# Patient Record
Sex: FEMALE | Race: WHITE | NOT HISPANIC OR LATINO | Employment: OTHER | URBAN - METROPOLITAN AREA
[De-identification: names, ages, dates, MRNs, and addresses within clinical notes are randomized per-mention and may not be internally consistent; named-entity substitution may affect disease eponyms.]

---

## 2017-06-26 ENCOUNTER — ALLSCRIPTS OFFICE VISIT (OUTPATIENT)
Dept: OTHER | Facility: OTHER | Age: 57
End: 2017-06-26

## 2017-06-26 DIAGNOSIS — E78.5 HYPERLIPIDEMIA: ICD-10-CM

## 2017-06-26 DIAGNOSIS — R79.89 OTHER SPECIFIED ABNORMAL FINDINGS OF BLOOD CHEMISTRY: ICD-10-CM

## 2017-06-26 DIAGNOSIS — E03.9 HYPOTHYROIDISM: ICD-10-CM

## 2017-06-26 DIAGNOSIS — E55.9 VITAMIN D DEFICIENCY: ICD-10-CM

## 2017-06-26 DIAGNOSIS — F39 MOOD DISORDER (HCC): ICD-10-CM

## 2017-06-28 ENCOUNTER — ALLSCRIPTS OFFICE VISIT (OUTPATIENT)
Dept: OTHER | Facility: OTHER | Age: 57
End: 2017-06-28

## 2017-08-25 ENCOUNTER — GENERIC CONVERSION - ENCOUNTER (OUTPATIENT)
Dept: OTHER | Facility: OTHER | Age: 57
End: 2017-08-25

## 2017-11-07 ENCOUNTER — GENERIC CONVERSION - ENCOUNTER (OUTPATIENT)
Dept: FAMILY MEDICINE CLINIC | Facility: CLINIC | Age: 57
End: 2017-11-07

## 2017-11-08 ENCOUNTER — ALLSCRIPTS OFFICE VISIT (OUTPATIENT)
Dept: OTHER | Facility: OTHER | Age: 57
End: 2017-11-08

## 2017-11-10 NOTE — PROGRESS NOTES
Assessment    1  Visit for suture removal (V58 32) (Z48 02)    Plan  Visit for suture removal    · Follow-up PRN Evaluation and Treatment  Follow-up  Status: Complete  Done:08Nov2017    Discussion/Summary    Continue to keep area clean and dry  Can use moisturizer to help minimize scarring in that area  Follow-up as needed  Possible side effects of new medications were reviewed with the patient/guardian today  The treatment plan was reviewed with the patient/guardian  The patient/guardian understands and agrees with the treatment plan     Self Referrals: No      Chief Complaint  Patient presents today for suture removal, 6 sutures on chin  History of Present Illness  HPI: 51-year-old female with intellectual disability presenting with caretaker for removal of sutures  Patient fell approximately 1 week ago going upstairs and lacerated her chin  Was seen [facility] was given 6 sutures in her chin  Mild tenderness around the area today, but no drainage  Review of Systems   Constitutional: No fever, no chills, feels well, no tiredness, no recent weight gain or loss  Integumentary: as noted in HPI  Active Problems    1  Balance problem (781 99) (R26 89)   2  Chronic constipation (564 00) (K59 09)   3  Colonoscopy (Fiberoptic) Screening   4  Confusion and disorientation (300 9) (F99)   5  Contusion of right lower leg (924 10) (S80 11XA)   6  Contusion of right wrist (923 21) (S60 211A)   7  Depression (311) (F32 9)   8  Encounter for PPD test (V74 1) (Z11 1)   9  Encounter for screening colonoscopy (V76 51) (Z12 11)   10  Encounter for screening mammogram for breast cancer (V76 12) (Z12 31)   11  Encounter for screening mammogram for malignant neoplasm of breast (V76 12)  (Z12 31)   12  Examination (V72 9) (Z00 00)   13  Hyperlipidemia (272 4) (E78 5)   14  Hypothyroidism (244 9) (E03 9)   15  Low serum vitamin D (790 6) (R79 89)   16   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,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)   17  Mental disability (319) (F79)   18  Mood disorder (296 90) (F39)   19  Need for prophylactic vaccination and inoculation against influenza (V04 81) (Z23)   20  Need for vaccination for DTP (V06 1) (Z23)   21  Obese (278 00) (E66 9)   22  Open Wound Of The Finger (883 0)   23  PPD screening test (V74 1) (Z11 1)   24  Screening for depression (V79 0) (Z13 89)   25  Urinary incontinence (788 30) (R32)   26  Vitamin D deficiency (268 9) (E55 9)    Past Medical History  1  History of Anxiety disorder (300 00) (F41 9)   2  History of Cellulitis of leg (682 6) (L03 119)   3  History of Cellulitis Of The Ankle (682 6)   4  History of Dyslipidemia (272 4) (E78 5)   5  History of mental retardation (V11 8) (Z86 59)   6  History of varicella (V12 09) (Z86 19)   7  History of Impacted cerumen, unspecified laterality (380 4) (H61 20)   8  Need for vaccination for DTP (V06 1) (Z23)    Family History  Mother    1  Family history of Neglect or abandonment  Father    2  Family history of Neglect or abandonment    Social History   · Disabled   · Never a smoker   · Never A Smoker   · Never Drank Alcohol   · No alcohol use   · No drug use   · Social history reviewed, unchanged    Surgical History    1  History of Hysterectomy    Current Meds   1  Calcium Carbonate-Vitamin D 600-400 MG-UNIT Oral Tablet; TAKE 1 TABLET TWICE DAILY AT 8AM & 5PM *DR MERRITT (CALCIUM SUPPLEMENT); Therapy: 13MZU6915 to (Evaluate:22Jan2018)  Requested for: 26Jun2017; Last Rx:26Jun2017 Ordered   2  Disposable Brief Large Miscellaneous; Use 3 pullups a day; Therapy: 51PLA4721 to (Evaluate:25Glh3621); Last Rx:73Spf1487 Ordered   3  Disposable Brief X-Large Miscellaneous; use 2 a day; Therapy: 62QKM2205 to (Evaluate:16Dtb9296)  Requested for: 46KUO9623; Last Rx:12Iyo7381 Ordered   4  FLUoxetine HCl - 40 MG Oral Capsule; take one capsule a day; Therapy: 99HOE4259 to (Evaluate:64Vdt1356)  Requested for: 04MFG4261; Last Rx:15Mar2016 Ordered   5  LamoTRIgine 100 MG Oral Tablet; TAKE 1 TABLET DAILY; Therapy: 67WLN0949 to (Evaluate:98Lbd2977)  Requested for: 92SNH8959; Last Rx:2016 Ordered   6  Levothyroxine Sodium 100 MCG Oral Tablet; TAKE 1 TABLET BY MOUTH ONCE DAILY AT 6AM (HYPOTHYROIDISM); Therapy: 10YLS1266 to (Evaluate:98Rez3006)  Requested for: 2017; Last Rx:2017 Ordered   7  Omega 3 1000 MG Oral Capsule; take 3 tabs daily;; Therapy: 2013 to (Evaluate:29Zlt6169)  Requested for: 54KRJ4660; Last Rx:2017 Ordered   8  Pravastatin Sodium 40 MG Oral Tablet; TAKE 1 TABLET BY MOUTH ONCE DAILY AT 5PM (CHOLESTEROL) *PICA; Therapy: 30GLA3125 to (Evaluate:40Rza5585)  Requested for: 50PYZ6919; Last DP:22EGM9957 Ordered   9  SF 5000 Plus 1 1 % Dental Cream; Therapy: (Recorded:24Lty1479) to Recorded   10  Tylenol 325 MG Oral Tablet; TAKE 1 OR 2 TABLETS EVERY 4 TO 6 HOURS AS  NEEDED; Therapy: 29VHX5942 to (Prosper Blacksmith)  Requested for: 2013; Last  Rx:2013 Ordered   11  Vitamin D2 400 UNIT Oral Tablet; Take 1 pill a day; Therapy: 48ZGQ1195 to (Evaluate:08Ael1840)  Requested for: 29IZU8020; Last  88YAH4659 Ordered   12  Vitamin D3 400 UNIT Oral Tablet; TAKE 1 TABLET BY MOUTH ONCE DAILY @ 8AM  (SUPPLEMENT); Therapy: 59UCU8074 to (Evaluate:81Dbf1786)  Requested for: 95QYI5415; Last  Rx:2017 Ordered    The medication list was reviewed and updated today  Allergies  1  No Known Drug Allergies    Vitals   Recorded: 60RMI4210 02:07PM   Temperature 96 8 F   Heart Rate 76   Respiration 20   Systolic 801   Diastolic 76   Height 5 ft 1 in   Weight 196 lb 2 oz   BMI Calculated 37 06   BSA Calculated 1 87   O2 Saturation 94       Physical Exam   Constitutional  General appearance: No acute distress, well appearing and well nourished  Skin  Examination of the skin for lesions: Abnormal  -- Approximately 3 cm laceration on chin with 6 sutures  Procedure   six sutures removed chin  Mild erythema noted    Laceration appears well healing  No complaints the patient                      Signatures   Electronically signed by : CARLA Baxter; Nov 8 2017  2:38PM EST                       (Author)    Electronically signed by : NIGHAT Murillo ; Nov 8 2017  3:36PM EST

## 2017-12-07 ENCOUNTER — ALLSCRIPTS OFFICE VISIT (OUTPATIENT)
Dept: OTHER | Facility: OTHER | Age: 57
End: 2017-12-07

## 2017-12-07 DIAGNOSIS — Z12.31 ENCOUNTER FOR SCREENING MAMMOGRAM FOR MALIGNANT NEOPLASM OF BREAST: ICD-10-CM

## 2017-12-07 DIAGNOSIS — R94.31 ABNORMAL ELECTROCARDIOGRAM: ICD-10-CM

## 2017-12-07 DIAGNOSIS — R42 DIZZINESS AND GIDDINESS: ICD-10-CM

## 2017-12-07 DIAGNOSIS — R29.6 REPEATED FALLS: ICD-10-CM

## 2017-12-20 ENCOUNTER — GENERIC CONVERSION - ENCOUNTER (OUTPATIENT)
Dept: OTHER | Facility: OTHER | Age: 57
End: 2017-12-20

## 2017-12-26 ENCOUNTER — GENERIC CONVERSION - ENCOUNTER (OUTPATIENT)
Dept: FAMILY MEDICINE CLINIC | Facility: CLINIC | Age: 57
End: 2017-12-26

## 2017-12-26 ENCOUNTER — HOSPITAL ENCOUNTER (OUTPATIENT)
Dept: NON INVASIVE DIAGNOSTICS | Facility: HOSPITAL | Age: 57
Discharge: HOME | End: 2017-12-26
Payer: COMMERCIAL

## 2017-12-26 DIAGNOSIS — R42 DIZZINESS AND GIDDINESS: ICD-10-CM

## 2017-12-26 DIAGNOSIS — R29.6 REPEATED FALLS: ICD-10-CM

## 2017-12-26 DIAGNOSIS — R94.31 ABNORMAL ELECTROCARDIOGRAM: ICD-10-CM

## 2017-12-26 PROCEDURE — 93017 CV STRESS TEST TRACING ONLY: CPT

## 2017-12-29 ENCOUNTER — TRANSCRIBE ORDERS (OUTPATIENT)
Dept: ADMINISTRATIVE | Facility: HOSPITAL | Age: 57
End: 2017-12-29

## 2017-12-29 DIAGNOSIS — R29.6 FALLS FREQUENTLY: ICD-10-CM

## 2017-12-29 DIAGNOSIS — R94.31 NONSPECIFIC ABNORMAL ELECTROCARDIOGRAM (ECG) (EKG): Primary | ICD-10-CM

## 2017-12-29 DIAGNOSIS — R94.39 ABNORMAL BALLISTOCARDIOGRAM: ICD-10-CM

## 2017-12-29 DIAGNOSIS — R42 DIZZINESS AND GIDDINESS: ICD-10-CM

## 2018-01-10 NOTE — PROGRESS NOTES
Assessment    1  Encounter for preventive health examination (V70 0) (Z00 00)    Plan  Encounter for PPD test    · PPD  Encounter for screening mammogram for breast cancer    · * MAMMO SCREENING BILATERAL W CAD; Status:Active - Retrospective By Protocol  Authorization; Requested OPE:[id number]; Health Maintenance    · Calcium Carbonate-Vitamin D 600-400 MG-UNIT Oral Tablet; TAKE 1 TABLET  TWICE DAILY AT 8AM & 5PM *DR MERRITT (CALCIUM SUPPLEMENT)   · Begin a limited exercise program ; Status:Complete;   Done: 22WNE6496   · Eat a low fat and low cholesterol diet ; Status:Complete;   Done: 44YAO5079   · Some eating tips that can help you lose weight ; Status:Complete;   Done: 78DZG9472   · We recommend that you follow these steps to lower your risk of osteoporosis  ;  Status:Complete;   Done: 58ZIW4214  Hyperlipidemia    · Pravastatin Sodium 40 MG Oral Tablet; TAKE 1 TABLET BY MOUTH ONCE  DAILY AT 5PM (CHOLESTEROL) *PICA   · (1) COMPREHENSIVE METABOLIC PANEL; Status:Active; Requested CIJ:49ZEQ5792;    · (1) LIPID PANEL, FASTING; Status:Active; Requested XXQ:85GTV0921;   Hypothyroidism    · Levothyroxine Sodium 100 MCG Oral Tablet; TAKE 1 TABLET BY MOUTH ONCE  DAILY AT 6AM (HYPOTHYROIDISM)   · (1) TSH; Status:Active; Requested PWH:27UDU6763;   Low serum vitamin D    · Vitamin D2 400 UNIT Oral Tablet; Take 1 pill a day  Low serum vitamin D, Vitamin D deficiency    · (1) VITAMIN D 25-HYDROXY; Status:Active; Requested BOB:[id number];   Mood disorder    · (1) CBC/PLT/DIFF; Status:Active; Requested TPL:79NRF8655;     Discussion/Summary  Impression: Subsequent Annual Wellness Visit, with preventive exam as well as age and risk appropriate counseling completed  Cardiovascular screening and counseling: screening is current, counseling was given on maintaining a healthy diet, counseling was given on maintaining a healthy weight and counseling was given on ways to improve cholesterol     Diabetes screening and counseling: screening is current, counseling was given on maintaining a healthy diet, counseling was given on maintaining a healthy weight and counseling was given on ways to improve physical activity  Colorectal cancer screening and counseling: screening is current  Breast cancer screening and counseling: screening is current  Osteoporosis screening and counseling: screening not indicated  Abdominal aortic aneurysm screening and counseling: screening not indicated  Glaucoma screening and counseling: screening not indicated  HIV screening and counseling: screening not indicated  Immunizations: influenza vaccination is recommended annually, pneumococcal vaccination status is unknown, hepatitis B vaccination series is not indicated at this time due to the patient's low risk of abdoul the disease, Zostavax vaccination status is unknown and Tdap vaccination up to date  Advance Directive Planning: complete and up to date  Advice and education were given regarding increasing physical activity, nutrition (non-diabetic) and sunscreen use  She was referred to continue regular f/u  Patient Discussion: follow-up visit needed in one year  Educational resources provided:   Possible side effects of new medications were reviewed with the patient/guardian today  The treatment plan was reviewed with the patient/guardian  The patient/guardian understands and agrees with the treatment plan     Self Referrals: No      Chief Complaint  ANNUAL WELLNESS VISIT      History of Present Illness  HPI: 63 y/o F here for AWV  No complaints  She has changed her diet and is more physically active  She pierce have optometry and just had lenses changed  Welcome to [employer] and Wellness Visits: The patient is being seen for the subsequent annual wellness visit  Medicare Screening and Risk Factors   Hospitalizations: she has been previously hospitalizied     Once per lifetime medicare screening tests: ECG has not been done and AAA screening US has not yet been done  Medicare Screening Tests Risk Questions   Abdominal aortic aneurysm risk assessment: none indicated  Osteoporosis risk assessment: none indicated  HIV risk assessment: none indicated  Drug and Alcohol Use: The patient has never smoked cigarettes  The patient reports never drinking alcohol  Alcohol concern:   The patient has no concerns about alcohol abuse  She has never used illicit drugs  Diet and Physical Activity: Current diet includes well balanced meals, limited junk food, 2 servings of fruit per day, 1 servings of vegetables per day, 1-2 servings of meat per day, 1 servings of whole grains per day, 1 servings of simple carbohydrates per day, 1-2 servings of dairy products per day, 2 cups of coffee per day, 1 cups of tea per day, 0 cans of regular soda per day and 1 cans of diet soda per day  She exercises daily  Exercise: walking 30-60 minutes per day  Mood Disorder and Cognitive Impairment Screening: PHQ-9 Depression Scale   Over the past 2 weeks, how often have you been bothered by the following problems? 1 ) Little interest or pleasure in doing things? Not at all    2 ) Feeling down, depressed or hopeless? Not at all    3 ) Trouble falling asleep or sleeping too much? Not at all    4 ) Feeling tired or having little energy? Not at all    5 ) Poor appetite or overeating? Not at all    6 ) Feeling bad about yourself, or that you are a failure, or have let yourself or your family down? Not at all    7 ) Trouble concentrating on things, such as reading a newspaper or watching television? Not at all    8 ) Moving or speaking so slowly that other people could have noticed, or the opposite, moving or speaking faster than usual? Not at all  TOTAL SCORE: 0    How difficult have these problems made it for you to do your work, take care of things at home, or get along with people? Not at all  Depression screening score was 0     negative for symptoms   She denies feeling down, depressed, or hopeless over the past two weeks  She denies feeling little interest or pleasure in doing things over the past two weeks  Cognitive impairment screening: denies difficulty learning/retaining new information, difficulty handling complex tasks, denies difficulty with reasoning, denies difficulty with spatial ability and orientation, denies difficulty with language and difficulty with behavior  Functional Ability/Level of Safety: Hearing is normal in the right ear, normal in the left ear and a hearing aid is not used  She denies hearing difficulties  The patient is currently able to do activities of daily living with limitations, able to do instrumental activities of daily living with limitations, able to participate in social activities with limitations and unable to drive  Activities of daily living details: needs help using the phone, transportation help needed, needs help shopping, meal preparation help needed, needs help doing housework, needs help doing laundry, needs help managing medications and needs help managing money  Fall risk factors:  urinary incontinence, but The patient fell 0 times in the past 12 months , no polypharmacy, no alcohol use, no mobility impairment, no antidepressant use, no deconditioning, no postural hypotension, no sedative use, no visual impairment, no antihypertensive use, no cognitive impairment and no previous fall  Home safety risk factors:  no unfamiliar surroundings, no loose rugs, no poor household lighting, no uneven floors, no household clutter, grab bars in the bathroom and handrails on the stairs  Advance Directives: Advance directives: durable power of  for health care directives, but no living will and no advance directives     Co-Managers and Medical Equipment/Suppliers: See Patient Care Team      Patient Care Team    Care Team Member Role Specialty Office Number   [facility]  Family Medicine [phone]   Danish Barba, [address] [facility] Medicine [phone]     Active Problems    1  Balance problem (781 99) (R26 89)   2  Chronic constipation (564 00) (K59 09)   3  Colonoscopy (Fiberoptic) Screening   4  Confusion and disorientation (300 9) (F99)   5  Contusion of right lower leg (924 10) (S80 11XA)   6  Contusion of right wrist (923 21) (S60 211A)   7  Depression (311) (F32 9)   8  Encounter for screening colonoscopy (V76 51) (Z12 11)   9  Encounter for screening mammogram for breast cancer (V76 12) (Z12 31)   10  Encounter for screening mammogram for malignant neoplasm of breast (V76 12)    (Z12 31)   11  Examination (V72 9) (Z00 00)   12  Hyperlipidemia (272 4) (E78 5)   13  Hypothyroidism (244 9) (E03 9)   14  Low serum vitamin D (790 6) (R79 89)   15  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,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)   16  Mental disability (319) (F79)   17  Mood disorder (296 90) (F39)   18  Need for prophylactic vaccination and inoculation against influenza (V04 81) (Z23)   19  Need for vaccination for DTP (V06 1) (Z23)   20  Obese (278 00) (E66 9)   21  Open Wound Of The Finger (883 0)   22  PPD screening test (V74 1) (Z11 1)   23  Screening for depression (V79 0) (Z13 89)   24  Urinary incontinence (788 30) (R32)   25  Vitamin D deficiency (268 9) (E55 9)    Past Medical History    · History of Anxiety disorder (300 00) (F41 9)   · History of Cellulitis of leg (682 6) (L03 119)   · History of Cellulitis Of The Ankle (682 6)   · History of Dyslipidemia (272 4) (E78 5)   · History of mental retardation (V11 8) (Z86 59)   · History of varicella (V12 09) (Z86 19)   · History of Impacted cerumen, unspecified laterality (380 4) (H61 20)   · Need for vaccination for DTP (V06 1) (Z23)    Surgical History    · History of Hysterectomy    The surgical history was reviewed and updated today         Family History  Mother    · Family history of Neglect or abandonment  Father    · Family history of Neglect or abandonment    The family history was reviewed and updated today  Social History    · Disabled   · Never a smoker   · Never A Smoker   · Never Drank Alcohol   · No alcohol use   · No drug use   · Social history reviewed, unchanged  The social history was reviewed and updated today  Current Meds   1  Calcium Carbonate-Vitamin D 600-400 MG-UNIT Oral Tablet; TAKE 1 TABLET TWICE   DAILY AT 8AM & 5PM *DR MERRITT (CALCIUM SUPPLEMENT); Therapy: 50UQM0217 to (Evaluate:36Yjc4278)  Requested for: 28Apr2017; Last   Rx:28Apr2017 Ordered   2  Disposable Brief Large Miscellaneous; Use 3 pullups a day; Therapy: 83EFQ8823 to (Evaluate:47Gkk0725); Last Rx:85Cxi7400 Ordered   3  Disposable Brief X-Large Miscellaneous; use 2 a day; Therapy: 01ZLJ4428 to (Evaluate:00Ehq8859)  Requested for: 66KER2318; Last   Rx:07Nsc1540 Ordered   4  FLUoxetine HCl - 40 MG Oral Capsule; take one capsule a day; Therapy: 70GSK5689 to (Evaluate:82Hhl4531)  Requested for: 08SVZ2268; Last   Rx:15Mar2016 Ordered   5  LamoTRIgine 100 MG Oral Tablet; TAKE 1 TABLET DAILY; Therapy: 92AUK7260 to (Evaluate:60Lmk4007)  Requested for: 41TFC4485; Last   Rx:15Mar2016 Ordered   6  Levothyroxine Sodium 100 MCG Oral Tablet; TAKE 1 TABLET BY MOUTH ONCE DAILY   AT 6AM (HYPOTHYROIDISM); Therapy: 46THB1394 to (Evaluate:37Xdc0100)  Requested for: 02BZG1281; Last   Rx:30Jan2017 Ordered   7  Omega 3 1000 MG Oral Capsule; take 3 tabs daily;;   Therapy: 12Jun2013 to (Evaluate:95Xzv5230)  Requested for: 22RND4987; Last   Rx:31Mar2017 Ordered   8  Pravastatin Sodium 40 MG Oral Tablet; TAKE 1 TABLET BY MOUTH ONCE DAILY AT   5PM (CHOLESTEROL) *PICA; Therapy: 55TPH1537 to (Evaluate:54Boa1267)  Requested for: 28Apr2017; Last   Rx:28Apr2017 Ordered   9  SF 5000 Plus 1 1 % Dental Cream;   Therapy: (Recorded:26Piq3554) to Recorded   10  Tylenol 325 MG Oral Tablet; TAKE 1 OR 2 TABLETS EVERY 4 TO 6 HOURS AS    NEEDED;     Therapy: 16SBW1844 to (Fermin Virgen)  Requested for: 86KFE9378; Last    Rx:11Jun2013 Ordered   11  Vitamin D2 400 UNIT Oral Tablet; Take 1 pill a day; Therapy: 74DOU5702 to (Evaluate:03Jun2017)  Requested for: 00OWC3022; Last    Rx:02Fmd5893 Ordered   12  Vitamin D3 400 UNIT Oral Tablet; TAKE 1 TABLET BY MOUTH ONCE DAILY @ 8AM    (SUPPLEMENT); Therapy: 21DKY8905 to (Evaluate:03Sep2017)  Requested for: 57VSR3128; Last    Rx:93Qxo4791 Ordered    The medication list was reviewed and updated today  Allergies    1  No Known Drug Allergies    Immunizations   1 2    Influenza  Temporarily Deferred: Out of Supplies 14-Nov-2014    PPD  11-Jun-2013 12-Jun-2015    Tdap  11-Jun-2013      Vitals  Signs    Temperature: 97 4 F, Tympanic  Heart Rate: 79  Respiration: 22  Systolic: 688  Diastolic: 78  Height: 5 ft 1 in  Weight: 199 lb 3 oz  BMI Calculated: 37 64  BSA Calculated: 1 89  O2 Saturation: 96    Physical Exam    Constitutional   General appearance: No acute distress, well appearing and well nourished  Eyes   Conjunctiva and lids: No swelling, erythema or discharge  Pupils and irises: Equal, round and reactive to light  Ears, Nose, Mouth, and Throat   External inspection of ears and nose: Normal     Otoscopic examination: Tympanic membranes translucent with normal light reflex  Canals patent without erythema  Oropharynx: Normal with no erythema, edema, exudate or lesions  Pulmonary   Respiratory effort: No increased work of breathing or signs of respiratory distress  Auscultation of lungs: Clear to auscultation  Cardiovascular   Palpation of heart: Normal PMI, no thrills  Auscultation of heart: Normal rate and rhythm, normal S1 and S2, without murmurs  Examination of extremities for edema and/or varicosities: Normal     Abdomen   Abdomen: Non-tender, no masses  Liver and spleen: No hepatomegaly or splenomegaly  Lymphatic   Palpation of lymph nodes in neck: No lymphadenopathy      Musculoskeletal   Gait and station: Normal     Digits and nails: Normal without clubbing or cyanosis  Inspection/palpation of joints, bones, and muscles: Normal     Skin   Skin and subcutaneous tissue: Normal without rashes or lesions  Neurologic   Cranial nerves: Cranial nerves 2-12 intact  Reflexes: 2+ and symmetric  Sensation: No sensory loss      Psychiatric   Orientation to person, place, and time: Normal     Mood and affect: Normal        Future Appointments    Date/Time Provider Specialty Site   06/28/2017 03:00 PM Silvestre PAEZ, Nurse Schedule  [place] [id number]     Signatures   Electronically signed by : ALBARO Qiu; Jun 26 2017  3:05PM EST                       (Author)    Electronically signed by : NIGHAT Mcgee ; Jul 5 2017  2:20PM EST

## 2018-01-11 NOTE — MISCELLANEOUS
Message  We have attempted to contact the patient/patients care taker but have been unsuccessful  I sent the patient a letter to contact our office at her earliest convenience  [id number]      Active Problems    1  Balance problem (781 99) (R26 89)   2  Chronic constipation (564 00) (K59 09)   3  Colonoscopy (Fiberoptic) Screening   4  Confusion and disorientation (300 9) (F99)   5  Contusion of right lower leg (924 10) (S80 11XA)   6  Contusion of right wrist (923 21) (S60 211A)   7  Depression (311) (F32 9)   8  Encounter for PPD test (V74 1) (Z11 1)   9  Encounter for screening colonoscopy (V76 51) (Z12 11)   10  Encounter for screening mammogram for breast cancer (V76 12) (Z12 31)   11  Encounter for screening mammogram for malignant neoplasm of breast (V76 12)    (Z12 31)   12  Examination (V72 9) (Z00 00)   13  Hyperlipidemia (272 4) (E78 5)   14  Hypothyroidism (244 9) (E03 9)   15  Low serum vitamin D (790 6) (R79 89)   16  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,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)   17  Mental disability (319) (F79)   18  Mood disorder (296 90) (F39)   19  Need for prophylactic vaccination and inoculation against influenza (V04 81) (Z23)   20  Need for vaccination for DTP (V06 1) (Z23)   21  Obese (278 00) (E66 9)   22  Open Wound Of The Finger (883 0)   23  PPD screening test (V74 1) (Z11 1)   24  Screening for depression (V79 0) (Z13 89)   25  Urinary incontinence (788 30) (R32)   26  Vitamin D deficiency (268 9) (E55 9)    Current Meds   1  Calcium Carbonate-Vitamin D 600-400 MG-UNIT Oral Tablet; TAKE 1 TABLET TWICE   DAILY AT 8AM & 5PM *DR MERRITT (CALCIUM SUPPLEMENT); Therapy: 14UDV4132 to (Evaluate:22Jan2018)  Requested for: 26Jun2017; Last   Rx:26Jun2017 Ordered   2  Disposable Brief Large Miscellaneous; Use 3 pullups a day; Therapy: 12QCQ3014 to (Evaluate:12Sta8359); Last Rx:86Wwa9050 Ordered   3  Disposable Brief X-Large Miscellaneous; use 2 a day;    Therapy: 01WON5657 to (Evaluate:24Cjq0512) Requested for: 08KRB4166; Last   Rx:89Ker4130 Ordered   4  FLUoxetine HCl - 40 MG Oral Capsule; take one capsule a day; Therapy: 14NDQ9620 to (Evaluate:23Tze4831)  Requested for: 13QIK9928; Last   Rx:2016 Ordered   5  LamoTRIgine 100 MG Oral Tablet; TAKE 1 TABLET DAILY; Therapy: 83AJA0924 to (Evaluate:68Eqg0835)  Requested for: 19PAA6583; Last   Rx:2016 Ordered   6  Levothyroxine Sodium 100 MCG Oral Tablet; TAKE 1 TABLET BY MOUTH ONCE DAILY   AT 6AM (HYPOTHYROIDISM); Therapy: 02GMU0420 to (Evaluate:81Zcw8754)  Requested for: 2017; Last   Rx:2017 Ordered   7  Omega 3 1000 MG Oral Capsule; take 3 tabs daily;;   Therapy: 2013 to (Evaluate:50Btc1806)  Requested for: 53IAC9213; Last   Rx:2017 Ordered   8  Pravastatin Sodium 40 MG Oral Tablet; TAKE 1 TABLET BY MOUTH ONCE DAILY AT   5PM (CHOLESTEROL) *PICA; Therapy: 58SLW2637 to (Evaluate:50Hpg5465)  Requested for: 23RHP4705; Last   ZX:73MFU9674 Ordered   9  SF 5000 Plus 1 1 % Dental Cream;   Therapy: (Recorded:39Lwz1477) to Recorded   10  Tylenol 325 MG Oral Tablet; TAKE 1 OR 2 TABLETS EVERY 4 TO 6 HOURS AS    NEEDED; Therapy: 26JPP6903 to (Yoana Walsh)  Requested for: 2013; Last    Rx:2013 Ordered   11  Vitamin D2 400 UNIT Oral Tablet; Take 1 pill a day; Therapy: 22WVG2687 to (Evaluate:98Mqd9122)  Requested for: 69FQS8150; Last    26QJR3271 Ordered   12  Vitamin D3 400 UNIT Oral Tablet; TAKE 1 TABLET BY MOUTH ONCE DAILY @ 8AM    (SUPPLEMENT); Therapy: 97GCZ0923 to (Evaluate:36Moj9010)  Requested for: 62LJZ3548; Last    Rx:85Zyw0515 Ordered    Allergies    1  No Known Drug Allergies    Signatures   Electronically signed by : ALBARO Hassan;  Aug 28 2017 11:14AM EST

## 2018-01-11 NOTE — PROGRESS NOTES
Assessment    1  Hyperlipidemia (272 4) (E78 5)   2  Hypothyroidism (244 9) (E03 9)   3  Encounter for preventive health examination (V70 0) (Z00 00)   4  Mental disability (319) (F79)    Plan  Encounter for screening mammogram for breast cancer    · * MAMMO SCREENING BILATERAL W CAD; Status:Hold For - Scheduling; Requested  for:22Jun2016;   Hyperlipidemia    · (1) COMPREHENSIVE METABOLIC PANEL; Status:Active; Requested for:22Jun2016;    · (1) COMPREHENSIVE METABOLIC PANEL; Status:Canceled;    · (1) LIPID PANEL, FASTING; Status:Active; Requested for:22Jun2016;    · (1) LIPID PANEL, FASTING; Status:Canceled;    · (1) LIPID PANEL, FASTING; Status:Canceled; Hypothyroidism    · (1) TSH; Status:Active; Requested for:22Jun2016;    · (1) TSH; Status:Canceled;    · (1) TSH; Status:Canceled;   Vitamin D deficiency    · (1) VITAMIN D 25-HYDROXY; Status:Canceled;    · (1) VITAMIN D 25-HYDROXY; Status:Canceled; Discussion/Summary  health maintenance visit Currently, she eats a healthy diet  cervical cancer screening is current Breast cancer screening: mammogram is current  Colorectal cancer screening: colorectal cancer screening is current  Screening lab work includes glucose, lipid profile and thyroid function testing  The immunizations are up to date  She was advised to be evaluated by UTD  Advice and education were given regarding nutrition and aerobic exercise  Continue with healthy diet and exercise  Despite no weight loss she is walking regularly and doing aerobics classes  She is UTD with vaccines and zostavax was in 2014  Possible side effects of new medications were reviewed with the patient/guardian today  The patient was counseled regarding instructions for management, impressions  Self Referrals: No      Chief Complaint  53 y/o well visit  Tosha Gann states that psych DC'd abilify 2mg and put pt on abilify 1mg      History of Present Illness  HM, Adult Female:  The patient is being seen for a health maintenance evaluation  The last health maintenance visit was 1 year(s) ago  General Health: The patient's health since the last visit is described as good  She has regular dental visits  She complains of vision problems (just got new glasses)  Vision care includes wearing glasses  She denies hearing loss  Immunizations status: up to date  Lifestyle:  She does not have a healthy diet  She has weight concerns  She exercises regularly  She does not use tobacco  She denies alcohol use  She denies drug use  Reproductive health:  she reports normal menses  she is not sexually active  Screening: cancer screening reviewed and current  metabolic screening reviewed and current  risk screening reviewed and current  HPI: 53 y/o F here for yearly PE  She saw psychiatry 1 year ago and they put her on abilify  She has had shaking and UI  He decreased the dose  Review of Systems    Constitutional: No fever, no chills, feels well, no tiredness, no recent weight gain or weight loss  Eyes: No complaints of eye pain, no red eyes, no eyesight problems, no discharge, no dry eyes, no itching of eyes  ENT: no complaints of earache, no loss of hearing, no nose bleeds, no nasal discharge, no sore throat, no hoarseness  Cardiovascular: No complaints of slow heart rate, no fast heart rate, no chest pain, no palpitations, no leg claudication, no lower extremity edema  Respiratory: No complaints of shortness of breath, no wheezing, no cough, no SOB on exertion, no orthopnea, no PND  Gastrointestinal: No complaints of abdominal pain, no constipation, no nausea or vomiting, no diarrhea, no bloody stools  Genitourinary: as noted in HPI  Musculoskeletal: No complaints of arthralgias, no myalgias, no joint swelling or stiffness, no limb pain or swelling  Integumentary: No complaints of skin rash or lesions, no itching, no skin wounds, no breast pain or lump     Neurological: tremor, but No complaints of headache, no confusion, no convulsions, no numbness, no dizziness or fainting, no tingling, no limb weakness, no difficulty walking  Psychiatric: Not suicidal, no sleep disturbance, no anxiety or depression, no change in personality, no emotional problems  Endocrine: No complaints of proptosis, no hot flashes, no muscle weakness, no deepening of the voice, no feelings of weakness  Hematologic/Lymphatic: No complaints of swollen glands, no swollen glands in the neck, does not bleed easily, does not bruise easily  Active Problems    1  Chronic constipation (564 00) (K59 09)   2  Colonoscopy (Fiberoptic) Screening   3  Contusion of right lower leg (924 10) (S80 11XA)   4  Contusion of right wrist (923 21) (S60 211A)   5  Depression (311) (F32 9)   6  Encounter for screening colonoscopy (V76 51) (Z12 11)   7  Encounter for screening mammogram for breast cancer (V76 12) (Z12 31)   8  Encounter for screening mammogram for malignant neoplasm of breast (V76 12)   (Z12 31)   9  Examination (V72 9) (Z00 00)   10  Hyperlipidemia (272 4) (E78 5)   11  Hypothyroidism (244 9) (E03 9)   12  Low serum vitamin D (790 6) (R79 89)   13  Mood disorder (296 90) (F39)   14  Need for prophylactic vaccination and inoculation against influenza (V04 81) (Z23)   15  Need for vaccination for DTP (V06 1) (Z23)   16  Obese (278 00) (E66 9)   17  Open Wound Of The Finger (883 0)   18  PPD screening test (V74 1) (Z11 1)   19  Screening for depression (V79 0) (Z13 89)   20  Urinary incontinence (788 30) (R32)   21   Vitamin D deficiency (268 9) (E55 9)    Past Medical History    · History of Anxiety disorder (300 00) (F41 9)   · History of Cellulitis of leg (682 6) (L03 119)   · History of Cellulitis Of The Ankle (682 6)   · History of Dyslipidemia (272 4) (E78 5)   · History of mental retardation (V11 8) (Z86 59)   · History of varicella (V12 09) (Z86 19)   · History of Impacted cerumen, unspecified laterality (380 4) (H61 20)   · Need for vaccination for DTP (V06 1) (Z23)    Surgical History    · History of Hysterectomy    Family History  Mother    · Family history of Neglect or abandonment  Father    · Family history of Neglect or abandonment    Social History    · Disabled   · Never a smoker   · Never A Smoker   · Never Drank Alcohol   · No alcohol use   · No drug use   · Social history reviewed, unchanged    Current Meds   1  ARIPiprazole 2 MG Oral Tablet; TAKE 1 TABLET DAILY AS DIRECTED AT 6AM;   Therapy: 13PKB2961 to (Evaluate:07Imh7812)  Requested for: 30URQ1765; Last   Rx:15Mar2016 Ordered   2  Calcium Carbonate-Vitamin D 600-400 MG-UNIT Oral Tablet; TAKE 1 TABLET TWICE   DAILY AT 8AM & 5PM *DR MERRITT (CALCIUM SUPPLEMENT); Therapy: 23BSO4768 to (Last Rx:28Apr2016)  Requested for: 28Apr2016 Ordered   3  FLUoxetine HCl - 40 MG Oral Capsule; take one capsule a day; Therapy: 54EOC8433 to (Evaluate:87Yob4301)  Requested for: 77DOC8467; Last   Rx:15Mar2016 Ordered   4  LamoTRIgine 100 MG Oral Tablet; TAKE 1 TABLET DAILY; Therapy: 65LHR4176 to (Evaluate:10Ppj5105)  Requested for: 86BGP8919; Last   Rx:15Mar2016 Ordered   5  Levothyroxine Sodium 100 MCG Oral Tablet; TAKE ONE (1) TABLET daily; Therapy: 59NLA8752 to (Evaluate:92Ibj7740)  Requested for: 72NOJ7898; Last   Rx:15Mar2016 Ordered   6  Omega 3 1000 MG Oral Capsule; take 3 tabs daily;;   Therapy: 69Wiu2151 to (Evaluate:27Xho3791)  Requested for: 36XFT9602; Last   Rx:15Mar2016 Ordered   7  Pravastatin Sodium 40 MG Oral Tablet; TAKE 1 TABLET BY MOUTH ONCE DAILY AT   5PM; Patient to contact PCP for Appointment; Therapy: 40GGW9635 to (Evaluate:86Uos8257)  Requested for: 78EAQ0790; Last   Rx:15Mar2016 Ordered   8  SF 5000 Plus 1 1 % Dental Cream;   Therapy: (Recorded:90Qjv8781) to Recorded   9  Tylenol 325 MG Oral Tablet; TAKE 1 OR 2 TABLETS EVERY 4 TO 6 HOURS AS   NEEDED; Therapy: 29XKZ0851 to (Chadd Das)  Requested for: 92Lic3866; Last   Rx:11Jun2013 Ordered   10   Vitamin D2 400 UNIT Oral Tablet; Take 1 pill a day; Therapy: 42QXQ0732 to (Evaluate:39Zok6022)  Requested for: 38WMB1260; Last    Rx:70Jhe7224 Ordered    Allergies    1  No Known Drug Allergies    Vitals   Recorded: 24QPT0903 12:54PM   Temperature 98 3 F   Heart Rate 72   Respiration 20   Systolic 193   Diastolic 68   Height 5 ft 1 in   Weight 206 lb    BMI Calculated 38 92   BSA Calculated 1 91     Physical Exam    Constitutional   General appearance: No acute distress, well appearing and well nourished  Eyes   Conjunctiva and lids: No swelling, erythema or discharge  Pupils and irises: Equal, round and reactive to light  Ears, Nose, Mouth, and Throat   External inspection of ears and nose: Normal     Otoscopic examination: Tympanic membranes translucent with normal light reflex  Canals patent without erythema  Oropharynx: Normal with no erythema, edema, exudate or lesions  Pulmonary   Respiratory effort: No increased work of breathing or signs of respiratory distress  Auscultation of lungs: Clear to auscultation  Cardiovascular   Palpation of heart: Normal PMI, no thrills  Auscultation of heart: Normal rate and rhythm, normal S1 and S2, without murmurs  Examination of extremities for edema and/or varicosities: Normal     Abdomen   Abdomen: Non-tender, no masses  Liver and spleen: No hepatomegaly or splenomegaly  Lymphatic   Palpation of lymph nodes in neck: No lymphadenopathy  Musculoskeletal   Gait and station: Normal     Digits and nails: Normal without clubbing or cyanosis  Inspection/palpation of joints, bones, and muscles: Normal     Skin   Skin and subcutaneous tissue: Normal without rashes or lesions  Neurologic   Cranial nerves: Cranial nerves 2-12 intact  Reflexes: 2+ and symmetric  Sensation: No sensory loss      Psychiatric   Orientation to person, place, and time: Normal     Mood and affect: Normal        Procedure    Procedure: Hearing Acuity Test  Indication: Routine screeing  Audiometry:   Hearing in the right ear: 30 decibals at 500 hertz, 30 decibals at 1000 hertz and 30 decibals at 2000 hertz  Hearing in the left ear: 30 decibals at 500 hertz, 30 decibals at 1000 hertz and 30 decibals at 2000 hertz         Signatures   Electronically signed by : ALBARO Flores; Jun 22 2016  1:27PM EST                       (Author)    Electronically signed by : NIGHAT Qureshi ; Jun 22 2016  2:17PM EST

## 2018-01-12 VITALS
RESPIRATION RATE: 22 BRPM | SYSTOLIC BLOOD PRESSURE: 126 MMHG | HEIGHT: 61 IN | OXYGEN SATURATION: 96 % | WEIGHT: 199.19 LBS | TEMPERATURE: 97.4 F | BODY MASS INDEX: 37.61 KG/M2 | DIASTOLIC BLOOD PRESSURE: 78 MMHG | HEART RATE: 79 BPM

## 2018-01-14 VITALS
BODY MASS INDEX: 37.03 KG/M2 | OXYGEN SATURATION: 94 % | TEMPERATURE: 96.8 F | WEIGHT: 196.13 LBS | SYSTOLIC BLOOD PRESSURE: 124 MMHG | RESPIRATION RATE: 20 BRPM | HEART RATE: 76 BPM | HEIGHT: 61 IN | DIASTOLIC BLOOD PRESSURE: 76 MMHG

## 2018-01-15 NOTE — MISCELLANEOUS
Please contact our office at your convenience  It is important that we speak to you  REGARDING:          Scheduling an Appointment     Rescheduling an Appointment      Cancelling an Appointment     XXXXX Your Lab/ X-ray Results    XXXXX Updating your Phone number or Address      Other:                                                                                                                   Narvis Rubinstein

## 2018-01-18 NOTE — PROGRESS NOTES
Chief Complaint  Pt here today with care taker for PPD reading  PPD is Negative @0MM SR16      Active Problems    1  Balance problem (781 99) (R26 89)   2  Chronic constipation (564 00) (K59 09)   3  Colonoscopy (Fiberoptic) Screening   4  Confusion and disorientation (300 9) (F99)   5  Contusion of right lower leg (924 10) (S80 11XA)   6  Contusion of right wrist (923 21) (S60 211A)   7  Depression (311) (F32 9)   8  Encounter for PPD test (V74 1) (Z11 1)   9  Encounter for screening colonoscopy (V76 51) (Z12 11)   10  Encounter for screening mammogram for breast cancer (V76 12) (Z12 31)   11  Encounter for screening mammogram for malignant neoplasm of breast (V76 12)    (Z12 31)   12  Examination (V72 9) (Z00 00)   13  Hyperlipidemia (272 4) (E78 5)   14  Hypothyroidism (244 9) (E03 9)   15  Low serum vitamin D (790 6) (R79 89)   16  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,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)   17  Mental disability (319) (F79)   18  Mood disorder (296 90) (F39)   19  Need for prophylactic vaccination and inoculation against influenza (V04 81) (Z23)   20  Need for vaccination for DTP (V06 1) (Z23)   21  Obese (278 00) (E66 9)   22  Open Wound Of The Finger (883 0)   23  PPD screening test (V74 1) (Z11 1)   24  Screening for depression (V79 0) (Z13 89)   25  Urinary incontinence (788 30) (R32)   26  Vitamin D deficiency (268 9) (E55 9)    Current Meds   1  Calcium Carbonate-Vitamin D 600-400 MG-UNIT Oral Tablet; TAKE 1 TABLET TWICE   DAILY AT 8AM & 5PM *DR MERRITT (CALCIUM SUPPLEMENT); Therapy: 18DOI5628 to (Evaluate:22Jan2018)  Requested for: 26Jun2017; Last   Rx:26Jun2017 Ordered   2  Disposable Brief Large Miscellaneous; Use 3 pullups a day; Therapy: 81WQG3481 to (Evaluate:07Sog6186); Last Rx:92Qmr2464 Ordered   3  Disposable Brief X-Large Miscellaneous; use 2 a day; Therapy: 38XXH7569 to (Evaluate:10Dhg8760)  Requested for: 70HAZ8576; Last   Rx:30Sep2016 Ordered   4   FLUoxetine HCl - 40 MG Oral Capsule; take one capsule a day; Therapy: 66LNO1455 to (Evaluate:08Pae8001)  Requested for: 76YVC7717; Last   Rx:15Mar2016 Ordered   5  LamoTRIgine 100 MG Oral Tablet; TAKE 1 TABLET DAILY; Therapy: 58UMD2499 to (Evaluate:30Shk7021)  Requested for: 00HGJ4148; Last   Rx:15Mar2016 Ordered   6  Levothyroxine Sodium 100 MCG Oral Tablet; TAKE 1 TABLET BY MOUTH ONCE DAILY   AT 6AM (HYPOTHYROIDISM); Therapy: 08ABA8626 to (Evaluate:44Sxy2587)  Requested for: 26Jun2017; Last   Rx:26Jun2017 Ordered   7  Omega 3 1000 MG Oral Capsule; take 3 tabs daily;;   Therapy: 12Jun2013 to (Evaluate:34Dqa9522)  Requested for: 48KLI1090; Last   Rx:31Mar2017 Ordered   8  Pravastatin Sodium 40 MG Oral Tablet; TAKE 1 TABLET BY MOUTH ONCE DAILY AT   5PM (CHOLESTEROL) *PICA; Therapy: 70VEZ2095 to (Evaluate:44Tyo0076)  Requested for: 08LDP9180; Last   YK:40QXS5883 Ordered   9  SF 5000 Plus 1 1 % Dental Cream;   Therapy: (Recorded:01Ekh3937) to Recorded   10  Tylenol 325 MG Oral Tablet; TAKE 1 OR 2 TABLETS EVERY 4 TO 6 HOURS AS    NEEDED; Therapy: 89LBN5141 to (Hernan Mitchell)  Requested for: 11Jun2013; Last    Rx:11Jun2013 Ordered   11  Vitamin D2 400 UNIT Oral Tablet; Take 1 pill a day; Therapy: 16HLZ0459 to (Evaluate:73Yhs1498)  Requested for: 12PSH7038; Last    EX:01HEP3328 Ordered   12  Vitamin D3 400 UNIT Oral Tablet; TAKE 1 TABLET BY MOUTH ONCE DAILY @ 8AM    (SUPPLEMENT); Therapy: 06SGN5844 to (Evaluate:88Cnq9014)  Requested for: 78GHB1334; Last    Rx:05Jun2017 Ordered    Allergies    1   No Known Drug Allergies    Plan  Encounter for PPD test    · PPD    Signatures   Electronically signed by : ALBARO Sanchez; Jun 29 2017  8:40AM EST                          Electronically signed by : NIGHAT Moran ; Jul 5 2017  2:20PM EST

## 2018-01-19 ENCOUNTER — GENERIC CONVERSION - ENCOUNTER (OUTPATIENT)
Dept: OTHER | Facility: OTHER | Age: 58
End: 2018-01-19

## 2018-01-19 ENCOUNTER — TRANSCRIBE ORDERS (OUTPATIENT)
Dept: ADMINISTRATIVE | Facility: HOSPITAL | Age: 58
End: 2018-01-19

## 2018-01-19 ENCOUNTER — ALLSCRIPTS OFFICE VISIT (OUTPATIENT)
Dept: OTHER | Facility: OTHER | Age: 58
End: 2018-01-19

## 2018-01-19 DIAGNOSIS — R94.31 ABNORMAL ELECTROCARDIOGRAM: Primary | ICD-10-CM

## 2018-01-19 DIAGNOSIS — R42 DIZZINESS AND GIDDINESS: ICD-10-CM

## 2018-01-22 VITALS
SYSTOLIC BLOOD PRESSURE: 126 MMHG | HEIGHT: 61 IN | TEMPERATURE: 98 F | RESPIRATION RATE: 20 BRPM | HEART RATE: 76 BPM | DIASTOLIC BLOOD PRESSURE: 72 MMHG | OXYGEN SATURATION: 96 % | BODY MASS INDEX: 36.46 KG/M2 | WEIGHT: 193.13 LBS

## 2018-01-23 NOTE — MISCELLANEOUS
Please contact our office at your convenience  It is important that we speak to you  REGARDING:   Porfavor contacte a nuestra oficina  Es muy  importante que hablemos con usted   SOBRE:          Scheduling an Appointment/ Programar grace Lisa          Rescheduling an Appointment/ Re-programar  grace Lisa     Cancelling an Appointment/Cancelar crow Lisa     Your Lab/ X-ray Results/Resultados         Updating your Phone number or Address/ Actualizar crow [employer]            Verify your Primary Providor/ Verificar crow Proveedor primario          X Other/Otro: Call office  [phone] about test    Please Contact Our Office to Schedule Your Appointment  It is Very Important That You See Your Provider for your  Routine  Medical Care  If you are seeing a New Providor,  Please Contact our Office so we can update our Records  Thank You  Comuníquese con nuestra oficina para programar crow lisa  Es Muy Importante que usted magui crow proveedor para crow   [address] [facility] Floor de [place]  Si usted esta viendo un Proveedor   [place], [place] con Meneses Yannick oficina para actualizar   nuestro registros  Abran

## 2018-01-24 VITALS
HEART RATE: 60 BPM | WEIGHT: 194 LBS | HEIGHT: 61 IN | SYSTOLIC BLOOD PRESSURE: 102 MMHG | BODY MASS INDEX: 36.63 KG/M2 | DIASTOLIC BLOOD PRESSURE: 70 MMHG | RESPIRATION RATE: 16 BRPM

## 2018-02-08 RX ORDER — LAMOTRIGINE 100 MG/1
1 TABLET ORAL DAILY
COMMUNITY
Start: 2015-12-10 | End: 2019-06-24 | Stop reason: SDUPTHER

## 2018-02-08 RX ORDER — SODIUM FLUORIDE 5 MG/ML
PASTE, DENTIFRICE DENTAL
COMMUNITY
End: 2021-12-13 | Stop reason: ALTCHOICE

## 2018-02-08 RX ORDER — PRAVASTATIN SODIUM 40 MG
TABLET ORAL
COMMUNITY
Start: 2012-01-17 | End: 2018-07-02 | Stop reason: SDUPTHER

## 2018-02-08 RX ORDER — LEVOTHYROXINE SODIUM 0.1 MG/1
TABLET ORAL
COMMUNITY
Start: 2015-07-14 | End: 2018-07-02 | Stop reason: SDUPTHER

## 2018-02-08 RX ORDER — FLUOXETINE HYDROCHLORIDE 40 MG/1
CAPSULE ORAL
COMMUNITY
Start: 2015-12-10 | End: 2018-07-02 | Stop reason: SINTOL

## 2018-02-08 RX ORDER — QUETIAPINE FUMARATE 50 MG/1
1 TABLET, FILM COATED ORAL
COMMUNITY
Start: 2018-01-19 | End: 2019-06-24 | Stop reason: SDUPTHER

## 2018-02-08 RX ORDER — CHLORAL HYDRATE 500 MG
CAPSULE ORAL
COMMUNITY
Start: 2013-06-12 | End: 2018-07-02 | Stop reason: SDUPTHER

## 2018-02-08 RX ORDER — DIAPER,BRIEF,ADULT, DISPOSABLE
EACH MISCELLANEOUS
COMMUNITY
Start: 2016-09-30 | End: 2018-02-26 | Stop reason: SDUPTHER

## 2018-02-08 RX ORDER — OMEGA-3S/DHA/EPA/FISH OIL/D3 300MG-1000
CAPSULE ORAL
COMMUNITY
Start: 2017-06-05 | End: 2018-07-02 | Stop reason: SDUPTHER

## 2018-02-08 RX ORDER — BUSPIRONE HYDROCHLORIDE 10 MG/1
0.5 TABLET ORAL 2 TIMES DAILY
COMMUNITY
Start: 2018-01-19 | End: 2019-06-24 | Stop reason: SDUPTHER

## 2018-02-13 ENCOUNTER — OFFICE VISIT (OUTPATIENT)
Dept: CARDIOLOGY CLINIC | Facility: CLINIC | Age: 58
End: 2018-02-13
Payer: COMMERCIAL

## 2018-02-13 ENCOUNTER — HOSPITAL ENCOUNTER (OUTPATIENT)
Dept: NON INVASIVE DIAGNOSTICS | Facility: CLINIC | Age: 58
Discharge: HOME | End: 2018-02-13
Payer: COMMERCIAL

## 2018-02-13 VITALS
SYSTOLIC BLOOD PRESSURE: 100 MMHG | BODY MASS INDEX: 35.27 KG/M2 | RESPIRATION RATE: 20 BRPM | HEIGHT: 61 IN | HEART RATE: 76 BPM | DIASTOLIC BLOOD PRESSURE: 68 MMHG | WEIGHT: 186.8 LBS

## 2018-02-13 DIAGNOSIS — R42 DIZZINESS AND GIDDINESS: ICD-10-CM

## 2018-02-13 DIAGNOSIS — I51.7 ENLARGED LA (LEFT ATRIUM): Primary | ICD-10-CM

## 2018-02-13 DIAGNOSIS — R94.31 ABNORMAL ELECTROCARDIOGRAM: ICD-10-CM

## 2018-02-13 DIAGNOSIS — Z87.898 H/O PROLONGED Q-T INTERVAL ON ECG: ICD-10-CM

## 2018-02-13 PROCEDURE — 93306 TTE W/DOPPLER COMPLETE: CPT | Performed by: INTERNAL MEDICINE

## 2018-02-13 PROCEDURE — 99214 OFFICE O/P EST MOD 30 MIN: CPT | Performed by: INTERNAL MEDICINE

## 2018-02-13 PROCEDURE — 93306 TTE W/DOPPLER COMPLETE: CPT

## 2018-02-13 RX ORDER — DOXYCYCLINE HYCLATE 100 MG/1
CAPSULE ORAL
COMMUNITY
Start: 2018-02-09 | End: 2018-07-02 | Stop reason: ALTCHOICE

## 2018-02-13 RX ORDER — VALACYCLOVIR HYDROCHLORIDE 1 G/1
TABLET, FILM COATED ORAL
COMMUNITY
Start: 2018-02-12 | End: 2018-07-02 | Stop reason: ALTCHOICE

## 2018-02-13 RX ORDER — BUSPIRONE HYDROCHLORIDE 5 MG/1
TABLET ORAL
COMMUNITY
Start: 2018-02-10 | End: 2018-02-13 | Stop reason: SDUPTHER

## 2018-02-13 RX ORDER — LAMOTRIGINE 25 MG/1
TABLET ORAL
COMMUNITY
Start: 2018-01-25 | End: 2019-06-24 | Stop reason: SDUPTHER

## 2018-02-13 RX ORDER — ARIPIPRAZOLE 2 MG/1
2 TABLET ORAL
COMMUNITY
End: 2018-07-02 | Stop reason: SINTOL

## 2018-02-13 NOTE — LETTER
February 13, 2018     Aditya Mccray, [address]  [address] [place] [address]    Patient: Donta Ramos   YOB: 1960   Date of Visit: 2/13/2018       Dear Dr Lizabeth Whalen: Thank you for referring Carlos Vangchristinehitesh to me for evaluation  Below are my notes for this consultation  If you have questions, please do not hesitate to call me  I look forward to following your patient along with you           Sincerely,        Redgie Riedel, MD        CC: No Recipients

## 2018-02-13 NOTE — PROGRESS NOTES
[address] [facility] [place]  [address]  [facility] [address] [facility]  [phone]    Cardiology Follow up    Patient:  Claudio Dennis  :  1960  MRN:  [id number]    History of Present Illness:      44-year-old female with past medical history of dyslipidemia, thyroid disease, mood disorder and mild IDD/ MR presents for follow-up  She has unfortunately been diagnosed with pneumonia recently and has had some shortness of breath  She continues to have some instability when she walks around but no falls  She denies chest pain, palpitations, or syncope  Patient Active Problem List   Diagnosis    Enlarged LA (left atrium)       Past Surgical History  No past surgical history on file  Social History   Social History     Social History    Marital status: Single     Spouse name: N/A    Number of children: N/A    Years of education: N/A     Occupational History    Not on file  Social History Main Topics    Smoking status: Never Smoker    Smokeless tobacco: Never Used    Alcohol use Not on file    Drug use: Unknown    Sexual activity: Not on file     Other Topics Concern    Not on file     Social History Narrative    No narrative on file        No Known Allergies    Family History   No family history on file  Review of Systems:  Review of Systems   Constitutional: Negative for chills, fatigue and fever  HENT: Negative for hearing loss, nosebleeds and tinnitus  Eyes: Negative for pain  Respiratory: Positive for shortness of breath  Negative for cough and chest tightness  Cardiovascular: Negative for chest pain, palpitations and leg swelling  Gastrointestinal: Negative for abdominal pain, nausea and vomiting  Endocrine: Negative for cold intolerance and heat intolerance  Genitourinary: Negative for difficulty urinating, hematuria and vaginal bleeding  Musculoskeletal: Negative for arthralgias  Skin: Negative for rash     Allergic/Immunologic: Negative for environmental allergies  Neurological: Negative for dizziness, syncope, weakness and light-headedness  Psychiatric/Behavioral: Negative for decreased concentration and sleep disturbance  The patient is not nervous/anxious  Current Outpatient Prescriptions:     busPIRone (BUSPAR) 10 mg tablet, Take 0 5 tablets by mouth 2 (two) times a day, Disp: , Rfl:     Calcium Carbonate-Vitamin D3 600-400 MG-UNIT TABS, , Disp: , Rfl:     cholecalciferol (VITAMIN D3) 400 units tablet, Take by mouth, Disp: , Rfl:     doxycycline hyclate (VIBRAMYCIN) 100 mg capsule, , Disp: , Rfl:     FLUoxetine (PROzac) 40 MG capsule, Take by mouth, Disp: , Rfl:     Incontinence Supply Disposable (DISPOSABLE BRIEF X-LARGE) MISC, by Does not apply route, Disp: , Rfl:     lamoTRIgine (LaMICtal) 100 mg tablet, Take 1 tablet by mouth daily, Disp: , Rfl:     lamoTRIgine (LaMICtal) 25 mg tablet, , Disp: , Rfl:     levothyroxine 100 mcg tablet, Take by mouth, Disp: , Rfl:     Omega-3 1000 MG CAPS, Take by mouth, Disp: , Rfl:     pravastatin (PRAVACHOL) 40 mg tablet, Take by mouth, Disp: , Rfl:     QUEtiapine (SEROquel) 50 mg tablet, Take 1 tablet by mouth, Disp: , Rfl:     SODIUM FLUORIDE, DENTAL GEL, (SF 5000 PLUS) 1 1 % CREA, SF 5000 Plus 1 1 % Dental Cream; 0; 22-Aug-2014; Active, Disp: , Rfl:     valACYclovir (VALTREX) 1,000 mg tablet, , Disp: , Rfl:     VENTOLIN  (90 Base) MCG/ACT inhaler, , Disp: , Rfl:     ARIPiprazole (ABILIFY) 2 mg tablet, Take 2 mg by mouth, Disp: , Rfl:      Physical Exam:    Vitals:    02/13/18 0840   BP: 100/68   BP Location: Left arm   Patient Position: Sitting   Cuff Size: Standard   Pulse: 76   Resp: 20   Weight: 84 7 kg (186 lb 12 8 oz)   Height: 5' 1" (1 549 m)       Physical Exam   Constitutional: She is oriented to person, place, and time  She appears well-developed and well-nourished  HENT:   Head: Normocephalic     Right Ear: External ear normal    Left Ear: External ear normal  Mouth/Throat: Oropharynx is clear and moist    Eyes: Pupils are equal, round, and reactive to light  Neck: No JVD present  Carotid bruit is not present  Cardiovascular: Normal rate, regular rhythm and intact distal pulses  Exam reveals no gallop and no friction rub  No murmur heard  Pulmonary/Chest: Effort normal and breath sounds normal  No tachypnea  No respiratory distress  She has no wheezes  She has no rales  She exhibits no tenderness  Abdominal: Soft  She exhibits no distension  There is no tenderness  There is no rebound and no guarding  Musculoskeletal: She exhibits no edema  Neurological: She is alert and oriented to person, place, and time  Skin: Skin is warm and dry  Psychiatric: She has a normal mood and affect  Her behavior is normal  Judgment and thought content normal    Nursing note and vitals reviewed  Labs:not applicable    Assessment/Plan:     1  Gait instability/ history of falls- I do not see a clear cardiovascular etiology for this  I do think it would be a good idea for follow-up with Neurology  From a cardiovascular standpoint,her stress test was without evidence of ischemia but she did not reach target heart rate  Her echocardiogram has a moderately dilated left atrium and the left ventricle is top-normal in size  She does have mild mitral regurgitation  I would like her to follow up next year with an echocardiogram       2  Mildly prolonged QTC - I would recommend checking EKG if there are new medicines that are added that can prolong QTC  Thank you so much, please not hesitate to contact me if there are questions or concerns              Josep Medina MD  2/13/2018  9:16 AM

## 2018-02-26 ENCOUNTER — TELEPHONE (OUTPATIENT)
Dept: FAMILY MEDICINE CLINIC | Facility: CLINIC | Age: 58
End: 2018-02-26

## 2018-02-26 DIAGNOSIS — R32 URINARY INCONTINENCE, UNSPECIFIED TYPE: Primary | ICD-10-CM

## 2018-02-28 RX ORDER — DIAPER,BRIEF,ADULT, DISPOSABLE
EACH MISCELLANEOUS
Qty: 64 EACH | Refills: 5 | Status: SHIPPED | OUTPATIENT
Start: 2018-02-28 | End: 2018-07-02 | Stop reason: SDUPTHER

## 2018-03-01 ENCOUNTER — TELEPHONE (OUTPATIENT)
Dept: FAMILY MEDICINE CLINIC | Facility: CLINIC | Age: 58
End: 2018-03-01

## 2018-03-08 ENCOUNTER — TELEPHONE (OUTPATIENT)
Dept: NEUROLOGY | Facility: CLINIC | Age: 58
End: 2018-03-08

## 2018-03-08 ENCOUNTER — TELEPHONE (OUTPATIENT)
Dept: FAMILY MEDICINE CLINIC | Facility: CLINIC | Age: 58
End: 2018-03-08

## 2018-03-08 DIAGNOSIS — R25.1 TREMOR: Primary | ICD-10-CM

## 2018-04-16 LAB
ARRHY DURING EX: NORMAL
CHEST PAIN STATEMENT: NORMAL
MAX DIASTOLIC BP: 76 MMHG
MAX HEART RATE: 118 BPM
MAX PREDICTED HEART RATE: 163 BPM
MAX. SYSTOLIC BP: 192 MMHG
PROTOCOL NAME: NORMAL
TARGET HR FORMULA: NORMAL
TEST INDICATION: NORMAL
TIME IN EXERCISE PHASE: NORMAL

## 2018-06-24 RX ORDER — LEVOTHYROXINE SODIUM 0.1 MG/1
TABLET ORAL
Qty: 31 TABLET | Refills: 0 | Status: CANCELLED | OUTPATIENT
Start: 2018-06-24

## 2018-07-02 ENCOUNTER — OFFICE VISIT (OUTPATIENT)
Dept: FAMILY MEDICINE CLINIC | Facility: CLINIC | Age: 58
End: 2018-07-02
Payer: COMMERCIAL

## 2018-07-02 VITALS
TEMPERATURE: 98.2 F | BODY MASS INDEX: 35.95 KG/M2 | HEIGHT: 61 IN | WEIGHT: 190.4 LBS | SYSTOLIC BLOOD PRESSURE: 118 MMHG | OXYGEN SATURATION: 95 % | RESPIRATION RATE: 20 BRPM | DIASTOLIC BLOOD PRESSURE: 62 MMHG | HEART RATE: 88 BPM

## 2018-07-02 DIAGNOSIS — Z13.1 SCREENING FOR DIABETES MELLITUS: ICD-10-CM

## 2018-07-02 DIAGNOSIS — R32 URINARY INCONTINENCE, UNSPECIFIED TYPE: ICD-10-CM

## 2018-07-02 DIAGNOSIS — Z13.820 SCREENING FOR OSTEOPOROSIS: ICD-10-CM

## 2018-07-02 DIAGNOSIS — Z00.00 MEDICARE ANNUAL WELLNESS VISIT, SUBSEQUENT: Primary | ICD-10-CM

## 2018-07-02 DIAGNOSIS — E03.9 HYPOTHYROIDISM, UNSPECIFIED TYPE: ICD-10-CM

## 2018-07-02 DIAGNOSIS — N39.490 OVERFLOW INCONTINENCE OF URINE: ICD-10-CM

## 2018-07-02 DIAGNOSIS — E55.9 VITAMIN D DEFICIENCY: ICD-10-CM

## 2018-07-02 DIAGNOSIS — E78.5 HYPERLIPIDEMIA, UNSPECIFIED HYPERLIPIDEMIA TYPE: ICD-10-CM

## 2018-07-02 DIAGNOSIS — Z13.0 SCREENING FOR DEFICIENCY ANEMIA: ICD-10-CM

## 2018-07-02 PROBLEM — F41.9 SEVERE ANXIETY: Status: ACTIVE | Noted: 2018-05-15

## 2018-07-02 PROCEDURE — G0439 PPPS, SUBSEQ VISIT: HCPCS | Performed by: FAMILY MEDICINE

## 2018-07-02 PROCEDURE — 3008F BODY MASS INDEX DOCD: CPT | Performed by: FAMILY MEDICINE

## 2018-07-02 PROCEDURE — 3725F SCREEN DEPRESSION PERFORMED: CPT | Performed by: FAMILY MEDICINE

## 2018-07-02 RX ORDER — PRAVASTATIN SODIUM 40 MG
40 TABLET ORAL DAILY
Qty: 30 TABLET | Refills: 5 | Status: SHIPPED | OUTPATIENT
Start: 2018-07-02 | End: 2018-11-05 | Stop reason: SDUPTHER

## 2018-07-02 RX ORDER — CHLORAL HYDRATE 500 MG
3 CAPSULE ORAL DAILY
Qty: 90 CAPSULE | Refills: 5 | Status: SHIPPED | OUTPATIENT
Start: 2018-07-02 | End: 2018-11-05 | Stop reason: SDUPTHER

## 2018-07-02 RX ORDER — DIAPER,BRIEF,ADULT, DISPOSABLE
EACH MISCELLANEOUS
Qty: 64 EACH | Refills: 5 | Status: SHIPPED | OUTPATIENT
Start: 2018-07-02 | End: 2019-02-28 | Stop reason: SDUPTHER

## 2018-07-02 RX ORDER — OMEGA-3S/DHA/EPA/FISH OIL/D3 300MG-1000
400 CAPSULE ORAL DAILY
Qty: 30 TABLET | Refills: 5 | Status: SHIPPED | OUTPATIENT
Start: 2018-07-02 | End: 2018-08-20 | Stop reason: DRUGHIGH

## 2018-07-02 RX ORDER — LEVOTHYROXINE SODIUM 0.1 MG/1
100 TABLET ORAL DAILY
Qty: 30 TABLET | Refills: 5 | Status: SHIPPED | OUTPATIENT
Start: 2018-07-02 | End: 2018-11-05 | Stop reason: SDUPTHER

## 2018-07-02 NOTE — PROGRESS NOTES
Assessment and Plan:  Problem List Items Addressed This Visit     Hypothyroidism    Relevant Medications    levothyroxine 100 mcg tablet    Other Relevant Orders    TSH, 3rd generation with T4 reflex    Hyperlipidemia    Relevant Medications    Omega-3 1000 MG CAPS    pravastatin (PRAVACHOL) 40 mg tablet    Vitamin D deficiency    Relevant Medications    cholecalciferol (VITAMIN D3) 400 units tablet    Calcium Carbonate-Vitamin D3 600-400 MG-UNIT TABS    Other Relevant Orders    Vitamin D 25 hydroxy    Overflow incontinence of urine      Other Visit Diagnoses     Medicare annual wellness visit, subsequent    -  Primary    Relevant Orders    Lipid panel    POCT HIV-1 and HIV-2 antibodies    Hepatitis C antibody    Mammo screening bilateral w cad    Urinary incontinence, unspecified type        Relevant Medications    Incontinence Supply Disposable (WINGS CHOICE PLUS ADULT BRIEFS) MISC    Screening for diabetes mellitus        Relevant Orders    Comprehensive metabolic panel    Screening for deficiency anemia        Relevant Orders    CBC and differential    Screening for osteoporosis        Relevant Orders    DXA bone density spine hip and pelvis        Health Maintenance Due   Topic Date Due    HIV SCREENING  1960    Hepatitis C Screening  1960    Depression Screening PHQ-9  1960    PAP SMEAR  1960    CRC Screening: Colonoscopy  1960    MAMMOGRAM  05/16/2016    INFLUENZA VACCINE  06/01/2018         HPI:  Damien Jaquez is a 62 y o  female here for her Subsequent Wellness Visit  No recent issues with balance  She had been on fluoxetine previously but this was discontinued by psych 6/15/18 and since that was discontinued, she hasn't had any dizzy/balance issues  No other concerns today other than having lots of forms to complete for her housing and AR[facility]      Patient Active Problem List   Diagnosis    Enlarged LA (left atrium)    H/O prolonged Q-T interval on ECG    Hypothyroidism    Hyperlipidemia    Vitamin D deficiency    Medication-induced movement disorder    Mental disability    Mood disorder (HCC)    Obese    Overflow incontinence of urine    Severe anxiety     Past Medical History:   Diagnosis Date    Anxiety disorder     Dyslipidemia     Mental retardation      Past Surgical History:   Procedure Laterality Date    HYSTERECTOMY       Family History   Problem Relation Age of Onset    Other Mother         neglect or abandonment     Other Father         neglect or abandonment      History   Smoking Status    Never Smoker   Smokeless Tobacco    Never Used     History   Alcohol Use No      History   Drug Use No         Current Outpatient Prescriptions   Medication Sig Dispense Refill    busPIRone (BUSPAR) 10 mg tablet Take 0 5 tablets by mouth 2 (two) times a day      Calcium Carbonate-Vitamin D3 600-400 MG-UNIT TABS Take 1 tablet by mouth 2 (two) times a day 60 tablet 5    cholecalciferol (VITAMIN D3) 400 units tablet Take 1 tablet (400 Units total) by mouth daily 30 tablet 5    lamoTRIgine (LaMICtal) 100 mg tablet Take 1 tablet by mouth daily      lamoTRIgine (LaMICtal) 25 mg tablet       levothyroxine 100 mcg tablet Take 1 tablet (100 mcg total) by mouth daily 30 tablet 5    Omega-3 1000 MG CAPS Take 3 capsules (3,000 mg total) by mouth daily 90 capsule 5    pravastatin (PRAVACHOL) 40 mg tablet Take 1 tablet (40 mg total) by mouth daily 30 tablet 5    QUEtiapine (SEROquel) 50 mg tablet Take 1 tablet by mouth      SODIUM FLUORIDE, DENTAL GEL, (SF 5000 PLUS) 1 1 % CREA SF 5000 Plus 1 1 % Dental Cream; 0; 22-Aug-2014; Active      Incontinence Supply Disposable (WINGS CHOICE PLUS ADULT BRIEFS) MISC Use one twice daily 64 each 5     No current facility-administered medications for this visit        No Known Allergies  Immunization History   Administered Date(s) Administered    Influenza 10/31/2016, 10/13/2017, 10/25/2017    Influenza TIV (IM) 11/14/2014, 10/25/2017    Tdap 06/11/2013, 11/01/2017    Tuberculin Skin Test-PPD Intradermal 06/11/2013, 06/12/2015, 06/26/2017       Patient Care Team:  Ana Fenton MD as PCP - General (Family Medicine)  MD Alivia Lara PA-C Richele Pica, LALA Grimes    Medicare Screening Tests and Risk Assessments:  AWV Clinical     ISAR:   Previous hospitalizations?:  No       Once in a Lifetime Medicare Screening:   EKG performed?:  Yes Results:  Murmur    AAA screening performed? (if performed, please add date to Health Maintenance):  No       Medicare Screening Tests and Risk Assessment:   AAA Risk Assessment    None Indicated:  Yes    Osteoporosis Risk Assessment     Female:  Yes   :  Yes :  No   Age over 48:  Yes Low body weight (<127lbs):  No   Tobacco use:  No Alcohol use:  No   Low calcium diet:  No PMHX of fractures:  No   FHX of fractures:  No    HIV Risk Assessment    None indicated:  Yes        Drug and Alcohol Use:   Tobacco use    Cigarettes:  never smoker    Tobacco use duration    Tobacco Cessation Readiness    Alcohol use    Alcohol use:  never    Alcohol Treatment Readiness   Illicit Drug Use    Drug use:  never        Diet & Exercise:   Diet   What is your diet?:  Regular, Limited junk food   How many servings a day of the following:   Fruits and Vegetables:  3-4 Meat:  1-2   Whole Grains:  3 Simple Carbs:  2   Dairy:  1 Soda:  1   Coffee:  2 Tea:  1   Exercise    Do you currently exercise?:  yes    Frequency:  daily    Type of exercise:  cardio, walking       Cognitive Impairment Screening:   Depression screening preformed:  Yes Depression screen score:  0    PHQ-9 Depression scale score:  0   Depression screening results:  negative for symptoms   Cognitive Impairment Screening    Do you have difficulty learning or retaining new information?:  No Do you have difficulty handling new tasks?:  No   Do you have difficulty with reasoning?:  No Do you have difficulty with spatial ability and orientation?:  No   Do you have difficulty with language?:  No Do you have difficulty with behavior?:  No       Functional Ability/Level of Safety:   Hearing    Hearing Impairment Assessment    Current Activities    Help needed with the folllowing:    ADL    Fall Risk   Injury History       Home Safety:   Are there hazards in your environment?:  No   If you fell, would you need help to get back up from the ground?:  No Do you have problems or concerns getting in/out of a bed, chair, tub, or toilet?:  No   Do you feel unsteady when walking?:  No Is your activity limited by pain?:  No   Do you have handrails and grab-bars in the home?:  Yes Are emergency numbers kept by the phone and regularly updated?:  Yes   Are you and/or family members aware of the dangers of smoking in bed?:  Yes    Do you have working smoke alarms and fire extinguisher?:  Yes Do all household members know how to use them?:  Yes   Have you left the stove on unsupervised?:  No    Home Safety Risk Factors   Unfamilar with surroundings:  No Uneven floors:  No   Stairs or handrail saftey risk:  No Loose rugs:  No   Household clutter:  No Poor household lighting:  No   No grab bars in bathroom:  No Further evaluation needed:  No       Advanced Directives:   Advanced Directives    Living Will:  No Durable POA for healthcare:  No   Advanced directive:  No    Patient's End of Life Decisions        Urinary Incontinence:   Do you have urinary incontinence?:  Yes Do you have incomplete emptying?:  No   Do you urinate frequently?:  No Do you have urinary urgency?:  No   Do you have urinary hesitancy?:  No Do you have dysuria (painful and/or difficult urination)?:  No   Do you have nocturia (waking up to urinate)?:  No Do you strain when urinating (have to push to urinate)?:  No   Do you have a weak stream when urinating?:  No Do you have intermittent streaming when urinating?:  No   Do you dribble urine after finishing?: Yes     Do you have vaginal dryness?:  No       Glaucoma:            Provider Screening     Preventative Screening/Counseling:   Cardiovascular Screening/Counseling:   (Labs Q5 years, EKG optional one-time)   General:  Screening Current Counseling:  Healthy Diet, Healthy Weight Due for: Lab Panel/Analytes:  Lipid Panel         Diabetes Screening/Counseling:   (2 tests/year if Pre-Diabetes or 1 test/year if no Diabetes)   General:  Risks and Benefits Discussed Counseling:  Healthy Diet, Healthy Weight, Improve Physical Activity Due for: Labs:  Blood Glucose         Colorectal Cancer Screening/Counseling:   (FOBT Q1 yr; Flex Sig Q4 yrs or Q10 yrs after Screening Colonoscopy; Screening Colonoscpy Q2 yrs High Risk or Q10 yrs Low Risk; Barium Enema Q2 yrs High Risk or Q4 yrs Low Risk)   General:  Risks and Benefits Discussed, Screening Current Counseling:  high fiber diet          Prostate Cancer Screening/Counseling:   (Annual)          Breast Cancer Screening/Counseling:   (Baseline Age 28 - 43; Annual Age 36+)   General:  Risks and Benefits Discussed Due for: Studies:  mammogram         Cervical Cancer Screening/Counseling:   (Annual for High Risk or Childbearing Age with Abnormal Pap in Last 3 yrs; Every 2 all others)   General:  Risks and Benefits Discussed, Screening Current           Osteoporosis Screening/Counseling:   (Every 2 Yrs if at risk or more if medically necessary)   General:  Risks and Benefits Discussed, Screening Current Counseling:  Calcium and Vitamin D Intake Due for: Studies:  Bone Density Ultrasound         AAA Screening/Counseling:   (Once per Lifetime with risk factors)    General:  Risks and Benefits Discussed, Screening Not Indicated           Glaucoma Screening/Counseling:   (Annual)   General:  Risks and Benefits Discussed, Screening Current          HIV Screening/Counseling:   (Voluntary;  Once annually for high risk OR 3 times for Pregnancy at diagnosis of IUP; 3rd trimester; and at Labor General:  Risks and Benefits Discussed  Due for: Labs:  SHE         Hepatitis C Screening:   Hepatitis C Counseling Provided:  Yes Hepatitis C Screening Accepted: Yes              Immunizations:   Influenza (annual):  Risks & Benefits Discussed, Influenza Recommended Annually   Pneumococcal (Once in a Lifetime):  Risks & Benefits Discussed   Hepatitis B Series (medium to high risk patients scheduled series):  Risks & Benefits Discussedj   Zostavax (Medicare D Coverage, Pt >70 yo):  Risks & Benefits Discussed, Zostavax Vaccine UTD   Tdap (Non-Medicare Wellness Visit required):  Risks & Benefits Discussed, Tdap Vaccine UTD       Other Preventative Couseling (Non-Medicare Wellness Visit Required):   nutrition counseling performed, fall prevention education provided, car/seat belt/driving safety reviewed, alcohol use counseling provided, dietary education for weight gain, sunscreen education provided, weight reduction was discussed, Increased physical activity counseling given       Referrals (Non-Medicare Wellness Visit Required):       Medical Equipment/Suppliers:           No exam data present    Physical Exam :  History obtained from patient and caregiver from [facility]  Physical Exam   Constitutional: She is oriented to person, place, and time  She appears well-developed and well-nourished  No distress  HENT:   Head: Normocephalic and atraumatic  Right Ear: External ear normal    Left Ear: External ear normal    Nose: Nose normal    Mouth/Throat: Oropharynx is clear and moist  No oropharyngeal exudate  Eyes: Conjunctivae and EOM are normal  Pupils are equal, round, and reactive to light  Right eye exhibits no discharge  Left eye exhibits no discharge  No scleral icterus  Neck: Normal range of motion  Neck supple  No JVD present  No tracheal deviation present  No thyromegaly present  Cardiovascular: Normal rate, regular rhythm, normal heart sounds and intact distal pulses    Exam reveals no gallop and no friction rub  No murmur heard  Pulmonary/Chest: Effort normal and breath sounds normal  No stridor  No respiratory distress  She has no wheezes  She has no rales  She exhibits no tenderness  Abdominal: Soft  Bowel sounds are normal  She exhibits no distension and no mass  There is no tenderness  There is no rebound and no guarding  No hernia  Musculoskeletal: Normal range of motion  She exhibits no edema, tenderness or deformity  Lymphadenopathy:     She has no cervical adenopathy  Neurological: She is alert and oriented to person, place, and time  She displays normal reflexes  No cranial nerve deficit or sensory deficit  She exhibits normal muscle tone  Coordination normal    Skin: Skin is warm and dry  No rash noted  She is not diaphoretic  No erythema  No pallor  Psychiatric: She has a normal mood and affect   Her behavior is normal  Judgment and thought content normal

## 2018-07-02 NOTE — PATIENT INSTRUCTIONS

## 2018-07-11 ENCOUNTER — TELEPHONE (OUTPATIENT)
Dept: NEUROLOGY | Facility: CLINIC | Age: 58
End: 2018-07-11

## 2018-07-17 ENCOUNTER — TELEPHONE (OUTPATIENT)
Dept: FAMILY MEDICINE CLINIC | Facility: CLINIC | Age: 58
End: 2018-07-17

## 2018-07-25 ENCOUNTER — HOSPITAL ENCOUNTER (OUTPATIENT)
Dept: BONE DENSITY | Facility: MEDICAL CENTER | Age: 58
Discharge: HOME | End: 2018-07-25
Payer: COMMERCIAL

## 2018-07-25 DIAGNOSIS — Z13.820 SCREENING FOR OSTEOPOROSIS: ICD-10-CM

## 2018-07-25 PROCEDURE — 77080 DXA BONE DENSITY AXIAL: CPT

## 2018-08-20 ENCOUNTER — TELEPHONE (OUTPATIENT)
Dept: FAMILY MEDICINE CLINIC | Facility: CLINIC | Age: 58
End: 2018-08-20

## 2018-08-20 DIAGNOSIS — E55.9 VITAMIN D DEFICIENCY: Primary | ICD-10-CM

## 2018-08-20 RX ORDER — OMEGA-3S/DHA/EPA/FISH OIL/D3 300MG-1000
400 CAPSULE ORAL 3 TIMES DAILY
Qty: 270 TABLET | Refills: 2 | Status: SHIPPED | OUTPATIENT
Start: 2018-08-20 | End: 2018-11-05 | Stop reason: SDUPTHER

## 2018-08-24 ENCOUNTER — TELEPHONE (OUTPATIENT)
Dept: FAMILY MEDICINE CLINIC | Facility: CLINIC | Age: 58
End: 2018-08-24

## 2018-08-24 RX ORDER — OMEGA-3S/DHA/EPA/FISH OIL/D3 300MG-1000
CAPSULE ORAL
Qty: 31 TABLET | Refills: 0 | OUTPATIENT
Start: 2018-08-24

## 2018-08-24 RX ORDER — PRAVASTATIN SODIUM 40 MG
TABLET ORAL
Qty: 31 TABLET | Refills: 0 | OUTPATIENT
Start: 2018-08-24

## 2018-08-24 NOTE — TELEPHONE ENCOUNTER
Pt's caretaker is questioning the Vit D 400 units three times a day  Pt already taking calcium plus Vit D twice a day and she wants to make sure she should be taking such a high dose

## 2018-08-24 NOTE — TELEPHONE ENCOUNTER
It looks like pradip had added this because it was still low with the vitamin d she is taking    Some people are on high doses like 5000 a day so this is acceptable

## 2018-09-25 ENCOUNTER — TELEPHONE (OUTPATIENT)
Dept: NEUROLOGY | Facility: CLINIC | Age: 58
End: 2018-09-25

## 2018-11-05 ENCOUNTER — OFFICE VISIT (OUTPATIENT)
Dept: FAMILY MEDICINE CLINIC | Facility: CLINIC | Age: 58
End: 2018-11-05
Payer: COMMERCIAL

## 2018-11-05 VITALS
HEIGHT: 61 IN | TEMPERATURE: 97 F | WEIGHT: 195 LBS | BODY MASS INDEX: 36.82 KG/M2 | SYSTOLIC BLOOD PRESSURE: 112 MMHG | OXYGEN SATURATION: 95 % | HEART RATE: 97 BPM | DIASTOLIC BLOOD PRESSURE: 62 MMHG | RESPIRATION RATE: 20 BRPM

## 2018-11-05 DIAGNOSIS — E78.5 HYPERLIPIDEMIA, UNSPECIFIED HYPERLIPIDEMIA TYPE: ICD-10-CM

## 2018-11-05 DIAGNOSIS — E03.9 HYPOTHYROIDISM, UNSPECIFIED TYPE: ICD-10-CM

## 2018-11-05 DIAGNOSIS — E55.9 VITAMIN D DEFICIENCY: Primary | ICD-10-CM

## 2018-11-05 PROCEDURE — 99213 OFFICE O/P EST LOW 20 MIN: CPT | Performed by: PHYSICIAN ASSISTANT

## 2018-11-05 PROCEDURE — 3008F BODY MASS INDEX DOCD: CPT | Performed by: PHYSICIAN ASSISTANT

## 2018-11-05 RX ORDER — OMEGA-3S/DHA/EPA/FISH OIL/D3 300MG-1000
400 CAPSULE ORAL 3 TIMES DAILY
Qty: 270 TABLET | Refills: 1 | Status: SHIPPED | OUTPATIENT
Start: 2018-11-05 | End: 2019-04-04 | Stop reason: SDUPTHER

## 2018-11-05 RX ORDER — PRAVASTATIN SODIUM 40 MG
40 TABLET ORAL DAILY
Qty: 30 TABLET | Refills: 5 | Status: SHIPPED | OUTPATIENT
Start: 2018-11-05 | End: 2019-01-28 | Stop reason: SDUPTHER

## 2018-11-05 RX ORDER — LEVOTHYROXINE SODIUM 0.1 MG/1
100 TABLET ORAL DAILY
Qty: 30 TABLET | Refills: 5 | Status: SHIPPED | OUTPATIENT
Start: 2018-11-05 | End: 2019-01-28 | Stop reason: SDUPTHER

## 2018-11-05 RX ORDER — CHLORAL HYDRATE 500 MG
3 CAPSULE ORAL DAILY
Qty: 90 CAPSULE | Refills: 5 | Status: SHIPPED | OUTPATIENT
Start: 2018-11-05 | End: 2019-01-28 | Stop reason: SDUPTHER

## 2018-11-05 NOTE — PROGRESS NOTES
Assessment/Plan:    Hypothyroidism  Labs to be done in Feb       Hyperlipidemia  Continue on pravastatin  Lipids stable           Problem List Items Addressed This Visit        Endocrine    Hypothyroidism     Labs to be done in Feb            Relevant Medications    levothyroxine 100 mcg tablet    Other Relevant Orders    CBC and differential    Comprehensive metabolic panel    TSH, 3rd generation       Other    Hyperlipidemia     Continue on pravastatin  Lipids stable           Relevant Medications    Omega-3 1000 MG CAPS    pravastatin (PRAVACHOL) 40 mg tablet    Other Relevant Orders    Comprehensive metabolic panel    Lipid panel    Vitamin D deficiency - Primary    Relevant Medications    Calcium Carbonate-Vitamin D3 600-400 MG-UNIT TABS    cholecalciferol (VITAMIN D3) 400 units tablet    Other Relevant Orders    Vitamin D 25 hydroxy            Subjective:      Patient ID: Lisa Munoz is a 62 y o  female  HPI 63 y/o F here for follow up of anxiety  She is doing better with behaviors now  One med was stopped and she was placed on buspar and her lamictal was increased  She has not had self harm in the last 1 month  She is UTD with mammo, pap, dentist and eye exam      The following portions of the patient's history were reviewed and updated as appropriate:   She  has a past medical history of Anxiety disorder and Mental retardation  She   Patient Active Problem List    Diagnosis Date Noted    Overflow incontinence of urine 05/15/2018    Severe anxiety 05/15/2018    Enlarged LA (left atrium) 02/13/2018    H/O prolonged Q-T interval on ECG 02/13/2018    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    Mental disability 06/22/2016    Hypothyroidism 07/14/2015    Vitamin D deficiency 07/14/2015    Obese 01/27/2015    Mood disorder ([address] ) 08/22/2014    Hyperlipidemia 06/27/2012     She  has a past surgical history that includes Hysterectomy    Her family history includes Other in her father and mother  She  reports that she has never smoked  She has never used smokeless tobacco  She reports that she does not drink alcohol or use drugs  Current Outpatient Prescriptions   Medication Sig Dispense Refill    busPIRone (BUSPAR) 10 mg tablet Take 0 5 tablets by mouth 2 (two) times a day      Calcium Carbonate-Vitamin D3 600-400 MG-UNIT TABS Take 1 tablet by mouth 2 (two) times a day 60 tablet 5    cholecalciferol (VITAMIN D3) 400 units tablet Take 1 tablet (400 Units total) by mouth 3 (three) times a day 270 tablet 1    Incontinence Supply Disposable (WINGS CHOICE PLUS ADULT BRIEFS) MISC Use one twice daily 64 each 5    lamoTRIgine (LaMICtal) 100 mg tablet Take 1 tablet by mouth daily      lamoTRIgine (LaMICtal) 25 mg tablet       levothyroxine 100 mcg tablet Take 1 tablet (100 mcg total) by mouth daily 30 tablet 5    Omega-3 1000 MG CAPS Take 3 capsules (3,000 mg total) by mouth daily 90 capsule 5    pravastatin (PRAVACHOL) 40 mg tablet Take 1 tablet (40 mg total) by mouth daily 30 tablet 5    QUEtiapine (SEROquel) 50 mg tablet Take 1 tablet by mouth      SODIUM FLUORIDE, DENTAL GEL, (SF 5000 PLUS) 1 1 % CREA SF 5000 Plus 1 1 % Dental Cream; 0; 22-Aug-2014; Active       No current facility-administered medications for this visit  Current Outpatient Prescriptions on File Prior to Visit   Medication Sig    busPIRone (BUSPAR) 10 mg tablet Take 0 5 tablets by mouth 2 (two) times a day    Incontinence Supply Disposable (WINGS CHOICE PLUS ADULT BRIEFS) MISC Use one twice daily    lamoTRIgine (LaMICtal) 100 mg tablet Take 1 tablet by mouth daily    lamoTRIgine (LaMICtal) 25 mg tablet     QUEtiapine (SEROquel) 50 mg tablet Take 1 tablet by mouth    SODIUM FLUORIDE, DENTAL GEL, (SF 5000 PLUS) 1 1 % CREA SF 5000 Plus 1 1 % Dental Cream; 0; 22-Aug-2014;  Active    [DISCONTINUED] Calcium Carbonate-Vitamin D3 600-400 MG-UNIT TABS Take 1 tablet by mouth 2 (two) times a day    [DISCONTINUED] cholecalciferol (VITAMIN D3) 400 units tablet Take 1 tablet (400 Units total) by mouth 3 (three) times a day    [DISCONTINUED] levothyroxine 100 mcg tablet Take 1 tablet (100 mcg total) by mouth daily    [DISCONTINUED] Omega-3 1000 MG CAPS Take 3 capsules (3,000 mg total) by mouth daily    [DISCONTINUED] pravastatin (PRAVACHOL) 40 mg tablet Take 1 tablet (40 mg total) by mouth daily     No current facility-administered medications on file prior to visit  She has No Known Allergies       Review of Systems   Constitutional: Negative for activity change, appetite change, chills, fatigue and unexpected weight change  HENT: Negative for dental problem, ear pain, hearing loss and sore throat  Eyes: Negative for visual disturbance  Respiratory: Negative for cough and wheezing  Cardiovascular: Negative for chest pain  Gastrointestinal: Negative for abdominal pain, constipation, diarrhea and vomiting  Genitourinary: Negative for difficulty urinating and dysuria  Musculoskeletal: Negative for arthralgias, back pain and myalgias  Skin: Negative for rash  Neurological: Negative for dizziness and headaches  Psychiatric/Behavioral: Negative for behavioral problems  Objective:      /62 (BP Location: Right arm, Patient Position: Sitting, Cuff Size: Large)   Pulse 97   Temp (!) 97 °F (36 1 °C) (Tympanic)   Resp 20   Ht 5' 1" (1 549 m)   Wt 88 5 kg (195 lb)   SpO2 95%   Breastfeeding? No   BMI 36 84 kg/m²          Physical Exam   Constitutional: She is oriented to person, place, and time  She appears well-developed and well-nourished  HENT:   Head: Normocephalic and atraumatic  Right Ear: External ear normal    Left Ear: External ear normal    Nose: Nose normal    Mouth/Throat: Oropharynx is clear and moist    Eyes: Conjunctivae are normal    Neck: Normal range of motion  Neck supple  No thyromegaly present  Cardiovascular: Normal rate, regular rhythm and normal heart sounds  No murmur heard  Pulmonary/Chest: Effort normal and breath sounds normal  No respiratory distress  Abdominal: Soft  Bowel sounds are normal  She exhibits no mass  There is no tenderness  There is no guarding  Musculoskeletal: Normal range of motion  Lymphadenopathy:     She has no cervical adenopathy  Neurological: She is alert and oriented to person, place, and time  Skin: Skin is warm  Psychiatric: She has a normal mood and affect  Her behavior is normal    Nursing note and vitals reviewed

## 2018-11-29 ENCOUNTER — OFFICE VISIT (OUTPATIENT)
Dept: NEUROLOGY | Facility: CLINIC | Age: 58
End: 2018-11-29
Payer: COMMERCIAL

## 2018-11-29 VITALS
HEART RATE: 80 BPM | SYSTOLIC BLOOD PRESSURE: 120 MMHG | RESPIRATION RATE: 14 BRPM | DIASTOLIC BLOOD PRESSURE: 80 MMHG | BODY MASS INDEX: 36.82 KG/M2 | WEIGHT: 195 LBS | HEIGHT: 61 IN

## 2018-11-29 DIAGNOSIS — F39 MOOD DISORDER (HCC): ICD-10-CM

## 2018-11-29 DIAGNOSIS — G25.1 DRUG-INDUCED TREMOR: Primary | ICD-10-CM

## 2018-11-29 DIAGNOSIS — F79 MENTAL DISABILITY: ICD-10-CM

## 2018-11-29 PROCEDURE — 99204 OFFICE O/P NEW MOD 45 MIN: CPT | Performed by: PSYCHIATRY & NEUROLOGY

## 2018-11-29 NOTE — PROGRESS NOTES
Patient ID: Macario Rodriguez is a 62 y o  female  Assessment/Plan:    No problem-specific Assessment & Plan notes found for this encounter  Diagnoses and all orders for this visit:    Drug-induced tremor  - per hx when on wellbutrin certainly not noted on exam today  - No PD symptoms  - Hx of imbalance and confusion when on wellbutrin but no longer and she is at baseline today and since wellbutrin cessation per group home personnel present today    Neurologically stable, will review blood work ordered by PCP when done including TSH  Follow up with us on an as needed basis  Subjective:    HPI      Ms  Kelly Cast is a pleasant 63 yo female with history IDD, limited cognitive baseline see in consultation for tremors and imbalance and confusion per chart review  On exam today group home personnel states this was present months ago when patient was on Wellbutrin, since then this was discontinued with essential resolution of tremor  Mild imbalance from time to time now per group home personnel but no falls and certainly not worse than prior  Per report tremor was resting, right worse than left with no weakness or sensory loss reported  No confusion or staring off episodes or seizure like events reported  Patient states she is feeling great and has no complaints    The following portions of the patient's history were reviewed and updated as appropriate: allergies, current medications, past family history, past medical history, past social history, past surgical history and problem list          Objective:    Blood pressure 120/80, pulse 80, resp  rate 14, height 5' 1" (1 549 m), weight 88 5 kg (195 lb), not currently breastfeeding  Physical Exam   Constitutional: She is oriented to person, place, and time  She appears well-developed and well-nourished  HENT:   Head: Normocephalic and atraumatic  Eyes: Pupils are equal, round, and reactive to light  Neck: Normal range of motion  Cardiovascular: Normal rate and regular rhythm  Pulmonary/Chest: Effort normal    Abdominal: Soft  Musculoskeletal: She exhibits no tenderness  Neurological: She is alert and oriented to person, place, and time  She has normal strength and normal reflexes  Coordination normal    Psychiatric:   Hx IDD- limited cognitive baseline   Nursing note and vitals reviewed  Neurological Exam  Mental Status  Alert  Oriented only to person and place  Language is fluent with no aphasia  Oriented to person place but not time or situation, limited cognitive baseline (Chronic)  Can repeat and name, has a baseline chronic dysarthria       Cranial Nerves  CN II: Visual fields full to confrontation  CN III, IV, VI: Extraocular movements intact bilaterally  Pupils equal round and reactive to light bilaterally  CN V: Facial sensation is normal   CN VII: Full and symmetric facial movement  CN VIII: Hearing is normal   CN IX, X: Palate elevates symmetrically  Normal gag reflex  CN XI: Shoulder shrug strength is normal   CN XII: Tongue midline without atrophy or fasciculations  Motor   Normal muscle tone  Strength is 5/5 throughout all four extremities  Sensory  Light touch is normal in upper and lower extremities  Temperature is normal in upper and lower extremities  No right-sided hemispatial neglect  No left-sided hemispatial neglect  Reflexes  Deep tendon reflexes are 2+ and symmetric in all four extremities with downgoing toes bilaterally  Coordination  Finger-to-nose, rapid alternating movements and heel-to-shin normal bilaterally without dysmetria  Gait  Normal range gait with no ataxia noted  Does not follow with Rombergs eyes closed testing  ROS:    Review of Systems   Constitutional: Negative  Negative for appetite change and fever  HENT: Negative  Negative for hearing loss, tinnitus, trouble swallowing and voice change  Eyes: Negative  Negative for photophobia and pain  Respiratory: Negative  Negative for shortness of breath  Cardiovascular: Negative  Negative for palpitations  Gastrointestinal: Negative  Negative for nausea and vomiting  Endocrine: Negative  Negative for cold intolerance and heat intolerance  Genitourinary: Negative  Negative for dysuria, frequency and urgency  Musculoskeletal: Negative  Negative for myalgias and neck pain  Skin: Negative  Negative for rash  Neurological: Positive for tremors  Negative for dizziness, seizures, syncope, facial asymmetry, speech difficulty, weakness, light-headedness, numbness and headaches  Hematological: Negative  Does not bruise/bleed easily  Psychiatric/Behavioral: Negative  Negative for confusion, hallucinations and sleep disturbance

## 2019-01-02 DIAGNOSIS — E78.5 HYPERLIPIDEMIA, UNSPECIFIED HYPERLIPIDEMIA TYPE: ICD-10-CM

## 2019-01-02 DIAGNOSIS — E03.9 HYPOTHYROIDISM, UNSPECIFIED TYPE: ICD-10-CM

## 2019-01-03 RX ORDER — LEVOTHYROXINE SODIUM 0.1 MG/1
TABLET ORAL
Qty: 31 TABLET | Refills: 0 | OUTPATIENT
Start: 2019-01-03

## 2019-01-03 RX ORDER — PRAVASTATIN SODIUM 40 MG
TABLET ORAL
Qty: 31 TABLET | Refills: 0 | OUTPATIENT
Start: 2019-01-03

## 2019-01-24 DIAGNOSIS — E78.5 HYPERLIPIDEMIA, UNSPECIFIED HYPERLIPIDEMIA TYPE: ICD-10-CM

## 2019-01-24 DIAGNOSIS — E03.9 HYPOTHYROIDISM, UNSPECIFIED TYPE: ICD-10-CM

## 2019-01-24 RX ORDER — LEVOTHYROXINE SODIUM 0.1 MG/1
TABLET ORAL
Qty: 31 TABLET | Refills: 4 | Status: CANCELLED | OUTPATIENT
Start: 2019-01-24

## 2019-01-24 RX ORDER — PRAVASTATIN SODIUM 40 MG
TABLET ORAL
Qty: 31 TABLET | Refills: 4 | Status: CANCELLED | OUTPATIENT
Start: 2019-01-24

## 2019-01-28 DIAGNOSIS — E03.9 HYPOTHYROIDISM, UNSPECIFIED TYPE: ICD-10-CM

## 2019-01-28 DIAGNOSIS — E55.9 VITAMIN D DEFICIENCY: ICD-10-CM

## 2019-01-28 DIAGNOSIS — E78.5 HYPERLIPIDEMIA, UNSPECIFIED HYPERLIPIDEMIA TYPE: ICD-10-CM

## 2019-01-29 RX ORDER — CHLORAL HYDRATE 500 MG
3 CAPSULE ORAL DAILY
Qty: 90 CAPSULE | Refills: 5 | Status: SHIPPED | OUTPATIENT
Start: 2019-01-29 | End: 2019-06-24 | Stop reason: SDUPTHER

## 2019-01-29 RX ORDER — LEVOTHYROXINE SODIUM 0.1 MG/1
100 TABLET ORAL DAILY
Qty: 30 TABLET | Refills: 5 | Status: SHIPPED | OUTPATIENT
Start: 2019-01-29 | End: 2019-06-24 | Stop reason: SDUPTHER

## 2019-01-29 RX ORDER — PRAVASTATIN SODIUM 40 MG
40 TABLET ORAL DAILY
Qty: 30 TABLET | Refills: 5 | Status: SHIPPED | OUTPATIENT
Start: 2019-01-29 | End: 2019-06-24 | Stop reason: SDUPTHER

## 2019-02-06 DIAGNOSIS — E55.9 VITAMIN D DEFICIENCY: ICD-10-CM

## 2019-02-28 DIAGNOSIS — R32 URINARY INCONTINENCE, UNSPECIFIED TYPE: ICD-10-CM

## 2019-03-01 RX ORDER — DIAPER,BRIEF,ADULT, DISPOSABLE
EACH MISCELLANEOUS
Qty: 90 EACH | Refills: 4 | Status: SHIPPED | OUTPATIENT
Start: 2019-03-01 | End: 2022-07-05

## 2019-04-02 DIAGNOSIS — E55.9 VITAMIN D DEFICIENCY: ICD-10-CM

## 2019-04-03 RX ORDER — OMEGA-3S/DHA/EPA/FISH OIL/D3 300MG-1000
CAPSULE ORAL
Qty: 90 TABLET | Refills: 0 | OUTPATIENT
Start: 2019-04-03

## 2019-04-04 DIAGNOSIS — E55.9 VITAMIN D DEFICIENCY: ICD-10-CM

## 2019-04-04 RX ORDER — OMEGA-3S/DHA/EPA/FISH OIL/D3 300MG-1000
400 CAPSULE ORAL 3 TIMES DAILY
Qty: 270 TABLET | Refills: 1 | Status: SHIPPED | OUTPATIENT
Start: 2019-04-04 | End: 2019-06-24 | Stop reason: SDUPTHER

## 2019-04-05 ENCOUNTER — TELEPHONE (OUTPATIENT)
Dept: FAMILY MEDICINE CLINIC | Facility: CLINIC | Age: 59
End: 2019-04-05

## 2019-06-24 ENCOUNTER — OFFICE VISIT (OUTPATIENT)
Dept: FAMILY MEDICINE CLINIC | Facility: CLINIC | Age: 59
End: 2019-06-24

## 2019-06-24 VITALS
WEIGHT: 198 LBS | TEMPERATURE: 97.4 F | SYSTOLIC BLOOD PRESSURE: 128 MMHG | BODY MASS INDEX: 37.41 KG/M2 | HEART RATE: 75 BPM | DIASTOLIC BLOOD PRESSURE: 84 MMHG | OXYGEN SATURATION: 97 %

## 2019-06-24 DIAGNOSIS — E78.5 HYPERLIPIDEMIA, UNSPECIFIED HYPERLIPIDEMIA TYPE: ICD-10-CM

## 2019-06-24 DIAGNOSIS — E55.9 VITAMIN D DEFICIENCY: ICD-10-CM

## 2019-06-24 DIAGNOSIS — Z00.00 MEDICARE ANNUAL WELLNESS VISIT, SUBSEQUENT: Primary | ICD-10-CM

## 2019-06-24 DIAGNOSIS — E03.9 HYPOTHYROIDISM, UNSPECIFIED TYPE: ICD-10-CM

## 2019-06-24 DIAGNOSIS — F71 MODERATE INTELLECTUAL DISABILITIES: ICD-10-CM

## 2019-06-24 DIAGNOSIS — F39 MOOD DISORDER (HCC): ICD-10-CM

## 2019-06-24 PROCEDURE — G0439 PPPS, SUBSEQ VISIT: HCPCS | Performed by: PHYSICIAN ASSISTANT

## 2019-06-24 PROCEDURE — 86580 TB INTRADERMAL TEST: CPT

## 2019-06-24 RX ORDER — OMEGA-3S/DHA/EPA/FISH OIL/D3 300MG-1000
400 CAPSULE ORAL 3 TIMES DAILY
Qty: 270 TABLET | Refills: 1 | Status: SHIPPED | OUTPATIENT
Start: 2019-06-24 | End: 2020-02-11 | Stop reason: SDUPTHER

## 2019-06-24 RX ORDER — LEVOTHYROXINE SODIUM 0.1 MG/1
100 TABLET ORAL DAILY
Qty: 30 TABLET | Refills: 5 | Status: SHIPPED | OUTPATIENT
Start: 2019-06-24 | End: 2019-12-04 | Stop reason: SDUPTHER

## 2019-06-24 RX ORDER — CHLORAL HYDRATE 500 MG
3 CAPSULE ORAL DAILY
Qty: 90 CAPSULE | Refills: 5 | Status: SHIPPED | OUTPATIENT
Start: 2019-06-24 | End: 2019-12-04 | Stop reason: SDUPTHER

## 2019-06-24 RX ORDER — BUSPIRONE HYDROCHLORIDE 10 MG/1
10 TABLET ORAL 2 TIMES DAILY
Qty: 60 TABLET | Refills: 1 | Status: SHIPPED | OUTPATIENT
Start: 2019-06-24 | End: 2020-09-15 | Stop reason: SDUPTHER

## 2019-06-24 RX ORDER — DIAPER,BRIEF,INFANT-TODD,DISP
EACH MISCELLANEOUS 2 TIMES DAILY
Qty: 30 G | Refills: 0 | Status: SHIPPED | OUTPATIENT
Start: 2019-06-24 | End: 2021-09-20 | Stop reason: ALTCHOICE

## 2019-06-24 RX ORDER — LAMOTRIGINE 25 MG/1
50 TABLET ORAL
Qty: 60 TABLET | Refills: 1 | Status: SHIPPED | OUTPATIENT
Start: 2019-06-24 | End: 2021-03-01

## 2019-06-24 RX ORDER — SENNOSIDES 8.6 MG
650 CAPSULE ORAL EVERY 8 HOURS PRN
Qty: 30 TABLET | Refills: 1 | Status: SHIPPED | OUTPATIENT
Start: 2019-06-24 | End: 2021-11-30

## 2019-06-24 RX ORDER — DOCUSATE SODIUM 100 MG/1
100 CAPSULE, LIQUID FILLED ORAL 2 TIMES DAILY
Qty: 30 CAPSULE | Refills: 0 | Status: SHIPPED | OUTPATIENT
Start: 2019-06-24

## 2019-06-24 RX ORDER — LOPERAMIDE HYDROCHLORIDE 2 MG/1
2 CAPSULE ORAL 4 TIMES DAILY PRN
Qty: 30 CAPSULE | Refills: 0 | Status: SHIPPED | OUTPATIENT
Start: 2019-06-24

## 2019-06-24 RX ORDER — PRAVASTATIN SODIUM 40 MG
40 TABLET ORAL DAILY
Qty: 30 TABLET | Refills: 5 | Status: SHIPPED | OUTPATIENT
Start: 2019-06-24 | End: 2020-02-05

## 2019-06-24 RX ORDER — IBUPROFEN 200 MG
TABLET ORAL 4 TIMES DAILY
Qty: 28.3 G | Refills: 0 | Status: SHIPPED | OUTPATIENT
Start: 2019-06-24 | End: 2021-01-19

## 2019-06-24 RX ORDER — QUETIAPINE FUMARATE 50 MG/1
TABLET, FILM COATED ORAL
Qty: 90 TABLET | Refills: 1 | Status: SHIPPED | OUTPATIENT
Start: 2019-06-24 | End: 2019-12-23

## 2019-06-24 RX ORDER — OMEGA-3 FATTY ACIDS/FISH OIL 300-1000MG
CAPSULE ORAL
Qty: 120 EACH | Refills: 0 | Status: SHIPPED | OUTPATIENT
Start: 2019-06-24

## 2019-06-24 RX ORDER — LAMOTRIGINE 100 MG/1
100 TABLET ORAL DAILY
Qty: 30 TABLET | Refills: 1 | Status: SHIPPED | OUTPATIENT
Start: 2019-06-24 | End: 2021-03-01

## 2019-06-26 ENCOUNTER — CLINICAL SUPPORT (OUTPATIENT)
Dept: FAMILY MEDICINE CLINIC | Facility: CLINIC | Age: 59
End: 2019-06-26

## 2019-06-26 DIAGNOSIS — Z11.1 ENCOUNTER FOR PPD SKIN TEST READING: Primary | ICD-10-CM

## 2019-06-26 LAB
INDURATION: 0 MM
TB SKIN TEST: NEGATIVE

## 2019-07-01 DIAGNOSIS — E55.9 VITAMIN D DEFICIENCY: ICD-10-CM

## 2019-07-01 RX ORDER — CALCIUM CARBONATE/VITAMIN D3 600 MG-10
TABLET ORAL
Qty: 62 TABLET | Refills: 6 | Status: SHIPPED | OUTPATIENT
Start: 2019-07-01 | End: 2020-01-03

## 2019-07-08 ENCOUNTER — LAB (OUTPATIENT)
Dept: LAB | Age: 59
End: 2019-07-08
Payer: COMMERCIAL

## 2019-07-08 ENCOUNTER — HOSPITAL ENCOUNTER (OUTPATIENT)
Dept: RADIOLOGY | Age: 59
Discharge: HOME | End: 2019-07-08
Payer: COMMERCIAL

## 2019-07-08 VITALS — BODY MASS INDEX: 37.38 KG/M2 | WEIGHT: 198 LBS | HEIGHT: 61 IN

## 2019-07-08 DIAGNOSIS — Z00.00 MEDICARE ANNUAL WELLNESS VISIT, SUBSEQUENT: ICD-10-CM

## 2019-07-08 DIAGNOSIS — E78.5 HYPERLIPIDEMIA, UNSPECIFIED HYPERLIPIDEMIA TYPE: ICD-10-CM

## 2019-07-08 DIAGNOSIS — E55.9 VITAMIN D DEFICIENCY: ICD-10-CM

## 2019-07-08 DIAGNOSIS — E03.9 HYPOTHYROIDISM, UNSPECIFIED TYPE: ICD-10-CM

## 2019-07-08 LAB
25(OH)D3 SERPL-MCNC: 32.7 NG/ML (ref 30–100)
BASOPHILS # BLD AUTO: 0.05 THOUSANDS/ΜL (ref 0–0.1)
BASOPHILS NFR BLD AUTO: 1 % (ref 0–1)
EOSINOPHIL # BLD AUTO: 0.26 THOUSAND/ΜL (ref 0–0.61)
EOSINOPHIL NFR BLD AUTO: 4 % (ref 0–6)
ERYTHROCYTE [DISTWIDTH] IN BLOOD BY AUTOMATED COUNT: 13.8 % (ref 11.6–15.1)
HCT VFR BLD AUTO: 42 % (ref 34.8–46.1)
HGB BLD-MCNC: 13.2 G/DL (ref 11.5–15.4)
IMM GRANULOCYTES # BLD AUTO: 0.01 THOUSAND/UL (ref 0–0.2)
IMM GRANULOCYTES NFR BLD AUTO: 0 % (ref 0–2)
LYMPHOCYTES # BLD AUTO: 1.71 THOUSANDS/ΜL (ref 0.6–4.47)
LYMPHOCYTES NFR BLD AUTO: 25 % (ref 14–44)
MCH RBC QN AUTO: 28.1 PG (ref 26.8–34.3)
MCHC RBC AUTO-ENTMCNC: 31.4 G/DL (ref 31.4–37.4)
MCV RBC AUTO: 90 FL (ref 82–98)
MONOCYTES # BLD AUTO: 0.39 THOUSAND/ΜL (ref 0.17–1.22)
MONOCYTES NFR BLD AUTO: 6 % (ref 4–12)
NEUTROPHILS # BLD AUTO: 4.57 THOUSANDS/ΜL (ref 1.85–7.62)
NEUTS SEG NFR BLD AUTO: 64 % (ref 43–75)
NRBC BLD AUTO-RTO: 0 /100 WBCS
PLATELET # BLD AUTO: 134 THOUSANDS/UL (ref 149–390)
PMV BLD AUTO: 12.2 FL (ref 8.9–12.7)
RBC # BLD AUTO: 4.69 MILLION/UL (ref 3.81–5.12)
TSH SERPL DL<=0.05 MIU/L-ACNC: 1.99 UIU/ML (ref 0.36–3.74)
WBC # BLD AUTO: 6.99 THOUSAND/UL (ref 4.31–10.16)

## 2019-07-08 PROCEDURE — 87389 HIV-1 AG W/HIV-1&-2 AB AG IA: CPT

## 2019-07-08 PROCEDURE — 84443 ASSAY THYROID STIM HORMONE: CPT

## 2019-07-08 PROCEDURE — 85025 COMPLETE CBC W/AUTO DIFF WBC: CPT

## 2019-07-08 PROCEDURE — 36415 COLL VENOUS BLD VENIPUNCTURE: CPT

## 2019-07-08 PROCEDURE — 82306 VITAMIN D 25 HYDROXY: CPT

## 2019-07-08 PROCEDURE — 77067 SCR MAMMO BI INCL CAD: CPT

## 2019-07-08 PROCEDURE — 86706 HEP B SURFACE ANTIBODY: CPT

## 2019-07-08 PROCEDURE — 86803 HEPATITIS C AB TEST: CPT | Performed by: FAMILY MEDICINE

## 2019-07-09 LAB
HBV SURFACE AB SER-ACNC: 22.86 MIU/ML
HCV AB SER QL: NORMAL

## 2019-07-10 ENCOUNTER — TRANSCRIBE ORDERS (OUTPATIENT)
Dept: ADMINISTRATIVE | Age: 59
End: 2019-07-10

## 2019-07-10 ENCOUNTER — APPOINTMENT (OUTPATIENT)
Dept: LAB | Age: 59
End: 2019-07-10
Payer: COMMERCIAL

## 2019-07-10 DIAGNOSIS — E78.5 HYPERLIPIDEMIA, UNSPECIFIED HYPERLIPIDEMIA TYPE: Primary | ICD-10-CM

## 2019-07-10 DIAGNOSIS — E78.5 HYPERLIPIDEMIA, UNSPECIFIED HYPERLIPIDEMIA TYPE: ICD-10-CM

## 2019-07-10 LAB
ALBUMIN SERPL BCP-MCNC: 3.8 G/DL (ref 3.5–5)
ALP SERPL-CCNC: 110 U/L (ref 46–116)
ALT SERPL W P-5'-P-CCNC: 18 U/L (ref 12–78)
ANION GAP SERPL CALCULATED.3IONS-SCNC: 3 MMOL/L (ref 4–13)
AST SERPL W P-5'-P-CCNC: 10 U/L (ref 5–45)
BILIRUB SERPL-MCNC: 1.2 MG/DL (ref 0.2–1)
BUN SERPL-MCNC: 19 MG/DL (ref 5–25)
CALCIUM SERPL-MCNC: 9.3 MG/DL (ref 8.3–10.1)
CHLORIDE SERPL-SCNC: 105 MMOL/L (ref 100–108)
CHOLEST SERPL-MCNC: 127 MG/DL (ref 50–200)
CO2 SERPL-SCNC: 31 MMOL/L (ref 21–32)
CREAT SERPL-MCNC: 1.05 MG/DL (ref 0.6–1.3)
GFR SERPL CREATININE-BSD FRML MDRD: 59 ML/MIN/1.73SQ M
GLUCOSE P FAST SERPL-MCNC: 87 MG/DL (ref 65–99)
HDLC SERPL-MCNC: 53 MG/DL (ref 40–60)
HIV 1+2 AB+HIV1 P24 AG SERPL QL IA: NORMAL
LDLC SERPL CALC-MCNC: 56 MG/DL (ref 0–100)
NONHDLC SERPL-MCNC: 74 MG/DL
POTASSIUM SERPL-SCNC: 3.9 MMOL/L (ref 3.5–5.3)
PROT SERPL-MCNC: 7.5 G/DL (ref 6.4–8.2)
SODIUM SERPL-SCNC: 139 MMOL/L (ref 136–145)
TRIGL SERPL-MCNC: 89 MG/DL

## 2019-07-10 PROCEDURE — 80053 COMPREHEN METABOLIC PANEL: CPT

## 2019-07-10 PROCEDURE — 80061 LIPID PANEL: CPT

## 2019-07-10 PROCEDURE — 36415 COLL VENOUS BLD VENIPUNCTURE: CPT

## 2019-07-11 DIAGNOSIS — R79.89 PLATELET COUNT LESS 140,000 PER CUBIC MILLIMETER: Primary | ICD-10-CM

## 2019-07-11 NOTE — RESULT ENCOUNTER NOTE
Her blood work looked good  She was immune to hepatitis-B  She does not have hepatitis-C  Her thyroid level was good    Her platelet count was a little bit low so I would recommend rechecking in 3 months but has been good in the past

## 2019-08-06 ENCOUNTER — OFFICE VISIT (OUTPATIENT)
Dept: URGENT CARE | Age: 59
End: 2019-08-06
Payer: COMMERCIAL

## 2019-08-06 VITALS
TEMPERATURE: 96.9 F | DIASTOLIC BLOOD PRESSURE: 72 MMHG | HEIGHT: 61 IN | SYSTOLIC BLOOD PRESSURE: 138 MMHG | WEIGHT: 196 LBS | RESPIRATION RATE: 16 BRPM | OXYGEN SATURATION: 95 % | BODY MASS INDEX: 37 KG/M2 | HEART RATE: 103 BPM

## 2019-08-06 DIAGNOSIS — J06.9 ACUTE UPPER RESPIRATORY INFECTION: Primary | ICD-10-CM

## 2019-08-06 PROCEDURE — 99213 OFFICE O/P EST LOW 20 MIN: CPT | Performed by: FAMILY MEDICINE

## 2019-08-06 RX ORDER — BENZONATATE 200 MG/1
200 CAPSULE ORAL 3 TIMES DAILY PRN
Qty: 20 CAPSULE | Refills: 0 | Status: SHIPPED | OUTPATIENT
Start: 2019-08-06

## 2019-08-06 NOTE — LETTER
August 6, 2019     Patient: Helen Verduzco   YOB: 1960   Date of Visit: 8/6/2019       To Whom it May Concern:    Jimena Myers was seen in my clinic on 8/6/2019  She may return to school on 08/08/2019  If you have any questions or concerns, please don't hesitate to call           Sincerely,          Shawn Zuniga DO        CC: No Recipients

## 2019-08-06 NOTE — PROGRESS NOTES
[facility]        NAME: Dianna Greer is a 62 y o  female  : 1960    MRN: [id number]  DATE: 2019  TIME: 3:05 PM    Assessment and Plan   Acute upper respiratory infection [J06 9]  1  Acute upper respiratory infection  benzonatate (TESSALON) 200 MG capsule         Patient Instructions     Patient Instructions   Tessalon Perles 2 to 3 times a day (every 8-12 hours) as needed for cough  Ibuprofen as needed  Recheck/follow-up with family physician as needed  Please go to the hospital emergency department if needed  Follow up with PCP in 3-5 days  Proceed to  ER if symptoms worsen  Chief Complaint     Chief Complaint   Patient presents with    Cough     Pt having a cough starting yesterday and lost her voice today at her day program  Denies fevers  History of Present Illness       Patient here with caretaker; cough and hoarseness since yesterday; no fever      Review of Systems   Review of Systems   Constitutional: Negative for fever  HENT: Positive for voice change  Respiratory: Positive for cough  Cardiovascular: Negative  Musculoskeletal: Negative  Skin: Negative  Neurological: Negative            Current Medications       Current Outpatient Medications:     busPIRone (BUSPAR) 10 mg tablet, Take 1 tablet (10 mg total) by mouth 2 (two) times a day, Disp: 60 tablet, Rfl: 1    CALCIUM 600-D 600-400 MG-UNIT TABS, TAKE (1) TABLET BY MOUTH TWICE DAILY AT 8AM AND 5PM , Disp: 62 tablet, Rfl: 6    cholecalciferol (VITAMIN D3) 400 units tablet, Take 1 tablet (400 Units total) by mouth 3 (three) times a day, Disp: 270 tablet, Rfl: 1    docusate sodium (COLACE) 100 mg capsule, Take 1 capsule (100 mg total) by mouth 2 (two) times a day If needed constipation, Disp: 30 capsule, Rfl: 0    hydrocortisone 1 % cream, Apply topically 2 (two) times a day If needed for itchy rash, Disp: 30 g, Rfl: 0    Ibuprofen 200 MG CAPS, Take 1-2 tablets if needed for pain or fever every 6-8 hours, Disp: 120 each, Rfl: 0    Incontinence Supply Disposable (WINGS CHOICE PLUS ADULT BRIEFS) MISC, USE AS DIRECTED FOR INCONTINENCE, Disp: 90 each, Rfl: 4    lamoTRIgine (LaMICtal) 100 mg tablet, Take 1 tablet (100 mg total) by mouth daily In the morning, Disp: 30 tablet, Rfl: 1    lamoTRIgine (LaMICtal) 25 mg tablet, Take 2 tablets (50 mg total) by mouth daily at bedtime, Disp: 60 tablet, Rfl: 1    levothyroxine 100 mcg tablet, Take 1 tablet (100 mcg total) by mouth daily, Disp: 30 tablet, Rfl: 5    loperamide (IMODIUM A-D) 2 mg capsule, Take 1 capsule (2 mg total) by mouth 4 (four) times a day as needed for diarrhea, Disp: 30 capsule, Rfl: 0    neomycin-bacitracin-polymyxin (NEOSPORIN) 5-400-5,000 ointment, Apply topically 4 (four) times a day If needed for wound, Disp: 28 3 g, Rfl: 0    Omega-3 1000 MG CAPS, Take 3 capsules (3,000 mg total) by mouth daily, Disp: 90 capsule, Rfl: 5    pravastatin (PRAVACHOL) 40 mg tablet, Take 1 tablet (40 mg total) by mouth daily, Disp: 30 tablet, Rfl: 5    QUEtiapine (SEROquel) 50 mg tablet, Take 1 tablet in the morning and 2 tablets orally at night, Disp: 90 tablet, Rfl: 1    SODIUM FLUORIDE, DENTAL GEL, (SF 5000 PLUS) 1 1 % CREA, SF 5000 Plus 1 1 % Dental Cream; 0; 22-Aug-2014;  Active, Disp: , Rfl:     acetaminophen (TYLENOL) 650 mg CR tablet, Take 1 tablet (650 mg total) by mouth every 8 (eight) hours as needed for mild pain (or fever) (Patient not taking: Reported on 8/6/2019), Disp: 30 tablet, Rfl: 1    benzonatate (TESSALON) 200 MG capsule, Take 1 capsule (200 mg total) by mouth 3 (three) times a day as needed for cough for up to 20 doses, Disp: 20 capsule, Rfl: 0    Current Allergies     Allergies as of 08/06/2019    (No Known Allergies)            The following portions of the patient's history were reviewed and updated as appropriate: allergies, current medications, past family history, past medical history, past social history, past surgical history and problem list      Past Medical History:   Diagnosis Date    Anxiety disorder     Mental retardation        Past Surgical History:   Procedure Laterality Date    HYSTERECTOMY      age 18    OOPHORECTOMY         Family History   Problem Relation Age of Onset    Other Mother         neglect or abandonment     Other Father         neglect or abandonment     No Known Problems Sister          Medications have been verified  Objective   /72 (BP Location: Right arm, Patient Position: Sitting)   Pulse 103   Temp (!) 96 9 °F (36 1 °C) (Temporal)   Resp 16   Ht 5' 1" (1 549 m)   Wt 88 9 kg (196 lb)   SpO2 95%   BMI 37 03 kg/m²        Physical Exam     Physical Exam   Constitutional: She appears well-developed and well-nourished  No distress  HENT:   Right Ear: External ear normal    Left Ear: External ear normal    Slight injection of the oropharynx   Neck: Normal range of motion  Neck supple  Cardiovascular: Normal rate, regular rhythm and normal heart sounds  Pulmonary/Chest: Effort normal and breath sounds normal    Neurological: She is alert  Skin:   Good color and turgor   Nursing note and vitals reviewed

## 2019-08-06 NOTE — PATIENT INSTRUCTIONS
Tessalon Perles 2 to 3 times a day (every 8-12 hours) as needed for cough  Ibuprofen as needed  Recheck/follow-up with family physician as needed  Please go to the hospital emergency department if needed

## 2019-08-22 ENCOUNTER — HOSPITAL ENCOUNTER (INPATIENT)
Facility: HOSPITAL | Age: 59
LOS: 2 days | Discharge: HOME | DRG: 310 | End: 2019-08-24
Attending: INTERNAL MEDICINE | Admitting: INTERNAL MEDICINE
Payer: COMMERCIAL

## 2019-08-22 ENCOUNTER — OFFICE VISIT (OUTPATIENT)
Dept: CARDIOLOGY CLINIC | Facility: CLINIC | Age: 59
End: 2019-08-22
Payer: COMMERCIAL

## 2019-08-22 VITALS
DIASTOLIC BLOOD PRESSURE: 80 MMHG | SYSTOLIC BLOOD PRESSURE: 120 MMHG | HEIGHT: 61 IN | HEART RATE: 125 BPM | BODY MASS INDEX: 37.19 KG/M2 | WEIGHT: 197 LBS

## 2019-08-22 DIAGNOSIS — I48.19 PERSISTENT ATRIAL FIBRILLATION (HCC): ICD-10-CM

## 2019-08-22 DIAGNOSIS — Z87.898 H/O PROLONGED Q-T INTERVAL ON ECG: Primary | ICD-10-CM

## 2019-08-22 DIAGNOSIS — I48.91 NEW ONSET ATRIAL FIBRILLATION (HCC): ICD-10-CM

## 2019-08-22 DIAGNOSIS — I51.7 ENLARGED LA (LEFT ATRIUM): Primary | ICD-10-CM

## 2019-08-22 DIAGNOSIS — I48.91 RAPID ATRIAL FIBRILLATION (HCC): ICD-10-CM

## 2019-08-22 LAB
ANION GAP SERPL CALCULATED.3IONS-SCNC: 9 MMOL/L (ref 4–13)
BASOPHILS # BLD AUTO: 0.02 THOUSANDS/ΜL (ref 0–0.1)
BASOPHILS NFR BLD AUTO: 0 % (ref 0–1)
BUN SERPL-MCNC: 17 MG/DL (ref 5–25)
CALCIUM SERPL-MCNC: 9.1 MG/DL (ref 8.3–10.1)
CHLORIDE SERPL-SCNC: 106 MMOL/L (ref 100–108)
CO2 SERPL-SCNC: 28 MMOL/L (ref 21–32)
CREAT SERPL-MCNC: 1.16 MG/DL (ref 0.6–1.3)
EOSINOPHIL # BLD AUTO: 0.31 THOUSAND/ΜL (ref 0–0.61)
EOSINOPHIL NFR BLD AUTO: 4 % (ref 0–6)
ERYTHROCYTE [DISTWIDTH] IN BLOOD BY AUTOMATED COUNT: 13.9 % (ref 11.6–15.1)
GFR SERPL CREATININE-BSD FRML MDRD: 52 ML/MIN/1.73SQ M
GLUCOSE SERPL-MCNC: 153 MG/DL (ref 65–140)
HCT VFR BLD AUTO: 40 % (ref 34.8–46.1)
HGB BLD-MCNC: 12.5 G/DL (ref 11.5–15.4)
IMM GRANULOCYTES # BLD AUTO: 0.01 THOUSAND/UL (ref 0–0.2)
IMM GRANULOCYTES NFR BLD AUTO: 0 % (ref 0–2)
LYMPHOCYTES # BLD AUTO: 2.2 THOUSANDS/ΜL (ref 0.6–4.47)
LYMPHOCYTES NFR BLD AUTO: 30 % (ref 14–44)
MCH RBC QN AUTO: 28.5 PG (ref 26.8–34.3)
MCHC RBC AUTO-ENTMCNC: 31.3 G/DL (ref 31.4–37.4)
MCV RBC AUTO: 91 FL (ref 82–98)
MONOCYTES # BLD AUTO: 0.31 THOUSAND/ΜL (ref 0.17–1.22)
MONOCYTES NFR BLD AUTO: 4 % (ref 4–12)
NEUTROPHILS # BLD AUTO: 4.43 THOUSANDS/ΜL (ref 1.85–7.62)
NEUTS SEG NFR BLD AUTO: 62 % (ref 43–75)
NRBC BLD AUTO-RTO: 0 /100 WBCS
PLATELET # BLD AUTO: 141 THOUSANDS/UL (ref 149–390)
PMV BLD AUTO: 11.5 FL (ref 8.9–12.7)
POTASSIUM SERPL-SCNC: 3.4 MMOL/L (ref 3.5–5.3)
RBC # BLD AUTO: 4.39 MILLION/UL (ref 3.81–5.12)
SODIUM SERPL-SCNC: 143 MMOL/L (ref 136–145)
T4 FREE SERPL-MCNC: 0.9 NG/DL (ref 0.76–1.46)
TSH SERPL DL<=0.05 MIU/L-ACNC: 3 UIU/ML (ref 0.36–3.74)
WBC # BLD AUTO: 7.28 THOUSAND/UL (ref 4.31–10.16)

## 2019-08-22 PROCEDURE — 80048 BASIC METABOLIC PNL TOTAL CA: CPT | Performed by: INTERNAL MEDICINE

## 2019-08-22 PROCEDURE — 93000 ELECTROCARDIOGRAM COMPLETE: CPT | Performed by: INTERNAL MEDICINE

## 2019-08-22 PROCEDURE — 99222 1ST HOSP IP/OBS MODERATE 55: CPT | Performed by: INTERNAL MEDICINE

## 2019-08-22 PROCEDURE — 85025 COMPLETE CBC W/AUTO DIFF WBC: CPT | Performed by: INTERNAL MEDICINE

## 2019-08-22 PROCEDURE — 1111F DSCHRG MED/CURRENT MED MERGE: CPT | Performed by: INTERNAL MEDICINE

## 2019-08-22 PROCEDURE — 99214 OFFICE O/P EST MOD 30 MIN: CPT | Performed by: INTERNAL MEDICINE

## 2019-08-22 PROCEDURE — 84443 ASSAY THYROID STIM HORMONE: CPT | Performed by: INTERNAL MEDICINE

## 2019-08-22 PROCEDURE — 84439 ASSAY OF FREE THYROXINE: CPT | Performed by: INTERNAL MEDICINE

## 2019-08-22 PROCEDURE — 99223 1ST HOSP IP/OBS HIGH 75: CPT | Performed by: INTERNAL MEDICINE

## 2019-08-22 RX ORDER — QUETIAPINE FUMARATE 25 MG/1
50 TABLET, FILM COATED ORAL 2 TIMES DAILY
Status: DISCONTINUED | OUTPATIENT
Start: 2019-08-22 | End: 2019-08-24 | Stop reason: HOSPADM

## 2019-08-22 RX ORDER — LEVOTHYROXINE SODIUM 0.1 MG/1
100 TABLET ORAL
Status: DISCONTINUED | OUTPATIENT
Start: 2019-08-23 | End: 2019-08-24 | Stop reason: HOSPADM

## 2019-08-22 RX ORDER — SODIUM CHLORIDE 9 MG/ML
100 INJECTION, SOLUTION INTRAVENOUS CONTINUOUS
Status: DISCONTINUED | OUTPATIENT
Start: 2019-08-23 | End: 2019-08-24 | Stop reason: HOSPADM

## 2019-08-22 RX ORDER — BUSPIRONE HYDROCHLORIDE 10 MG/1
10 TABLET ORAL 2 TIMES DAILY
Status: DISCONTINUED | OUTPATIENT
Start: 2019-08-22 | End: 2019-08-24 | Stop reason: HOSPADM

## 2019-08-22 RX ORDER — LAMOTRIGINE 25 MG/1
50 TABLET ORAL
Status: DISCONTINUED | OUTPATIENT
Start: 2019-08-22 | End: 2019-08-24 | Stop reason: HOSPADM

## 2019-08-22 RX ORDER — LAMOTRIGINE 100 MG/1
100 TABLET ORAL DAILY
Status: DISCONTINUED | OUTPATIENT
Start: 2019-08-23 | End: 2019-08-24 | Stop reason: HOSPADM

## 2019-08-22 RX ORDER — PRAVASTATIN SODIUM 40 MG
40 TABLET ORAL
Status: DISCONTINUED | OUTPATIENT
Start: 2019-08-22 | End: 2019-08-24 | Stop reason: HOSPADM

## 2019-08-22 RX ADMIN — QUETIAPINE FUMARATE 50 MG: 25 TABLET ORAL at 18:20

## 2019-08-22 RX ADMIN — PRAVASTATIN SODIUM 40 MG: 40 TABLET ORAL at 18:20

## 2019-08-22 RX ADMIN — BUSPIRONE HYDROCHLORIDE 10 MG: 10 TABLET ORAL at 18:20

## 2019-08-22 RX ADMIN — LAMOTRIGINE 50 MG: 25 TABLET ORAL at 21:34

## 2019-08-22 RX ADMIN — APIXABAN 5 MG: 5 TABLET, FILM COATED ORAL at 17:40

## 2019-08-22 NOTE — ASSESSMENT & PLAN NOTE
· Patient resides in a group home  · Watch for delirium/agitation  · If necessary, will give Ativan p r n  Her sister is agreeable

## 2019-08-22 NOTE — ASSESSMENT & PLAN NOTE
· Direct admission from Cardiology office due to atrial fibrillation with rapid ventricular response  · Discussed plan with Cardiology  · NPO at midnight for planned MARCIE with cardioversion tomorrow  · Started on Eliquis 5 mg b i d   · Postprocedure, she will be observed another night and then plan for discharge on Saturday 08/24/2019 if stable  · She will need 1 month of anticoagulation after discharge  · Follow-up with Cardiology post discharge    · Plan of care was discussed with sister and supervised at the [facility]

## 2019-08-22 NOTE — ASSESSMENT & PLAN NOTE
· Check TSH and free T4 given new onset atrial fibrillation  · Continue levothyroxine 100 mcg daily for now

## 2019-08-22 NOTE — H&P
H&P- Jeff Estrada 1960, 62 y o  female MRN: [id number]    Unit/Bed#: E4 -01 Encounter: [id number]    Primary Care Provider: Ho Valles PA-C   Date and time admitted to hospital: 8/22/2019  2:41 PM        * New onset atrial fibrillation [facility])  Assessment & Plan  · Direct admission from Cardiology office due to atrial fibrillation with rapid ventricular response  · Discussed plan with Cardiology  · NPO at midnight for planned MARCIE with cardioversion tomorrow  · Started on Eliquis 5 mg b i d   · Postprocedure, she will be observed another night and then plan for discharge on Saturday 08/24/2019 if stable  · She will need 1 month of anticoagulation after discharge  · Follow-up with Cardiology post discharge  · Plan of care was discussed with sister and supervised at the group home    H/O prolonged Q-T interval on ECG  Assessment & Plan  · Possibly related to psychiatric medications such as Seroquel and Lamictal  · Maintain on telemetry    Mood disorder [facility])  Assessment & Plan  · Mood is currently stable  · Continue home regimen:  Lamictal 100 mg in the morning and 50 mg at bedtime, Seroquel 50 mg in the morning and 100 mg at night, BuSpar 10 mg b i d  Mental disability  Assessment & Plan  · Patient resides in a group home  · Watch for delirium/agitation  · If necessary, will give Ativan p r n  Her sister is agreeable  Hyperlipidemia  Assessment & Plan  · Continue pravastatin 40 mg daily    Hypothyroidism  Assessment & Plan  · Check TSH and free T4 given new onset atrial fibrillation  · Continue levothyroxine 100 mcg daily for now        VTE Prophylaxis: Apixaban (Eliquis)     Code Status: full code  POLST: There is no POLST form on file for this patient (pre-hospital)  Discussion with family: yes    Anticipated Length of Stay:  Patient will be admitted on an Inpatient basis with an anticipated length of stay of  2 midnights     Justification for Hospital Stay: new afib     Total Time for Visit, including Counseling / Coordination of Care: 45 minutes  Greater than 50% of this total time spent on direct patient counseling and coordination of care  Chief Complaint:   Abnormal heart rate    History of Present Illness:    Jr Joiner is a 62 y o  female who was sent to the hospital from the Cardiology office due to newly diagnosed atrial fibrillation  She has known history of intellectual disability and mood disorder  She is residing in a group home  At baseline she is able to perform activities of daily living but does require help with bathing  She was at the Cardiology office today for a routine visit regarding previously diagnosed prolonged QT  During the visit she was found to have a fast and irregular heartbeat  An EKG was performed which confirmed new atrial fibrillation with RVR  The patient herself had no symptoms of dizziness, lightheadedness, chest pain or shortness of breath  Most of the history was obtained from her sister and the supervisor at the group home  At the time of my examination she was laying on the bed without any complaints  Review of Systems:    Review of Systems   Unable to perform ROS: Psychiatric disorder       Past Medical and Surgical History:     Past Medical History:   Diagnosis Date    Anxiety disorder     Mental retardation        Past Surgical History:   Procedure Laterality Date    HYSTERECTOMY      age 18    OOPHORECTOMY         Meds/Allergies:    Prior to Admission medications    Medication Sig Start Date End Date Taking?  Authorizing Provider   busPIRone (BUSPAR) 10 mg tablet Take 1 tablet (10 mg total) by mouth 2 (two) times a day 6/24/19  Yes Ho Valles PA-C   CALCIUM 600-D 600-400 MG-UNIT TABS TAKE (1) TABLET BY MOUTH TWICE DAILY AT 8AM AND 5PM  7/1/19  Yes Ho Valles PA-C   cholecalciferol (VITAMIN D3) 400 units tablet Take 1 tablet (400 Units total) by mouth 3 (three) times a day 6/24/19  Yes Ho Valles PA-C   Incontinence Supply Disposable (WINGS CHOICE PLUS ADULT BRIEFS) MISC USE AS DIRECTED FOR INCONTINENCE 3/1/19  Yes Ho Valles, PA-C   lamoTRIgine (LaMICtal) 100 mg tablet Take 1 tablet (100 mg total) by mouth daily In the morning 6/24/19  Yes Ho Pica, PA-C   lamoTRIgine (LaMICtal) 25 mg tablet Take 2 tablets (50 mg total) by mouth daily at bedtime 6/24/19  Yes Ho Fourniera, PA-C   levothyroxine 100 mcg tablet Take 1 tablet (100 mcg total) by mouth daily 6/24/19  Yes Ho Valles, PA-C   Omega-3 1000 MG CAPS Take 3 capsules (3,000 mg total) by mouth daily 6/24/19  Yes Ho Valles, PA-C   pravastatin (PRAVACHOL) 40 mg tablet Take 1 tablet (40 mg total) by mouth daily 6/24/19  Yes Ho Fourniera, PA-C   QUEtiapine (SEROquel) 50 mg tablet Take 1 tablet in the morning and 2 tablets orally at night 6/24/19  Yes Ho Pica, PA-C   SODIUM FLUORIDE, DENTAL GEL, (SF 5000 PLUS) 1 1 % CREA SF 5000 Plus 1 1 % Dental Cream; 0; 22-Aug-2014;  Active   Yes Historical Provider, MD   acetaminophen (TYLENOL) 650 mg CR tablet Take 1 tablet (650 mg total) by mouth every 8 (eight) hours as needed for mild pain (or fever) 6/24/19   Ho Valles, PA-C   benzonatate (TESSALON) 200 MG capsule Take 1 capsule (200 mg total) by mouth 3 (three) times a day as needed for cough for up to 20 doses 8/6/19   Sherwin Curlin, DO   docusate sodium (COLACE) 100 mg capsule Take 1 capsule (100 mg total) by mouth 2 (two) times a day If needed constipation 6/24/19   Ho Pica, PA-C   hydrocortisone 1 % cream Apply topically 2 (two) times a day If needed for itchy rash 6/24/19   oH Pica, PA-C   Ibuprofen 200 MG CAPS Take 1-2 tablets if needed for pain or fever every 6-8 hours 6/24/19   Ho Pica, PA-C   loperamide (IMODIUM A-D) 2 mg capsule Take 1 capsule (2 mg total) by mouth 4 (four) times a day as needed for diarrhea 6/24/19   Ho Valles PA-C   neomycin-bacitracin-polymyxin (NEOSPORIN) 5-400-5,000 ointment Apply topically 4 (four) times a day If needed for wound 6/24/19   Ho Valles PA-C     I have reviewed home medications with a medical source (PCP, Pharmacy, other)  Allergies: No Known Allergies    Social History:     Marital Status: Single   Occupation:  None  Patient Pre-hospital Living Situation:  Lives in a group home  Patient Pre-hospital Level of Mobility:  Independent  Patient Pre-hospital Diet Restrictions:  None  Substance Use History:   Social History     Substance and Sexual Activity   Alcohol Use No     Social History     Tobacco Use   Smoking Status Never Smoker   Smokeless Tobacco Never Used     Social History     Substance and Sexual Activity   Drug Use No       Family History: According to her sister her father had heart disease    Family History   Problem Relation Age of Onset    Other Mother         neglect or abandonment     Other Father         neglect or abandonment     No Known Problems Sister        Physical Exam:     Vitals:   Blood Pressure: 132/80 (08/22/19 1454)  Pulse: (!) 108 (08/22/19 1454)  Temperature: 97 8 °F (36 6 °C) (08/22/19 1454)  Temp Source: Temporal (08/22/19 1454)  Respirations: 18 (08/22/19 1454)  SpO2: 98 % (08/22/19 1454)    Physical Exam   Constitutional: She appears well-developed  No distress  HENT:   Head: Normocephalic and atraumatic  On visual exam, slight microcephaly   Eyes: No scleral icterus  Neck: No JVD present  Cardiovascular:   Irregular irregular   Pulmonary/Chest: Effort normal  No stridor  No respiratory distress  She has no wheezes  Abdominal: Soft  She exhibits no distension  There is no tenderness  Musculoskeletal: She exhibits no edema or deformity  Neurological: She is alert  Skin: Skin is warm and dry  She is not diaphoretic  Psychiatric: She has a normal mood and affect  Vitals reviewed  Additional Data:     Lab Results: I have personally reviewed pertinent reports                                  Imaging:     No orders to display       EKG, Pathology, and Other Studies Reviewed on Admission:   · EKG:  Atrial fibrillation with RVR    Allscripts / Epic Records Reviewed: Yes     ** Please Note: This note has been constructed using a voice recognition system   **

## 2019-08-22 NOTE — PROGRESS NOTES
[address] [facility]  [address]  [facility] [address] [facility]  [phone]    Cardiology Follow up    Patient:  Jr Joiner  :  1960  MRN:  [id number]    History of Present Illness:      79-year-old female with past medical history of dyslipidemia, thyroid disease, mood disorder and mild IDD/ MR, history of prolonged QTC, presents for follow-up  She does note palpitations for few days  He notes no chest pain, shortness of breath, dizziness or syncope      Patient Active Problem List   Diagnosis    Enlarged LA (left atrium)    H/O prolonged Q-T interval on ECG    Hypothyroidism    Hyperlipidemia    Vitamin D deficiency    Medication-induced movement disorder    Mental disability    Mood disorder ([address] )    Obese    Overflow incontinence of urine    Severe anxiety       Past Surgical History  Past Surgical History:   Procedure Laterality Date    HYSTERECTOMY      age 25   Elwyn Serge OOPHORECTOMY         Social History   Social History     Socioeconomic History    Marital status: Single     Spouse name: Not on file    Number of children: Not on file    Years of education: Not on file    Highest education level: Not on file   Occupational History    Not on file   Social Needs    Financial resource strain: Not on file    Food insecurity:     Worry: Not on file     Inability: Not on file    Transportation needs:     Medical: Not on file     Non-medical: Not on file   Tobacco Use    Smoking status: Never Smoker    Smokeless tobacco: Never Used   Substance and Sexual Activity    Alcohol use: No    Drug use: No    Sexual activity: Not on file   Lifestyle    Physical activity:     Days per week: Not on file     Minutes per session: Not on file    Stress: Not on file   Relationships    Social connections:     Talks on phone: Not on file     Gets together: Not on file     Attends [employer] service: Not on file     Active member of club or organization: Not on file     Attends meetings of clubs or organizations: Not on file     Relationship status: Not on file    Intimate partner violence:     Fear of current or ex partner: Not on file     Emotionally abused: Not on file     Physically abused: Not on file     Forced sexual activity: Not on file   Other Topics Concern    Not on file   Social History Narrative    Disabled    Social history reviewed unchanged         No Known Allergies    Family History   Family History   Problem Relation Age of Onset    Other Mother         neglect or abandonment     Other Father         neglect or abandonment     No Known Problems Sister        Review of Systems:  Review of Systems   Constitutional: Negative for chills, fatigue and fever  HENT: Negative for hearing loss, nosebleeds and tinnitus  Eyes: Negative for pain  Respiratory: Negative for cough, chest tightness and shortness of breath  Cardiovascular: Positive for palpitations  Negative for chest pain and leg swelling  Gastrointestinal: Negative for abdominal pain, nausea and vomiting  Endocrine: Negative for cold intolerance and heat intolerance  Genitourinary: Negative for difficulty urinating, hematuria and vaginal bleeding  Musculoskeletal: Negative for arthralgias  Skin: Negative for rash  Allergic/Immunologic: Negative for environmental allergies  Neurological: Negative for dizziness, syncope, weakness and light-headedness  Psychiatric/Behavioral: Negative for decreased concentration and sleep disturbance  The patient is not nervous/anxious            Current Outpatient Medications:     acetaminophen (TYLENOL) 650 mg CR tablet, Take 1 tablet (650 mg total) by mouth every 8 (eight) hours as needed for mild pain (or fever) (Patient not taking: Reported on 8/6/2019), Disp: 30 tablet, Rfl: 1    benzonatate (TESSALON) 200 MG capsule, Take 1 capsule (200 mg total) by mouth 3 (three) times a day as needed for cough for up to 20 doses, Disp: 20 capsule, Rfl: 0   busPIRone (BUSPAR) 10 mg tablet, Take 1 tablet (10 mg total) by mouth 2 (two) times a day, Disp: 60 tablet, Rfl: 1    CALCIUM 600-D 600-400 MG-UNIT TABS, TAKE (1) TABLET BY MOUTH TWICE DAILY AT 8AM AND 5PM , Disp: 62 tablet, Rfl: 6    cholecalciferol (VITAMIN D3) 400 units tablet, Take 1 tablet (400 Units total) by mouth 3 (three) times a day, Disp: 270 tablet, Rfl: 1    docusate sodium (COLACE) 100 mg capsule, Take 1 capsule (100 mg total) by mouth 2 (two) times a day If needed constipation, Disp: 30 capsule, Rfl: 0    hydrocortisone 1 % cream, Apply topically 2 (two) times a day If needed for itchy rash, Disp: 30 g, Rfl: 0    Ibuprofen 200 MG CAPS, Take 1-2 tablets if needed for pain or fever every 6-8 hours, Disp: 120 each, Rfl: 0    Incontinence Supply Disposable (WINGS CHOICE PLUS ADULT BRIEFS) MISC, USE AS DIRECTED FOR INCONTINENCE, Disp: 90 each, Rfl: 4    lamoTRIgine (LaMICtal) 100 mg tablet, Take 1 tablet (100 mg total) by mouth daily In the morning, Disp: 30 tablet, Rfl: 1    lamoTRIgine (LaMICtal) 25 mg tablet, Take 2 tablets (50 mg total) by mouth daily at bedtime, Disp: 60 tablet, Rfl: 1    levothyroxine 100 mcg tablet, Take 1 tablet (100 mcg total) by mouth daily, Disp: 30 tablet, Rfl: 5    loperamide (IMODIUM A-D) 2 mg capsule, Take 1 capsule (2 mg total) by mouth 4 (four) times a day as needed for diarrhea, Disp: 30 capsule, Rfl: 0    neomycin-bacitracin-polymyxin (NEOSPORIN) 5-400-5,000 ointment, Apply topically 4 (four) times a day If needed for wound, Disp: 28 3 g, Rfl: 0    Omega-3 1000 MG CAPS, Take 3 capsules (3,000 mg total) by mouth daily, Disp: 90 capsule, Rfl: 5    pravastatin (PRAVACHOL) 40 mg tablet, Take 1 tablet (40 mg total) by mouth daily, Disp: 30 tablet, Rfl: 5    QUEtiapine (SEROquel) 50 mg tablet, Take 1 tablet in the morning and 2 tablets orally at night, Disp: 90 tablet, Rfl: 1    SODIUM FLUORIDE, DENTAL GEL, (SF 5000 PLUS) 1 1 % CREA, SF 5000 Plus 1 1 % Dental Cream; 0; 22-Aug-2014; Active, Disp: , Rfl:      Physical Exam:    Vitals:    08/22/19 1258   Height: 5' 1" (1 549 m)       Physical Exam   Constitutional: She is oriented to person, place, and time  She appears well-developed and well-nourished  HENT:   Head: Normocephalic  Right Ear: External ear normal    Left Ear: External ear normal    Mouth/Throat: Oropharynx is clear and moist    Eyes: Pupils are equal, round, and reactive to light  Neck: No JVD present  Carotid bruit is not present  Cardiovascular: Normal rate and intact distal pulses  Exam reveals no gallop and no friction rub  No murmur heard  Irregular tachycardia   Pulmonary/Chest: Effort normal and breath sounds normal  No tachypnea  No respiratory distress  She has no wheezes  She has no rales  She exhibits no tenderness  Abdominal: Soft  She exhibits no distension  There is no tenderness  There is no rebound and no guarding  Musculoskeletal: She exhibits no edema  Neurological: She is alert and oriented to person, place, and time  Skin: Skin is warm and dry  Psychiatric: She has a normal mood and affect  Her behavior is normal  Judgment and thought content normal    Nursing note and vitals reviewed  Labs:not applicable    Assessment/Plan:    1  Rapid atrial fibrillation  I would like her admitted for anticoagulation, rate control, and possible cardioversion tomorrow  I noticed her resting heart rate in sinus on no beta-blockers is 56-70 beats per minute  She does have a history of prolonged QTC  Will see her back after the hospital stay  Thank you so much, please not hesitate to contact me with any questions or concerns          Luis Garcia MD  8/22/2019  1:00 PM

## 2019-08-22 NOTE — ASSESSMENT & PLAN NOTE
· Mood is currently stable  · Continue home regimen:  Lamictal 100 mg in the morning and 50 mg at bedtime, Seroquel 50 mg in the morning and 100 mg at night, BuSpar 10 mg b i d

## 2019-08-22 NOTE — CONSULTS
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  5  Hypothyroidism    PLAN:     1  Atrial fibrillation:    -begin Eliquis 5 mg b i d , 1st dose tonight   -rate intrinsically controlled, no need for rate control medications   -will arrange for MARCIE/cardioversion tomorrow   -verbal consent obtained by sister at bedside, POA   -check echocardiogram   -NPO after midnight tonight with gentle IV fluids   -will check telemetry in a m     2  History of prolonged QTC syndrome:   -please placed on telemetry   -avoid QT prolonging medications       History Of Present Illness: This is a very kind 79-year-old female with past medical history including prolonged QT, dyslipidemia, mild MR, and hypothyroidism  She normally follows with Dr Rebecca John of our cardiology group  She presently lives in a group home secondary to her mild cognitive delay  Her sister is her medical power of   She was originally seen in consultation by our group for history of frequent falls and shortness of breath with exertion  She underwent an exercise stress test which was equivocal and had no ECG changes or symptomatic skin me a  She had an echocardiogram performed in February 2018 which was significant for grade 2 diastolic dysfunction  She went to routine follow-up visit today in our Cardiology office at which time she was noted to be in atrial fibrillation with a ventricular rate of 125  She she was reportedly symptomatic with a several day history of palpitations  Her sinus rate was noted to be between 59-70 beats per minute  For these reasons he was advised that she come to the hospital for direct admission noted to undergo anticoagulation and possible MARCIE/cardioversion    As such I am seeing her today in consultation  A caretaker from her group home is present and providing history  The patient had a fairly regular day  She has not been ill lately  She was not complaining of chest pain or shortness of breath at the group home  Presently she is resting comfortably in bed has no physical complaints  Past Medical History:        Past Medical History:   Diagnosis Date    Anxiety disorder     Mental retardation       Past Surgical History:   Procedure Laterality Date    HYSTERECTOMY      age 25   Joce Cast OOPHORECTOMY          Allergy:        No Known Allergies    Medications:       Prior to Admission medications    Medication Sig Start Date End Date Taking?  Authorizing Provider   busPIRone (BUSPAR) 10 mg tablet Take 1 tablet (10 mg total) by mouth 2 (two) times a day 6/24/19  Yes CARLA Kasper-C   CALCIUM 600-D 600-400 MG-UNIT TABS TAKE (1) TABLET BY MOUTH TWICE DAILY AT 8AM AND 5PM  7/1/19  Yes Ho Valles PA-C   cholecalciferol (VITAMIN D3) 400 units tablet Take 1 tablet (400 Units total) by mouth 3 (three) times a day 6/24/19  Yes Ho Valles PA-C   Incontinence Supply Disposable (WINGS CHOICE PLUS ADULT BRIEFS) MISC USE AS DIRECTED FOR INCONTINENCE 3/1/19  Yes Ho Valles PA-C   lamoTRIgine (LaMICtal) 100 mg tablet Take 1 tablet (100 mg total) by mouth daily In the morning 6/24/19  Yes Ho Valles PA-C   lamoTRIgine (LaMICtal) 25 mg tablet Take 2 tablets (50 mg total) by mouth daily at bedtime 6/24/19  Yes Ho Valles PA-C   levothyroxine 100 mcg tablet Take 1 tablet (100 mcg total) by mouth daily 6/24/19  Yes Ho Valles PA-C   Omega-3 1000 MG CAPS Take 3 capsules (3,000 mg total) by mouth daily 6/24/19  Yes Ho Valles PA-C   pravastatin (PRAVACHOL) 40 mg tablet Take 1 tablet (40 mg total) by mouth daily 6/24/19  Yes Ho Valles PA-C   QUEtiapine (SEROquel) 50 mg tablet Take 1 tablet in the morning and 2 tablets orally at night 6/24/19  Yes Ho Valles PA-C   SODIUM FLUORIDE, DENTAL GEL, (SF 5000 PLUS) 1 1 % CREA SF 5000 Plus 1 1 % Dental Cream; 0; 22-Aug-2014;  Active   Yes Historical Provider, MD   acetaminophen (TYLENOL) 650 mg CR tablet Take 1 tablet (650 mg total) by mouth every 8 (eight) hours as needed for mild pain (or fever) 6/24/19   Ho Valles PA-C   benzonatate (TESSALON) 200 MG capsule Take 1 capsule (200 mg total) by mouth 3 (three) times a day as needed for cough for up to 20 doses 8/6/19   Kingsley Lunsford DO   docusate sodium (COLACE) 100 mg capsule Take 1 capsule (100 mg total) by mouth 2 (two) times a day If needed constipation 6/24/19   Ho Valles PA-C   hydrocortisone 1 % cream Apply topically 2 (two) times a day If needed for itchy rash 6/24/19   Ho Valles PA-C   Ibuprofen 200 MG CAPS Take 1-2 tablets if needed for pain or fever every 6-8 hours 6/24/19   Ho Valles, PA-C   loperamide (IMODIUM A-D) 2 mg capsule Take 1 capsule (2 mg total) by mouth 4 (four) times a day as needed for diarrhea 6/24/19   Ho Fourniera, PA-C   neomycin-bacitracin-polymyxin (NEOSPORIN) 5-400-5,000 ointment Apply topically 4 (four) times a day If needed for wound 6/24/19   Ho Valles PA-C       Family History:     Family History   Problem Relation Age of Onset    Other Mother         neglect or abandonment     Other Father         neglect or abandonment     No Known Problems Sister         Social History:       Social History     Socioeconomic History    Marital status: Single     Spouse name: Not on file    Number of children: Not on file    Years of education: Not on file    Highest education level: Not on file   Occupational History    Not on file   Social Needs    Financial resource strain: Not on file    Food insecurity:     Worry: Not on file     Inability: Not on file    Transportation needs:     Medical: Not on file     Non-medical: Not on file   Tobacco Use    Smoking status: Never Smoker    Smokeless tobacco: Never Used   Substance and Sexual Activity    Alcohol use: No    Drug use: No    Sexual activity: Not on file   Lifestyle    Physical activity:     Days per week: Not on file     Minutes per session: Not on file    Stress: Not on file   Relationships    Social connections:     Talks on phone: Not on file     Gets together: Not on file     Attends Denominational service: Not on file     Active member of club or organization: Not on file     Attends meetings of clubs or organizations: Not on file     Relationship status: Not on file    Intimate partner violence:     Fear of current or ex partner: Not on file     Emotionally abused: Not on file     Physically abused: Not on file     Forced sexual activity: Not on file   Other Topics Concern    Not on file   Social History Narrative    Disabled    Social history reviewed unchanged        ROS:  ROS unobtainable secondary to patient mental status  Remainder review of systems is negative    Exam:  General:  Alert awake  Pleasant and cooperative  Limited historian  Head: Normocephalic, atraumatic  Eyes:  EOMI  Pupils - equal, round, reactive to accomodation  No icterus  Normal Conjunctiva  Oropharynx: moist and normal-appearing mucosa  Neck: supple, symmetrical, trachea midline and no JVD  Heart:  Irregular but rate controlled  No murmurs   Respiratory effort / Chest Inspection: unlabored on room air  Lungs:  normal air entry, lungs clear to auscultation and no rales, rhonchi or wheezing   Abdomen: flat, normal findings: bowel sounds normal and soft, non-tender  Lower Limbs:  no pitting edema  Pulses[de-identified]  RLE - DP: present 2+                 LLE - DP: present 2+  Musculoskeletal: ROM grossly normal        DATA:        Telemetry: Atrial fibrillation  HR 80-low 100's          Echocardiogram:      2/2018:  SUMMARY     LEFT VENTRICLE:  Size was at the upper limits of normal   Systolic function was normal  Ejection fraction was estimated to be 55 %  There were no regional wall motion abnormalities    Features were consistent with a pseudonormal left ventricular filling pattern, with concomitant abnormal relaxation and increased filling pressure (grade 2 diastolic dysfunction)      LEFT ATRIUM:  The atrium was moderately dilated      MITRAL VALVE:  There was mild regurgitation      TRICUSPID VALVE:  There was mild regurgitation      HISTORY: PRIOR HISTORY: Hyperlipidemia, Dyspnea on exertion, Mental disability      PROCEDURE: The study was performed in the [address] [place] [address]  This was a routine study  The transthoracic approach was used  The study included complete 2D imaging, M-mode, complete spectral Doppler, and color Doppler  The  heart rate was 63 bpm, at the start of the study  Images were obtained from the parasternal, apical, subcostal, and suprasternal notch acoustic windows  Image quality was adequate      LEFT VENTRICLE: Size was at the upper limits of normal  Systolic function was normal  Ejection fraction was estimated to be 55 %  There were no regional wall motion abnormalities  Wall thickness was normal  No evidence of apical thrombus  DOPPLER: Features were consistent with a pseudonormal left ventricular filling pattern, with concomitant abnormal relaxation and increased filling pressure (grade 2 diastolic dysfunction)      RIGHT VENTRICLE: The size was normal  Systolic function was normal  Wall thickness was normal      LEFT ATRIUM: The atrium was moderately dilated      RIGHT ATRIUM: Size was normal      MITRAL VALVE: Valve structure was normal  There was normal leaflet separation  DOPPLER: The transmitral velocity was within the normal range  There was no evidence for stenosis  There was mild regurgitation      AORTIC VALVE: The valve was trileaflet  Leaflets exhibited normal thickness and normal cuspal separation  DOPPLER: Transaortic velocity was within the normal range  There was no evidence for stenosis   There was no significant  regurgitation      TRICUSPID VALVE: The valve structure was normal  There was normal leaflet separation  DOPPLER: The transtricuspid velocity was within the normal range  There was no evidence for stenosis  There was mild regurgitation  Estimated peak PA  pressure was 30 mmHg      PULMONIC VALVE: Not well visualized  DOPPLER: The transpulmonic velocity was within the normal range  There was no significant regurgitation      PERICARDIUM: There was no pericardial effusion  The pericardium was normal in appearance      AORTA: The root exhibited normal size      SYSTEMIC VEINS: IVC: The inferior vena cava was normal in size  Ischemic Testing:         Weights: Wt Readings from Last 3 Encounters:   08/22/19 89 4 kg (197 lb)   08/06/19 88 9 kg (196 lb)   07/08/19 89 8 kg (198 lb)   , There is no height or weight on file to calculate BMI           Lab Studies:               ,       Invalid input(s): LABALBU

## 2019-08-22 NOTE — PLAN OF CARE
Problem: PAIN - ADULT  Goal: Verbalizes/displays adequate comfort level or baseline comfort level  Description  Interventions:  - Encourage patient to monitor pain and request assistance  - Assess pain using appropriate pain scale  - Administer analgesics based on type and severity of pain and evaluate response  - Implement non-pharmacological measures as appropriate and evaluate response  - Consider cultural and social influences on pain and pain management  - Notify physician/advanced practitioner if interventions unsuccessful or patient reports new pain  Outcome: Progressing     Problem: INFECTION - ADULT  Goal: Absence or prevention of progression during hospitalization  Description  INTERVENTIONS:  - Assess and monitor for signs and symptoms of infection  - Monitor lab/diagnostic results  - Monitor all insertion sites, i e  indwelling lines, tubes, and drains  - Monitor endotracheal if appropriate and nasal secretions for changes in amount and color  - Newton appropriate cooling/warming therapies per order  - Administer medications as ordered  - Instruct and encourage patient and family to use good hand hygiene technique  - Identify and instruct in appropriate isolation precautions for identified infection/condition  Outcome: Progressing     Problem: SAFETY ADULT  Goal: Patient will remain free of falls  Description  INTERVENTIONS:  - Assess patient frequently for physical needs  -  Identify cognitive and physical deficits and behaviors that affect risk of falls    -  Newton fall precautions as indicated by assessment   - Educate patient/family on patient safety including physical limitations  - Instruct patient to call for assistance with activity based on assessment  - Modify environment to reduce risk of injury  - Consider OT/PT consult to assist with strengthening/mobility  Outcome: Progressing  Goal: Maintain or return to baseline ADL function  Description  INTERVENTIONS:  -  Assess patient's ability to carry out ADLs; assess patient's baseline for ADL function and identify physical deficits which impact ability to perform ADLs (bathing, care of mouth/teeth, toileting, grooming, dressing, etc )  - Assess/evaluate cause of self-care deficits   - Assess range of motion  - Assess patient's mobility; develop plan if impaired  - Assess patient's need for assistive devices and provide as appropriate  - Encourage maximum independence but intervene and supervise when necessary  - Involve family in performance of ADLs  - Assess for home care needs following discharge   - Consider OT consult to assist with ADL evaluation and planning for discharge  - Provide patient education as appropriate  Outcome: Progressing  Goal: Maintain or return mobility status to optimal level  Description  INTERVENTIONS:  - Assess patient's baseline mobility status (ambulation, transfers, stairs, etc )    - Identify cognitive and physical deficits and behaviors that affect mobility  - Identify mobility aids required to assist with transfers and/or ambulation (gait belt, sit-to-stand, lift, walker, cane, etc )  - Brooks fall precautions as indicated by assessment  - Record patient progress and toleration of activity level on Mobility SBAR; progress patient to next Phase/Stage  - Instruct patient to call for assistance with activity based on assessment  - Consider rehabilitation consult to assist with strengthening/weightbearing, etc   Outcome: Progressing     Problem: DISCHARGE PLANNING  Goal: Discharge to home or other facility with appropriate resources  Description  INTERVENTIONS:  - Identify barriers to discharge w/patient and caregiver  - Arrange for needed discharge resources and transportation as appropriate  - Identify discharge learning needs (meds, wound care, etc )  - Arrange for interpretive services to assist at discharge as needed  - Refer to Case Management Department for coordinating discharge planning if the patient needs post-hospital services based on physician/advanced practitioner order or complex needs related to functional status, cognitive ability, or social support system  Outcome: Progressing     Problem: Knowledge Deficit  Goal: Patient/family/caregiver demonstrates understanding of disease process, treatment plan, medications, and discharge instructions  Description  Complete learning assessment and assess knowledge base    Interventions:  - Provide teaching at level of understanding  - Provide teaching via preferred learning methods  Outcome: Progressing     Problem: CARDIOVASCULAR - ADULT  Goal: Maintains optimal cardiac output and hemodynamic stability  Description  INTERVENTIONS:  - Monitor I/O, vital signs and rhythm  - Monitor for S/S and trends of decreased cardiac output  - Administer and titrate ordered vasoactive medications to optimize hemodynamic stability  - Assess quality of pulses, skin color and temperature  - Assess for signs of decreased coronary artery perfusion  - Instruct patient to report change in severity of symptoms  Outcome: Progressing  Goal: Absence of cardiac dysrhythmias or at baseline rhythm  Description  INTERVENTIONS:  - Continuous cardiac monitoring, vital signs, obtain 12 lead EKG if ordered  - Administer antiarrhythmic and heart rate control medications as ordered  - Monitor electrolytes and administer replacement therapy as ordered  Outcome: Progressing

## 2019-08-23 ENCOUNTER — ANESTHESIA EVENT (INPATIENT)
Dept: NON INVASIVE DIAGNOSTICS | Facility: HOSPITAL | Age: 59
DRG: 310 | End: 2019-08-23
Payer: COMMERCIAL

## 2019-08-23 ENCOUNTER — APPOINTMENT (INPATIENT)
Dept: NON INVASIVE DIAGNOSTICS | Facility: HOSPITAL | Age: 59
DRG: 310 | End: 2019-08-23
Attending: INTERNAL MEDICINE
Payer: COMMERCIAL

## 2019-08-23 PROCEDURE — 99233 SBSQ HOSP IP/OBS HIGH 50: CPT | Performed by: INTERNAL MEDICINE

## 2019-08-23 PROCEDURE — 92960 CARDIOVERSION ELECTRIC EXT: CPT | Performed by: INTERNAL MEDICINE

## 2019-08-23 PROCEDURE — 93325 DOPPLER ECHO COLOR FLOW MAPG: CPT | Performed by: INTERNAL MEDICINE

## 2019-08-23 PROCEDURE — 93312 ECHO TRANSESOPHAGEAL: CPT | Performed by: INTERNAL MEDICINE

## 2019-08-23 PROCEDURE — B24BZZ4 ULTRASONOGRAPHY OF HEART WITH AORTA, TRANSESOPHAGEAL: ICD-10-PCS | Performed by: INTERNAL MEDICINE

## 2019-08-23 PROCEDURE — 5A2204Z RESTORATION OF CARDIAC RHYTHM, SINGLE: ICD-10-PCS | Performed by: INTERNAL MEDICINE

## 2019-08-23 PROCEDURE — NC001 PR NO CHARGE: Performed by: INTERNAL MEDICINE

## 2019-08-23 PROCEDURE — 99232 SBSQ HOSP IP/OBS MODERATE 35: CPT | Performed by: INTERNAL MEDICINE

## 2019-08-23 PROCEDURE — 92960 CARDIOVERSION ELECTRIC EXT: CPT

## 2019-08-23 PROCEDURE — 93005 ELECTROCARDIOGRAM TRACING: CPT

## 2019-08-23 PROCEDURE — 93312 ECHO TRANSESOPHAGEAL: CPT

## 2019-08-23 RX ORDER — ONDANSETRON 2 MG/ML
4 INJECTION INTRAMUSCULAR; INTRAVENOUS ONCE
Status: COMPLETED | OUTPATIENT
Start: 2019-08-23 | End: 2019-08-23

## 2019-08-23 RX ORDER — LIDOCAINE HYDROCHLORIDE 20 MG/ML
INJECTION, SOLUTION EPIDURAL; INFILTRATION; INTRACAUDAL; PERINEURAL AS NEEDED
Status: DISCONTINUED | OUTPATIENT
Start: 2019-08-23 | End: 2019-08-23 | Stop reason: SURG

## 2019-08-23 RX ORDER — FENTANYL CITRATE/PF 50 MCG/ML
25 SYRINGE (ML) INJECTION
Status: DISCONTINUED | OUTPATIENT
Start: 2019-08-23 | End: 2019-08-24 | Stop reason: HOSPADM

## 2019-08-23 RX ORDER — PROPOFOL 10 MG/ML
INJECTION, EMULSION INTRAVENOUS AS NEEDED
Status: DISCONTINUED | OUTPATIENT
Start: 2019-08-23 | End: 2019-08-23 | Stop reason: SURG

## 2019-08-23 RX ADMIN — APIXABAN 5 MG: 5 TABLET, FILM COATED ORAL at 18:01

## 2019-08-23 RX ADMIN — PROPOFOL 50 MG: 10 INJECTION, EMULSION INTRAVENOUS at 12:15

## 2019-08-23 RX ADMIN — PROPOFOL 100 MG: 10 INJECTION, EMULSION INTRAVENOUS at 12:12

## 2019-08-23 RX ADMIN — ONDANSETRON 4 MG: 2 INJECTION INTRAMUSCULAR; INTRAVENOUS at 14:12

## 2019-08-23 RX ADMIN — LAMOTRIGINE 100 MG: 100 TABLET ORAL at 08:26

## 2019-08-23 RX ADMIN — BUSPIRONE HYDROCHLORIDE 10 MG: 10 TABLET ORAL at 18:01

## 2019-08-23 RX ADMIN — PRAVASTATIN SODIUM 40 MG: 40 TABLET ORAL at 18:01

## 2019-08-23 RX ADMIN — SODIUM CHLORIDE 100 ML/HR: 0.9 INJECTION, SOLUTION INTRAVENOUS at 15:45

## 2019-08-23 RX ADMIN — DILTIAZEM HYDROCHLORIDE 30 MG: 30 TABLET, FILM COATED ORAL at 14:10

## 2019-08-23 RX ADMIN — PROPOFOL 50 MG: 10 INJECTION, EMULSION INTRAVENOUS at 12:19

## 2019-08-23 RX ADMIN — QUETIAPINE FUMARATE 50 MG: 25 TABLET ORAL at 18:01

## 2019-08-23 RX ADMIN — SODIUM CHLORIDE 100 ML/HR: 0.9 INJECTION, SOLUTION INTRAVENOUS at 05:12

## 2019-08-23 RX ADMIN — PROPOFOL 50 MG: 10 INJECTION, EMULSION INTRAVENOUS at 12:27

## 2019-08-23 RX ADMIN — LAMOTRIGINE 50 MG: 25 TABLET ORAL at 21:21

## 2019-08-23 RX ADMIN — QUETIAPINE FUMARATE 50 MG: 25 TABLET ORAL at 08:26

## 2019-08-23 RX ADMIN — PROPOFOL 50 MG: 10 INJECTION, EMULSION INTRAVENOUS at 12:23

## 2019-08-23 RX ADMIN — BUSPIRONE HYDROCHLORIDE 10 MG: 10 TABLET ORAL at 08:26

## 2019-08-23 RX ADMIN — LEVOTHYROXINE SODIUM 100 MCG: 100 TABLET ORAL at 05:12

## 2019-08-23 RX ADMIN — APIXABAN 5 MG: 5 TABLET, FILM COATED ORAL at 08:26

## 2019-08-23 RX ADMIN — LIDOCAINE HYDROCHLORIDE 60 MG: 20 INJECTION, SOLUTION EPIDURAL; INFILTRATION; INTRACAUDAL; PERINEURAL at 12:12

## 2019-08-23 NOTE — ASSESSMENT & PLAN NOTE
· Direct admission from Cardiology office due to atrial fibrillation with rapid ventricular response  · For MARCIE and cardioversion today  · Started on Eliquis 5 mg b i d   · Postprocedure, she will be observed another night and then plan for discharge on Saturday 08/24/2019 if stable  · She will need 1 month of anticoagulation after discharge  · Follow-up with Cardiology post discharge

## 2019-08-23 NOTE — UTILIZATION REVIEW
Initial Clinical Review    DIRECT ADMISSION FROM CARDIOLOGY OFFICE     Admission: Date/Time/Statement: Inpatient Admission Orders (From admission, onward)     Ordered        08/22/19 1749  Inpatient Admission  Once                   Orders Placed This Encounter   Procedures    Inpatient Admission     Standing Status:   Standing     Number of Occurrences:   1     Order Specific Question:   Admitting Physician     Answer:   Marianela Marquez [[id number]]     Order Specific Question:   Level of Care     Answer:   Med Surg [[id number]]     Order Specific Question:   Bed Type     Answer:   Catherine [[id number]]     Order Specific Question:   Estimated length of stay     Answer:   More than 2 Midnights     Order Specific Question:   Certification     Answer:   I certify that inpatient services are medically necessary for this patient for a duration of greater than two midnights  See H&P and MD Progress Notes for additional information about the patient's course of treatment  Assessment/Plan:   MS CORTES IS A 57 YO FEMALE WHO WAS A DIRECT ADMISSION FROM THE CARDIOLOGY OFFICE TO [facility] WITH NEW ONSET OF ATRIAL FIB  SHE WAS THERE FOR PREVIOUSLY DIAGNOSED PROLONGED QR  SHE WAS THEN FOUND TO HAVE A FAST, IRREGULAR HEART BEAT  NO ASSOCIATED SYMPTOMS  PMH:  INTELLECTUAL DISABILITY AND LIVES IN A GROUP HOME  NEEDS ASSISTANCE WITH DRESSING  SHE IS ADMITTED TO INPATIENT STATUS WITH NEW ONSET ATRIAL FIBRILLATION  - WILL HAVE MARCIE WITH CARDIOVERSION 8/23, ELIQUIS WHICH SHE WILL STAY ON FOR 1 MONTH POST-D/C  H/O PROLONGED QT INTERVAL - POSSIBLE R/T TO PSYCH MEDS  PMH:  MOOD DISORDER, MENTAL DISABILITY, HLD, HYPOTHYROIDISM  CARDIOLOGY CONSULT HAS SAME PLAN - TELE       ++++++++++++++++++++++++++++  8/23 MARCIE WITH CARDIOVERSION   Conclusion:   - No evidence of left atrial or left atrial appendage thrombus  - Successful cardioversion of atrial flutter into sinus rhythm    - Other echo findings as noted above      Recommendations:   - Patient will begin therapy with Cardizem 30 mg p o  Q 6 hours    This will need to be converted to long-acting Cardizem at a probably it does tomorrow morning   - Continue anticoagulation with Eliquis 5 mg twice daily   - If no events overnight, may be able to be discharged home tomorrow with arrangements for outpatient follow-up with Cardiology and primary care physician   ++++++++++++++++++++++++++++    Additional Vital Signs:   08/23/19 1500  97 6 °F (36 4 °C)  79  18  150/80  98 %  None (Room air)   08/23/19 1409  97 6 °F (36 4 °C)  78  18  119/68  98 %  None (Room air)   08/23/19 1311  98 °F (36 7 °C)  76  12  125/58  97 %  None (Room air)   08/23/19 1256  --  76  19  118/56  95 %  Nasal cannula   08/23/19 1249  --  --  --  --  100 %  Nasal cannula   08/23/19 1241  98 5 °F (36 9 °C)  63  17  110/55  100 %  Nasal cannula   08/23/19 1100  97 5 °F (36 4 °C)  116Abnormal   18  144/83  97 %  None (Room air)   08/23/19 0830  98 °F (36 7 °C)  113Abnormal   18  125/81  96 %  None (Room air)   08/22/19 2347  97 3 °F (36 3 °C)Abnormal   88  18  112/79  96 %  None (Room air)   08/22/19 1933  97 7 °F (36 5 °C)  102  18  117/65  97 %  None (Room air)   08/22/19 1454  97 8 °F (36 6 °C)  108Abnormal   18  132/80  98 %  None (Room air)     Pertinent Labs/Diagnostic Test Results:     8/22 ECG - Atrial fibrillation at 125 beats per minute nonspecific T changes    8/22 MARCIE AND CARDIOVERSION - SEE ABOVE NOTES    Results from last 7 days   Lab Units 08/22/19  2040   WBC Thousand/uL 7 28   HEMOGLOBIN g/dL 12 5   HEMATOCRIT % 40 0   PLATELETS Thousands/uL 141*   NEUTROS ABS Thousands/µL 4 43     Results from last 7 days   Lab Units 08/22/19 2040   SODIUM mmol/L 143   POTASSIUM mmol/L 3 4*   CHLORIDE mmol/L 106   CO2 mmol/L 28   ANION GAP mmol/L 9   BUN mg/dL 17   CREATININE mg/dL 1 16   EGFR ml/min/1 73sq m 52   CALCIUM mg/dL 9 1     Results from last 7 days   Lab Units 08/22/19  2040   GLUCOSE RANDOM mg/dL 153*     Results from last 7 days   Lab Units 08/22/19 2040   TSH 3RD GENERATON uIU/mL 3 000     Past Medical History:   Diagnosis Date    Anxiety disorder     Mental retardation      Present on Admission:   Mood disorder ([address] )   Mental disability   Hypothyroidism   Hyperlipidemia    Admitting Diagnosis: Enlarged LA (left atrium) [I51 7]     Age/Sex: 62 y o  female     Admission Orders:    Current Facility-Administered Medications:  apixaban 5 mg Oral BID    busPIRone 10 mg Oral BID    diltiazem 30 mg Oral Q6H LATHA    fentaNYL 25 mcg Intravenous Q5 Min PRN    lamoTRIgine 100 mg Oral Daily    lamoTRIgine 50 mg Oral HS    levothyroxine 100 mcg Oral Early Morning    pravastatin 40 mg Oral After Dinner    QUEtiapine 50 mg Oral BID    sodium chloride 100 mL/hr Intravenous Continuous Last Rate: 100 mL/hr (08/23/19 1545)     TELE  SCDs  CONT PULSE OX   ACTIVITY AS ASHWIN   UP W/ ASSIST  CARDIAC, LO CHOLESTEROL DIET   IP CONSULT TO CARDIOLOGY    Network Utilization Review Department  Phone: [phone]; Fax [phone]  [email or web address]  ATTENTION: Please call with any questions or concerns to [phone]  and carefully listen to the prompts so that you are directed to the right person  Send all requests for admission clinical reviews, approved or denied determinations and any other requests to fax [phone]   All voicemails are confidential

## 2019-08-23 NOTE — PROGRESS NOTES
Patient s  Blood pressure is stable  een and examined prior to procedure  Remains in atrial fibrillation rhythm with tendency towards tachycardia  Consents verified  Will proceed with MARCIE and cardioversion

## 2019-08-23 NOTE — ANESTHESIA PREPROCEDURE EVALUATION
Review of Systems/Medical History  Patient summary reviewed  Chart reviewed      Cardiovascular  Exercise tolerance (METS): <4,  Hyperlipidemia,    Pulmonary  Negative pulmonary ROS        GI/Hepatic  Negative GI/hepatic ROS          Negative  ROS        Endo/Other  History of thyroid disease , hypothyroidism,      GYN       Hematology  Negative hematology ROS      Musculoskeletal  Negative musculoskeletal ROS        Neurology  Negative neurology ROS      Psychology   Anxiety,     Comment: Mentally chalenged           Physical Exam    Airway    Mallampati score: II  TM Distance: <3 FB  Neck ROM: full     Dental       Cardiovascular  Rhythm: regular,     Pulmonary  Breath sounds clear to auscultation,     Other Findings        Anesthesia Plan  ASA Score- 3 Emergent    Anesthesia Type- general with ASA Monitors  Additional Monitors:   Airway Plan:         Plan Factors- Patient instructed to abstain from smoking on day of procedure  Patient did not smoke on day of surgery  Induction- intravenous  Postoperative Plan-     Informed Consent- Anesthetic plan and risks discussed with patient

## 2019-08-23 NOTE — PROCEDURES
DC CARDIOVERSION PROCEDURE REPORT     ENCOUNTER DATE: 08/23/19 12:47 PM    PATIENT NAME: Roberta Ho  Age: 62 y o  Sex: female    AUTHOR: Brody Bethea MD  PRIMARYCARE PHYSICIAN: Mana Schneider PA-C  INPATIENT ATTENDING: Yesenia Richards MD     PROCEDURE PERFORMING PHYSICIAN: Cielo Yung MD [facility]     ANESTHESIA: Natacha Shannon CRNA; Loc Whatley MD     INDICATION:  Atrial fibrillation with rapid ventricular response  Medications Administered during Procedure: Propofol IV (total 300 mg), Hurricane spray and Lidocaine gel  Procedures Perfomed:  1  Transesophageal Echocardiogram  2  Synchronized DC cardioversion  Procedures Summary: Patient was received in cath lab and identified  Informed consent, NPO status, allergies, medications and labs were reviewed and verified  Patient reported no loose teeth, reported no difficulty swallowing and had no history of previous radiation exposure  The Zoll pads were placed in Anterior-Posterior approach  Timeout procedure was performed  The patient was placed in left lateral position and back of throat was numbed with Hurricaine  spray  Following sedation by Anesthesia, the MARCIE probe was guided into the esophagous without any difficulty  MARCIE was performed as per standard protocol in multiple planes and images were obtained using 2D, color and Doppler imaging  After imaging was completed, the probe was removed without difficulty  As there was no left atrial or left atrial appendage clots on the MARCIE, we proceeded to perform synchronized cardioversion at 150 J and this was successful in converting the patient into sinus rhythm  Patient was recovered in the PACU and will be transferred to the floor when cleared by Anesthesia  HR, BP, ECG and oxygenation were monitored continuously during the procedure  Heart rate and BP response: Normal blood pressure, heart rate and oxygen and maintained throughout the procedure      Complications: None during or immediately following the procedure  Findings:   - Normal left ventricle cavity size, mild concentric left ventricular hypertrophy, normal left ventricular systolic function  Regional wall motion and ejection fraction could not be reliably assessed due to rapid ventricular heart rates  - Normal right ventricular size and visually normal right ventricular systolic function     - Moderately enlarged left atrium, no spontaneous contrast,mass or thrombus noted in left atrial cavity      - Enlarged single lobe left atrial appendage (ABBIE), with moderately reduced ABBIE velocities  No masses, vegetations or thrombus seen identified in ABBIE  - Mild right atrial enlargement  - Intact interatrial septum  No evidence of interatrial septal aneurysm are shunts by color flow Doppler evaluation     - Mild mitral valve leaflet thickening, adequate leaflet mobility  Mild mitral valve regurgitation noted  - Tricuspid aortic valve with mild sclerosis  No aortic valve stenosis regurgitation     - Normal-appearing tricuspid valve morphology and motion  There was mild tricuspid valve regurgitation     - Normal appearing pulmonic valve with no significant pulmonic valve regurgitation     - Mild atherosclerotic changes noted in the visualized portion of the ascending and descending aorta  No mobile plaques noted  Conclusion:   - No evidence of left atrial or left atrial appendage thrombus  - Successful cardioversion of atrial flutter into sinus rhythm  - Other echo findings as noted above  Recommendations:   - Patient will begin therapy with Cardizem 30 mg p o  Q 6 hours  This will need to be converted to long-acting Cardizem at a probably it does tomorrow morning   - Continue anticoagulation with Eliquis 5 mg twice daily   - If no events overnight, may be able to be discharged home tomorrow with arrangements for outpatient follow-up with Cardiology and primary care physician

## 2019-08-23 NOTE — PLAN OF CARE
Problem: PAIN - ADULT  Goal: Verbalizes/displays adequate comfort level or baseline comfort level  Description  Interventions:  - Encourage patient to monitor pain and request assistance  - Assess pain using appropriate pain scale  - Administer analgesics based on type and severity of pain and evaluate response  - Implement non-pharmacological measures as appropriate and evaluate response  - Consider cultural and social influences on pain and pain management  - Notify physician/advanced practitioner if interventions unsuccessful or patient reports new pain  Outcome: Progressing     Problem: INFECTION - ADULT  Goal: Absence or prevention of progression during hospitalization  Description  INTERVENTIONS:  - Assess and monitor for signs and symptoms of infection  - Monitor lab/diagnostic results  - Monitor all insertion sites, i e  indwelling lines, tubes, and drains  - Monitor endotracheal if appropriate and nasal secretions for changes in amount and color  - [place] appropriate cooling/warming therapies per order  - Administer medications as ordered  - Instruct and encourage patient and family to use good hand hygiene technique  - Identify and instruct in appropriate isolation precautions for identified infection/condition  Outcome: Progressing     Problem: SAFETY ADULT  Goal: Patient will remain free of falls  Description  INTERVENTIONS:  - Assess patient frequently for physical needs  -  Identify cognitive and physical deficits and behaviors that affect risk of falls    -  [place] fall precautions as indicated by assessment   - Educate patient/family on patient safety including physical limitations  - Instruct patient to call for assistance with activity based on assessment  - Modify environment to reduce risk of injury  - Consider OT/PT consult to assist with strengthening/mobility  Outcome: Progressing  Goal: Maintain or return to baseline ADL function  Description  INTERVENTIONS:  -  Assess patient's ability to carry out ADLs; assess patient's baseline for ADL function and identify physical deficits which impact ability to perform ADLs (bathing, care of mouth/teeth, toileting, grooming, dressing, etc )  - Assess/evaluate cause of self-care deficits   - Assess range of motion  - Assess patient's mobility; develop plan if impaired  - Assess patient's need for assistive devices and provide as appropriate  - Encourage maximum independence but intervene and supervise when necessary  - Involve family in performance of ADLs  - Assess for home care needs following discharge   - Consider OT consult to assist with ADL evaluation and planning for discharge  - Provide patient education as appropriate  Outcome: Progressing  Goal: Maintain or return mobility status to optimal level  Description  INTERVENTIONS:  - Assess patient's baseline mobility status (ambulation, transfers, stairs, etc )    - Identify cognitive and physical deficits and behaviors that affect mobility  - Identify mobility aids required to assist with transfers and/or ambulation (gait belt, sit-to-stand, lift, walker, cane, etc )  - [place] fall precautions as indicated by assessment  - Record patient progress and toleration of activity level on Mobility SBAR; progress patient to next Phase/Stage  - Instruct patient to call for assistance with activity based on assessment  - Consider rehabilitation consult to assist with strengthening/weightbearing, etc   Outcome: Progressing     Problem: DISCHARGE PLANNING  Goal: Discharge to home or other facility with appropriate resources  Description  INTERVENTIONS:  - Identify barriers to discharge w/patient and caregiver  - Arrange for needed discharge resources and transportation as appropriate  - Identify discharge learning needs (meds, wound care, etc )  - Arrange for interpretive services to assist at discharge as needed  - Refer to Case Management Department for coordinating discharge planning if the patient needs post-hospital services based on physician/advanced practitioner order or complex needs related to functional status, cognitive ability, or social support system  Outcome: Progressing     Problem: Knowledge Deficit  Goal: Patient/family/caregiver demonstrates understanding of disease process, treatment plan, medications, and discharge instructions  Description  Complete learning assessment and assess knowledge base    Interventions:  - Provide teaching at level of understanding  - Provide teaching via preferred learning methods  Outcome: Progressing     Problem: CARDIOVASCULAR - ADULT  Goal: Maintains optimal cardiac output and hemodynamic stability  Description  INTERVENTIONS:  - Monitor I/O, vital signs and rhythm  - Monitor for S/S and trends of decreased cardiac output  - Administer and titrate ordered vasoactive medications to optimize hemodynamic stability  - Assess quality of pulses, skin color and temperature  - Assess for signs of decreased coronary artery perfusion  - Instruct patient to report change in severity of symptoms  Outcome: Progressing  Goal: Absence of cardiac dysrhythmias or at baseline rhythm  Description  INTERVENTIONS:  - Continuous cardiac monitoring, vital signs, obtain 12 lead EKG if ordered  - Administer antiarrhythmic and heart rate control medications as ordered  - Monitor electrolytes and administer replacement therapy as ordered  Outcome: Progressing     Problem: Prexisting or High Potential for Compromised Skin Integrity  Goal: Skin integrity is maintained or improved  Description  INTERVENTIONS:  - Identify patients at risk for skin breakdown  - Assess and monitor skin integrity  - Assess and monitor nutrition and hydration status  - Monitor labs   - Assess for incontinence   - Turn and reposition patient  - Assist with mobility/ambulation  - Relieve pressure over bony prominences  - Avoid friction and shearing  - Provide appropriate hygiene as needed including keeping skin clean and dry  - Evaluate need for skin moisturizer/barrier cream  - Collaborate with interdisciplinary team   - Patient/family teaching  - Consider wound care consult   Outcome: Progressing     Problem: Potential for Falls  Goal: Patient will remain free of falls  Description  INTERVENTIONS:  - Assess patient frequently for physical needs  -  Identify cognitive and physical deficits and behaviors that affect risk of falls    -  [place] fall precautions as indicated by assessment   - Educate patient/family on patient safety including physical limitations  - Instruct patient to call for assistance with activity based on assessment  - Modify environment to reduce risk of injury  - Consider OT/PT consult to assist with strengthening/mobility  Outcome: Progressing

## 2019-08-23 NOTE — PROGRESS NOTES
Progress Note - Whit Retort 1960, 62 y o  female MRN: [id number]    Unit/Bed#: E4 -01 Encounter: [id number]    Primary Care Provider: Ho Valles PA-C   Date and time admitted to hospital: 8/22/2019  2:41 PM        * New onset atrial fibrillation [facility])  Assessment & Plan  · Direct admission from Cardiology office due to atrial fibrillation with rapid ventricular response  · For MARCIE and cardioversion today  · Started on Eliquis 5 mg b i d   · Postprocedure, she will be observed another night and then plan for discharge on Saturday 08/24/2019 if stable  · She will need 1 month of anticoagulation after discharge  · Follow-up with Cardiology post discharge  H/O prolonged Q-T interval on ECG  Assessment & Plan  · Possibly related to psychiatric medications such as Seroquel and Lamictal  · Maintain on telemetry    Mood disorder [facility])  Assessment & Plan  · Mood is currently stable  · Continue home regimen:  Lamictal 100 mg in the morning and 50 mg at bedtime, Seroquel 50 mg in the morning and 100 mg at night, BuSpar 10 mg b i d  Mental disability  Assessment & Plan  · Patient resides in a group home  · Watch for delirium/agitation  · If necessary, will give Ativan p r n  Her sister is agreeable  Hyperlipidemia  Assessment & Plan  · Continue pravastatin 40 mg daily    Hypothyroidism  Assessment & Plan  · TSH and T4 within acceptable limits  · Continue levothyroxine 100 mcg daily       VTE Pharmacologic Prophylaxis:   Pharmacologic: Apixaban (Eliquis)  Mechanical VTE Prophylaxis in Place: No    Patient Centered Rounds: I have performed bedside rounds with nursing staff today  Time Spent for Care: 20 minutes  More than 50% of total time spent on counseling and coordination of care as described above      Current Length of Stay: 1 day(s)    Current Patient Status: Inpatient   Certification Statement: The patient will continue to require additional inpatient hospital stay due to New atrial fibrillation    Discharge Plan:  In 24 hours if stable    Code Status: Level 1 - Full Code      Subjective:   Patient reports having had a good night sleep  No palpitations or chest pain    Objective:     Vitals:   Temp (24hrs), Av 7 °F (36 5 °C), Min:97 3 °F (36 3 °C), Max:98 °F (36 7 °C)    Temp:  [97 3 °F (36 3 °C)-98 °F (36 7 °C)] 97 5 °F (36 4 °C)  HR:  [] 116  Resp:  [18] 18  BP: (112-144)/(65-83) 144/83  SpO2:  [96 %-98 %] 97 %  There is no height or weight on file to calculate BMI  Input and Output Summary (last 24 hours):     No intake or output data in the 24 hours ending 19 1155    Physical Exam:     Physical Exam   Constitutional: She appears well-developed  No distress  HENT:   Head: Normocephalic and atraumatic  Eyes: No scleral icterus  Neck: No JVD present  Cardiovascular:   Irregular irregular   Pulmonary/Chest: Effort normal  No stridor  No respiratory distress  She has no wheezes  Abdominal: Soft  She exhibits no distension  There is no tenderness  There is no guarding  Musculoskeletal: She exhibits no edema  Neurological: She is alert  Skin: Skin is warm and dry  She is not diaphoretic  Psychiatric: She has a normal mood and affect  Additional Data:     Labs:    Results from last 7 days   Lab Units 19  2040   WBC Thousand/uL 7 28   HEMOGLOBIN g/dL 12 5   HEMATOCRIT % 40 0   PLATELETS Thousands/uL 141*   NEUTROS PCT % 62   LYMPHS PCT % 30   MONOS PCT % 4   EOS PCT % 4     Results from last 7 days   Lab Units 19  2040   SODIUM mmol/L 143   POTASSIUM mmol/L 3 4*   CHLORIDE mmol/L 106   CO2 mmol/L 28   BUN mg/dL 17   CREATININE mg/dL 1 16   ANION GAP mmol/L 9   CALCIUM mg/dL 9 1   GLUCOSE RANDOM mg/dL 153*                           * I Have Reviewed All Lab Data Listed Above  * Additional Pertinent Lab Tests Reviewed:  All Labs Within Last 24 Hours Reviewed    Imaging:    Imaging Reports Reviewed Today Include:  None    Recent Cultures (last 7 days): Last 24 Hours Medication List:     Current Facility-Administered Medications:  apixaban 5 mg Oral BID Michelle Mendes MD    busPIRone 10 mg Oral BID Michelle Mendes MD    lamoTRIgine 100 mg Oral Daily Michelle Mendes MD    lamoTRIgine 50 mg Oral HS Michelle Mendes MD    levothyroxine 100 mcg Oral Early Morning Michelle Mendes MD    pravastatin 40 mg Oral After Ronal Ramos MD    QUEtiapine 50 mg Oral BID Michelle Mendes MD    sodium chloride 100 mL/hr Intravenous Continuous Michelle Mendes MD Last Rate: 100 mL/hr (08/23/19 1465)        Today, Patient Was Seen By: Michelle Mendes MD    ** Please Note: Dictation voice to text software may have been used in the creation of this document   **

## 2019-08-23 NOTE — PROGRESS NOTES
CARDIOLOGY INPATIENT FOLLOW-UP PROGRESS NOTE    ENCOUNTER DATE: 08/23/19 11:40 AM  PATIENT NAME: Blake Otto   1960    [id number]  Age: 62 y o  Sex: female  AUTHOR: Brody Bethea MD  PRIMARYCARE PHYSICIAN: Ho Valles PA-C  PRIMARY INPATIENT PHYSICIAN: Annabelle Carpenter MD  *-*-*-*-*-*-*-*-*-*-*-*-*-*-*-*-*-*-*-*-*-*-*-*-*-*-*-*-*-*-*-*-*-*-*-*-*-*-*-*-*-*-*-*-*-*-*-*-*-*-*-*-*-*-  FOLLOW-UP FOR:  Paroxysmal atrial fibrillation with rapid ventricular response  CARDIOLOGY PLAN:  - MARCIE and Cardioversion today  - Begin Cardizem PO 30mg Q 6 hrs after cath and transition to long acting tomorrow  - Contineu Eliquis  - Needs workup for YVETTE as outpatient  - Follow-up with Dr Rola Tapia upon discharge  *-*-*-*-*-*-*-*-*-*-*-*-*-*-*-*-*-*-*-*-*-*-*-*-*-*-*-*-*-*-*-*-*-*-*-*-*-*-*-*-*-*-*-*-*-*-*-*-*-*-*-*-*-*-  INTERVAL CHANGES / HISTORY OF PRESENT ILLNESS:  No acute events the patient remains in atrial fibrillation with intermittent rapid heart rate needed due to her intellectual impairment she is unable to provide history  There have been no acute events overnight and no significant symptoms were reported  Patient has been NPO  Received Eliquis this AM   Currently not on any Cardizem drip  REVIEW OF SYMPTOMS:    Positive for:  Not obtainable  Negative for: All remaining as reviewed below and in HPI      SYSTEM SYMPTOMS REVIEWED:  General--weight change, fever, night sweats  Respiratoryl-- Wheezing, shortness of breath, cough, URI symptoms, sputum, blood  Cardiovascular--chest pain, syncope, dyspnea on exertion, edema, decline in exercise tolerance, claudication   Gastrointestinal--persistent vomiting, diarrhea, abdominal distention, blood in stool   Muscular or skeletal--joint pain or swelling   Neurologic--headaches, syncope, abnormal movement  Hematologic--history of easy bruising and bleeding   Endocrine--thyroid enlargement, heat or cold intolerance, polyuria   Psychiatric--anxiety, depression *-*-*-*-*-*-*-*-*-*-*-*-*-*-*-*-*-*-*-*-*-*-*-*-*-*-*-*-*-*-*-*-*-*-*-*-*-*-*-*-*-*-*-*-*-*-*-*-*-*-*-*-*-*-    VITAL SIGNS:  Vitals:    19 1933 19 2347 19 0830 19 1100   BP: 117/65 112/79 125/81 144/83   BP Location: Left arm Left arm Left arm Left arm   Pulse: 102 88 (!) 113 (!) 116   Resp: 18 18 18 18   Temp: 97 7 °F (36 5 °C) (!) 97 3 °F (36 3 °C) 98 °F (36 7 °C) 97 5 °F (36 4 °C)   TempSrc: Temporal Tympanic Tympanic Tympanic   SpO2: 97% 96% 96% 97%      Temp (24hrs), Av 7 °F (36 5 °C), Min:97 3 °F (36 3 °C), Max:98 °F (36 7 °C)  Current: Temperature: 97 5 °F (36 4 °C)    No intake/output data recorded  Weight (last 2 days)     None           *-*-*-*-*-*-*-*-*-*-*-*-*-*-*-*-*-*-*-*-*-*-*-*-*-*-*-*-*-*-*-*-*-*-*-*-*-*-*-*-*-*-*-*-*-*-*-*-*-*-*-*-*-*-  PHYSICAL EXAM:  General Appearance:    Awake, oriented to self only  In no respiratory distress  Head, Eyes, ENT:    No obvious abnormality, moist mucous mebranes  Neck:   Supple, no carotid bruit or JVD   Back:     Symmetric, no curvature  Lungs:     Respirations unlabored  Clear to auscultation bilaterally  Chest wall:    No tenderness or deformity   Heart:    irregularly irregular rhythm with tendency towards tachycardia, S1 and S2 normal, no murmur, rub  or gallop  Abdomen:     Soft, non-tender, No obvious masses, or organomegaly   Extremities:   Extremities normal, no cyanosis or edema    Skin:   Skin color, texture, turgor normal, no rashes or lesions   *-*-*-*-*-*-*-*-*-*-*-*-*-*-*-*-*-*-*-*-*-*-*-*-*-*-*-*-*-*-*-*-*-*-*-*-*-*-*-*-*-*-*-*-*-*-*-*-*-*-*-*-*-*-    Telemetry reviewed    Atrial fibrillation with intermittent rapid ventricular response  Last ECG     No results found for this visit on 19  Medications reviewed         Current Facility-Administered Medications:     apixaban (ELIQUIS) tablet 5 mg, 5 mg, Oral, BID, Benigno Oliveros MD, 5 mg at 19 0826    busPIRone (BUSPAR) tablet 10 mg, 10 mg, Oral, BID, Amarilis Sharpe MD, 10 mg at 08/23/19 6616    lamoTRIgine (LaMICtal) tablet 100 mg, 100 mg, Oral, Daily, Amarilis Sharpe MD, 100 mg at 08/23/19 5574    lamoTRIgine (LaMICtal) tablet 50 mg, 50 mg, Oral, HS, Amarilis Sharpe MD, 50 mg at 08/22/19 2134    levothyroxine tablet 100 mcg, 100 mcg, Oral, Early Morning, Amarilis Sharpe MD, 100 mcg at 08/23/19 4823    pravastatin (PRAVACHOL) tablet 40 mg, 40 mg, Oral, After Miguel Campbell MD, 40 mg at 08/22/19 1820    QUEtiapine (SEROquel) tablet 50 mg, 50 mg, Oral, BID, Amarilis Sharpe MD, 50 mg at 08/23/19 0826    sodium chloride 0 9 % infusion, 100 mL/hr, Intravenous, Continuous, Amarilis Sharpe MD, Last Rate: 100 mL/hr at 08/23/19 0512, 100 mL/hr at 08/23/19 7881    Imaging studies results reviewed    No procedure found  *-*-*-*-*-*-*-*-*-*-*-*-*-*-*-*-*-*-*-*-*-*-*-*-*-*-*-*-*-*-*-*-*-*-*-*-*-*-*-*-*-*-*-*-*-*-*-*-*-*-*-*-*-*-    LABORATORY DATA:  I have personally reviewed pertinent labs  CMP:   Results from last 7 days   Lab Units 08/22/19  2040   SODIUM mmol/L 143   POTASSIUM mmol/L 3 4*   CHLORIDE mmol/L 106   CO2 mmol/L 28   BUN mg/dL 17   CREATININE mg/dL 1 16   CALCIUM mg/dL 9 1       Cardiac Profile: No results found for: TROPONINI, CKMB, NTBNP, DIGOXIN    CBC:   Results from last 7 days   Lab Units 08/22/19  2040   WBC Thousand/uL 7 28   HEMOGLOBIN g/dL 12 5   HEMATOCRIT % 40 0   PLATELETS Thousands/uL 141*       PT/INR: No results found for: PT, INR,     Magnesium:       Phosphorous:       Microbiology:            Moiz Carrasco TSH and free T4     *-*-*-*-*-*-*-*-*-*-*-*-*-*-*-*-*-*-*-*-*-*-*-*-*-*-*-*-*-*-*-*-*-*-*-*-*-*-*-*-*-*-*-*-*-*-*-*-*-*-*-*-*-*-  ECHOCARDIOGRAM AND OTHER CARDIOLOGY RESULTS:  Results for orders placed during the hospital encounter of 02/13/18   Echo complete with contrast if indicated    Narrative Miles Christine [address]    [place], [address]  [phone]    Transthoracic Echocardiogram  2D, M-mode, Doppler, and Color Doppler    Study date:  2018    Patient: Carlos Hernandez  MR number: [id number]  Account number: [de-identified]  : 1960  Age: 62 years  Gender: Female  Status: Outpatient  Location: [address] [place] [address]  Height: 61 in  Weight: 194 lb  BP: 102/ 70 mmHg    Indications: Abnormal EKG  Diagnoses: R94 31 - Abnormal electrocardiogram [ECG] [EKG]    Sonographer:  Marj Dockery RDCS  Primary Physician:  LALA Ramírez  Referring Physician:  Irene Dailey MD  Group:  [facility]  Interpreting Physician:  Irene Dailey MD    SUMMARY    LEFT VENTRICLE:  Size was at the upper limits of normal   Systolic function was normal  Ejection fraction was estimated to be 55 %  There were no regional wall motion abnormalities  Features were consistent with a pseudonormal left ventricular filling pattern, with concomitant abnormal relaxation and increased filling pressure (grade 2 diastolic dysfunction)  LEFT ATRIUM:  The atrium was moderately dilated  MITRAL VALVE:  There was mild regurgitation  TRICUSPID VALVE:  There was mild regurgitation  HISTORY: PRIOR HISTORY: Hyperlipidemia, Dyspnea on exertion, Mental disability  PROCEDURE: The study was performed in the [address] [place] [address]  This was a routine study  The transthoracic approach was used  The study included complete 2D imaging, M-mode, complete spectral Doppler, and color Doppler  The  heart rate was 63 bpm, at the start of the study  Images were obtained from the parasternal, apical, subcostal, and suprasternal notch acoustic windows  Image quality was adequate  LEFT VENTRICLE: Size was at the upper limits of normal  Systolic function was normal  Ejection fraction was estimated to be 55 %  There were no regional wall motion abnormalities  Wall thickness was normal  No evidence of apical thrombus    DOPPLER: Features were consistent with a pseudonormal left ventricular filling pattern, with concomitant abnormal relaxation and increased filling pressure (grade 2 diastolic dysfunction)  RIGHT VENTRICLE: The size was normal  Systolic function was normal  Wall thickness was normal     LEFT ATRIUM: The atrium was moderately dilated  RIGHT ATRIUM: Size was normal     MITRAL VALVE: Valve structure was normal  There was normal leaflet separation  DOPPLER: The transmitral velocity was within the normal range  There was no evidence for stenosis  There was mild regurgitation  AORTIC VALVE: The valve was trileaflet  Leaflets exhibited normal thickness and normal cuspal separation  DOPPLER: Transaortic velocity was within the normal range  There was no evidence for stenosis  There was no significant  regurgitation  TRICUSPID VALVE: The valve structure was normal  There was normal leaflet separation  DOPPLER: The transtricuspid velocity was within the normal range  There was no evidence for stenosis  There was mild regurgitation  Estimated peak PA  pressure was 30 mmHg  PULMONIC VALVE: Not well visualized  DOPPLER: The transpulmonic velocity was within the normal range  There was no significant regurgitation  PERICARDIUM: There was no pericardial effusion  The pericardium was normal in appearance  AORTA: The root exhibited normal size  SYSTEMIC VEINS: IVC: The inferior vena cava was normal in size      SYSTEM MEASUREMENT TABLES    2D  %FS: 31 2 %  Ao Diam: 2 9 cm  EDV(Teich): 161 8 ml  EF(Teich): 58 3 %  ESV(Teich): 67 5 ml  IVSd: 1 cm  LA Area: 25 7 cm2  LA Diam: 5 3 cm  LVEDV MOD A4C: 62 6 ml  LVEF MOD A4C: 57 8 %  LVESV MOD A4C: 26 4 ml  LVIDd: 5 7 cm  LVIDs: 3 9 cm  LVLd A4C: 6 2 cm  LVLs A4C: 5 5 cm  LVPWd: 1 cm  RA Area: 12 4 cm2  RVIDd: 3 1 cm  SV MOD A4C: 36 2 ml  SV(Teich): 94 4 ml    CW  TR Vmax: 2 6 m/s  TR maxP 5 mmHg    MM  TAPSE: 2 3 cm    PW  E': 0 1 m/s  E/E': 11 8  MV A Adrian: 0 2 m/s  MV Dec Kittson: 5 9 m/s2  MV DecT: 141 1 ms  MV E Adrian: 0 8 m/s  MV E/A Ratio: 3 8  MV PHT: 40 9 ms  MVA By PHT: 5 4 cm2    [employer] Accredited Echocardiography Laboratory    Prepared and electronically signed by    Farhan Izquierdo MD  Signed [id number] 08:58:33       No results found for this or any previous visit  No results found for this or any previous visit  No results found for this or any previous visit        *-*-*-*-*-*-*-*-*-*-*-*-*-*-*-*-*-*-*-*-*-*-*-*-*-*-*-*-*-*-*-*-*-*-*-*-*-*-*-*-*-*-*-*-*-*-*-*-*-*-*-*-*-*-  ALLERGIES:  No Known Allergies    *-*-*-*-*-*-*-*-*-*-*-*-*-*-*-*-*-*-*-*-*-*-*-*-*-*-*-*-*-*-*-*-*-*-*-*-*-*-*-*-*-*-*-*-*-*-*-*-*-*-*-*-*-*-    SIGNATURES:   @VNY@   Brody Bethea MD

## 2019-08-23 NOTE — SOCIAL WORK
Cm reviewed pt care coordination rounds  Pt is not medically cleared for d/c  Was s/p cardioversion today  Will keep overnight to monitor  Anticipating d/c michell to [facility] group home  Spoke with nurse Laura Maurer [phone]  Requested that MD give specific directions for new meds and that scripts be sent to [facility] [phone] ph; [phone] fx  Staff will provide transport, requesting a call once pt is ready for d/c

## 2019-08-24 VITALS
WEIGHT: 197 LBS | RESPIRATION RATE: 18 BRPM | HEIGHT: 61 IN | TEMPERATURE: 97.9 F | OXYGEN SATURATION: 96 % | BODY MASS INDEX: 37.19 KG/M2 | SYSTOLIC BLOOD PRESSURE: 113 MMHG | DIASTOLIC BLOOD PRESSURE: 64 MMHG | HEART RATE: 74 BPM

## 2019-08-24 LAB
GLUCOSE SERPL-MCNC: 93 MG/DL (ref 65–140)
GLUCOSE SERPL-MCNC: 96 MG/DL (ref 65–140)

## 2019-08-24 PROCEDURE — 82948 REAGENT STRIP/BLOOD GLUCOSE: CPT

## 2019-08-24 PROCEDURE — 99239 HOSP IP/OBS DSCHRG MGMT >30: CPT | Performed by: INTERNAL MEDICINE

## 2019-08-24 PROCEDURE — 99232 SBSQ HOSP IP/OBS MODERATE 35: CPT | Performed by: INTERNAL MEDICINE

## 2019-08-24 RX ORDER — DILTIAZEM HYDROCHLORIDE 120 MG/1
120 CAPSULE, COATED, EXTENDED RELEASE ORAL DAILY
Status: DISCONTINUED | OUTPATIENT
Start: 2019-08-24 | End: 2019-08-24 | Stop reason: HOSPADM

## 2019-08-24 RX ADMIN — DILTIAZEM HYDROCHLORIDE 120 MG: 120 CAPSULE, COATED, EXTENDED RELEASE ORAL at 11:17

## 2019-08-24 RX ADMIN — LAMOTRIGINE 100 MG: 100 TABLET ORAL at 08:48

## 2019-08-24 RX ADMIN — APIXABAN 5 MG: 5 TABLET, FILM COATED ORAL at 08:45

## 2019-08-24 RX ADMIN — SODIUM CHLORIDE 100 ML/HR: 0.9 INJECTION, SOLUTION INTRAVENOUS at 01:30

## 2019-08-24 RX ADMIN — BUSPIRONE HYDROCHLORIDE 10 MG: 10 TABLET ORAL at 08:48

## 2019-08-24 RX ADMIN — LEVOTHYROXINE SODIUM 100 MCG: 100 TABLET ORAL at 05:15

## 2019-08-24 RX ADMIN — QUETIAPINE FUMARATE 50 MG: 25 TABLET ORAL at 08:43

## 2019-08-24 NOTE — ASSESSMENT & PLAN NOTE
· Possibly related to psychiatric medications such as Seroquel and Lamictal  · Remained stable on telemetry

## 2019-08-24 NOTE — ASSESSMENT & PLAN NOTE
· Direct admission from Cardiology office due to atrial fibrillation with rapid ventricular response  · S/p MARCIE and cardioversion on 8/23  · Continue on Eliquis 5 mg b i d   For 1 month  · Stable for discharge and outpatient follow-up with Cardiology  · Follow-up with Cardiology post discharge (ambulatory referral done)  · Specific instructions regarding Eliquis and Cardizem were placed on her discharge instructions papers

## 2019-08-24 NOTE — SOCIAL WORK
JESSICA received a call from [email or web address] ()  She was inquiring if pt could d/c today  W/E CM called pt's nurse and was informed pt was medically cleared for d/c, but staff at [facility] were told the d/c would have to wait until Monday d/t no nurse on the weekends  Kallie Nascimento reported to this CM there is NEVER a nurse at the group home pt lives at, but a "nurse advisor" which is her role  She trains the staff at the [facility] home to follow the doctor orders, which is why she was calling  She was requesting that the d/c paperwork include very specific parameters and instructions for the group home staff to follow I e  If BP goes above this     go to ER or call PCP,  If HR exceeds this, do that or that   etc      She was also requesting that the scripts for Eliquis & Cardizem be faxed over to [facility]) [phone], (f) [phone]  JESSICA called nursing to update and made her aware that Kallie Nascimento would be calling her as she is requesting the DCI be faxed to them, and to inform the sister plans on being there at 11:00 to pick the pt up  Spoke to attending requesting the scripts and to please include these instructions in the DCI for the group home staff  LINA Patricia  8/24/2019  8:58    UPDATE: Scripts were faxed over at 10:34 to [facility]  Nurse was going to fax the DCI to Kallie Nascimento since it had not yet been completed by attending

## 2019-08-24 NOTE — ASSESSMENT & PLAN NOTE
· Patient resides in a group home  Patient had an unremarkable hospital stay  Stable for transfer back to group [place] today

## 2019-08-24 NOTE — PROGRESS NOTES
Progress Note - Cardiology   Carole Noel Farmer 62 y o  female MRN: [id number]  Unit/Bed#: E4 -01 Encounter: [id number]      Assessment:     1  Atrial fibrillation of unknown duration status post cardioversion  2  Mood disorder with intellectual disability  3  History of prolonged QTC  4  Dyslipidemia    Discussion/Recommendations: Follow-up in our office in 2 weeks  Continue Cardizem and Eliquis  Acceptable for discharge from cardiovascular standpoint      Subjective:  No chest pain or trouble breathing      Physical Exam:  GEN:  NAD  HEENT:  MMM, NCAT, pink conjunctiva, EOMI, nonicteric sclera  CV:  NO JVD/HJR, RR, NO M/R/G, +S1/S2, NO PARASTERNAL HEAVE/THRILL, NO LE EDEMA, NO HEPATIC SYSTOLIC PULSATION, WARM EXTREMITIES  RESP:  CTAB/L  ABD:  SOFT, NT, NO GROSS ORGANOMEGALY        Vitals:   /75 (BP Location: Right arm)   Pulse 83   Temp 97 8 °F (36 6 °C) (Tympanic)   Resp 18   Ht 5' 1" (1 549 m)   Wt 89 4 kg (197 lb)   SpO2 96%   BMI 37 22 kg/m²   Vitals:    08/24/19 0100   Weight: 89 4 kg (197 lb)       Intake/Output Summary (Last 24 hours) at 8/24/2019 0914  Last data filed at 8/23/2019 1900  Gross per 24 hour   Intake 1160 ml   Output --   Net 1160 ml         TELEMETRY:  Sinus rhythm  Lab Results:  Results from last 7 days   Lab Units 08/22/19  2040   WBC Thousand/uL 7 28   HEMOGLOBIN g/dL 12 5   HEMATOCRIT % 40 0   PLATELETS Thousands/uL 141*     Results from last 7 days   Lab Units 08/22/19  2040   POTASSIUM mmol/L 3 4*   CHLORIDE mmol/L 106   CO2 mmol/L 28   BUN mg/dL 17   CREATININE mg/dL 1 16   CALCIUM mg/dL 9 1     Results from last 7 days   Lab Units 08/22/19  2040   POTASSIUM mmol/L 3 4*   CHLORIDE mmol/L 106   CO2 mmol/L 28   BUN mg/dL 17   CREATININE mg/dL 1 16   CALCIUM mg/dL 9 1             Medications:    Current Facility-Administered Medications:     apixaban (ELIQUIS) tablet 5 mg, 5 mg, Oral, BID, Sven Connelly MD, 5 mg at 08/24/19 0845    busPIRone (BUSPAR) tablet 10 mg, 10 mg, Oral, BID, Joy Levine MD, 10 mg at 08/24/19 0848    diltiazem (CARDIZEM) tablet 30 mg, 30 mg, Oral, Q6H [facility], Brdoy Bethea MD, 30 mg at 08/23/19 1410    fentaNYL (SUBLIMAZE) injection 25 mcg, 25 mcg, Intravenous, Q5 Min PRN, Krunal Matamoros DO    lamoTRIgine (LaMICtal) tablet 100 mg, 100 mg, Oral, Daily, Joy Levine MD, 100 mg at 08/24/19 0848    lamoTRIgine (LaMICtal) tablet 50 mg, 50 mg, Oral, HS, Joy Levine MD, 50 mg at 08/23/19 2121    levothyroxine tablet 100 mcg, 100 mcg, Oral, Early Morning, Joy Levine MD, 100 mcg at 08/24/19 0515    pravastatin (PRAVACHOL) tablet 40 mg, 40 mg, Oral, After Sandra Hodge MD, 40 mg at 08/23/19 1801    QUEtiapine (SEROquel) tablet 50 mg, 50 mg, Oral, BID, Joy Levine MD, 50 mg at 08/24/19 0843    sodium chloride 0 9 % infusion, 100 mL/hr, Intravenous, Continuous, Joy Levine MD, Last Rate: 100 mL/hr at 08/24/19 0130, 100 mL/hr at 08/24/19 0130    Portions of the record may have been created with voice recognition software  Occasional wrong word or "sound a like" substitutions may have occurred due to the inherent limitations of voice recognition software  Read the chart carefully and recognize, using context, where substitutions have occurred

## 2019-08-24 NOTE — DISCHARGE INSTRUCTIONS
NEW medications: eliquis 5 mg 2x daily and cardizem 120 mg daily  STOP eliquis and send to ER if any persistent bleeding occurs  HOLD cardizem for BP <  100/70  HOLD cardizem for Heart rate < 60  CALL PCP or cardiology if HR is < 60 or > 120

## 2019-08-25 LAB
ATRIAL RATE: 77 BPM
P AXIS: 91 DEGREES
PR INTERVAL: 180 MS
QRS AXIS: -10 DEGREES
QRSD INTERVAL: 90 MS
QT INTERVAL: 376 MS
QTC INTERVAL: 425 MS
T WAVE AXIS: 30 DEGREES
VENTRICULAR RATE: 77 BPM

## 2019-08-25 PROCEDURE — 93010 ELECTROCARDIOGRAM REPORT: CPT | Performed by: INTERNAL MEDICINE

## 2019-08-26 ENCOUNTER — TRANSITIONAL CARE MANAGEMENT (OUTPATIENT)
Dept: FAMILY MEDICINE CLINIC | Facility: CLINIC | Age: 59
End: 2019-08-26

## 2019-09-04 ENCOUNTER — OFFICE VISIT (OUTPATIENT)
Dept: FAMILY MEDICINE CLINIC | Facility: CLINIC | Age: 59
End: 2019-09-04

## 2019-09-04 ENCOUNTER — APPOINTMENT (OUTPATIENT)
Dept: LAB | Facility: HOSPITAL | Age: 59
End: 2019-09-04
Attending: INTERNAL MEDICINE
Payer: COMMERCIAL

## 2019-09-04 ENCOUNTER — OFFICE VISIT (OUTPATIENT)
Dept: CARDIOLOGY CLINIC | Facility: CLINIC | Age: 59
End: 2019-09-04
Payer: COMMERCIAL

## 2019-09-04 VITALS
RESPIRATION RATE: 20 BRPM | BODY MASS INDEX: 36.82 KG/M2 | OXYGEN SATURATION: 95 % | HEIGHT: 61 IN | SYSTOLIC BLOOD PRESSURE: 98 MMHG | WEIGHT: 195 LBS | HEART RATE: 82 BPM | TEMPERATURE: 96.2 F | DIASTOLIC BLOOD PRESSURE: 60 MMHG

## 2019-09-04 VITALS
WEIGHT: 195.8 LBS | BODY MASS INDEX: 37 KG/M2 | SYSTOLIC BLOOD PRESSURE: 82 MMHG | DIASTOLIC BLOOD PRESSURE: 68 MMHG | HEART RATE: 64 BPM

## 2019-09-04 DIAGNOSIS — I48.0 PAROXYSMAL ATRIAL FIBRILLATION (HCC): ICD-10-CM

## 2019-09-04 DIAGNOSIS — Z87.898 H/O PROLONGED Q-T INTERVAL ON ECG: ICD-10-CM

## 2019-09-04 DIAGNOSIS — F79 MENTAL DISABILITY: ICD-10-CM

## 2019-09-04 DIAGNOSIS — I48.19 PERSISTENT ATRIAL FIBRILLATION (HCC): Primary | ICD-10-CM

## 2019-09-04 DIAGNOSIS — Z09 HOSPITAL DISCHARGE FOLLOW-UP: Primary | ICD-10-CM

## 2019-09-04 DIAGNOSIS — E03.4 HYPOTHYROIDISM DUE TO ACQUIRED ATROPHY OF THYROID: ICD-10-CM

## 2019-09-04 DIAGNOSIS — E78.5 HYPERLIPIDEMIA, UNSPECIFIED HYPERLIPIDEMIA TYPE: ICD-10-CM

## 2019-09-04 DIAGNOSIS — F39 MOOD DISORDER (HCC): ICD-10-CM

## 2019-09-04 LAB
ANION GAP SERPL CALCULATED.3IONS-SCNC: 7 MMOL/L (ref 4–13)
BUN SERPL-MCNC: 20 MG/DL (ref 5–25)
CALCIUM SERPL-MCNC: 9.8 MG/DL (ref 8.3–10.1)
CHLORIDE SERPL-SCNC: 105 MMOL/L (ref 100–108)
CO2 SERPL-SCNC: 30 MMOL/L (ref 21–32)
CREAT SERPL-MCNC: 0.95 MG/DL (ref 0.6–1.3)
GFR SERPL CREATININE-BSD FRML MDRD: 66 ML/MIN/1.73SQ M
GLUCOSE SERPL-MCNC: 84 MG/DL (ref 65–140)
POTASSIUM SERPL-SCNC: 4.2 MMOL/L (ref 3.5–5.3)
SODIUM SERPL-SCNC: 142 MMOL/L (ref 136–145)

## 2019-09-04 PROCEDURE — 99214 OFFICE O/P EST MOD 30 MIN: CPT | Performed by: INTERNAL MEDICINE

## 2019-09-04 PROCEDURE — 36415 COLL VENOUS BLD VENIPUNCTURE: CPT | Performed by: INTERNAL MEDICINE

## 2019-09-04 PROCEDURE — 93000 ELECTROCARDIOGRAM COMPLETE: CPT | Performed by: INTERNAL MEDICINE

## 2019-09-04 PROCEDURE — 1111F DSCHRG MED/CURRENT MED MERGE: CPT | Performed by: PHYSICIAN ASSISTANT

## 2019-09-04 PROCEDURE — 80048 BASIC METABOLIC PNL TOTAL CA: CPT | Performed by: INTERNAL MEDICINE

## 2019-09-04 PROCEDURE — 99495 TRANSJ CARE MGMT MOD F2F 14D: CPT | Performed by: PHYSICIAN ASSISTANT

## 2019-09-04 NOTE — PROGRESS NOTES
Transition of Care  Follow-up After Hospitalization    Carole Burrows 61 y o  female   Date:  9/4/2019    TCM Call (since 8/4/2019)     Date and time call was made  8/27/2019  9:12 AM    Hospital care reviewed  Records reviewed    Patient was hospitialized at  [place] [address]    Date of Admission  08/22/19    Date of discharge  08/24/19    Diagnosis  New onset atrial fibrillation    Disposition  Assisted Living; Home    Were the patients medications reviewed and updated  No <img src='C:FILES (X86)    Current Symptoms  None <img src='C:FILES (X86)      TCM Call (since 8/4/2019)     Post hospital issues  None    Should patient be enrolled in anticoag monitoring? No    Scheduled for follow up? Yes    Did you obtain your prescribed medications  Yes    Do you need help managing your prescriptions or medications  No    Is transportation to your appointment needed  No    I have advised the patient to call PCP with any new or worsening symptoms  [place] R/MA     [facility] you have social support  Yes, as much as I need    [address] [facility] records were reviewed  Medications upon discharge reviewed/updated  Discharge Disposition:  Group Home   Follow up visits with other specialists: Cardiology (9/4/19)     HPI:  - Patient is a 63-year-old female who presents today for transition of care visit  Patient is accompanied today by her caretaker  Patient originally presented to her routine cardiology follow-up appointment on 08/22/2019 for history of prolonged QTC  Patient was to be atrial fibrillation and sent to the  [place] [address] emergency room  Patient underwent a MARCIE which did not show any atrial thrombus  Patient was cardioverted successfully  Patient was started on Eliquis 5 mg twice daily and Cardizem 120 mg once daily  Patient was discharged on 08/24/2019 back to her group home   Patient was told to return to the ER if any persistent bleeding occurs  Patient/ patient's group home was told to hold Cardizem if patient's blood pressure is less than 100/70 or heart rate is less than 55 and to call PCP/ Cardiology  If her heart rate is less than 55 or more than 120     - Patient had follow-up outpatient cardiology visit earlier today  Per chart review, patient is to continue Cardizem and Eliquis as prescribed  Patient is to check her blood pressure in the morning and in the evening every day for 1 week and to then report to Cardiology with the blood pressure readings  Patient was sent for BMP due to prominent U wave on EKG  Patient is to follow up with electrophysiology for paroxysmal atrial fibrillation and history of QTC prolongation  Patient notes she has an appointment scheduled with electrophysiology on 10/09/2019     - Today, patient notes she is doing well and has no complaints  Patient denies any chest pain, palpitations, shortness of breath, lower leg swelling, headaches, dizziness  Patient notes she is complying with her medications  Patient's other chronic conditions are stable  ROS: Review of Systems   Constitutional: Negative for appetite change, fatigue and fever  HENT: Negative for congestion, ear pain, rhinorrhea and sore throat  Eyes: Negative for pain  Respiratory: Negative for cough, chest tightness and shortness of breath  Cardiovascular: Negative for chest pain, palpitations and leg swelling  Gastrointestinal: Negative for abdominal pain, constipation, diarrhea, nausea and vomiting  Genitourinary: Negative for difficulty urinating and dysuria  Musculoskeletal: Negative for arthralgias  Skin: Negative for rash  Neurological: Negative for dizziness and headaches  Psychiatric/Behavioral: The patient is not nervous/anxious          Past Medical History:   Diagnosis Date    Anxiety disorder     Mental retardation        Past Surgical History:   Procedure Laterality Date    [facility] age 25   [facility]            Social History     Socioeconomic History    Marital status: Single     Spouse name: None    Number of children: None    Years of education: None    Highest education level: None   Occupational History    None   Social Needs    Financial resource strain: None    Food insecurity:     Worry: None     Inability: None    Transportation needs:     Medical: None     Non-medical: None   Tobacco Use    Smoking status: Never Smoker    Smokeless tobacco: Never Used   Substance and Sexual Activity    Alcohol use: Never     Frequency: Never    Drug use: No    Sexual activity: None   Lifestyle    Physical activity:     Days per week: None     Minutes per session: None    Stress: None   Relationships    Social connections:     Talks on phone: None     Gets together: None     Attends Restorationism service: None     Active member of club or organization: None     Attends meetings of clubs or organizations: None     Relationship status: None    Intimate partner violence:     Fear of current or ex partner: None     Emotionally abused: None     Physically abused: None     Forced sexual activity: None   Other Topics Concern    None   Social History Narrative    Disabled    Social history reviewed unchanged        Family History   Problem Relation Age of Onset    Other Mother         neglect or abandonment     Other Father         neglect or abandonment     No Known Problems Sister        No Known Allergies      Current Outpatient Medications:     acetaminophen (TYLENOL) 650 mg CR tablet, Take 1 tablet (650 mg total) by mouth every 8 (eight) hours as needed for mild pain (or fever), Disp: 30 tablet, Rfl: 1    apixaban (ELIQUIS) 5 mg, Take 1 tablet (5 mg total) by mouth 2 (two) times a day, Disp: 60 tablet, Rfl: 0    benzonatate (TESSALON) 200 MG capsule, Take 1 capsule (200 mg total) by mouth 3 (three) times a day as needed for cough for up to 20 doses, Disp: 20 capsule, Rfl: 0    busPIRone (BUSPAR) 10 mg tablet, Take 1 tablet (10 mg total) by mouth 2 (two) times a day, Disp: 60 tablet, Rfl: 1    CALCIUM 600-D 600-400 MG-UNIT TABS, TAKE (1) TABLET BY MOUTH TWICE DAILY AT 8AM AND 5PM , Disp: 62 tablet, Rfl: 6    cholecalciferol (VITAMIN D3) 400 units tablet, Take 1 tablet (400 Units total) by mouth 3 (three) times a day, Disp: 270 tablet, Rfl: 1    diltiazem (CARDIZEM LA) 120 MG 24 hr tablet, Take 1 tablet (120 mg total) by mouth daily, Disp: 30 tablet, Rfl: 0    docusate sodium (COLACE) 100 mg capsule, Take 1 capsule (100 mg total) by mouth 2 (two) times a day If needed constipation, Disp: 30 capsule, Rfl: 0    hydrocortisone 1 % cream, Apply topically 2 (two) times a day If needed for itchy rash, Disp: 30 g, Rfl: 0    Ibuprofen 200 MG CAPS, Take 1-2 tablets if needed for pain or fever every 6-8 hours, Disp: 120 each, Rfl: 0    Incontinence Supply Disposable (WINGS CHOICE PLUS ADULT BRIEFS) MISC, USE AS DIRECTED FOR INCONTINENCE, Disp: 90 each, Rfl: 4    lamoTRIgine (LaMICtal) 100 mg tablet, Take 1 tablet (100 mg total) by mouth daily In the morning, Disp: 30 tablet, Rfl: 1    lamoTRIgine (LaMICtal) 25 mg tablet, Take 2 tablets (50 mg total) by mouth daily at bedtime, Disp: 60 tablet, Rfl: 1    levothyroxine 100 mcg tablet, Take 1 tablet (100 mcg total) by mouth daily, Disp: 30 tablet, Rfl: 5    loperamide (IMODIUM A-D) 2 mg capsule, Take 1 capsule (2 mg total) by mouth 4 (four) times a day as needed for diarrhea, Disp: 30 capsule, Rfl: 0    neomycin-bacitracin-polymyxin (NEOSPORIN) 5-400-5,000 ointment, Apply topically 4 (four) times a day If needed for wound, Disp: 28 3 g, Rfl: 0    Omega-3 1000 MG CAPS, Take 3 capsules (3,000 mg total) by mouth daily, Disp: 90 capsule, Rfl: 5    pravastatin (PRAVACHOL) 40 mg tablet, Take 1 tablet (40 mg total) by mouth daily, Disp: 30 tablet, Rfl: 5    QUEtiapine (SEROquel) 50 mg tablet, Take 1 tablet in the morning and 2 tablets orally at night, Disp: 90 tablet, Rfl: 1    SODIUM FLUORIDE, DENTAL GEL, (SF 5000 PLUS) 1 1 % CREA, SF 5000 Plus 1 1 % Dental Cream; 0; 22-Aug-2014; Active, Disp: , Rfl:       Physical Exam:  BP 98/60   Pulse 82   Temp (!) 96 2 °F (35 7 °C) (Skin)   Resp 20   Ht 5' 1" (1 549 m)   Wt 88 5 kg (195 lb)   SpO2 95%   BMI 36 84 kg/m²     Physical Exam   Constitutional: She is oriented to person, place, and time  She appears well-developed and well-nourished  HENT:   Head: Normocephalic and atraumatic  Right Ear: External ear normal    Left Ear: External ear normal    Nose: Nose normal    Mouth/Throat: Uvula is midline, oropharynx is clear and moist and mucous membranes are normal    Eyes: Pupils are equal, round, and reactive to light  Conjunctivae and EOM are normal    Neck: Normal range of motion  Neck supple  Cardiovascular: Normal rate, regular rhythm, normal heart sounds and intact distal pulses  No murmur heard  Pulmonary/Chest: Effort normal and breath sounds normal  She has no wheezes  Abdominal: Soft  Bowel sounds are normal  There is no tenderness  Musculoskeletal: Normal range of motion  She exhibits no edema  Neurological: She is alert and oriented to person, place, and time  Skin: Skin is warm and dry  Psychiatric: She has a normal mood and affect  Her behavior is normal    Nursing note and vitals reviewed            Labs:  Lab Results   Component Value Date    WBC 7 28 08/22/2019    HGB 12 5 08/22/2019    HCT 40 0 08/22/2019    MCV 91 08/22/2019     (L) 08/22/2019     Lab Results   Component Value Date    K 4 2 09/04/2019     09/04/2019    CO2 30 09/04/2019    BUN 20 09/04/2019    CREATININE 0 95 09/04/2019    GLUF 87 07/10/2019    CALCIUM 9 8 09/04/2019    AST 10 07/10/2019    ALT 18 07/10/2019    ALKPHOS 110 07/10/2019    EGFR 66 09/04/2019       Assessment and Plan:    Paroxysmal atrial fibrillation/History of QTC prolongation  - Patient is in normal sinus rhythm today and is asymptomatic  Continue Eliquis 5 mg twice daily and Cardizem 120 mg once daily as prescribed through Cardiology  Advised to follow up with electrophysiology as scheduled on 10/09/2019  Mood disorder  - Stable  Continue follow up with psychiatry  Continue Lamictal 100 mg in the morning and 50 mg at bedtime, Seroquel 50 mg in the morning and 100 mg at night, and BuSpar 10 mg twice daily  Hyperlipidemia  - Stable  Continue pravastatin 40 mg once daily  Hypothyroidism  - Stable  Continue levothyroxine 100 mcg once daily  Mental disability  - Patient lives in a group home and has help with managing her medications  Patient is doing well  Carole was seen today for transition of care management  Diagnoses and all orders for this visit:    Hospital discharge follow-up    Paroxysmal atrial fibrillation ([facility] [address] )    H/O prolonged Q-T interval on ECG    Hyperlipidemia, unspecified hyperlipidemia type    Mental disability    Mood disorder ([facility] [address] )    Hypothyroidism due to acquired atrophy of thyroid      - Return to clinic for next scheduled follow up with PCP on 12/17/2019  All of patients questions were answered  Patient understands and agrees with the above plan       Iman Padilla PA-C  09/04/19  DINA Rodriguez

## 2019-09-04 NOTE — PROGRESS NOTES
[address] Cardiology [place]  [address]  [facility] [facility]  [phone]    Cardiology Follow up    Patient:  Satya Monsivais  :  1960  MRN:  [id number]    History of Present Illness:     66-year-old female with past medical history of paroxysmal atrial fibrillation diagnosed in 2019 status post cardioversion, possible hypertension, dyslipidemia, thyroid disease, mood disorder and mild IDD/ MR, history of prolonged QTC, presents for follow-up  She did note she has a few minutes of palpitations yesterday but denies any chest pain, shortness of breath, dizziness, or syncope          Patient Active Problem List   Diagnosis    Enlarged LA (left atrium)    H/O prolonged Q-T interval on ECG    Hypothyroidism    Hyperlipidemia    Vitamin D deficiency    Medication-induced movement disorder    Mental disability    Mood disorder ([address] )    Obese    Overflow incontinence of urine    Severe anxiety    New onset atrial fibrillation (HCC)       Past Surgical History  Past Surgical History:   Procedure Laterality Date    HYSTERECTOMY      age 25   [de-identified]      [facility] [address] EXTERNAL  2019            Social History   Social History     Socioeconomic History    Marital status: Single     Spouse name: Not on file    Number of children: Not on file    Years of education: Not on file    Highest education level: Not on file   Occupational History    Not on file   Social Needs    Financial resource strain: Not on file    Food insecurity:     Worry: Not on file     Inability: Not on file    Transportation needs:     Medical: Not on file     Non-medical: Not on file   Tobacco Use    Smoking status: Never Smoker    Smokeless tobacco: Never Used   Substance and Sexual Activity    Alcohol use: Never     Frequency: Never    Drug use: No    Sexual activity: Not on file   Lifestyle    Physical activity:     Days per week: Not on file     Minutes per session: Not on file    Stress: Not on file   Relationships    Social connections:     Talks on phone: Not on file     Gets together: Not on file     Attends Gnosticist service: Not on file     Active member of club or organization: Not on file     Attends meetings of clubs or organizations: Not on file     Relationship status: Not on file    Intimate partner violence:     Fear of current or ex partner: Not on file     Emotionally abused: Not on file     Physically abused: Not on file     Forced sexual activity: Not on file   Other Topics Concern    Not on file   Social History Narrative    Disabled    Social history reviewed unchanged         No Known Allergies    Family History   Family History   Problem Relation Age of Onset    Other Mother         neglect or abandonment     Other Father         neglect or abandonment     No Known Problems Sister        Review of Systems:  Review of Systems   Constitutional: Negative for chills, fatigue and fever  HENT: Negative for hearing loss, nosebleeds and tinnitus  Eyes: Negative for pain  Respiratory: Negative for cough, chest tightness and shortness of breath  Cardiovascular: Positive for palpitations  Negative for chest pain and leg swelling  Gastrointestinal: Negative for abdominal pain, nausea and vomiting  Endocrine: Negative for cold intolerance and heat intolerance  Genitourinary: Negative for difficulty urinating, hematuria and vaginal bleeding  Musculoskeletal: Negative for arthralgias  Skin: Negative for rash  Allergic/Immunologic: Negative for environmental allergies  Neurological: Negative for dizziness, syncope, weakness and light-headedness  Psychiatric/Behavioral: Negative for decreased concentration and sleep disturbance  The patient is not nervous/anxious            Current Outpatient Medications:     acetaminophen (TYLENOL) 650 mg CR tablet, Take 1 tablet (650 mg total) by mouth every 8 (eight) hours as needed for mild pain (or fever), Disp: 30 tablet, Rfl: 1    apixaban (ELIQUIS) 5 mg, Take 1 tablet (5 mg total) by mouth 2 (two) times a day, Disp: 60 tablet, Rfl: 0    benzonatate (TESSALON) 200 MG capsule, Take 1 capsule (200 mg total) by mouth 3 (three) times a day as needed for cough for up to 20 doses, Disp: 20 capsule, Rfl: 0    busPIRone (BUSPAR) 10 mg tablet, Take 1 tablet (10 mg total) by mouth 2 (two) times a day, Disp: 60 tablet, Rfl: 1    CALCIUM 600-D 600-400 MG-UNIT TABS, TAKE (1) TABLET BY MOUTH TWICE DAILY AT 8AM AND 5PM , Disp: 62 tablet, Rfl: 6    cholecalciferol (VITAMIN D3) 400 units tablet, Take 1 tablet (400 Units total) by mouth 3 (three) times a day, Disp: 270 tablet, Rfl: 1    diltiazem (CARDIZEM LA) 120 MG 24 hr tablet, Take 1 tablet (120 mg total) by mouth daily, Disp: 30 tablet, Rfl: 0    docusate sodium (COLACE) 100 mg capsule, Take 1 capsule (100 mg total) by mouth 2 (two) times a day If needed constipation, Disp: 30 capsule, Rfl: 0    hydrocortisone 1 % cream, Apply topically 2 (two) times a day If needed for itchy rash, Disp: 30 g, Rfl: 0    Ibuprofen 200 MG CAPS, Take 1-2 tablets if needed for pain or fever every 6-8 hours, Disp: 120 each, Rfl: 0    Incontinence Supply Disposable (WINGS CHOICE PLUS ADULT BRIEFS) MISC, USE AS DIRECTED FOR INCONTINENCE, Disp: 90 each, Rfl: 4    lamoTRIgine (LaMICtal) 100 mg tablet, Take 1 tablet (100 mg total) by mouth daily In the morning, Disp: 30 tablet, Rfl: 1    lamoTRIgine (LaMICtal) 25 mg tablet, Take 2 tablets (50 mg total) by mouth daily at bedtime, Disp: 60 tablet, Rfl: 1    levothyroxine 100 mcg tablet, Take 1 tablet (100 mcg total) by mouth daily, Disp: 30 tablet, Rfl: 5    loperamide (IMODIUM A-D) 2 mg capsule, Take 1 capsule (2 mg total) by mouth 4 (four) times a day as needed for diarrhea, Disp: 30 capsule, Rfl: 0    neomycin-bacitracin-polymyxin (NEOSPORIN) 5-400-5,000 ointment, Apply topically 4 (four) times a day If needed for wound, Disp: 28 3 g, Rfl: 0    Omega-3 1000 MG CAPS, Take 3 capsules (3,000 mg total) by mouth daily, Disp: 90 capsule, Rfl: 5    pravastatin (PRAVACHOL) 40 mg tablet, Take 1 tablet (40 mg total) by mouth daily, Disp: 30 tablet, Rfl: 5    QUEtiapine (SEROquel) 50 mg tablet, Take 1 tablet in the morning and 2 tablets orally at night, Disp: 90 tablet, Rfl: 1    SODIUM FLUORIDE, DENTAL GEL, (SF 5000 PLUS) 1 1 % CREA, SF 5000 Plus 1 1 % Dental Cream; 0; 22-Aug-2014; Active, Disp: , Rfl:      Physical Exam:    Vitals:    09/04/19 0920   BP: (!) 82/68   BP Location: Left arm   Patient Position: Sitting   Cuff Size: Large   Pulse: 64   Weight: 88 8 kg (195 lb 12 8 oz)       Physical Exam   Constitutional: She is oriented to person, place, and time  She appears well-developed and well-nourished  HENT:   Head: Normocephalic  Right Ear: External ear normal    Left Ear: External ear normal    Mouth/Throat: Oropharynx is clear and moist    Eyes: Pupils are equal, round, and reactive to light  Neck: No JVD present  Carotid bruit is not present  Cardiovascular: Normal rate, regular rhythm and intact distal pulses  Exam reveals no gallop and no friction rub  No murmur heard  Pulmonary/Chest: Effort normal and breath sounds normal  No tachypnea  No respiratory distress  She has no wheezes  She has no rales  She exhibits no tenderness  Abdominal: Soft  She exhibits no distension  There is no tenderness  There is no rebound and no guarding  Musculoskeletal: She exhibits no edema  Neurological: She is alert and oriented to person, place, and time  Skin: Skin is warm and dry  Psychiatric: She has a normal mood and affect  Her behavior is normal  Judgment and thought content normal    Nursing note and vitals reviewed  Labs:not applicable    Assessment/Plan:    1  Paroxysmal atrial fibrillation  She appears to be maintaining sinus rhythm after cardioversion Cardizem and Eliquis currently    She will continue on the Eliquis for now     Would like her to follow up with electrophysiology  2  Borderline hypertension  Her pressure was normal in the office today but I did notice that her pressure was above 140 commonly at home  I have asked them to check her blood pressure morning and evening for few days and also make sure that the cuff is the appropriate size  3  Prominent U wave-I have asked her to get a basic metabolic panel today  4  History of QTC prolongation-she will follow-up with electrophysiology  Thank you so much, please not hesitate to contact me with any questions or concerns        Yaakov Crain MD  9/4/2019  9:30 AM

## 2019-09-05 ENCOUNTER — OFFICE VISIT (OUTPATIENT)
Dept: FAMILY MEDICINE CLINIC | Facility: CLINIC | Age: 59
End: 2019-09-05

## 2019-09-05 VITALS
OXYGEN SATURATION: 96 % | HEART RATE: 77 BPM | TEMPERATURE: 97.8 F | BODY MASS INDEX: 37.19 KG/M2 | WEIGHT: 197 LBS | DIASTOLIC BLOOD PRESSURE: 70 MMHG | HEIGHT: 61 IN | RESPIRATION RATE: 18 BRPM | SYSTOLIC BLOOD PRESSURE: 108 MMHG

## 2019-09-05 DIAGNOSIS — R21 RASH: Primary | ICD-10-CM

## 2019-09-05 PROCEDURE — 99213 OFFICE O/P EST LOW 20 MIN: CPT | Performed by: FAMILY MEDICINE

## 2019-09-05 NOTE — PROGRESS NOTES
Assessment/Plan:    Rash  Multiple small, purpuras, nontender and not warm to touch  Rash resolving spontaneously  See attached pictures     -advised patient that would not change any medications at this point due to rash resolving itself   -due to lower extremity edema, advised patient to elevate legs while watching TV and place a pillow under her legs while asleep  Diagnoses and all orders for this visit:    Rash          Subjective:      Patient ID: Shyam Kelly is a 61 y o  female  Patient is a 66-year-old female with past medical history significant for new onset atrial fibrillation who was seen in clinic yesterday for transition of care visit  Patient reports 1 week history of rash developing to bilateral lower extremities  Patient is a resident of [facility] due to intellectual disability and presents today with her   Rash lower extremity  States that she was recently started on Cardizem as well as Eliquis for new onset atrial fibrillation  Denies any other medications  Rash is not itchy, not painful  Manager has not noticed a rash on any other percent living in the living facility  Patient does not go out in [place] or cuts grass  Denies any new perfumes, detergents or lotions  Patient and manager reports that the rash is now resolving  Review of Systems   Constitutional: Negative for chills and fever  Respiratory: Negative for cough and shortness of breath  Cardiovascular: Negative for chest pain and palpitations  Endocrine: Negative for polyphagia and polyuria  Musculoskeletal: Negative for back pain  Skin: Positive for rash  Neurological: Negative for light-headedness, numbness and headaches           Objective:      /70 (BP Location: Left arm, Patient Position: Sitting, Cuff Size: Large)   Pulse 77   Temp 97 8 °F (36 6 °C) (Temporal)   Resp 18   Ht 5' 1" (1 549 m)   Wt 89 4 kg (197 lb)   SpO2 96%   BMI 37 22 kg/m²          Physical Exam Constitutional: She appears well-developed and well-nourished  HENT:   Head: Normocephalic and atraumatic  Eyes: EOM are normal    Neck: Normal range of motion  Neck supple  Cardiovascular: Normal rate and normal heart sounds  Pulmonary/Chest: Effort normal and breath sounds normal  No respiratory distress  Abdominal: Soft  Bowel sounds are normal  She exhibits no distension  Neurological: She is alert  Skin: Skin is warm and dry  Left lower extremity:  Medial aspect with multiple small purpuras  Nonblanching, not warm to touch and not tender to palpation either  Right lower extremity:  Anterior aspect with very small purpuras  Nonblanching, not warm to touch and not tender to palpation either  See attached pictures  Bilateral lower extremities with trace pitting edema up to the shins

## 2019-09-05 NOTE — ASSESSMENT & PLAN NOTE
Multiple small, purpuras, nontender and not warm to touch  Rash resolving spontaneously  See attached pictures     -advised patient that would not change any medications at this point due to rash resolving itself   -due to lower extremity edema, advised patient to elevate legs while watching TV and place a pillow under her legs while asleep

## 2019-09-18 DIAGNOSIS — I48.91 NEW ONSET ATRIAL FIBRILLATION (HCC): ICD-10-CM

## 2019-10-03 DIAGNOSIS — I48.91 NEW ONSET ATRIAL FIBRILLATION (HCC): ICD-10-CM

## 2019-10-03 RX ORDER — APIXABAN 5 MG/1
TABLET, FILM COATED ORAL
Qty: 60 TABLET | Refills: 3 | Status: SHIPPED | OUTPATIENT
Start: 2019-10-03 | End: 2019-10-09

## 2019-10-03 RX ORDER — DILTIAZEM HYDROCHLORIDE 120 MG/1
CAPSULE, COATED, EXTENDED RELEASE ORAL
Qty: 30 CAPSULE | Refills: 3 | Status: SHIPPED | OUTPATIENT
Start: 2019-10-03 | End: 2020-02-17 | Stop reason: SDUPTHER

## 2019-10-09 ENCOUNTER — CONSULT (OUTPATIENT)
Dept: CARDIOLOGY CLINIC | Facility: CLINIC | Age: 59
End: 2019-10-09
Payer: COMMERCIAL

## 2019-10-09 VITALS
BODY MASS INDEX: 37.19 KG/M2 | WEIGHT: 197 LBS | HEART RATE: 56 BPM | DIASTOLIC BLOOD PRESSURE: 78 MMHG | HEIGHT: 61 IN | SYSTOLIC BLOOD PRESSURE: 120 MMHG

## 2019-10-09 DIAGNOSIS — I48.91 ATRIAL FIBRILLATION, UNSPECIFIED TYPE (HCC): ICD-10-CM

## 2019-10-09 DIAGNOSIS — I48.19 PERSISTENT ATRIAL FIBRILLATION (HCC): Primary | ICD-10-CM

## 2019-10-09 DIAGNOSIS — I48.91 NEW ONSET ATRIAL FIBRILLATION (HCC): ICD-10-CM

## 2019-10-09 PROCEDURE — 99214 OFFICE O/P EST MOD 30 MIN: CPT | Performed by: INTERNAL MEDICINE

## 2019-10-09 PROCEDURE — 93000 ELECTROCARDIOGRAM COMPLETE: CPT | Performed by: INTERNAL MEDICINE

## 2019-10-09 NOTE — PROGRESS NOTES
HEART AND VASCULAR  CARDIAC [address]    Outpatient New Consult    Today's Date: 10/09/19        Patient name: Radha Salter  YOB: 1960  Sex: female         Chief Complaint: Referral from Dr Jono Lucia for afib    ASSESSMENT:  Problem List Items Addressed This Visit        Cardiovascular and Mediastinum    New onset atrial fibrillation [facility])      Other Visit Diagnoses     Persistent atrial fibrillation    -  Primary    Atrial fibrillation, unspecified type [facility])        Relevant Orders    POCT ECG            62 yo female  1) Afib, unclear duration or type, found incidentally at office visit in Aug 2019 and sent in for MARCIE -DCCV and since then NSR, she is on Eliquis and diltiazem now  [id number] female   She has developmental delay and lives in care facility, they check her BP/HR daily  Otherwise has mild MR/TR normal EF on TTE  Moderate LAE    2) 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  PLAN:  1  Recommend d/c ELiquis given [id number] female, per guidelines it is not recommended  2  F/u 6 months  Made note w Care facility that if HR>80bpm then to bring her in for EKG to see if any afib recurrence  3  Continue Diltiazem  Changed hold perameters to <50 instead of <60bpm        Follow up in: 6 months    Orders Placed This Encounter   Procedures    POCT ECG     Medications Discontinued During This Encounter   Medication Reason    ELIQUIS 5 MG              HPI/Subjective:  62 yo female  1) Afib, unclear duration or type, found incidentally at office visit in Aug 2019 and sent in for MARCIE -DCCV and since then NSR, she is on Eliquis and diltiazem now  [id number] female   She has developmental delay and lives in care facility, they check her BP/HR daily  Otherwise has mild MR/TR normal EF on TTE   Moderate LAE    2) 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    Patient is not verbal so hard to get complete history  Please note HPI is listed by problem with with update following it, it is copied again in the assessment above and reflects medical decision making as well  Complete 12 point ROS reviewed and otherwise non pertinent or negative except as per HPI pertinent positives in Cardiovascular and Respiratory emphasized  Please see paper chart for outpatient clinic patients where the patient completed the 12 point ROS survey  Past Medical History:   Diagnosis Date    Anxiety disorder     Mental retardation        No Known Allergies  I reviewed the Home Medication list and Allergies in the chart  Scheduled Meds:  Current Outpatient Medications   Medication Sig Dispense Refill    acetaminophen (TYLENOL) 650 mg CR tablet Take 1 tablet (650 mg total) by mouth every 8 (eight) hours as needed for mild pain (or fever) 30 tablet 1    benzonatate (TESSALON) 200 MG capsule Take 1 capsule (200 mg total) by mouth 3 (three) times a day as needed for cough for up to 20 doses 20 capsule 0    busPIRone (BUSPAR) 10 mg tablet Take 1 tablet (10 mg total) by mouth 2 (two) times a day 60 tablet 1    CALCIUM 600-D 600-400 MG-UNIT TABS TAKE (1) TABLET BY MOUTH TWICE DAILY AT 8AM AND 5PM  62 tablet 6    cholecalciferol (VITAMIN D3) 400 units tablet Take 1 tablet (400 Units total) by mouth 3 (three) times a day 270 tablet 1    diltiazem (CARDIZEM CD) 120 mg 24 hr capsule TAKE 1 CAPSULE BY MOUTH ONCE DAILY   30 capsule 3    diltiazem (CARDIZEM LA) 120 MG 24 hr tablet Take 1 tablet (120 mg total) by mouth daily 30 tablet 0    docusate sodium (COLACE) 100 mg capsule Take 1 capsule (100 mg total) by mouth 2 (two) times a day If needed constipation 30 capsule 0    hydrocortisone 1 % cream Apply topically 2 (two) times a day If needed for itchy rash 30 g 0    Ibuprofen 200 MG CAPS Take 1-2 tablets if needed for pain or fever every 6-8 hours 120 each 0    Incontinence Supply Disposable (WINGS CHOICE PLUS ADULT BRIEFS) MISC USE AS DIRECTED FOR INCONTINENCE 90 each 4    lamoTRIgine (LaMICtal) 100 mg tablet Take 1 tablet (100 mg total) by mouth daily In the morning 30 tablet 1    lamoTRIgine (LaMICtal) 25 mg tablet Take 2 tablets (50 mg total) by mouth daily at bedtime 60 tablet 1    levothyroxine 100 mcg tablet Take 1 tablet (100 mcg total) by mouth daily 30 tablet 5    loperamide (IMODIUM A-D) 2 mg capsule Take 1 capsule (2 mg total) by mouth 4 (four) times a day as needed for diarrhea 30 capsule 0    neomycin-bacitracin-polymyxin (NEOSPORIN) 5-400-5,000 ointment Apply topically 4 (four) times a day If needed for wound 28 3 g 0    Omega-3 1000 MG CAPS Take 3 capsules (3,000 mg total) by mouth daily 90 capsule 5    pravastatin (PRAVACHOL) 40 mg tablet Take 1 tablet (40 mg total) by mouth daily 30 tablet 5    QUEtiapine (SEROquel) 50 mg tablet Take 1 tablet in the morning and 2 tablets orally at night 90 tablet 1    SODIUM FLUORIDE, DENTAL GEL, (SF 5000 PLUS) 1 1 % CREA SF 5000 Plus 1 1 % Dental Cream; 0; 22-Aug-2014; Active       No current facility-administered medications for this visit        PRN Meds:         Family History   Problem Relation Age of Onset    Other Mother         neglect or abandonment     Other Father         neglect or abandonment     No Known Problems Sister        Social History     Socioeconomic History    Marital status: Single     Spouse name: Not on file    Number of children: Not on file    Years of education: Not on file    Highest education level: Not on file   Occupational History    Not on file   Social Needs    Financial resource strain: Not on file    Food insecurity:     Worry: Not on file     Inability: Not on file    Transportation needs:     Medical: Not on file     Non-medical: Not on file   Tobacco Use    Smoking status: Never Smoker    Smokeless tobacco: Never Used   Substance and Sexual Activity    Alcohol use: Never     Frequency: Never    Drug use: No    Sexual activity: Not on file   Lifestyle    Physical activity:     Days per week: Not on file     Minutes per session: Not on file    Stress: Not on file   Relationships    Social connections:     Talks on phone: Not on file     Gets together: Not on file     Attends [employer] service: Not on file     Active member of club or organization: Not on file     Attends meetings of clubs or organizations: Not on file     Relationship status: Not on file    Intimate partner violence:     Fear of current or ex partner: Not on file     Emotionally abused: Not on file     Physically abused: Not on file     Forced sexual activity: Not on file   Other Topics Concern    Not on file   Social History Narrative    Disabled    Social history reviewed unchanged          OBJECTIVE:    /78   Pulse 56   Ht 5' 1" (1 549 m)   Wt 89 4 kg (197 lb)   BMI 37 22 kg/m²   Vitals:    10/09/19 1006   Weight: 89 4 kg (197 lb)     GEN: No acute distress, Alert , short stature  overweight  HEENT:Head, neck, ears, oral pharynx: Mucus membranes moist, oral pharynx clear, nares clear  External ears normal  EYES: Pupils equal, sclera anicteric, midline, normal conjuctiva  NECK: No JVD, supple, no obvious masses or thryomegaly or goiter  CARDIOVASCULAR:  RRR, No murmur, rub, gallops S1,S2  LUNGS: Clear To auscultation bilaterally, normal effort, no rales, rhonchi, crackles  ABDOMEN:  nondistended,  without obvious organomegaly or ascites  EXTREMITIES/VASCULAR:  No edema  Radial pulses intact, pedal pulses difficult to palpate, warm an well perfused  PSYCH: Normal Affect, no overt suicidal ideation, linear speech pattern without evidence of psychosis     NEURO: Grossly intact, moving all extremiteis equal, face symmetric, alert and responsive, no obvious focal defecits  GAIT:  Ambulates normally without difficulty  HEME: No bleeding, bruising, petechia, purpura  SKIN: No significant rashes, warm, no diaphoresis or pallor  Lab Results:       LABS:      Chemistry        Component Value Date/Time    K 4 2 2019 1036     2019 1036    CO2 30 2019 1036    BUN 20 2019 1036    CREATININE 0 95 2019 1036        Component Value Date/Time    CALCIUM 9 8 2019 1036    ALKPHOS 110 07/10/2019 0905    AST 10 07/10/2019 0905    ALT 18 07/10/2019 0905            No results found for: CHOL  Lab Results   Component Value Date    HDL 53 07/10/2019     Lab Results   Component Value Date    LDLCALC 56 07/10/2019     Lab Results   Component Value Date    TRIG 89 07/10/2019     No results found for: CHOLHDL    IMAGING: No results found  Cardiac testing:   Results for orders placed during the hospital encounter of 18   Echo complete with contrast if indicated    [address]  [address]  [place], [address]  [phone]    Transthoracic Echocardiogram  2D, M-mode, Doppler, and Color Doppler    Study date:  2018    Patient: Francisco Moya  MR number: [id number]  Account number: [de-identified]  : 1960  Age: 62 years  Gender: Female  Status: Outpatient  Location: [facility] [place]  Height: 61 in  Weight: 194 lb  BP: 102/ 70 mmHg    Indications: Abnormal EKG  Diagnoses: R94 31 - Abnormal electrocardiogram [ECG] [EKG]    Sonographer:  Mick Dukes RDCS  Primary Physician:  LALA Beavers  Referring Physician:  April Bermudez MD  Group:  [facility]  Interpreting Physician:  April Bermudez MD    SUMMARY    LEFT VENTRICLE:  Size was at the upper limits of normal   Systolic function was normal  Ejection fraction was estimated to be 55 %  There were no regional wall motion abnormalities    Features were consistent with a pseudonormal left ventricular filling pattern, with concomitant abnormal relaxation and increased filling pressure (grade 2 diastolic dysfunction)  LEFT ATRIUM:  The atrium was moderately dilated  MITRAL VALVE:  There was mild regurgitation  TRICUSPID VALVE:  There was mild regurgitation  HISTORY: PRIOR HISTORY: Hyperlipidemia, Dyspnea on exertion, Mental disability  PROCEDURE: The study was performed in the [address] [place] [address]  This was a routine study  The transthoracic approach was used  The study included complete 2D imaging, M-mode, complete spectral Doppler, and color Doppler  The  heart rate was 63 bpm, at the start of the study  Images were obtained from the parasternal, apical, subcostal, and suprasternal notch acoustic windows  Image quality was adequate  LEFT VENTRICLE: Size was at the upper limits of normal  Systolic function was normal  Ejection fraction was estimated to be 55 %  There were no regional wall motion abnormalities  Wall thickness was normal  No evidence of apical thrombus  DOPPLER: Features were consistent with a pseudonormal left ventricular filling pattern, with concomitant abnormal relaxation and increased filling pressure (grade 2 diastolic dysfunction)  RIGHT VENTRICLE: The size was normal  Systolic function was normal  Wall thickness was normal     LEFT ATRIUM: The atrium was moderately dilated  RIGHT ATRIUM: Size was normal     MITRAL VALVE: Valve structure was normal  There was normal leaflet separation  DOPPLER: The transmitral velocity was within the normal range  There was no evidence for stenosis  There was mild regurgitation  AORTIC VALVE: The valve was trileaflet  Leaflets exhibited normal thickness and normal cuspal separation  DOPPLER: Transaortic velocity was within the normal range  There was no evidence for stenosis  There was no significant  regurgitation  TRICUSPID VALVE: The valve structure was normal  There was normal leaflet separation  DOPPLER: The transtricuspid velocity was within the normal range  There was no evidence for stenosis   There was mild regurgitation  Estimated peak PA  pressure was 30 mmHg  PULMONIC VALVE: Not well visualized  DOPPLER: The transpulmonic velocity was within the normal range  There was no significant regurgitation  PERICARDIUM: There was no pericardial effusion  The pericardium was normal in appearance  AORTA: The root exhibited normal size  SYSTEMIC VEINS: IVC: The inferior vena cava was normal in size  SYSTEM MEASUREMENT TABLES    2D  %FS: 31 2 %  Ao Diam: 2 9 cm  EDV(Teich): 161 8 ml  EF(Teich): 58 3 %  ESV(Teich): 67 5 ml  IVSd: 1 cm  LA Area: 25 7 cm2  LA Diam: 5 3 cm  LVEDV MOD A4C: 62 6 ml  LVEF MOD A4C: 57 8 %  LVESV MOD A4C: 26 4 ml  LVIDd: 5 7 cm  LVIDs: 3 9 cm  LVLd A4C: 6 2 cm  LVLs A4C: 5 5 cm  LVPWd: 1 cm  RA Area: 12 4 cm2  RVIDd: 3 1 cm  SV MOD A4C: 36 2 ml  SV(Teich): 94 4 ml    CW  TR Vmax: 2 6 m/s  TR maxP 5 mmHg    MM  TAPSE: 2 3 cm    PW  E': 0 1 m/s  E/E': 11 8  MV A [place]: 0 2 m/s  MV Dec [place]: 5 9 m/s2  MV DecT: 141 1 ms  MV E Adrian: 0 8 m/s  MV E/A Ratio: 3 8  MV PHT: 40 9 ms  MVA By PHT: 5 4 cm2    [employer] Accredited Echocardiography Laboratory    Prepared and electronically signed by    Sanjeev Barrera MD  Signed [id number] 08:58:33       Results for orders placed during the hospital encounter of 19   MARCIE    Narrative [address]  [address]  [place], [address]  [phone]    Transesophageal Echocardiogram  2D, M-mode, Doppler, and Color Doppler    Study date:  23-Aug-2019    Patient: Neno Silveira  MR number: [id number]  Account number: [de-identified]  : 1960  Age: 62 years  Gender: Female  Status: Inpatient  Location: Cath lab  Height: 61 in  Weight: 196 7 lb  BP: 141/ 78 mmHg    Indications: Atrial fibrillation      Diagnoses: I48 0 - Atrial fibrillation    Sonographer:  Levada Grew, RDCS  Interpreting Physician:  Bernabe Adan MD  Primary Physician:  Lisa Burris PA-C  Referring Physician:  Sanjeev Barrera, MD  Group:  [facility]  RN:  Miki Berry RN BSN    IMPRESSIONS:  No evidence of left atrial or left atrial appendage thrombus on transesophageal echocardiogram   Other echocardiographic findings as noted below  SUMMARY    LEFT VENTRICLE:  Normal left ventricular cavity size mild concentric left ventricular hypertrophy, normal left ventricular systolic function  Regional wall motion and ejection fraction could not be reliably assessed due to rapid ventricular rates  RIGHT VENTRICLE:  Normal right ventricular size and visually normal right ventricular systolic function  LEFT ATRIUM:  Moderate left atrial cavity enlargement  No spontaneous contrast, mass or thrombus noted in left atrial cavity  LEFT ATRIAL APPENDAGE:  Enlarged single lobe left atrial appendage with moderately reduced flow velocities  No masses, vegetations or thrombus identified in left atrial appendage cavity  ATRIAL SEPTUM:  Intact interatrial septum  No color flow Doppler evidence of interatrial shunts  RIGHT ATRIUM:  Mild right atrial cavity enlargement  MITRAL VALVE:  Mild mitral valve leaflet thickening, adequate leaflet mobility  Mild mitral valve regurgitation noted  AORTIC VALVE:  Tricuspid aortic valve with mild sclerosis  Adequate cuspal separation  No aortic valve stenosis or regurgitation noted  TRICUSPID VALVE:  Mild tricuspid valve leaflet thickening  Mild tricuspid valve regurgitation  PULMONIC VALVE:  Unremarkable pulmonic valve leaflet morphology  No pulmonic valve stenosis or regurgitation noted  AORTA:  Aortic root and proximal ascending aorta are normal in size on 2D imaging  There are mild atherosclerotic changes noted in visualized portions of ascending and descending aorta  No mobile plaques noted  PERICARDIUM:  No pericardial effusion      HISTORY: PRIOR HISTORY: Long QT interval, hypothyroidism, hyperlipidemia, atrial fibrillation, mental retardation, left atrial enlargement, obesity, anxiety  PROCEDURE: The procedure was performed in the catheterization laboratory  The risks and alternatives of the procedure were explained to the patient's next of kin and informed consent was obtained  The transesophageal approach was used  The  study included complete 2D imaging, M-mode, complete spectral Doppler, and color Doppler  The heart rate was 156 bpm, at the start of the study  An adult omniplane probe was inserted by the attending cardiologist  Intubated with ease  One  intubation attempt(s)  There was no blood detected on the probe  Image quality was adequate  There were no complications during the procedure  MEDICATIONS: Anesthesia administered by anesthesia team     LEFT VENTRICLE: Normal left ventricular cavity size mild concentric left ventricular hypertrophy, normal left ventricular systolic function  Regional wall motion and ejection fraction could not be reliably assessed due to rapid ventricular  rates  RIGHT VENTRICLE: Normal right ventricular size and visually normal right ventricular systolic function  LEFT ATRIUM: Moderate left atrial cavity enlargement  No spontaneous contrast, mass or thrombus noted in left atrial cavity  APPENDAGE: Enlarged single lobe left atrial appendage with moderately reduced flow velocities  No masses,  vegetations or thrombus identified in left atrial appendage cavity  ATRIAL SEPTUM: Intact interatrial septum  No color flow Doppler evidence of interatrial shunts  RIGHT ATRIUM: Mild right atrial cavity enlargement  MITRAL VALVE: Mild mitral valve leaflet thickening, adequate leaflet mobility  Mild mitral valve regurgitation noted  AORTIC VALVE: Tricuspid aortic valve with mild sclerosis  Adequate cuspal separation  No aortic valve stenosis or regurgitation noted  TRICUSPID VALVE: Mild tricuspid valve leaflet thickening  Mild tricuspid valve regurgitation      PULMONIC VALVE: Unremarkable pulmonic valve leaflet morphology  No pulmonic valve stenosis or regurgitation noted  PERICARDIUM: No pericardial effusion  AORTA: Aortic root and proximal ascending aorta are normal in size on 2D imaging  There are mild atherosclerotic changes noted in visualized portions of ascending and descending aorta  No mobile plaques noted  SYSTEM MEASUREMENT TABLES    CW  TR Vmax: 2 1 m/s  TR maxP 7 mmHg    [facility]    Prepared and electronically signed by    Travis Carbajal MD  Signed 23-Aug-2019 19:27:14       No results found for this or any previous visit  No results found for this or any previous visit  I reviewed and interpreted the following LABS/EKG/TELE/IMAGING and below is summary of my interpretation (if data available):    LABS:    Current EKG and Rhythm Strip:Sinus sonia 52bpm QTc 438ms         EKG 2019 shows Sinus bradycadria poor baseline but QT likely in 460-500 range**  EKG Aug 2019 shows afib w RVR

## 2019-11-15 ENCOUNTER — OFFICE VISIT (OUTPATIENT)
Dept: FAMILY MEDICINE CLINIC | Facility: CLINIC | Age: 59
End: 2019-11-15

## 2019-11-15 VITALS
WEIGHT: 203 LBS | SYSTOLIC BLOOD PRESSURE: 110 MMHG | TEMPERATURE: 67.6 F | RESPIRATION RATE: 18 BRPM | OXYGEN SATURATION: 99 % | HEIGHT: 61 IN | BODY MASS INDEX: 38.33 KG/M2 | HEART RATE: 69 BPM | DIASTOLIC BLOOD PRESSURE: 68 MMHG

## 2019-11-15 DIAGNOSIS — Z23 ENCOUNTER FOR ADMINISTRATION OF VACCINE: Primary | ICD-10-CM

## 2019-11-15 DIAGNOSIS — D72.829 LEUKOCYTOSIS, UNSPECIFIED TYPE: ICD-10-CM

## 2019-11-15 DIAGNOSIS — R74.01 ELEVATED AST (SGOT): ICD-10-CM

## 2019-11-15 PROCEDURE — G0008 ADMIN INFLUENZA VIRUS VAC: HCPCS | Performed by: PHYSICIAN ASSISTANT

## 2019-11-15 PROCEDURE — 99213 OFFICE O/P EST LOW 20 MIN: CPT | Performed by: PHYSICIAN ASSISTANT

## 2019-11-15 PROCEDURE — 90682 RIV4 VACC RECOMBINANT DNA IM: CPT | Performed by: PHYSICIAN ASSISTANT

## 2019-11-15 PROCEDURE — 1036F TOBACCO NON-USER: CPT | Performed by: PHYSICIAN ASSISTANT

## 2019-11-15 PROCEDURE — 3008F BODY MASS INDEX DOCD: CPT | Performed by: PHYSICIAN ASSISTANT

## 2019-11-15 NOTE — PROGRESS NOTES
Assessment/Plan:    No problem-specific Assessment & Plan notes found for this encounter  Problem List Items Addressed This Visit     None      Visit Diagnoses     Encounter for administration of vaccine    -  Primary    Relevant Orders    influenza vaccine, [id number], quadrivalent, recombinant, PF, 0 5 mL, for patients 18 yr+ (FLUBLOK) (Completed)    Leukocytosis, unspecified type        Relevant Orders    CBC and differential    Elevated AST (SGOT)        Relevant Orders    Comprehensive metabolic panel            Subjective:      Patient ID: Carroll Gray is a 61 y o  female  HPI   59-year-old female a group home here [facility] Emergency room for a fall  Work did show slightly elevated white blood cell 11 4 and she did slightly elevated AST at 41  CT of the head was done due to Eliquis and was normal She has no compliants now  She has not had any dizziness, gait instability or pain  The following portions of the patient's history were reviewed and updated as appropriate:   She  has a past medical history of Anxiety disorder and Mental retardation  She   Patient Active Problem List    Diagnosis Date Noted    Rash 09/05/2019    New onset atrial fibrillation ([facility] [address] ) 08/22/2019    Overflow incontinence of urine 05/15/2018    Severe anxiety 05/15/2018    Enlarged LA (left atrium) 02/13/2018    H/O prolonged Q-T interval on ECG 02/13/2018    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    Mental disability 06/22/2016    Hypothyroidism 07/14/2015    Vitamin D deficiency 07/14/2015    Obese 01/27/2015    Mood disorder ([address] ) 08/22/2014    Hyperlipidemia 06/27/2012     She  has a past surgical history that includes Oophorectomy; Hysterectomy; and pr cardioversion elective arrhythmia external (8/23/2019)  Her family history includes No Known Problems in her sister; Other in her father and mother  She  reports that she has never smoked   She has never used smokeless tobacco  She reports that she does not drink alcohol or use drugs  Current Outpatient Medications   Medication Sig Dispense Refill    acetaminophen (TYLENOL) 650 mg CR tablet Take 1 tablet (650 mg total) by mouth every 8 (eight) hours as needed for mild pain (or fever) 30 tablet 1    benzonatate (TESSALON) 200 MG capsule Take 1 capsule (200 mg total) by mouth 3 (three) times a day as needed for cough for up to 20 doses 20 capsule 0    busPIRone (BUSPAR) 10 mg tablet Take 1 tablet (10 mg total) by mouth 2 (two) times a day 60 tablet 1    CALCIUM 600-D 600-400 MG-UNIT TABS TAKE (1) TABLET BY MOUTH TWICE DAILY AT 8AM AND 5PM  62 tablet 6    cholecalciferol (VITAMIN D3) 400 units tablet Take 1 tablet (400 Units total) by mouth 3 (three) times a day 270 tablet 1    diltiazem (CARDIZEM CD) 120 mg 24 hr capsule TAKE 1 CAPSULE BY MOUTH ONCE DAILY   30 capsule 3    diltiazem (CARDIZEM LA) 120 MG 24 hr tablet Take 1 tablet (120 mg total) by mouth daily 30 tablet 0    docusate sodium (COLACE) 100 mg capsule Take 1 capsule (100 mg total) by mouth 2 (two) times a day If needed constipation 30 capsule 0    hydrocortisone 1 % cream Apply topically 2 (two) times a day If needed for itchy rash 30 g 0    Ibuprofen 200 MG CAPS Take 1-2 tablets if needed for pain or fever every 6-8 hours 120 each 0    Incontinence Supply Disposable (WINGS CHOICE PLUS ADULT BRIEFS) MISC USE AS DIRECTED FOR INCONTINENCE 90 each 4    lamoTRIgine (LaMICtal) 100 mg tablet Take 1 tablet (100 mg total) by mouth daily In the morning 30 tablet 1    lamoTRIgine (LaMICtal) 25 mg tablet Take 2 tablets (50 mg total) by mouth daily at bedtime 60 tablet 1    levothyroxine 100 mcg tablet Take 1 tablet (100 mcg total) by mouth daily 30 tablet 5    loperamide (IMODIUM A-D) 2 mg capsule Take 1 capsule (2 mg total) by mouth 4 (four) times a day as needed for diarrhea 30 capsule 0    neomycin-bacitracin-polymyxin (NEOSPORIN) 5-400-5,000 ointment Apply topically 4 (four) times a day If needed for wound 28 3 g 0    Omega-3 1000 MG CAPS Take 3 capsules (3,000 mg total) by mouth daily 90 capsule 5    pravastatin (PRAVACHOL) 40 mg tablet Take 1 tablet (40 mg total) by mouth daily 30 tablet 5    QUEtiapine (SEROquel) 50 mg tablet Take 1 tablet in the morning and 2 tablets orally at night 90 tablet 1    SODIUM FLUORIDE, DENTAL GEL, (SF 5000 PLUS) 1 1 % CREA SF 5000 Plus 1 1 % Dental Cream; 0; 22-Aug-2014; Active       No current facility-administered medications for this visit  Current Outpatient Medications on File Prior to Visit   Medication Sig    acetaminophen (TYLENOL) 650 mg CR tablet Take 1 tablet (650 mg total) by mouth every 8 (eight) hours as needed for mild pain (or fever)    benzonatate (TESSALON) 200 MG capsule Take 1 capsule (200 mg total) by mouth 3 (three) times a day as needed for cough for up to 20 doses    busPIRone (BUSPAR) 10 mg tablet Take 1 tablet (10 mg total) by mouth 2 (two) times a day    CALCIUM 600-D 600-400 MG-UNIT TABS TAKE (1) TABLET BY MOUTH TWICE DAILY AT 8AM AND 5PM     cholecalciferol (VITAMIN D3) 400 units tablet Take 1 tablet (400 Units total) by mouth 3 (three) times a day    diltiazem (CARDIZEM CD) 120 mg 24 hr capsule TAKE 1 CAPSULE BY MOUTH ONCE DAILY      diltiazem (CARDIZEM LA) 120 MG 24 hr tablet Take 1 tablet (120 mg total) by mouth daily    docusate sodium (COLACE) 100 mg capsule Take 1 capsule (100 mg total) by mouth 2 (two) times a day If needed constipation    hydrocortisone 1 % cream Apply topically 2 (two) times a day If needed for itchy rash    Ibuprofen 200 MG CAPS Take 1-2 tablets if needed for pain or fever every 6-8 hours    Incontinence Supply Disposable (WINGS CHOICE PLUS ADULT BRIEFS) MISC USE AS DIRECTED FOR INCONTINENCE    lamoTRIgine (LaMICtal) 100 mg tablet Take 1 tablet (100 mg total) by mouth daily In the morning    lamoTRIgine (LaMICtal) 25 mg tablet Take 2 tablets (50 mg total) by mouth daily at bedtime    levothyroxine 100 mcg tablet Take 1 tablet (100 mcg total) by mouth daily    loperamide (IMODIUM A-D) 2 mg capsule Take 1 capsule (2 mg total) by mouth 4 (four) times a day as needed for diarrhea    neomycin-bacitracin-polymyxin (NEOSPORIN) 5-400-5,000 ointment Apply topically 4 (four) times a day If needed for wound    Omega-3 1000 MG CAPS Take 3 capsules (3,000 mg total) by mouth daily    pravastatin (PRAVACHOL) 40 mg tablet Take 1 tablet (40 mg total) by mouth daily    QUEtiapine (SEROquel) 50 mg tablet Take 1 tablet in the morning and 2 tablets orally at night    SODIUM FLUORIDE, DENTAL GEL, (SF 5000 PLUS) 1 1 % CREA SF 5000 Plus 1 1 % Dental Cream; 0; 22-Aug-2014; Active     No current facility-administered medications on file prior to visit  She has No Known Allergies       Review of Systems   Constitutional: Negative for chills and fever  Eyes: Negative for visual disturbance  Respiratory: Negative for shortness of breath  Cardiovascular: Negative for chest pain, palpitations and leg swelling  Gastrointestinal: Negative for abdominal pain  Musculoskeletal: Negative for back pain and gait problem  Neurological: Negative for dizziness, light-headedness and headaches  Objective:      /68 (BP Location: Left arm, Patient Position: Sitting, Cuff Size: Large)   Pulse 69   Temp (!) 67 6 °F (19 8 °C) (Temporal)   Resp 18   Ht 5' 1" (1 549 m)   Wt 92 1 kg (203 lb)   SpO2 99%   Breastfeeding? No   BMI 38 36 kg/m²          Physical Exam   Constitutional: She is oriented to person, place, and time  She appears well-developed and well-nourished  No distress  HENT:   Head: Normocephalic and atraumatic  Right Ear: External ear normal    Left Ear: External ear normal    Nose: Nose normal    Eyes: Conjunctivae are normal    Neck: Normal range of motion  Neck supple  No thyromegaly present  Cardiovascular: Normal rate, regular rhythm and normal heart sounds     No murmur heard  Pulmonary/Chest: Effort normal and breath sounds normal  No respiratory distress  She has no wheezes  Lymphadenopathy:     She has no cervical adenopathy  Neurological: She is alert and oriented to person, place, and time  Psychiatric: She has a normal mood and affect  Her behavior is normal    Nursing note and vitals reviewed

## 2019-11-19 ENCOUNTER — LAB (OUTPATIENT)
Dept: LAB | Age: 59
End: 2019-11-19
Payer: COMMERCIAL

## 2019-11-19 DIAGNOSIS — R74.01 ELEVATED AST (SGOT): ICD-10-CM

## 2019-11-19 DIAGNOSIS — D72.829 LEUKOCYTOSIS, UNSPECIFIED TYPE: ICD-10-CM

## 2019-11-19 LAB
ALBUMIN SERPL BCP-MCNC: 4.1 G/DL (ref 3.5–5)
ALP SERPL-CCNC: 125 U/L (ref 46–116)
ALT SERPL W P-5'-P-CCNC: 21 U/L (ref 12–78)
ANION GAP SERPL CALCULATED.3IONS-SCNC: 9 MMOL/L (ref 4–13)
AST SERPL W P-5'-P-CCNC: 13 U/L (ref 5–45)
BASOPHILS # BLD AUTO: 0.04 THOUSANDS/ΜL (ref 0–0.1)
BASOPHILS NFR BLD AUTO: 1 % (ref 0–1)
BILIRUB SERPL-MCNC: 0.7 MG/DL (ref 0.2–1)
BUN SERPL-MCNC: 29 MG/DL (ref 5–25)
CALCIUM SERPL-MCNC: 9.4 MG/DL (ref 8.3–10.1)
CHLORIDE SERPL-SCNC: 109 MMOL/L (ref 100–108)
CO2 SERPL-SCNC: 25 MMOL/L (ref 21–32)
CREAT SERPL-MCNC: 0.85 MG/DL (ref 0.6–1.3)
EOSINOPHIL # BLD AUTO: 0.25 THOUSAND/ΜL (ref 0–0.61)
EOSINOPHIL NFR BLD AUTO: 3 % (ref 0–6)
ERYTHROCYTE [DISTWIDTH] IN BLOOD BY AUTOMATED COUNT: 13.9 % (ref 11.6–15.1)
GFR SERPL CREATININE-BSD FRML MDRD: 75 ML/MIN/1.73SQ M
GLUCOSE P FAST SERPL-MCNC: 87 MG/DL (ref 65–99)
HCT VFR BLD AUTO: 44 % (ref 34.8–46.1)
HGB BLD-MCNC: 13.5 G/DL (ref 11.5–15.4)
IMM GRANULOCYTES # BLD AUTO: 0.02 THOUSAND/UL (ref 0–0.2)
IMM GRANULOCYTES NFR BLD AUTO: 0 % (ref 0–2)
LYMPHOCYTES # BLD AUTO: 2.16 THOUSANDS/ΜL (ref 0.6–4.47)
LYMPHOCYTES NFR BLD AUTO: 29 % (ref 14–44)
MCH RBC QN AUTO: 28.3 PG (ref 26.8–34.3)
MCHC RBC AUTO-ENTMCNC: 30.7 G/DL (ref 31.4–37.4)
MCV RBC AUTO: 92 FL (ref 82–98)
MONOCYTES # BLD AUTO: 0.44 THOUSAND/ΜL (ref 0.17–1.22)
MONOCYTES NFR BLD AUTO: 6 % (ref 4–12)
NEUTROPHILS # BLD AUTO: 4.52 THOUSANDS/ΜL (ref 1.85–7.62)
NEUTS SEG NFR BLD AUTO: 61 % (ref 43–75)
NRBC BLD AUTO-RTO: 0 /100 WBCS
PLATELET # BLD AUTO: 156 THOUSANDS/UL (ref 149–390)
PMV BLD AUTO: 12 FL (ref 8.9–12.7)
POTASSIUM SERPL-SCNC: 4.3 MMOL/L (ref 3.5–5.3)
PROT SERPL-MCNC: 7.8 G/DL (ref 6.4–8.2)
RBC # BLD AUTO: 4.77 MILLION/UL (ref 3.81–5.12)
SODIUM SERPL-SCNC: 143 MMOL/L (ref 136–145)
WBC # BLD AUTO: 7.43 THOUSAND/UL (ref 4.31–10.16)

## 2019-11-19 PROCEDURE — 85025 COMPLETE CBC W/AUTO DIFF WBC: CPT

## 2019-11-19 PROCEDURE — 36415 COLL VENOUS BLD VENIPUNCTURE: CPT

## 2019-11-19 PROCEDURE — 80053 COMPREHEN METABOLIC PANEL: CPT

## 2019-12-04 DIAGNOSIS — E78.5 HYPERLIPIDEMIA, UNSPECIFIED HYPERLIPIDEMIA TYPE: ICD-10-CM

## 2019-12-04 DIAGNOSIS — E03.9 HYPOTHYROIDISM, UNSPECIFIED TYPE: ICD-10-CM

## 2019-12-05 RX ORDER — LEVOTHYROXINE SODIUM 0.1 MG/1
TABLET ORAL
Qty: 31 TABLET | Refills: 10 | Status: SHIPPED | OUTPATIENT
Start: 2019-12-05 | End: 2020-11-04

## 2019-12-05 RX ORDER — CHLORAL HYDRATE 500 MG
CAPSULE ORAL
Qty: 93 CAPSULE | Refills: 10 | Status: SHIPPED | OUTPATIENT
Start: 2019-12-05 | End: 2020-11-18

## 2019-12-23 ENCOUNTER — OFFICE VISIT (OUTPATIENT)
Dept: FAMILY MEDICINE CLINIC | Facility: CLINIC | Age: 59
End: 2019-12-23

## 2019-12-23 VITALS
HEIGHT: 61 IN | OXYGEN SATURATION: 93 % | DIASTOLIC BLOOD PRESSURE: 88 MMHG | HEART RATE: 68 BPM | WEIGHT: 204.7 LBS | SYSTOLIC BLOOD PRESSURE: 148 MMHG | RESPIRATION RATE: 16 BRPM | BODY MASS INDEX: 38.65 KG/M2 | TEMPERATURE: 98.7 F

## 2019-12-23 DIAGNOSIS — E03.4 HYPOTHYROIDISM DUE TO ACQUIRED ATROPHY OF THYROID: Primary | ICD-10-CM

## 2019-12-23 DIAGNOSIS — Z12.11 COLON CANCER SCREENING: ICD-10-CM

## 2019-12-23 PROCEDURE — 3008F BODY MASS INDEX DOCD: CPT | Performed by: PHYSICIAN ASSISTANT

## 2019-12-23 PROCEDURE — 99213 OFFICE O/P EST LOW 20 MIN: CPT | Performed by: PHYSICIAN ASSISTANT

## 2019-12-23 RX ORDER — QUETIAPINE FUMARATE 25 MG/1
25 TABLET, FILM COATED ORAL
COMMUNITY
End: 2020-04-13 | Stop reason: SDUPTHER

## 2019-12-23 NOTE — PROGRESS NOTES
Assessment/Plan:    Hypothyroidism    Complaints and TSH has been normal continue on current medication  Problem List Items Addressed This Visit        Endocrine    Hypothyroidism - Primary       Complaints and TSH has been normal continue on current medication  Other Visit Diagnoses     Colon cancer screening        Relevant Orders    Ambulatory referral to Gastroenterology          Referred to [facility] gastroenterology as her gynecologist had also put in a referral   The caretaker with her is unsure if she has an appointment scheduled or not and so they will their office to find out  Labs were to review today and were  Recommend returning to office for regular wellness visit    Subjective:      Patient ID: Elie Hanley is a 61 y o  female  HPI    49-year-old female  With intellectual delay and anxiety here for a referral for a colonoscopy and also the staff with her but here to over her labs  She had recent lab work done for elevated liver function test on recheck it was normal   She has no complaints at this time  The following portions of the patient's history were reviewed and updated as appropriate:   She  has a past medical history of Anxiety disorder and Mental retardation  She   Patient Active Problem List    Diagnosis Date Noted    Rash 09/05/2019    New onset atrial fibrillation ([facility] [address] ) 08/22/2019    Overflow incontinence of urine 05/15/2018    Severe anxiety 05/15/2018    Enlarged LA (left atrium) 02/13/2018    H/O prolonged Q-T interval on ECG 02/13/2018    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    Mental disability 06/22/2016    Hypothyroidism 07/14/2015    Vitamin D deficiency 07/14/2015    Obese 01/27/2015    Mood disorder ([facility] [address] ) 08/22/2014    Hyperlipidemia 06/27/2012     She  has a past surgical history that includes Oophorectomy; Hysterectomy; and pr cardioversion elective arrhythmia external (8/23/2019)    Her family history includes No Known Problems in her sister; Other in her father and mother  She  reports that she has never smoked  She has never used smokeless tobacco  She reports that she does not drink alcohol or use drugs  Current Outpatient Medications   Medication Sig Dispense Refill    acetaminophen (TYLENOL) 650 mg CR tablet Take 1 tablet (650 mg total) by mouth every 8 (eight) hours as needed for mild pain (or fever) 30 tablet 1    benzonatate (TESSALON) 200 MG capsule Take 1 capsule (200 mg total) by mouth 3 (three) times a day as needed for cough for up to 20 doses 20 capsule 0    busPIRone (BUSPAR) 10 mg tablet Take 1 tablet (10 mg total) by mouth 2 (two) times a day 60 tablet 1    CALCIUM 600-D 600-400 MG-UNIT TABS TAKE (1) TABLET BY MOUTH TWICE DAILY AT 8AM AND 5PM  62 tablet 6    cholecalciferol (VITAMIN D3) 400 units tablet Take 1 tablet (400 Units total) by mouth 3 (three) times a day 270 tablet 1    diltiazem (CARDIZEM CD) 120 mg 24 hr capsule TAKE 1 CAPSULE BY MOUTH ONCE DAILY  30 capsule 3    docusate sodium (COLACE) 100 mg capsule Take 1 capsule (100 mg total) by mouth 2 (two) times a day If needed constipation 30 capsule 0    hydrocortisone 1 % cream Apply topically 2 (two) times a day If needed for itchy rash 30 g 0    Ibuprofen 200 MG CAPS Take 1-2 tablets if needed for pain or fever every 6-8 hours 120 each 0    Incontinence Supply Disposable (WINGS CHOICE PLUS ADULT BRIEFS) MISC USE AS DIRECTED FOR INCONTINENCE 90 each 4    lamoTRIgine (LaMICtal) 100 mg tablet Take 1 tablet (100 mg total) by mouth daily In the morning 30 tablet 1    lamoTRIgine (LaMICtal) 25 mg tablet Take 2 tablets (50 mg total) by mouth daily at bedtime 60 tablet 1    levothyroxine 100 mcg tablet TAKE (1) TABLET BY MOUTH ONCE DAILY   31 tablet 10    loperamide (IMODIUM A-D) 2 mg capsule Take 1 capsule (2 mg total) by mouth 4 (four) times a day as needed for diarrhea 30 capsule 0    neomycin-bacitracin-polymyxin (NEOSPORIN) 5-400-5,000 ointment Apply topically 4 (four) times a day If needed for wound 28 3 g 0    Omega-3 Fatty Acids (FISH OIL) 1,000 mg TAKE (3) CAPSULES BY MOUTH ONCE DAILY  93 capsule 10    pravastatin (PRAVACHOL) 40 mg tablet Take 1 tablet (40 mg total) by mouth daily 30 tablet 5    QUEtiapine (SEROquel) 25 mg tablet 25 mg Take 1 tablet in AM and 2 at night      SODIUM FLUORIDE, DENTAL GEL, (SF 5000 PLUS) 1 1 % CREA SF 5000 Plus 1 1 % Dental Cream; 0; 22-Aug-2014; Active       No current facility-administered medications for this visit  Current Outpatient Medications on File Prior to Visit   Medication Sig    acetaminophen (TYLENOL) 650 mg CR tablet Take 1 tablet (650 mg total) by mouth every 8 (eight) hours as needed for mild pain (or fever)    benzonatate (TESSALON) 200 MG capsule Take 1 capsule (200 mg total) by mouth 3 (three) times a day as needed for cough for up to 20 doses    busPIRone (BUSPAR) 10 mg tablet Take 1 tablet (10 mg total) by mouth 2 (two) times a day    CALCIUM 600-D 600-400 MG-UNIT TABS TAKE (1) TABLET BY MOUTH TWICE DAILY AT 8AM AND 5PM     cholecalciferol (VITAMIN D3) 400 units tablet Take 1 tablet (400 Units total) by mouth 3 (three) times a day    diltiazem (CARDIZEM CD) 120 mg 24 hr capsule TAKE 1 CAPSULE BY MOUTH ONCE DAILY      docusate sodium (COLACE) 100 mg capsule Take 1 capsule (100 mg total) by mouth 2 (two) times a day If needed constipation    hydrocortisone 1 % cream Apply topically 2 (two) times a day If needed for itchy rash    Ibuprofen 200 MG CAPS Take 1-2 tablets if needed for pain or fever every 6-8 hours    Incontinence Supply Disposable (WINGS CHOICE PLUS ADULT BRIEFS) MISC USE AS DIRECTED FOR INCONTINENCE    lamoTRIgine (LaMICtal) 100 mg tablet Take 1 tablet (100 mg total) by mouth daily In the morning    lamoTRIgine (LaMICtal) 25 mg tablet Take 2 tablets (50 mg total) by mouth daily at bedtime    levothyroxine 100 mcg tablet TAKE (1) TABLET BY MOUTH ONCE DAILY   loperamide (IMODIUM A-D) 2 mg capsule Take 1 capsule (2 mg total) by mouth 4 (four) times a day as needed for diarrhea    neomycin-bacitracin-polymyxin (NEOSPORIN) 5-400-5,000 ointment Apply topically 4 (four) times a day If needed for wound    Omega-3 Fatty Acids (FISH OIL) 1,000 mg TAKE (3) CAPSULES BY MOUTH ONCE DAILY   pravastatin (PRAVACHOL) 40 mg tablet Take 1 tablet (40 mg total) by mouth daily    QUEtiapine (SEROquel) 25 mg tablet 25 mg Take 1 tablet in AM and 2 at night    SODIUM FLUORIDE, DENTAL GEL, (SF 5000 PLUS) 1 1 % CREA SF 5000 Plus 1 1 % Dental Cream; 0; 22-Aug-2014; Active     No current facility-administered medications on file prior to visit       Review of Systems      Objective:      /88 (BP Location: Right arm, Patient Position: Sitting, Cuff Size: Adult)   Pulse 68   Temp 98 7 °F (37 1 °C) (Tympanic)   Resp 16   Ht 5' 1" (1 549 m)   Wt 92 9 kg (204 lb 11 2 oz)   SpO2 93%   Breastfeeding? No   BMI 38 68 kg/m²          Physical Exam   Constitutional: She is oriented to person, place, and time  She appears well-developed and well-nourished  No distress  HENT:   Head: Normocephalic and atraumatic  Right Ear: External ear normal    Left Ear: External ear normal    Eyes: Conjunctivae are normal    Neck: Normal range of motion  Neck supple  No thyromegaly present  Cardiovascular: Normal rate, regular rhythm and normal heart sounds  No murmur heard  Pulmonary/Chest: Effort normal and breath sounds normal  No respiratory distress  She has no wheezes  Neurological: She is alert and oriented to person, place, and time  Psychiatric: She has a normal mood and affect  Her behavior is normal    Nursing note and vitals reviewed

## 2020-01-02 DIAGNOSIS — E55.9 VITAMIN D DEFICIENCY: ICD-10-CM

## 2020-01-03 RX ORDER — CALCIUM CARBONATE/VITAMIN D3 600 MG-10
TABLET ORAL
Qty: 58 TABLET | Refills: 5 | Status: SHIPPED | OUTPATIENT
Start: 2020-01-03 | End: 2020-07-02

## 2020-02-04 DIAGNOSIS — E78.5 HYPERLIPIDEMIA, UNSPECIFIED HYPERLIPIDEMIA TYPE: ICD-10-CM

## 2020-02-05 RX ORDER — PRAVASTATIN SODIUM 40 MG
TABLET ORAL
Qty: 31 TABLET | Refills: 9 | Status: SHIPPED | OUTPATIENT
Start: 2020-02-05 | End: 2020-12-02

## 2020-02-11 DIAGNOSIS — E55.9 VITAMIN D DEFICIENCY: ICD-10-CM

## 2020-02-11 RX ORDER — OMEGA-3S/DHA/EPA/FISH OIL/D3 300MG-1000
400 CAPSULE ORAL 3 TIMES DAILY
Qty: 270 TABLET | Refills: 1 | Status: SHIPPED | OUTPATIENT
Start: 2020-02-11 | End: 2020-07-24 | Stop reason: SDUPTHER

## 2020-02-17 DIAGNOSIS — I48.91 NEW ONSET ATRIAL FIBRILLATION (HCC): ICD-10-CM

## 2020-02-17 RX ORDER — DILTIAZEM HYDROCHLORIDE 120 MG/1
120 CAPSULE, COATED, EXTENDED RELEASE ORAL DAILY
Qty: 30 CAPSULE | Refills: 2 | Status: SHIPPED | OUTPATIENT
Start: 2020-02-17 | End: 2020-05-04

## 2020-04-09 ENCOUNTER — TELEPHONE (OUTPATIENT)
Dept: FAMILY MEDICINE CLINIC | Facility: CLINIC | Age: 60
End: 2020-04-09

## 2020-04-13 DIAGNOSIS — F39 MOOD DISORDER (HCC): Primary | ICD-10-CM

## 2020-04-13 RX ORDER — QUETIAPINE FUMARATE 25 MG/1
25 TABLET, FILM COATED ORAL
Qty: 30 TABLET | Refills: 1 | Status: SHIPPED | OUTPATIENT
Start: 2020-04-13 | End: 2020-04-16 | Stop reason: SDUPTHER

## 2020-04-16 ENCOUNTER — TELEPHONE (OUTPATIENT)
Dept: FAMILY MEDICINE CLINIC | Facility: CLINIC | Age: 60
End: 2020-04-16

## 2020-04-16 DIAGNOSIS — F39 MOOD DISORDER (HCC): ICD-10-CM

## 2020-04-16 RX ORDER — QUETIAPINE FUMARATE 25 MG/1
TABLET, FILM COATED ORAL
Qty: 90 TABLET | Refills: 1 | Status: SHIPPED | OUTPATIENT
Start: 2020-04-16 | End: 2021-03-01

## 2020-04-30 ENCOUNTER — TELEPHONE (OUTPATIENT)
Dept: CARDIOLOGY CLINIC | Facility: CLINIC | Age: 60
End: 2020-04-30

## 2020-04-30 ENCOUNTER — TELEMEDICINE (OUTPATIENT)
Dept: CARDIOLOGY CLINIC | Facility: CLINIC | Age: 60
End: 2020-04-30
Payer: COMMERCIAL

## 2020-04-30 DIAGNOSIS — I48.91 A-FIB (HCC): Primary | ICD-10-CM

## 2020-04-30 PROCEDURE — G2012 BRIEF CHECK IN BY MD/QHP: HCPCS | Performed by: INTERNAL MEDICINE

## 2020-05-01 DIAGNOSIS — I48.91 NEW ONSET ATRIAL FIBRILLATION (HCC): ICD-10-CM

## 2020-05-04 RX ORDER — DILTIAZEM HYDROCHLORIDE 120 MG/1
CAPSULE, COATED, EXTENDED RELEASE ORAL
Qty: 30 CAPSULE | Refills: 5 | Status: SHIPPED | OUTPATIENT
Start: 2020-05-04 | End: 2020-11-04

## 2020-06-02 ENCOUNTER — CLINICAL SUPPORT (OUTPATIENT)
Dept: CARDIOLOGY CLINIC | Facility: CLINIC | Age: 60
End: 2020-06-02
Payer: COMMERCIAL

## 2020-06-02 DIAGNOSIS — I48.91 ATRIAL FIBRILLATION, UNSPECIFIED TYPE (HCC): ICD-10-CM

## 2020-06-02 DIAGNOSIS — I48.91 A-FIB (HCC): ICD-10-CM

## 2020-06-02 PROCEDURE — 0298T PR EXT ECG > 48HR TO 21 DAY REVIEW AND INTERPRETATN: CPT | Performed by: INTERNAL MEDICINE

## 2020-06-03 ENCOUNTER — TRANSCRIBE ORDERS (OUTPATIENT)
Dept: CARDIOLOGY CLINIC | Facility: CLINIC | Age: 60
End: 2020-06-03

## 2020-06-03 DIAGNOSIS — I48.91 NEW ONSET ATRIAL FIBRILLATION (HCC): Primary | ICD-10-CM

## 2020-06-04 ENCOUNTER — HOSPITAL ENCOUNTER (OUTPATIENT)
Dept: NON INVASIVE DIAGNOSTICS | Facility: CLINIC | Age: 60
Discharge: HOME | End: 2020-06-04
Attending: INTERNAL MEDICINE
Payer: COMMERCIAL

## 2020-06-04 DIAGNOSIS — I48.91 NEW ONSET ATRIAL FIBRILLATION (HCC): ICD-10-CM

## 2020-06-04 PROCEDURE — 93306 TTE W/DOPPLER COMPLETE: CPT

## 2020-06-04 PROCEDURE — 93306 TTE W/DOPPLER COMPLETE: CPT | Performed by: INTERNAL MEDICINE

## 2020-06-15 ENCOUNTER — TELEPHONE (OUTPATIENT)
Dept: CARDIOLOGY CLINIC | Facility: CLINIC | Age: 60
End: 2020-06-15

## 2020-06-16 ENCOUNTER — TELEPHONE (OUTPATIENT)
Dept: CARDIOLOGY CLINIC | Facility: CLINIC | Age: 60
End: 2020-06-16

## 2020-06-16 ENCOUNTER — OFFICE VISIT (OUTPATIENT)
Dept: CARDIOLOGY CLINIC | Facility: CLINIC | Age: 60
End: 2020-06-16
Payer: COMMERCIAL

## 2020-06-16 VITALS
SYSTOLIC BLOOD PRESSURE: 96 MMHG | HEART RATE: 71 BPM | DIASTOLIC BLOOD PRESSURE: 60 MMHG | HEIGHT: 61 IN | BODY MASS INDEX: 38.4 KG/M2 | WEIGHT: 203.4 LBS | RESPIRATION RATE: 16 BRPM

## 2020-06-16 DIAGNOSIS — I48.91 NEW ONSET ATRIAL FIBRILLATION (HCC): Primary | ICD-10-CM

## 2020-06-16 PROCEDURE — 1036F TOBACCO NON-USER: CPT | Performed by: INTERNAL MEDICINE

## 2020-06-16 PROCEDURE — 99213 OFFICE O/P EST LOW 20 MIN: CPT | Performed by: INTERNAL MEDICINE

## 2020-06-16 PROCEDURE — 93000 ELECTROCARDIOGRAM COMPLETE: CPT | Performed by: INTERNAL MEDICINE

## 2020-06-16 PROCEDURE — 3008F BODY MASS INDEX DOCD: CPT | Performed by: INTERNAL MEDICINE

## 2020-06-26 ENCOUNTER — OFFICE VISIT (OUTPATIENT)
Dept: FAMILY MEDICINE CLINIC | Facility: CLINIC | Age: 60
End: 2020-06-26

## 2020-06-26 VITALS
SYSTOLIC BLOOD PRESSURE: 118 MMHG | HEIGHT: 61 IN | WEIGHT: 204 LBS | OXYGEN SATURATION: 97 % | BODY MASS INDEX: 38.51 KG/M2 | HEART RATE: 74 BPM | RESPIRATION RATE: 16 BRPM | TEMPERATURE: 96.4 F | DIASTOLIC BLOOD PRESSURE: 70 MMHG

## 2020-06-26 DIAGNOSIS — E66.09 CLASS 2 OBESITY DUE TO EXCESS CALORIES WITHOUT SERIOUS COMORBIDITY WITH BODY MASS INDEX (BMI) OF 38.0 TO 38.9 IN ADULT: ICD-10-CM

## 2020-06-26 DIAGNOSIS — E03.4 HYPOTHYROIDISM DUE TO ACQUIRED ATROPHY OF THYROID: Primary | ICD-10-CM

## 2020-06-26 DIAGNOSIS — E78.5 HYPERLIPIDEMIA, UNSPECIFIED HYPERLIPIDEMIA TYPE: ICD-10-CM

## 2020-06-26 DIAGNOSIS — Z00.00 MEDICARE ANNUAL WELLNESS VISIT, SUBSEQUENT: ICD-10-CM

## 2020-06-26 DIAGNOSIS — H61.23 BILATERAL IMPACTED CERUMEN: ICD-10-CM

## 2020-06-26 PROCEDURE — 3008F BODY MASS INDEX DOCD: CPT | Performed by: PHYSICIAN ASSISTANT

## 2020-06-26 PROCEDURE — 99213 OFFICE O/P EST LOW 20 MIN: CPT | Performed by: PHYSICIAN ASSISTANT

## 2020-06-26 PROCEDURE — G0439 PPPS, SUBSEQ VISIT: HCPCS | Performed by: PHYSICIAN ASSISTANT

## 2020-06-26 PROCEDURE — 1036F TOBACCO NON-USER: CPT | Performed by: PHYSICIAN ASSISTANT

## 2020-06-29 ENCOUNTER — TELEPHONE (OUTPATIENT)
Dept: FAMILY MEDICINE CLINIC | Facility: CLINIC | Age: 60
End: 2020-06-29

## 2020-07-02 DIAGNOSIS — E55.9 VITAMIN D DEFICIENCY: ICD-10-CM

## 2020-07-02 RX ORDER — CALCIUM CARBONATE/VITAMIN D3 600 MG-10
TABLET ORAL
Qty: 62 TABLET | Refills: 5 | Status: SHIPPED | OUTPATIENT
Start: 2020-07-02 | End: 2021-01-05

## 2020-07-09 ENCOUNTER — HOSPITAL ENCOUNTER (OUTPATIENT)
Dept: RADIOLOGY | Age: 60
Discharge: HOME | End: 2020-07-09
Payer: COMMERCIAL

## 2020-07-09 VITALS — HEIGHT: 62 IN | BODY MASS INDEX: 35.88 KG/M2 | WEIGHT: 195 LBS

## 2020-07-09 DIAGNOSIS — Z12.31 ENCOUNTER FOR SCREENING MAMMOGRAM FOR MALIGNANT NEOPLASM OF BREAST: ICD-10-CM

## 2020-07-09 PROCEDURE — 77063 BREAST TOMOSYNTHESIS BI: CPT

## 2020-07-09 PROCEDURE — 77067 SCR MAMMO BI INCL CAD: CPT

## 2020-07-10 ENCOUNTER — TELEPHONE (OUTPATIENT)
Dept: MAMMOGRAPHY | Facility: CLINIC | Age: 60
End: 2020-07-10

## 2020-07-13 ENCOUNTER — HOSPITAL ENCOUNTER (OUTPATIENT)
Dept: ULTRASOUND IMAGING | Facility: CLINIC | Age: 60
Discharge: HOME | End: 2020-07-13
Payer: COMMERCIAL

## 2020-07-13 VITALS — HEIGHT: 62 IN | WEIGHT: 192 LBS | BODY MASS INDEX: 35.33 KG/M2

## 2020-07-13 DIAGNOSIS — R92.8 ABNORMAL MAMMOGRAM: ICD-10-CM

## 2020-07-13 PROCEDURE — 76642 ULTRASOUND BREAST LIMITED: CPT

## 2020-07-24 DIAGNOSIS — E55.9 VITAMIN D DEFICIENCY: ICD-10-CM

## 2020-07-27 ENCOUNTER — TELEPHONE (OUTPATIENT)
Dept: FAMILY MEDICINE CLINIC | Facility: CLINIC | Age: 60
End: 2020-07-27

## 2020-07-27 RX ORDER — OMEGA-3S/DHA/EPA/FISH OIL/D3 300MG-1000
400 CAPSULE ORAL 3 TIMES DAILY
Qty: 270 TABLET | Refills: 0 | Status: SHIPPED | OUTPATIENT
Start: 2020-07-27 | End: 2020-09-15 | Stop reason: SDUPTHER

## 2020-07-27 NOTE — TELEPHONE ENCOUNTER
They dropped off a PRN Medication Order Form to be filled out, form will be in your mail box this afternoon

## 2020-08-04 ENCOUNTER — TRANSCRIBE ORDERS (OUTPATIENT)
Dept: ADMINISTRATIVE | Age: 60
End: 2020-08-04

## 2020-08-04 ENCOUNTER — LAB (OUTPATIENT)
Dept: LAB | Age: 60
End: 2020-08-04
Payer: COMMERCIAL

## 2020-08-04 DIAGNOSIS — E03.4 HYPOTHYROIDISM DUE TO ACQUIRED ATROPHY OF THYROID: ICD-10-CM

## 2020-08-04 DIAGNOSIS — E78.5 HYPERLIPIDEMIA, UNSPECIFIED HYPERLIPIDEMIA TYPE: ICD-10-CM

## 2020-08-04 LAB
ALBUMIN SERPL BCP-MCNC: 3.8 G/DL (ref 3.5–5)
ALP SERPL-CCNC: 113 U/L (ref 46–116)
ALT SERPL W P-5'-P-CCNC: 20 U/L (ref 12–78)
ANION GAP SERPL CALCULATED.3IONS-SCNC: 3 MMOL/L (ref 4–13)
AST SERPL W P-5'-P-CCNC: 11 U/L (ref 5–45)
BASOPHILS # BLD AUTO: 0.05 THOUSANDS/ΜL (ref 0–0.1)
BASOPHILS NFR BLD AUTO: 1 % (ref 0–1)
BILIRUB SERPL-MCNC: 1.26 MG/DL (ref 0.2–1)
BUN SERPL-MCNC: 23 MG/DL (ref 5–25)
CALCIUM SERPL-MCNC: 9.4 MG/DL (ref 8.3–10.1)
CHLORIDE SERPL-SCNC: 108 MMOL/L (ref 100–108)
CHOLEST SERPL-MCNC: 143 MG/DL (ref 50–200)
CO2 SERPL-SCNC: 29 MMOL/L (ref 21–32)
CREAT SERPL-MCNC: 0.96 MG/DL (ref 0.6–1.3)
EOSINOPHIL # BLD AUTO: 0.22 THOUSAND/ΜL (ref 0–0.61)
EOSINOPHIL NFR BLD AUTO: 3 % (ref 0–6)
ERYTHROCYTE [DISTWIDTH] IN BLOOD BY AUTOMATED COUNT: 13.1 % (ref 11.6–15.1)
GFR SERPL CREATININE-BSD FRML MDRD: 65 ML/MIN/1.73SQ M
GLUCOSE P FAST SERPL-MCNC: 87 MG/DL (ref 65–99)
HCT VFR BLD AUTO: 48.1 % (ref 34.8–46.1)
HDLC SERPL-MCNC: 47 MG/DL
HGB BLD-MCNC: 15 G/DL (ref 11.5–15.4)
IMM GRANULOCYTES # BLD AUTO: 0.01 THOUSAND/UL (ref 0–0.2)
IMM GRANULOCYTES NFR BLD AUTO: 0 % (ref 0–2)
LDLC SERPL CALC-MCNC: 69 MG/DL (ref 0–100)
LYMPHOCYTES # BLD AUTO: 2.18 THOUSANDS/ΜL (ref 0.6–4.47)
LYMPHOCYTES NFR BLD AUTO: 29 % (ref 14–44)
MCH RBC QN AUTO: 28.7 PG (ref 26.8–34.3)
MCHC RBC AUTO-ENTMCNC: 31.2 G/DL (ref 31.4–37.4)
MCV RBC AUTO: 92 FL (ref 82–98)
MONOCYTES # BLD AUTO: 0.39 THOUSAND/ΜL (ref 0.17–1.22)
MONOCYTES NFR BLD AUTO: 5 % (ref 4–12)
NEUTROPHILS # BLD AUTO: 4.61 THOUSANDS/ΜL (ref 1.85–7.62)
NEUTS SEG NFR BLD AUTO: 62 % (ref 43–75)
NONHDLC SERPL-MCNC: 96 MG/DL
NRBC BLD AUTO-RTO: 0 /100 WBCS
PLATELET # BLD AUTO: 156 THOUSANDS/UL (ref 149–390)
PMV BLD AUTO: 11.5 FL (ref 8.9–12.7)
POTASSIUM SERPL-SCNC: 3.9 MMOL/L (ref 3.5–5.3)
PROT SERPL-MCNC: 7.5 G/DL (ref 6.4–8.2)
RBC # BLD AUTO: 5.23 MILLION/UL (ref 3.81–5.12)
SODIUM SERPL-SCNC: 140 MMOL/L (ref 136–145)
TRIGL SERPL-MCNC: 134 MG/DL
TSH SERPL DL<=0.05 MIU/L-ACNC: 1.16 UIU/ML (ref 0.36–3.74)
WBC # BLD AUTO: 7.46 THOUSAND/UL (ref 4.31–10.16)

## 2020-08-04 PROCEDURE — 84443 ASSAY THYROID STIM HORMONE: CPT

## 2020-08-04 PROCEDURE — 80053 COMPREHEN METABOLIC PANEL: CPT

## 2020-08-04 PROCEDURE — 85025 COMPLETE CBC W/AUTO DIFF WBC: CPT

## 2020-08-04 PROCEDURE — 36415 COLL VENOUS BLD VENIPUNCTURE: CPT

## 2020-08-04 PROCEDURE — 80061 LIPID PANEL: CPT

## 2020-08-06 ENCOUNTER — TELEPHONE (OUTPATIENT)
Dept: FAMILY MEDICINE CLINIC | Facility: CLINIC | Age: 60
End: 2020-08-06

## 2020-08-06 NOTE — TELEPHONE ENCOUNTER
When I was on the call bruce/ Dwain Tristan  I offered an appt for pt due to her results for 08/11/2020@ 8:40am when I got off the phone someone already had took the appt  Called  Maria Guadalupe PASTRANA to let her know appt will no longer be isidro  at 8:40am i changed it to 1:20pm due to someone already taking appt when i tried to scheduled angela Lerma calls please inform

## 2020-08-11 ENCOUNTER — OFFICE VISIT (OUTPATIENT)
Dept: FAMILY MEDICINE CLINIC | Facility: CLINIC | Age: 60
End: 2020-08-11

## 2020-08-11 VITALS
HEART RATE: 91 BPM | SYSTOLIC BLOOD PRESSURE: 126 MMHG | TEMPERATURE: 98.2 F | BODY MASS INDEX: 37.39 KG/M2 | WEIGHT: 203.2 LBS | RESPIRATION RATE: 20 BRPM | OXYGEN SATURATION: 97 % | HEIGHT: 62 IN | DIASTOLIC BLOOD PRESSURE: 78 MMHG

## 2020-08-11 DIAGNOSIS — R17 ELEVATED BILIRUBIN: Primary | ICD-10-CM

## 2020-08-11 DIAGNOSIS — F39 MOOD DISORDER (HCC): ICD-10-CM

## 2020-08-11 PROCEDURE — 3008F BODY MASS INDEX DOCD: CPT | Performed by: INTERNAL MEDICINE

## 2020-08-11 PROCEDURE — 1036F TOBACCO NON-USER: CPT | Performed by: PHYSICIAN ASSISTANT

## 2020-08-11 PROCEDURE — 3008F BODY MASS INDEX DOCD: CPT | Performed by: PHYSICIAN ASSISTANT

## 2020-08-11 PROCEDURE — 99213 OFFICE O/P EST LOW 20 MIN: CPT | Performed by: PHYSICIAN ASSISTANT

## 2020-08-11 NOTE — PROGRESS NOTES
Assessment/Plan:    Mood disorder ([facility] )  Stable continue current medications         Problem List Items Addressed This Visit        Other    Mood disorder ([place] [address] )     Stable continue current medications           Other Visit Diagnoses     Elevated bilirubin    -  Primary  Bilirubin was only slightly elevated  Recommend recheck another 2 weeks  She has no signs of any abdominal issue to warrant getting a CT done at this time  Relevant Orders    Comprehensive metabolic panel    Bilirubin, Fractionated            Subjective:      Patient ID: Salina Bamberger is a 61 y o  female  HPI  61year old F here for follow up of elevated bilirubin  She has hx of anxiety d/o and  Mental disability and is here because group home was concerned she may not be able to communicate well to them if she is having a problem  She has not had discussion with the staff about abdominal pain  She has been eating well  No nausea, vomiting, diarrhea or changes in her stool  She has been acting well and eating normally  The following portions of the patient's history were reviewed and updated as appropriate:   She  has a past medical history of Anxiety disorder  She   Patient Active Problem List    Diagnosis Date Noted    Bilateral impacted cerumen 06/26/2020    Rash 09/05/2019    New onset atrial fibrillation ([facility] ) 08/22/2019    Overflow incontinence of urine 05/15/2018    Severe anxiety 05/15/2018    Enlarged LA (left atrium) 02/13/2018    H/O prolonged Q-T interval on ECG 02/13/2018    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    Mental disability 06/22/2016    Hypothyroidism 07/14/2015    Vitamin D deficiency 07/14/2015    Obese 01/27/2015    Mood disorder ([facility] ) 08/22/2014    Hyperlipidemia 06/27/2012     She  has a past surgical history that includes Oophorectomy; Hysterectomy; and pr cardioversion elective arrhythmia external (8/23/2019)  Her family history includes No Known Problems in her sister;  Other in her father and mother  She  reports that she has never smoked  She has never used smokeless tobacco  She reports that she does not drink alcohol or use drugs  Current Outpatient Medications   Medication Sig Dispense Refill    acetaminophen (TYLENOL) 650 mg CR tablet Take 1 tablet (650 mg total) by mouth every 8 (eight) hours as needed for mild pain (or fever) 30 tablet 1    benzonatate (TESSALON) 200 MG capsule Take 1 capsule (200 mg total) by mouth 3 (three) times a day as needed for cough for up to 20 doses 20 capsule 0    busPIRone (BUSPAR) 10 mg tablet Take 1 tablet (10 mg total) by mouth 2 (two) times a day 60 tablet 1    CALCIUM 600-D 600-400 MG-UNIT TABS TAKE (1) TABLET BY MOUTH TWICE DAILY AT 8AM AND 5PM  62 tablet 5    cholecalciferol (VITAMIN D3) 400 units tablet Take 1 tablet (400 Units total) by mouth 3 (three) times a day 270 tablet 0    diltiazem (CARDIZEM CD) 120 mg 24 hr capsule TAKE 1 CAPSULE BY MOUTH ONCE DAILY  30 capsule 5    docusate sodium (COLACE) 100 mg capsule Take 1 capsule (100 mg total) by mouth 2 (two) times a day If needed constipation 30 capsule 0    hydrocortisone 1 % cream Apply topically 2 (two) times a day If needed for itchy rash 30 g 0    Ibuprofen 200 MG CAPS Take 1-2 tablets if needed for pain or fever every 6-8 hours 120 each 0    Incontinence Supply Disposable (WINGS CHOICE PLUS ADULT BRIEFS) MISC USE AS DIRECTED FOR INCONTINENCE 90 each 4    levothyroxine 100 mcg tablet TAKE (1) TABLET BY MOUTH ONCE DAILY  31 tablet 10    loperamide (IMODIUM A-D) 2 mg capsule Take 1 capsule (2 mg total) by mouth 4 (four) times a day as needed for diarrhea 30 capsule 0    neomycin-bacitracin-polymyxin (NEOSPORIN) 5-400-5,000 ointment Apply topically 4 (four) times a day If needed for wound 28 3 g 0    Omega-3 Fatty Acids (FISH OIL) 1,000 mg TAKE (3) CAPSULES BY MOUTH ONCE DAILY  93 capsule 10    pravastatin (PRAVACHOL) 40 mg tablet TAKE (1) TABLET BY MOUTH ONCE DAILY   32 tablet 9    rivaroxaban (XARELTO) 20 mg tablet Take 1 tablet (20 mg total) by mouth daily with breakfast 30 tablet 5    lamoTRIgine (LaMICtal) 100 mg tablet Take 1 tablet (100 mg total) by mouth daily In the morning 30 tablet 1    lamoTRIgine (LaMICtal) 25 mg tablet Take 2 tablets (50 mg total) by mouth daily at bedtime (Patient not taking: Reported on 6/16/2020) 60 tablet 1    QUEtiapine (SEROquel) 25 mg tablet Take 1 tablet in AM and 2 at night (Patient not taking: Reported on 6/16/2020) 90 tablet 1    SODIUM FLUORIDE, DENTAL GEL, (SF 5000 PLUS) 1 1 % CREA SF 5000 Plus 1 1 % Dental Cream; 0; 22-Aug-2014; Active       No current facility-administered medications for this visit  Current Outpatient Medications on File Prior to Visit   Medication Sig    acetaminophen (TYLENOL) 650 mg CR tablet Take 1 tablet (650 mg total) by mouth every 8 (eight) hours as needed for mild pain (or fever)    benzonatate (TESSALON) 200 MG capsule Take 1 capsule (200 mg total) by mouth 3 (three) times a day as needed for cough for up to 20 doses    busPIRone (BUSPAR) 10 mg tablet Take 1 tablet (10 mg total) by mouth 2 (two) times a day    CALCIUM 600-D 600-400 MG-UNIT TABS TAKE (1) TABLET BY MOUTH TWICE DAILY AT 8AM AND 5PM     cholecalciferol (VITAMIN D3) 400 units tablet Take 1 tablet (400 Units total) by mouth 3 (three) times a day    diltiazem (CARDIZEM CD) 120 mg 24 hr capsule TAKE 1 CAPSULE BY MOUTH ONCE DAILY   docusate sodium (COLACE) 100 mg capsule Take 1 capsule (100 mg total) by mouth 2 (two) times a day If needed constipation    hydrocortisone 1 % cream Apply topically 2 (two) times a day If needed for itchy rash    Ibuprofen 200 MG CAPS Take 1-2 tablets if needed for pain or fever every 6-8 hours    Incontinence Supply Disposable (WINGS CHOICE PLUS ADULT BRIEFS) MISC USE AS DIRECTED FOR INCONTINENCE    levothyroxine 100 mcg tablet TAKE (1) TABLET BY MOUTH ONCE DAILY      loperamide (IMODIUM A-D) 2 mg capsule Take 1 capsule (2 mg total) by mouth 4 (four) times a day as needed for diarrhea    neomycin-bacitracin-polymyxin (NEOSPORIN) 5-400-5,000 ointment Apply topically 4 (four) times a day If needed for wound    Omega-3 Fatty Acids (FISH OIL) 1,000 mg TAKE (3) CAPSULES BY MOUTH ONCE DAILY   pravastatin (PRAVACHOL) 40 mg tablet TAKE (1) TABLET BY MOUTH ONCE DAILY   rivaroxaban (XARELTO) 20 mg tablet Take 1 tablet (20 mg total) by mouth daily with breakfast    lamoTRIgine (LaMICtal) 100 mg tablet Take 1 tablet (100 mg total) by mouth daily In the morning    lamoTRIgine (LaMICtal) 25 mg tablet Take 2 tablets (50 mg total) by mouth daily at bedtime (Patient not taking: Reported on 6/16/2020)    QUEtiapine (SEROquel) 25 mg tablet Take 1 tablet in AM and 2 at night (Patient not taking: Reported on 6/16/2020)    SODIUM FLUORIDE, DENTAL GEL, (SF 5000 PLUS) 1 1 % CREA SF 5000 Plus 1 1 % Dental Cream; 0; 22-Aug-2014; Active     No current facility-administered medications on file prior to visit  She has No Known Allergies       Review of Systems   Constitutional: Negative for activity change, appetite change, chills, fatigue and unexpected weight change  HENT: Negative for dental problem, ear pain, hearing loss and sore throat  Eyes: Negative for visual disturbance  Respiratory: Negative for cough and wheezing  Cardiovascular: Negative for chest pain  Gastrointestinal: Negative for abdominal pain, constipation, diarrhea and vomiting  Genitourinary: Negative for difficulty urinating and dysuria  Musculoskeletal: Negative for arthralgias, back pain and myalgias  Skin: Negative for rash  Neurological: Negative for dizziness and headaches  Psychiatric/Behavioral: Negative for behavioral problems           Objective:      /78 (BP Location: Right arm, Patient Position: Sitting, Cuff Size: Adult)   Pulse 91   Temp 98 2 °F (36 8 °C) (Temporal)   Resp 20   Ht 5' 2" (1 575 m)   Wt 92 2 kg (203 lb 3 2 oz)   SpO2 97%   BMI 37 17 kg/m²          Physical Exam  Vitals signs and nursing note reviewed  Constitutional:       Appearance: She is well-developed  HENT:      Head: Normocephalic and atraumatic  Right Ear: External ear normal       Left Ear: External ear normal       Nose: Nose normal    Eyes:      Conjunctiva/sclera: Conjunctivae normal    Neck:      Musculoskeletal: Normal range of motion and neck supple  Thyroid: No thyromegaly  Cardiovascular:      Rate and Rhythm: Normal rate and regular rhythm  Heart sounds: Normal heart sounds  No murmur  Pulmonary:      Effort: Pulmonary effort is normal  No respiratory distress  Breath sounds: Normal breath sounds  Abdominal:      General: Bowel sounds are normal       Palpations: Abdomen is soft  There is no mass  Tenderness: There is no abdominal tenderness  There is no guarding  Musculoskeletal: Normal range of motion  Lymphadenopathy:      Cervical: No cervical adenopathy  Skin:     General: Skin is warm  Neurological:      Mental Status: She is alert and oriented to person, place, and time     Psychiatric:         Behavior: Behavior normal

## 2020-08-25 ENCOUNTER — LAB (OUTPATIENT)
Dept: LAB | Age: 60
End: 2020-08-25
Payer: COMMERCIAL

## 2020-08-25 DIAGNOSIS — R17 ELEVATED BILIRUBIN: ICD-10-CM

## 2020-08-25 LAB
ALBUMIN SERPL BCP-MCNC: 3.8 G/DL (ref 3.5–5)
ALP SERPL-CCNC: 126 U/L (ref 46–116)
ALT SERPL W P-5'-P-CCNC: 23 U/L (ref 12–78)
ANION GAP SERPL CALCULATED.3IONS-SCNC: 4 MMOL/L (ref 4–13)
AST SERPL W P-5'-P-CCNC: 14 U/L (ref 5–45)
BILIRUB DIRECT SERPL-MCNC: 0.23 MG/DL (ref 0–0.2)
BILIRUB SERPL-MCNC: 1.19 MG/DL (ref 0.2–1)
BUN SERPL-MCNC: 19 MG/DL (ref 5–25)
CALCIUM SERPL-MCNC: 9.7 MG/DL (ref 8.3–10.1)
CHLORIDE SERPL-SCNC: 108 MMOL/L (ref 100–108)
CO2 SERPL-SCNC: 29 MMOL/L (ref 21–32)
CREAT SERPL-MCNC: 0.89 MG/DL (ref 0.6–1.3)
GFR SERPL CREATININE-BSD FRML MDRD: 71 ML/MIN/1.73SQ M
GLUCOSE SERPL-MCNC: 79 MG/DL (ref 65–140)
POTASSIUM SERPL-SCNC: 4.6 MMOL/L (ref 3.5–5.3)
PROT SERPL-MCNC: 7.8 G/DL (ref 6.4–8.2)
SODIUM SERPL-SCNC: 141 MMOL/L (ref 136–145)

## 2020-08-25 PROCEDURE — 82248 BILIRUBIN DIRECT: CPT

## 2020-08-25 PROCEDURE — 80053 COMPREHEN METABOLIC PANEL: CPT

## 2020-08-25 PROCEDURE — 36415 COLL VENOUS BLD VENIPUNCTURE: CPT

## 2020-08-27 NOTE — RESULT ENCOUNTER NOTE
Her bilirubin did go down from before  I just recommend we continue to monitor it with her regular blood work yearly

## 2020-09-15 ENCOUNTER — TELEPHONE (OUTPATIENT)
Dept: FAMILY MEDICINE CLINIC | Facility: CLINIC | Age: 60
End: 2020-09-15

## 2020-09-15 DIAGNOSIS — F39 MOOD DISORDER (HCC): ICD-10-CM

## 2020-09-15 DIAGNOSIS — E55.9 VITAMIN D DEFICIENCY: ICD-10-CM

## 2020-09-15 RX ORDER — OMEGA-3S/DHA/EPA/FISH OIL/D3 300MG-1000
400 CAPSULE ORAL 3 TIMES DAILY
Qty: 270 TABLET | Refills: 0 | Status: SHIPPED | OUTPATIENT
Start: 2020-09-15 | End: 2021-02-05

## 2020-09-15 RX ORDER — BUSPIRONE HYDROCHLORIDE 10 MG/1
10 TABLET ORAL 2 TIMES DAILY
Qty: 60 TABLET | Refills: 1 | Status: SHIPPED | OUTPATIENT
Start: 2020-09-15 | End: 2021-12-13 | Stop reason: ALTCHOICE

## 2020-09-15 NOTE — TELEPHONE ENCOUNTER
Pt has enough refills for:  CALCIUM 600-D 600-400 MG-UNIT tabs  rivaroxaban (XARELTO) 20 mg tablet     diltiazem (CARDIZEM CD) 120 mg       Will request refills for other two medications

## 2020-09-15 NOTE — TELEPHONE ENCOUNTER
Luanne Chen called this morning stating pt was given double dose of following meds during morning med time  cholecalciferol (VITAMIN D3) 400 units tablet    CALCIUM 600-D 600-400 MG-UNIT TABS    rivaroxaban (XARELTO) 20 mg tablet    diltiazem (CARDIZEM CD) 120 mg 24 hr capsule     busPIRone (BUSPAR) 10 mg tablet    Is in need of f/u instructions and needs a letter confirming instructions

## 2020-09-15 NOTE — LETTER
To Whom It may Concern:  Recommend holding PM dose of xarelto, diltiazem, calcium with vitamin D, vitamin D and buspar today for Carole N  [facility], [address] [place] [address] 1960  For this or also she should be monitored for any increased bleeding such as nose bleeds, vaginal bleeding or rectal bleeding  Recommend contacting office if she starts having any headaches or abdominal pain  If she starts developing any palpitations or dizziness I would recommend contacting office because this could be signs of the diltiazem overdosing  I recommend that she avoid any XX exercise and make sure to stay well hydrated today

## 2020-10-02 DIAGNOSIS — I48.91 NEW ONSET ATRIAL FIBRILLATION (HCC): ICD-10-CM

## 2020-10-02 RX ORDER — RIVAROXABAN 20 MG/1
TABLET, FILM COATED ORAL
Qty: 30 TABLET | Refills: 0 | Status: SHIPPED | OUTPATIENT
Start: 2020-10-02 | End: 2020-11-04

## 2020-10-14 ENCOUNTER — IMMUNIZATIONS (OUTPATIENT)
Dept: FAMILY MEDICINE CLINIC | Facility: CLINIC | Age: 60
End: 2020-10-14

## 2020-10-14 DIAGNOSIS — Z23 ENCOUNTER FOR IMMUNIZATION: ICD-10-CM

## 2020-10-14 PROCEDURE — 90682 RIV4 VACC RECOMBINANT DNA IM: CPT | Performed by: PHYSICIAN ASSISTANT

## 2020-10-14 PROCEDURE — G0008 ADMIN INFLUENZA VIRUS VAC: HCPCS | Performed by: PHYSICIAN ASSISTANT

## 2020-11-04 DIAGNOSIS — I48.91 NEW ONSET ATRIAL FIBRILLATION (HCC): ICD-10-CM

## 2020-11-04 DIAGNOSIS — E03.9 HYPOTHYROIDISM, UNSPECIFIED TYPE: ICD-10-CM

## 2020-11-04 RX ORDER — LEVOTHYROXINE SODIUM 0.1 MG/1
TABLET ORAL
Qty: 31 TABLET | Refills: 5 | Status: SHIPPED | OUTPATIENT
Start: 2020-11-04 | End: 2021-07-01

## 2020-11-04 RX ORDER — RIVAROXABAN 20 MG/1
TABLET, FILM COATED ORAL
Qty: 31 TABLET | Refills: 0 | Status: SHIPPED | OUTPATIENT
Start: 2020-11-04 | End: 2020-12-02

## 2020-11-04 RX ORDER — DILTIAZEM HYDROCHLORIDE 120 MG/1
CAPSULE, COATED, EXTENDED RELEASE ORAL
Qty: 31 CAPSULE | Refills: 5 | Status: SHIPPED | OUTPATIENT
Start: 2020-11-04 | End: 2021-05-03

## 2020-11-18 DIAGNOSIS — E78.5 HYPERLIPIDEMIA, UNSPECIFIED HYPERLIPIDEMIA TYPE: ICD-10-CM

## 2020-11-18 RX ORDER — CHLORAL HYDRATE 500 MG
CAPSULE ORAL
Qty: 93 CAPSULE | Refills: 3 | Status: SHIPPED | OUTPATIENT
Start: 2020-11-18 | End: 2021-10-06

## 2020-12-02 DIAGNOSIS — E78.5 HYPERLIPIDEMIA, UNSPECIFIED HYPERLIPIDEMIA TYPE: ICD-10-CM

## 2020-12-02 DIAGNOSIS — I48.91 NEW ONSET ATRIAL FIBRILLATION (HCC): ICD-10-CM

## 2020-12-02 RX ORDER — RIVAROXABAN 20 MG/1
TABLET, FILM COATED ORAL
Qty: 31 TABLET | Refills: 0 | Status: SHIPPED | OUTPATIENT
Start: 2020-12-02 | End: 2021-01-04

## 2020-12-02 RX ORDER — PRAVASTATIN SODIUM 40 MG
TABLET ORAL
Qty: 31 TABLET | Refills: 2 | Status: SHIPPED | OUTPATIENT
Start: 2020-12-02 | End: 2021-03-08

## 2020-12-10 ENCOUNTER — TELEMEDICINE (OUTPATIENT)
Dept: CARDIOLOGY CLINIC | Facility: CLINIC | Age: 60
End: 2020-12-10
Payer: COMMERCIAL

## 2020-12-10 VITALS
DIASTOLIC BLOOD PRESSURE: 86 MMHG | HEIGHT: 62 IN | SYSTOLIC BLOOD PRESSURE: 145 MMHG | BODY MASS INDEX: 35.99 KG/M2 | WEIGHT: 195.6 LBS

## 2020-12-10 DIAGNOSIS — R60.9 EDEMA, UNSPECIFIED TYPE: Primary | ICD-10-CM

## 2020-12-10 DIAGNOSIS — Z20.822 CLOSE EXPOSURE TO COVID-19 VIRUS: ICD-10-CM

## 2020-12-10 PROCEDURE — 3008F BODY MASS INDEX DOCD: CPT | Performed by: INTERNAL MEDICINE

## 2020-12-10 PROCEDURE — U0003 INFECTIOUS AGENT DETECTION BY NUCLEIC ACID (DNA OR RNA); SEVERE ACUTE RESPIRATORY SYNDROME CORONAVIRUS 2 (SARS-COV-2) (CORONAVIRUS DISEASE [COVID-19]), AMPLIFIED PROBE TECHNIQUE, MAKING USE OF HIGH THROUGHPUT TECHNOLOGIES AS DESCRIBED BY CMS-2020-01-R: HCPCS | Performed by: INTERNAL MEDICINE

## 2020-12-10 PROCEDURE — 99214 OFFICE O/P EST MOD 30 MIN: CPT | Performed by: INTERNAL MEDICINE

## 2020-12-10 RX ORDER — FUROSEMIDE 20 MG/1
20 TABLET ORAL DAILY PRN
Qty: 30 TABLET | Refills: 11 | Status: SHIPPED | OUTPATIENT
Start: 2020-12-10 | End: 2022-06-07 | Stop reason: SDUPTHER

## 2020-12-11 LAB — SARS-COV-2 RNA SPEC QL NAA+PROBE: NOT DETECTED

## 2021-01-04 DIAGNOSIS — I48.91 NEW ONSET ATRIAL FIBRILLATION (HCC): ICD-10-CM

## 2021-01-04 DIAGNOSIS — E55.9 VITAMIN D DEFICIENCY: ICD-10-CM

## 2021-01-04 RX ORDER — RIVAROXABAN 20 MG/1
TABLET, FILM COATED ORAL
Qty: 28 TABLET | Refills: 0 | Status: SHIPPED | OUTPATIENT
Start: 2021-01-04 | End: 2021-02-04

## 2021-01-05 RX ORDER — CALCIUM CARBONATE/VITAMIN D3 600 MG-10
TABLET ORAL
Qty: 56 TABLET | Refills: 1 | Status: SHIPPED | OUTPATIENT
Start: 2021-01-05 | End: 2021-03-03

## 2021-01-19 DIAGNOSIS — F71 MODERATE INTELLECTUAL DISABILITIES: ICD-10-CM

## 2021-01-19 RX ORDER — IBUPROFEN 200 MG
CAPSULE ORAL
Qty: 28.4 G | Refills: 0 | Status: SHIPPED | OUTPATIENT
Start: 2021-01-19 | End: 2021-07-07

## 2021-02-04 DIAGNOSIS — E55.9 VITAMIN D DEFICIENCY: ICD-10-CM

## 2021-02-04 DIAGNOSIS — I48.91 NEW ONSET ATRIAL FIBRILLATION (HCC): ICD-10-CM

## 2021-02-04 RX ORDER — RIVAROXABAN 20 MG/1
TABLET, FILM COATED ORAL
Qty: 31 TABLET | Refills: 0 | Status: SHIPPED | OUTPATIENT
Start: 2021-02-04 | End: 2021-03-02

## 2021-02-05 RX ORDER — OMEGA-3S/DHA/EPA/FISH OIL/D3 300MG-1000
CAPSULE ORAL
Qty: 93 TABLET | Refills: 1 | Status: SHIPPED | OUTPATIENT
Start: 2021-02-05 | End: 2021-03-30

## 2021-03-01 ENCOUNTER — TELEPHONE (OUTPATIENT)
Dept: ADMINISTRATIVE | Facility: OTHER | Age: 61
End: 2021-03-01

## 2021-03-01 ENCOUNTER — OFFICE VISIT (OUTPATIENT)
Dept: FAMILY MEDICINE CLINIC | Facility: CLINIC | Age: 61
End: 2021-03-01

## 2021-03-01 VITALS
HEART RATE: 68 BPM | HEIGHT: 62 IN | WEIGHT: 202.5 LBS | RESPIRATION RATE: 20 BRPM | TEMPERATURE: 96.8 F | DIASTOLIC BLOOD PRESSURE: 72 MMHG | OXYGEN SATURATION: 98 % | BODY MASS INDEX: 37.26 KG/M2 | SYSTOLIC BLOOD PRESSURE: 104 MMHG

## 2021-03-01 DIAGNOSIS — I48.91 NEW ONSET ATRIAL FIBRILLATION (HCC): ICD-10-CM

## 2021-03-01 DIAGNOSIS — Z79.01 ENCOUNTER FOR CURRENT LONG-TERM USE OF ANTICOAGULANTS: ICD-10-CM

## 2021-03-01 DIAGNOSIS — E66.01 CLASS 2 SEVERE OBESITY DUE TO EXCESS CALORIES WITH SERIOUS COMORBIDITY AND BODY MASS INDEX (BMI) OF 37.0 TO 37.9 IN ADULT (HCC): ICD-10-CM

## 2021-03-01 DIAGNOSIS — M25.561 ACUTE PAIN OF RIGHT KNEE: ICD-10-CM

## 2021-03-01 DIAGNOSIS — E03.9 HYPOTHYROIDISM, UNSPECIFIED TYPE: Primary | ICD-10-CM

## 2021-03-01 DIAGNOSIS — E66.01 OBESITY, MORBID (HCC): ICD-10-CM

## 2021-03-01 DIAGNOSIS — E78.5 HYPERLIPIDEMIA, UNSPECIFIED HYPERLIPIDEMIA TYPE: ICD-10-CM

## 2021-03-01 DIAGNOSIS — Z12.11 COLON CANCER SCREENING: ICD-10-CM

## 2021-03-01 DIAGNOSIS — Z12.4 CERVICAL CANCER SCREENING: ICD-10-CM

## 2021-03-01 DIAGNOSIS — F39 MOOD DISORDER (HCC): ICD-10-CM

## 2021-03-01 PROCEDURE — 1036F TOBACCO NON-USER: CPT | Performed by: PHYSICIAN ASSISTANT

## 2021-03-01 PROCEDURE — 99214 OFFICE O/P EST MOD 30 MIN: CPT | Performed by: PHYSICIAN ASSISTANT

## 2021-03-01 PROCEDURE — 3725F SCREEN DEPRESSION PERFORMED: CPT | Performed by: PHYSICIAN ASSISTANT

## 2021-03-01 PROCEDURE — 3008F BODY MASS INDEX DOCD: CPT | Performed by: PHYSICIAN ASSISTANT

## 2021-03-01 NOTE — TELEPHONE ENCOUNTER
Upon review of the In Basket request we were able to locate, review, and update the patient chart as requested for CRC: Colonoscopy  in [address] [place] [address]  Any additional questions or concerns should be emailed to the Practice Liaisons via [email or web address] email, please do not reply via In Basket      Thank you  Mickey Curran

## 2021-03-01 NOTE — ASSESSMENT & PLAN NOTE
BMI Counseling: Body mass index is 37 04 kg/m²  The BMI is above normal  Nutrition recommendations include reducing portion sizes, 3-5 servings of fruits/vegetables daily and moderation in carbohydrate intake

## 2021-03-01 NOTE — TELEPHONE ENCOUNTER
Upon review of the In Basket request we have noted that this patient has had a Hysterectomy/BSO,most recent notation from [facility] 11/2/20, Progress note tele call-11/3/20 - found in [facility] because of this we are requesting that you forward this request/concern to the [email or web address] email  The Quality team members assigned to this email will be more than happy to assist you  Otherwise we would need facility name who performed surgical intervention    Any additional questions or concerns should be emailed to the Practice Liaisons via [employer] email, please do not reply via In Basket      Thank you  Lola Winkler

## 2021-03-01 NOTE — TELEPHONE ENCOUNTER
----- [facility] [id number] [facility] sent at 3/1/2021 12:37 PM EST -----  Regarding: screens  I can you please update her colonoscopy which was done 2016 and her cervical cancer screening which she said she has had this year  both are done at [facility]   Thanks

## 2021-03-01 NOTE — PROGRESS NOTES
Assessment/Plan:    Atrial fibrillation (HCC)  Continue eliquis daily    Mood disorder ([facility] )  Doing well  Psych is weaning her off medications    Class 2 severe obesity due to excess calories with serious comorbidity and body mass index (BMI) of 37 0 to 37 9 in adult ([facility])  BMI Counseling: Body mass index is 37 04 kg/m²  The BMI is above normal  Nutrition recommendations include reducing portion sizes, 3-5 servings of fruits/vegetables daily and moderation in carbohydrate intake  Acute pain of right knee  Recommend tylenol for pain  Ice for next 48 hours 15 minutes 3 times a day         Problem List Items Addressed This Visit        Endocrine    Hypothyroidism - Primary    Relevant Orders    TSH, 3rd generation       Cardiovascular and Mediastinum    Atrial fibrillation ([facility] )     Continue eliquis daily            Other    Hyperlipidemia    Relevant Orders    Comprehensive metabolic panel    Lipid panel    Mood disorder ([facility] )     Doing well  Psych is weaning her off medications         Class 2 severe obesity due to excess calories with serious comorbidity and body mass index (BMI) of 37 0 to 37 9 in adult ([facility])     BMI Counseling: Body mass index is 37 04 kg/m²  The BMI is above normal  Nutrition recommendations include reducing portion sizes, 3-5 servings of fruits/vegetables daily and moderation in carbohydrate intake  Acute pain of right knee     Recommend tylenol for pain  Ice for next 48 hours 15 minutes 3 times a day           Other Visit Diagnoses     Colon cancer screening        Cervical cancer screening        Encounter for current long-term use of anticoagulants        Relevant Orders    CBC and differential    Obesity, morbid (HCC)                Subjective:      Patient ID: Mike Maloney is a 61 y o  female  HPI  61year old F with intellectual disabilities here for follow up of hypothyroidism  She is having right knee pain since this Am    Current care taker with her does not know of any injuries she does not take any  She feels like it might be a little bit swollen     No PRNs taken today  Her mood has been ok  She seemed a little depressed lately because her roommate goes out more than her with her family  Her psychiatrist has been decreasing her medications however  She is currently only on BuSpar they plan to decrease this as well because she has been doing well  She is currently on a 2000 calorie diet  She does try to get regular exercise  When it is nice outside she will go outside and walk but when the weather is not permitting then she will try to dance inside  History is patient and caretaker  The following portions of the patient's history were reviewed and updated as appropriate:   She  has a past medical history of Anxiety disorder  She   Patient Active Problem List    Diagnosis Date Noted    Acute pain of right knee 03/01/2021    Bilateral impacted cerumen 06/26/2020    Rash 09/05/2019    Atrial fibrillation ([facility] ) 08/22/2019    Overflow incontinence of urine 05/15/2018    Severe anxiety 05/15/2018    Enlarged LA (left atrium) 02/13/2018    H/O prolonged Q-T interval on ECG 02/13/2018    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    Mental disability 06/22/2016    Hypothyroidism 07/14/2015    Vitamin D deficiency 07/14/2015    Class 2 severe obesity due to excess calories with serious comorbidity and body mass index (BMI) of 37 0 to 37 9 in adult [facility]) 01/27/2015    Mood disorder ([facility] ) 08/22/2014    Hyperlipidemia 06/27/2012     She  has a past surgical history that includes Oophorectomy; Hysterectomy; and pr cardioversion elective arrhythmia external (8/23/2019)  Her family history includes No Known Problems in her sister; Other in her father and mother  She  reports that she has never smoked  She has never used smokeless tobacco  She reports that she does not drink alcohol or use drugs    Current Outpatient Medications   Medication Sig Dispense Refill    acetaminophen (TYLENOL) 650 mg CR tablet Take 1 tablet (650 mg total) by mouth every 8 (eight) hours as needed for mild pain (or fever) 30 tablet 1    benzonatate (TESSALON) 200 MG capsule Take 1 capsule (200 mg total) by mouth 3 (three) times a day as needed for cough for up to 20 doses 20 capsule 0    busPIRone (BUSPAR) 10 mg tablet Take 1 tablet (10 mg total) by mouth 2 (two) times a day 60 tablet 1    Calcium 600-D 600-400 MG-UNIT TABS TAKE (1) TABLET BY MOUTH TWICE DAILY AT 8AM AND 5PM  56 tablet 1    cholecalciferol (VITAMIN D3) 400 units tablet TAKE 1 TABLET BY MOUTH THREE TIMES DAILY  93 tablet 1    diltiazem (CARDIZEM CD) 120 mg 24 hr capsule TAKE 1 CAPSULE BY MOUTH ONCE DAILY  31 capsule 5    docusate sodium (COLACE) 100 mg capsule Take 1 capsule (100 mg total) by mouth 2 (two) times a day If needed constipation 30 capsule 0    furosemide (LASIX) 20 mg tablet Take 1 tablet (20 mg total) by mouth daily as needed (edema) 30 tablet 11    hydrocortisone 1 % cream Apply topically 2 (two) times a day If needed for itchy rash 30 g 0    Ibuprofen 200 MG CAPS Take 1-2 tablets if needed for pain or fever every 6-8 hours 120 each 0    Incontinence Supply Disposable (WINGS CHOICE PLUS ADULT BRIEFS) MISC USE AS DIRECTED FOR INCONTINENCE 90 each 4    levothyroxine 100 mcg tablet TAKE (1) TABLET BY MOUTH ONCE DAILY  31 tablet 5    loperamide (IMODIUM A-D) 2 mg capsule Take 1 capsule (2 mg total) by mouth 4 (four) times a day as needed for diarrhea 30 capsule 0    neomycin-polymyxin b-bacitracin (NEOSPORIN) 3 5-400-5,000 APPLY TOPICALLY TO AFFECTED AREA FOUR TIMES A DAY AS NEEDED FOR WOUND CARE 28 4 g 0    Omega-3 Fatty Acids (fish oil) 1,000 mg TAKE (3) CAPSULES BY MOUTH ONCE DAILY  93 capsule 3    pravastatin (PRAVACHOL) 40 mg tablet TAKE (1) TABLET BY MOUTH ONCE DAILY  31 tablet 2    Xarelto 20 MG tablet TAKE (1) TABLET BY MOUTH ONCE DAILY WITH BREAKFAST   31 tablet 0    SODIUM FLUORIDE, DENTAL GEL, (SF 5000 PLUS) 1 1 % CREA SF 5000 Plus 1 1 % Dental Cream; 0; 22-Aug-2014; Active       No current facility-administered medications for this visit  Current Outpatient Medications on File Prior to Visit   Medication Sig    acetaminophen (TYLENOL) 650 mg CR tablet Take 1 tablet (650 mg total) by mouth every 8 (eight) hours as needed for mild pain (or fever)    benzonatate (TESSALON) 200 MG capsule Take 1 capsule (200 mg total) by mouth 3 (three) times a day as needed for cough for up to 20 doses    busPIRone (BUSPAR) 10 mg tablet Take 1 tablet (10 mg total) by mouth 2 (two) times a day    Calcium 600-D 600-400 MG-UNIT TABS TAKE (1) TABLET BY MOUTH TWICE DAILY AT 8AM AND 5PM     cholecalciferol (VITAMIN D3) 400 units tablet TAKE 1 TABLET BY MOUTH THREE TIMES DAILY   diltiazem (CARDIZEM CD) 120 mg 24 hr capsule TAKE 1 CAPSULE BY MOUTH ONCE DAILY   docusate sodium (COLACE) 100 mg capsule Take 1 capsule (100 mg total) by mouth 2 (two) times a day If needed constipation    furosemide (LASIX) 20 mg tablet Take 1 tablet (20 mg total) by mouth daily as needed (edema)    hydrocortisone 1 % cream Apply topically 2 (two) times a day If needed for itchy rash    Ibuprofen 200 MG CAPS Take 1-2 tablets if needed for pain or fever every 6-8 hours    Incontinence Supply Disposable (WINGS CHOICE PLUS ADULT BRIEFS) MISC USE AS DIRECTED FOR INCONTINENCE    levothyroxine 100 mcg tablet TAKE (1) TABLET BY MOUTH ONCE DAILY   loperamide (IMODIUM A-D) 2 mg capsule Take 1 capsule (2 mg total) by mouth 4 (four) times a day as needed for diarrhea    neomycin-polymyxin b-bacitracin (NEOSPORIN) 3 5-400-5,000 APPLY TOPICALLY TO AFFECTED AREA FOUR TIMES A DAY AS NEEDED FOR WOUND CARE    Omega-3 Fatty Acids (fish oil) 1,000 mg TAKE (3) CAPSULES BY MOUTH ONCE DAILY   pravastatin (PRAVACHOL) 40 mg tablet TAKE (1) TABLET BY MOUTH ONCE DAILY      Xarelto 20 MG tablet TAKE (1) TABLET BY MOUTH ONCE DAILY WITH BREAKFAST     SODIUM FLUORIDE, DENTAL GEL, (SF 5000 PLUS) 1 1 % CREA SF 5000 Plus 1 1 % Dental Cream; 0; 22-Aug-2014; Active    [DISCONTINUED] lamoTRIgine (LaMICtal) 100 mg tablet Take 1 tablet (100 mg total) by mouth daily In the morning    [DISCONTINUED] lamoTRIgine (LaMICtal) 25 mg tablet Take 2 tablets (50 mg total) by mouth daily at bedtime (Patient not taking: Reported on 3/1/2021)    [DISCONTINUED] QUEtiapine (SEROquel) 25 mg tablet Take 1 tablet in AM and 2 at night (Patient not taking: Reported on 6/16/2020)     No current facility-administered medications on file prior to visit  She has No Known Allergies       Review of Systems   Constitutional: Negative for activity change, appetite change, chills, fatigue and unexpected weight change  HENT: Negative for dental problem, ear pain, hearing loss and sore throat  Eyes: Negative for visual disturbance  Respiratory: Negative for cough and wheezing  Cardiovascular: Negative for chest pain  Gastrointestinal: Negative for abdominal pain, constipation, diarrhea and vomiting  Genitourinary: Negative for difficulty urinating and dysuria  Musculoskeletal: Positive for joint swelling  Negative for arthralgias, back pain and myalgias  Skin: Negative for rash  Neurological: Negative for dizziness and headaches  Psychiatric/Behavioral: Negative for behavioral problems  Objective:      /72 (BP Location: Left arm, Patient Position: Sitting, Cuff Size: Standard)   Pulse 68   Temp (!) 96 8 °F (36 °C) (Temporal)   Resp 20   Ht 5' 2" (1 575 m)   Wt 91 9 kg (202 lb 8 oz)   SpO2 98%   Breastfeeding No   BMI 37 04 kg/m²          Physical Exam  Vitals signs and nursing note reviewed  Constitutional:       General: She is not in acute distress  Appearance: She is well-developed  HENT:      Head: Normocephalic and atraumatic        Right Ear: External ear normal       Left Ear: External ear normal    Eyes: Conjunctiva/sclera: Conjunctivae normal    Neck:      Musculoskeletal: Normal range of motion and neck supple  Thyroid: No thyromegaly  Cardiovascular:      Rate and Rhythm: Normal rate and regular rhythm  Heart sounds: Normal heart sounds  No murmur  Pulmonary:      Effort: Pulmonary effort is normal  No respiratory distress  Breath sounds: Normal breath sounds  No wheezing  Musculoskeletal: Normal range of motion  General: Swelling (prepatella on right) present  No tenderness, deformity or signs of injury  Lymphadenopathy:      Cervical: No cervical adenopathy  Neurological:      Mental Status: She is alert and oriented to person, place, and time     Psychiatric:         Mood and Affect: Mood normal          Behavior: Behavior normal

## 2021-03-02 DIAGNOSIS — E55.9 VITAMIN D DEFICIENCY: ICD-10-CM

## 2021-03-02 DIAGNOSIS — I48.91 NEW ONSET ATRIAL FIBRILLATION (HCC): ICD-10-CM

## 2021-03-02 RX ORDER — RIVAROXABAN 20 MG/1
TABLET, FILM COATED ORAL
Qty: 30 TABLET | Refills: 0 | Status: SHIPPED | OUTPATIENT
Start: 2021-03-02 | End: 2021-03-30

## 2021-03-03 RX ORDER — CALCIUM CARBONATE/VITAMIN D3 600 MG-10
TABLET ORAL
Qty: 60 TABLET | Refills: 3 | Status: SHIPPED | OUTPATIENT
Start: 2021-03-03 | End: 2021-07-01

## 2021-03-08 DIAGNOSIS — E78.5 HYPERLIPIDEMIA, UNSPECIFIED HYPERLIPIDEMIA TYPE: ICD-10-CM

## 2021-03-08 RX ORDER — PRAVASTATIN SODIUM 40 MG
TABLET ORAL
Qty: 30 TABLET | Refills: 5 | Status: SHIPPED | OUTPATIENT
Start: 2021-03-08 | End: 2021-09-02

## 2021-03-30 DIAGNOSIS — I48.91 NEW ONSET ATRIAL FIBRILLATION (HCC): ICD-10-CM

## 2021-03-30 DIAGNOSIS — E55.9 VITAMIN D DEFICIENCY: ICD-10-CM

## 2021-03-30 RX ORDER — OMEGA-3S/DHA/EPA/FISH OIL/D3 300MG-1000
CAPSULE ORAL
Qty: 93 TABLET | Refills: 1 | Status: SHIPPED | OUTPATIENT
Start: 2021-03-30 | End: 2021-06-03

## 2021-03-30 RX ORDER — RIVAROXABAN 20 MG/1
TABLET, FILM COATED ORAL
Qty: 31 TABLET | Refills: 0 | Status: SHIPPED | OUTPATIENT
Start: 2021-03-30 | End: 2021-05-03

## 2021-05-03 DIAGNOSIS — I48.91 NEW ONSET ATRIAL FIBRILLATION (HCC): ICD-10-CM

## 2021-05-03 RX ORDER — RIVAROXABAN 20 MG/1
TABLET, FILM COATED ORAL
Qty: 30 TABLET | Refills: 0 | Status: SHIPPED | OUTPATIENT
Start: 2021-05-03 | End: 2021-06-03

## 2021-05-03 RX ORDER — DILTIAZEM HYDROCHLORIDE 120 MG/1
CAPSULE, COATED, EXTENDED RELEASE ORAL
Qty: 30 CAPSULE | Refills: 5 | Status: SHIPPED | OUTPATIENT
Start: 2021-05-03 | End: 2021-11-03

## 2021-06-03 DIAGNOSIS — E55.9 VITAMIN D DEFICIENCY: ICD-10-CM

## 2021-06-03 DIAGNOSIS — I48.91 NEW ONSET ATRIAL FIBRILLATION (HCC): ICD-10-CM

## 2021-06-03 RX ORDER — OMEGA-3S/DHA/EPA/FISH OIL/D3 300MG-1000
CAPSULE ORAL
Qty: 93 TABLET | Refills: 2 | Status: SHIPPED | OUTPATIENT
Start: 2021-06-03 | End: 2021-09-02

## 2021-06-03 RX ORDER — RIVAROXABAN 20 MG/1
TABLET, FILM COATED ORAL
Qty: 31 TABLET | Refills: 0 | Status: SHIPPED | OUTPATIENT
Start: 2021-06-03 | End: 2021-07-01

## 2021-06-09 ENCOUNTER — OFFICE VISIT (OUTPATIENT)
Dept: CARDIOLOGY CLINIC | Facility: CLINIC | Age: 61
End: 2021-06-09
Payer: COMMERCIAL

## 2021-06-09 VITALS
HEART RATE: 70 BPM | RESPIRATION RATE: 16 BRPM | WEIGHT: 200.9 LBS | BODY MASS INDEX: 36.97 KG/M2 | SYSTOLIC BLOOD PRESSURE: 102 MMHG | DIASTOLIC BLOOD PRESSURE: 78 MMHG | HEIGHT: 62 IN

## 2021-06-09 DIAGNOSIS — R07.9 CHEST PAIN, UNSPECIFIED TYPE: ICD-10-CM

## 2021-06-09 DIAGNOSIS — I51.7 ENLARGED LA (LEFT ATRIUM): ICD-10-CM

## 2021-06-09 DIAGNOSIS — I48.91 ATRIAL FIBRILLATION, UNSPECIFIED TYPE (HCC): Primary | ICD-10-CM

## 2021-06-09 DIAGNOSIS — Z87.898 H/O PROLONGED Q-T INTERVAL ON ECG: ICD-10-CM

## 2021-06-09 PROCEDURE — 93000 ELECTROCARDIOGRAM COMPLETE: CPT | Performed by: PHYSICIAN ASSISTANT

## 2021-06-09 PROCEDURE — 1036F TOBACCO NON-USER: CPT | Performed by: PHYSICIAN ASSISTANT

## 2021-06-09 PROCEDURE — 99214 OFFICE O/P EST MOD 30 MIN: CPT | Performed by: PHYSICIAN ASSISTANT

## 2021-06-09 NOTE — PROGRESS NOTES
Electrophysiology 6 Month for Follow Up  Heart & Vascular [address]  [facility], [place], [address]    Name: Edmund Gagnon  : 1960  MRN: [id number]    ASSESSMENT:  1  Atrial fibrillation, unspecified type ([address] )  POCT ECG    Echo stress test w contrast if indicated   2  Enlarged LA (left atrium)     3  H/O prolonged Q-T interval on ECG     4  Chest pain, unspecified type  Echo stress test w contrast if indicated       PLAN:  1  Early persistent atrial fibrillation, asymptomatic    - anticoagulated with Xarelto / [id number] score of 1 (sex)  - EF of 60% per echo 2020 / LA diameter of 4 84cm  - rate control: diltiazem 120mg daily   - antiarrhythmic therapy: none  - prior cardioversion: 2019  - first found in 2019  2  Borderline QT prolongation  - in the setting of Seroquel / now off Seroquel  3  Lower extremity edema  - utilizes furosemide 20 mg as needed  4  Hyperlipidemia  - maintained on pravastatin 40mg daily   5  Mood disorder  - maintained on Seroquel and Buspar in the past now discontinued  6  Hypothyroidism    IMPRESSION:  1  Atrial fibrillation - patient appears to be well controlled on diltiazem alone as rate control and Xarelto for stroke prevention  Her facility does take her pulse once a day and numbers appear in the 70s and 80s  Given her recent chest discomfort, I have asked that they monitor a little more closely with heart rate check once in the morning and once at night  However, no changes will be made from this standpoint  2  Chest discomfort - given learning delays, it is difficult to tell exactly what she is feeling but she has not undergone any kind of ischemic eval in the past   Her  did report that she is more active with walking at the facility and therefore, we did discuss attempting a stress test   Would not prefer to have nuclear done as that would require imaging and I am concerned about claustrophobia  However, I believe that she can do well on a treadmill following directions  Would attempt an echo stress test in the order will be placed  Patient is to follow up in our EP office in 6 months  She is to call our office with any further questions or concerns in the meantime  HPI:   Interim history: Macario Rodriguez is a 61 y o  female with early persistent atrial fibrillation anticoagulated with Xarelto, preserved LVEF, borderline QT prolongation setting of QT prolonging drugs, lower extremity edema, hyperlipidemia, mood disorder, and hypothyroidism  She presents today for six-month follow-up  Patient has follow-up with General Cardiology for many years but in August of 2019 had presented to her cardiologist's office and was found to be in new onset atrial fibrillation with RVR and was sent to the hospital for further evaluation and treatment  She did undergo cardioversion but when she was seen by electrophysiology and Zio patch was ordered, she was found to be in atrial fibrillation the majority of the time  Therefore, it was decided that rate control strategy would be utilized instead of rhythm control  She did have a repeat echocardiogram in 06/2020 that showed preserved LVEF  She reports today for six-month follow-up  She presents today with her , Aliya Moya, who reported for the last couple days that she has felt different in the chest   They see that Carole kind of  at the middle of her chest but cannot really put into words which she is feeling  They have checked her pulse during that time and it was 78 beats per minute the last time that this occurred  They check her weight every day and give her diuretic as needed and she offers no other complaints  She does report that she has been exercising more in walking around the facility      EKG: atrial fibrillation with controlled ventricular response at 70 beats per minute with occassional PVC    ROS:   Review of Systems Constitutional: Negative for chills, fever and malaise/fatigue  Cardiovascular: Positive for chest pain  Negative for dyspnea on exertion, irregular heartbeat, leg swelling, near-syncope, palpitations and syncope  Respiratory: Negative for shortness of breath and sleep disturbances due to breathing  All other systems reviewed and are negative  OBJECTIVE:   Vitals:   /78   Pulse 70   Resp 16   Ht 5' 2" (1 575 m)   Wt 91 1 kg (200 lb 14 4 oz)   BMI 36 75 kg/m²       Physical Exam:  Physical Exam  Vitals and nursing note reviewed  Constitutional:       General: She is not in acute distress  Appearance: She is well-developed  She is not diaphoretic  HENT:      Head: Normocephalic and atraumatic  Eyes:      Pupils: Pupils are equal, round, and reactive to light  Neck:      Vascular: No JVD  Cardiovascular:      Rate and Rhythm: Normal rate  Rhythm irregular  Heart sounds: No murmur heard  No friction rub  No gallop  Pulmonary:      Effort: Pulmonary effort is normal  No respiratory distress  Breath sounds: Normal breath sounds  No wheezing  Abdominal:      Palpations: Abdomen is soft  Musculoskeletal:         General: Normal range of motion  Cervical back: Normal range of motion  Skin:     General: Skin is warm and dry  Neurological:      Mental Status: She is alert and oriented to person, place, and time           Medications:      Current Outpatient Medications:     acetaminophen (TYLENOL) 650 mg CR tablet, Take 1 tablet (650 mg total) by mouth every 8 (eight) hours as needed for mild pain (or fever), Disp: 30 tablet, Rfl: 1    benzonatate (TESSALON) 200 MG capsule, Take 1 capsule (200 mg total) by mouth 3 (three) times a day as needed for cough for up to 20 doses, Disp: 20 capsule, Rfl: 0    busPIRone (BUSPAR) 10 mg tablet, Take 1 tablet (10 mg total) by mouth 2 (two) times a day, Disp: 60 tablet, Rfl: 1    Calcium 600-D 600-400 MG-UNIT TABS, TAKE (1) TABLET BY MOUTH TWICE DAILY AT 8AM AND 5PM , Disp: 60 tablet, Rfl: 03    cholecalciferol (VITAMIN D3) 400 units tablet, TAKE 1 TABLET BY MOUTH THREE TIMES DAILY  , Disp: 93 tablet, Rfl: 2    diltiazem (CARDIZEM CD) 120 mg 24 hr capsule, TAKE 1 CAPSULE BY MOUTH ONCE DAILY  , Disp: 30 capsule, Rfl: 5    docusate sodium (COLACE) 100 mg capsule, Take 1 capsule (100 mg total) by mouth 2 (two) times a day If needed constipation, Disp: 30 capsule, Rfl: 0    furosemide (LASIX) 20 mg tablet, Take 1 tablet (20 mg total) by mouth daily as needed (edema), Disp: 30 tablet, Rfl: 11    hydrocortisone 1 % cream, Apply topically 2 (two) times a day If needed for itchy rash, Disp: 30 g, Rfl: 0    Ibuprofen 200 MG CAPS, Take 1-2 tablets if needed for pain or fever every 6-8 hours, Disp: 120 each, Rfl: 0    Incontinence Supply Disposable (WINGS CHOICE PLUS ADULT BRIEFS) MISC, USE AS DIRECTED FOR INCONTINENCE, Disp: 90 each, Rfl: 4    levothyroxine 100 mcg tablet, TAKE (1) TABLET BY MOUTH ONCE DAILY  , Disp: 31 tablet, Rfl: 5    loperamide (IMODIUM A-D) 2 mg capsule, Take 1 capsule (2 mg total) by mouth 4 (four) times a day as needed for diarrhea, Disp: 30 capsule, Rfl: 0    neomycin-polymyxin b-bacitracin (NEOSPORIN) 3 5-400-5,000, APPLY TOPICALLY TO AFFECTED AREA FOUR TIMES A DAY AS NEEDED FOR WOUND CARE, Disp: 28 4 g, Rfl: 0    Omega-3 Fatty Acids (fish oil) 1,000 mg, TAKE (3) CAPSULES BY MOUTH ONCE DAILY  , Disp: 93 capsule, Rfl: 3    pravastatin (PRAVACHOL) 40 mg tablet, TAKE (1) TABLET BY MOUTH ONCE DAILY  , Disp: 30 tablet, Rfl: 5    SODIUM FLUORIDE, DENTAL GEL, (SF 5000 PLUS) 1 1 % CREA, SF 5000 Plus 1 1 % Dental Cream; 0; 22-Aug-2014;  Active, Disp: , Rfl:     Xarelto 20 MG tablet, TAKE (1) TABLET BY MOUTH ONCE DAILY WITH BREAKFAST , Disp: 31 tablet, Rfl: 0     Family History   Problem Relation Age of Onset    Other Mother         neglect or abandonment     Other Father         neglect or abandonment     No Known Problems Sister      Social History     Socioeconomic History    Marital status: Single     Spouse name: Not on file    Number of children: Not on file    Years of education: Not on file    Highest education level: Not on file   Occupational History    Not on file   Social Needs    Financial resource strain: Not on file    Food insecurity     Worry: Not on file     Inability: Not on file    Transportation needs     Medical: Not on file     Non-medical: Not on file   Tobacco Use    Smoking status: Never Smoker    Smokeless tobacco: Never Used   Substance and Sexual Activity    Alcohol use: Never     Frequency: Never    Drug use: Never    Sexual activity: Not on file   Lifestyle    Physical activity     Days per week: Not on file     Minutes per session: Not on file    Stress: Not on file   Relationships    Social connections     Talks on phone: Not on file     Gets together: Not on file     Attends Hoahaoism service: Not on file     Active member of club or organization: Not on file     Attends meetings of clubs or organizations: Not on file     Relationship status: Not on file    Intimate partner violence     Fear of current or ex partner: Not on file     Emotionally abused: Not on file     Physically abused: Not on file     Forced sexual activity: Not on file   Other Topics Concern    Not on file   Social History Narrative    Disabled    Social history reviewed unchanged      Social History     Tobacco Use   Smoking Status Never Smoker   Smokeless Tobacco Never Used     Social History     Substance and Sexual Activity   Alcohol Use Never    Frequency: Never       Labs & Results:  Below is the patient's most recent value for Albumin, ALT, AST, BUN, Calcium, Chloride, Cholesterol, CO2, Creatinine, GFR, Glucose, HDL, Hematocrit, Hemoglobin, Hemoglobin A1C, LDL, Magnesium, Phosphorus, Platelets, Potassium, PSA, Sodium, Triglycerides, and WBC     Lab Results   Component Value Date    ALT 23 08/25/2020 AST 14 2020    BUN 19 2020    CALCIUM 9 7 2020     2020    CO2 29 2020    CREATININE 0 89 2020    HDL 47 2020    HCT 48 1 (H) 2020    HGB 15 0 2020     2020    K 4 6 2020    TRIG 134 2020    WBC 7 46 2020     Note: for a comprehensive list of the patient's lab results, access the Results Review activity  CARDIAC TESTING:   ECHO:   Results for orders placed during the hospital encounter of 20   Echo complete with contrast if indicated    [place]ative [address]  [facility] [place] [facility]  [phone]    Transthoracic Echocardiogram  2D, M-mode, Doppler, and Color Doppler    Study date:  2020    Patient: Sushant Jorgensen  MR number: [id number]  Account number: [de-identified]  : 1960  Age: 61 years  Gender: Female  Status: Outpatient  Location: [address] [place] [address]  Height: 61 in  Weight: 204 lb  BP: 148/ 88 mmHg    Indications: Atrial fibrillation    Diagnoses: I48 0 - Atrial fibrillation    Sonographer:  WILL Mays, RDCS  Primary Physician:  Esperanza Galvez PA-C  Referring Physician:  Edith Cedeño MD  Group:  [facility]  Interpreting Physician:  Breanne Fenton MD    SUMMARY    LEFT VENTRICLE:  Systolic function was normal  Ejection fraction was estimated to be 60 %  There were no regional wall motion abnormalities  LEFT ATRIUM:  The atrium was dilated  MITRAL VALVE:  There was mild to moderate regurgitation  TRICUSPID VALVE:  There was moderate regurgitation  HISTORY: PRIOR HISTORY: Atrial fibrillation; LAE; Hyperlipidemia; Hypothyroid    PROCEDURE: The study was performed in the [address]  This was a routine study  The transthoracic approach was used  The study included complete 2D imaging, M-mode, complete spectral Doppler, and color Doppler   The  heart rate was 72 bpm, at the start of the study  Images were obtained from the parasternal, apical, subcostal, and suprasternal notch acoustic windows  Image quality was adequate  LEFT VENTRICLE: Size was normal  Systolic function was normal  Ejection fraction was estimated to be 60 %  There were no regional wall motion abnormalities  Wall thickness was normal  DOPPLER: Transmitral flow pattern: atrial fibrillation  The study was not technically sufficient to allow evaluation of LV diastolic function  RIGHT VENTRICLE: The size was normal  Systolic function was normal  Wall thickness was normal     LEFT ATRIUM: The atrium was dilated  RIGHT ATRIUM: Size was normal     MITRAL VALVE: Valve structure was normal  There was normal leaflet separation  DOPPLER: The transmitral velocity was within the normal range  There was no evidence for stenosis  There was mild to moderate regurgitation  AORTIC VALVE: The valve was trileaflet  Leaflets exhibited normal thickness and normal cuspal separation  DOPPLER: Transaortic velocity was within the normal range  There was no evidence for stenosis  There was no significant  regurgitation  TRICUSPID VALVE: The valve structure was normal  There was normal leaflet separation  DOPPLER: The transtricuspid velocity was within the normal range  There was no evidence for stenosis  There was moderate regurgitation  Pulmonary artery  systolic pressure was within the normal range  Estimated peak PA pressure was 34 mmHg  PULMONIC VALVE: Leaflets exhibited normal thickness, no calcification, and normal cuspal separation  DOPPLER: The transpulmonic velocity was within the normal range  There was trace regurgitation  PERICARDIUM: There was no pericardial effusion  The pericardium was normal in appearance  AORTA: The root exhibited normal size  SYSTEMIC VEINS: IVC: The inferior vena cava was normal in size   Respirophasic changes were normal     SYSTEM MEASUREMENT TABLES    2D  %FS: 22 42 %  Ao Diam: 2 88 cm  EDV(Teich): 77 32 ml  EF(Cube): 53 3 %  EF(Teich): 45 55 %  ESV(Cube): 33 9 ml  ESV(Teich): 42 1 ml  IVSd: 1 08 cm  LA Area: 30 35 cm2  LA Diam: 4 84 cm  LVIDd: 4 17 cm  LVIDs: 3 24 cm  LVPWd: 1 13 cm  RA Area: 16 08 cm2  RV Diam : 3 8 cm  SV(Cube): 38 69 ml  SV(Teich): 35 23 ml    CW  TR Vmax: 2 53 m/s  TR maxP 91 mmHg    MM  TAPSE: 2 17 cm    PW  E': 0 07 m/s  E/E': 9 73  MV A Adrian: 0 01 m/s  MV Dec Grainger: 3 07 m/s2  MV DecT: 227 19 ms  MV E Adrian: 0 7 m/s  MV E/A Ratio: 112 77    [facility] Accredited Echocardiography Laboratory    Prepared and electronically signed by    Pete Berry MD  Signed 2020 14:19:08       Results for orders placed during the hospital encounter of 19   MARCIE    Narrative [address] [place], [place] [address], [address]  [phone]    Transesophageal Echocardiogram  2D, M-mode, Doppler, and Color Doppler    Study date:  23-Aug-2019    Patient: Celestino Cardona  MR number: [id number]  Account number: [de-identified]  : 1960  Age: 62 years  Gender: Female  Status: Inpatient  Location: Cath lab  Height: 61 in  Weight: 196 7 lb  BP: 141/ 78 mmHg    Indications: Atrial fibrillation  Diagnoses: I48 0 - Atrial fibrillation    Sonographer:  Kendall Wiley RDCS  Interpreting Physician:  Mandy Rucker MD  Primary Physician:  Cora Brown PA-C  Referring Physician:  Araceli Tran MD  Group:  [facility]  RN:  Juan Alberto Hopkins RN BSN    IMPRESSIONS:  No evidence of left atrial or left atrial appendage thrombus on transesophageal echocardiogram   Other echocardiographic findings as noted below  SUMMARY    LEFT VENTRICLE:  Normal left ventricular cavity size mild concentric left ventricular hypertrophy, normal left ventricular systolic function  Regional wall motion and ejection fraction could not be reliably assessed due to rapid ventricular rates      RIGHT VENTRICLE:  Normal right ventricular size and visually normal right ventricular systolic function  LEFT ATRIUM:  Moderate left atrial cavity enlargement  No spontaneous contrast, mass or thrombus noted in left atrial cavity  LEFT ATRIAL APPENDAGE:  Enlarged single lobe left atrial appendage with moderately reduced flow velocities  No masses, vegetations or thrombus identified in left atrial appendage cavity  ATRIAL SEPTUM:  Intact interatrial septum  No color flow Doppler evidence of interatrial shunts  RIGHT ATRIUM:  Mild right atrial cavity enlargement  MITRAL VALVE:  Mild mitral valve leaflet thickening, adequate leaflet mobility  Mild mitral valve regurgitation noted  AORTIC VALVE:  Tricuspid aortic valve with mild sclerosis  Adequate cuspal separation  No aortic valve stenosis or regurgitation noted  TRICUSPID VALVE:  Mild tricuspid valve leaflet thickening  Mild tricuspid valve regurgitation  PULMONIC VALVE:  Unremarkable pulmonic valve leaflet morphology  No pulmonic valve stenosis or regurgitation noted  AORTA:  Aortic root and proximal ascending aorta are normal in size on 2D imaging  There are mild atherosclerotic changes noted in visualized portions of ascending and descending aorta  No mobile plaques noted  PERICARDIUM:  No pericardial effusion  HISTORY: PRIOR HISTORY: Long QT interval, hypothyroidism, hyperlipidemia, atrial fibrillation, mental retardation, left atrial enlargement, obesity, anxiety  PROCEDURE: The procedure was performed in the catheterization laboratory  The risks and alternatives of the procedure were explained to the patient's next of kin and informed consent was obtained  The transesophageal approach was used  The  study included complete 2D imaging, M-mode, complete spectral Doppler, and color Doppler  The heart rate was 156 bpm, at the start of the study  An adult omniplane probe was inserted by the attending cardiologist  Intubated with ease  One  intubation attempt(s)   There was no blood detected on the probe  Image quality was adequate  There were no complications during the procedure  MEDICATIONS: Anesthesia administered by anesthesia team     LEFT VENTRICLE: Normal left ventricular cavity size mild concentric left ventricular hypertrophy, normal left ventricular systolic function  Regional wall motion and ejection fraction could not be reliably assessed due to rapid ventricular  rates  RIGHT VENTRICLE: Normal right ventricular size and visually normal right ventricular systolic function  LEFT ATRIUM: Moderate left atrial cavity enlargement  No spontaneous contrast, mass or thrombus noted in left atrial cavity  APPENDAGE: Enlarged single lobe left atrial appendage with moderately reduced flow velocities  No masses,  vegetations or thrombus identified in left atrial appendage cavity  ATRIAL SEPTUM: Intact interatrial septum  No color flow Doppler evidence of interatrial shunts  RIGHT ATRIUM: Mild right atrial cavity enlargement  MITRAL VALVE: Mild mitral valve leaflet thickening, adequate leaflet mobility  Mild mitral valve regurgitation noted  AORTIC VALVE: Tricuspid aortic valve with mild sclerosis  Adequate cuspal separation  No aortic valve stenosis or regurgitation noted  TRICUSPID VALVE: Mild tricuspid valve leaflet thickening  Mild tricuspid valve regurgitation  PULMONIC VALVE: Unremarkable pulmonic valve leaflet morphology  No pulmonic valve stenosis or regurgitation noted  PERICARDIUM: No pericardial effusion  AORTA: Aortic root and proximal ascending aorta are normal in size on 2D imaging  There are mild atherosclerotic changes noted in visualized portions of ascending and descending aorta  No mobile plaques noted      SYSTEM MEASUREMENT TABLES    CW  TR Vmax: 2 1 m/s  TR maxP 7 mmHg    [employer] Accredited Echocardiography Laboratory    Prepared and electronically signed by    Loulou Kinney MD  Signed 23-Aug-2019 19:27:14 CATH:  No results found for this or any previous visit  STRESS TEST:  No results found for this or any previous visit

## 2021-06-10 ENCOUNTER — LAB (OUTPATIENT)
Dept: LAB | Age: 61
End: 2021-06-10
Payer: COMMERCIAL

## 2021-06-10 DIAGNOSIS — E03.9 HYPOTHYROIDISM, UNSPECIFIED TYPE: ICD-10-CM

## 2021-06-10 DIAGNOSIS — E78.5 HYPERLIPIDEMIA, UNSPECIFIED HYPERLIPIDEMIA TYPE: ICD-10-CM

## 2021-06-10 DIAGNOSIS — Z79.01 ENCOUNTER FOR CURRENT LONG-TERM USE OF ANTICOAGULANTS: ICD-10-CM

## 2021-06-10 LAB
ALBUMIN SERPL BCP-MCNC: 3.7 G/DL (ref 3.5–5)
ALP SERPL-CCNC: 118 U/L (ref 46–116)
ALT SERPL W P-5'-P-CCNC: 18 U/L (ref 12–78)
ANION GAP SERPL CALCULATED.3IONS-SCNC: 2 MMOL/L (ref 4–13)
AST SERPL W P-5'-P-CCNC: 13 U/L (ref 5–45)
BASOPHILS # BLD AUTO: 0.03 THOUSANDS/ΜL (ref 0–0.1)
BASOPHILS NFR BLD AUTO: 1 % (ref 0–1)
BILIRUB SERPL-MCNC: 1.48 MG/DL (ref 0.2–1)
BUN SERPL-MCNC: 16 MG/DL (ref 5–25)
CALCIUM SERPL-MCNC: 9.7 MG/DL (ref 8.3–10.1)
CHLORIDE SERPL-SCNC: 107 MMOL/L (ref 100–108)
CHOLEST SERPL-MCNC: 133 MG/DL (ref 50–200)
CO2 SERPL-SCNC: 31 MMOL/L (ref 21–32)
CREAT SERPL-MCNC: 0.88 MG/DL (ref 0.6–1.3)
EOSINOPHIL # BLD AUTO: 0.25 THOUSAND/ΜL (ref 0–0.61)
EOSINOPHIL NFR BLD AUTO: 4 % (ref 0–6)
ERYTHROCYTE [DISTWIDTH] IN BLOOD BY AUTOMATED COUNT: 12.9 % (ref 11.6–15.1)
GFR SERPL CREATININE-BSD FRML MDRD: 72 ML/MIN/1.73SQ M
GLUCOSE P FAST SERPL-MCNC: 80 MG/DL (ref 65–99)
HCT VFR BLD AUTO: 44.5 % (ref 34.8–46.1)
HDLC SERPL-MCNC: 47 MG/DL
HGB BLD-MCNC: 14.3 G/DL (ref 11.5–15.4)
IMM GRANULOCYTES # BLD AUTO: 0.01 THOUSAND/UL (ref 0–0.2)
IMM GRANULOCYTES NFR BLD AUTO: 0 % (ref 0–2)
LDLC SERPL CALC-MCNC: 64 MG/DL (ref 0–100)
LYMPHOCYTES # BLD AUTO: 1.69 THOUSANDS/ΜL (ref 0.6–4.47)
LYMPHOCYTES NFR BLD AUTO: 26 % (ref 14–44)
MCH RBC QN AUTO: 29.5 PG (ref 26.8–34.3)
MCHC RBC AUTO-ENTMCNC: 32.1 G/DL (ref 31.4–37.4)
MCV RBC AUTO: 92 FL (ref 82–98)
MONOCYTES # BLD AUTO: 0.41 THOUSAND/ΜL (ref 0.17–1.22)
MONOCYTES NFR BLD AUTO: 6 % (ref 4–12)
NEUTROPHILS # BLD AUTO: 4.05 THOUSANDS/ΜL (ref 1.85–7.62)
NEUTS SEG NFR BLD AUTO: 63 % (ref 43–75)
NONHDLC SERPL-MCNC: 86 MG/DL
NRBC BLD AUTO-RTO: 0 /100 WBCS
PLATELET # BLD AUTO: 152 THOUSANDS/UL (ref 149–390)
PMV BLD AUTO: 11.4 FL (ref 8.9–12.7)
POTASSIUM SERPL-SCNC: 3.9 MMOL/L (ref 3.5–5.3)
PROT SERPL-MCNC: 7.3 G/DL (ref 6.4–8.2)
RBC # BLD AUTO: 4.85 MILLION/UL (ref 3.81–5.12)
SODIUM SERPL-SCNC: 140 MMOL/L (ref 136–145)
TRIGL SERPL-MCNC: 109 MG/DL
TSH SERPL DL<=0.05 MIU/L-ACNC: 1.71 UIU/ML (ref 0.36–3.74)
WBC # BLD AUTO: 6.44 THOUSAND/UL (ref 4.31–10.16)

## 2021-06-10 PROCEDURE — 80061 LIPID PANEL: CPT

## 2021-06-10 PROCEDURE — 85025 COMPLETE CBC W/AUTO DIFF WBC: CPT

## 2021-06-10 PROCEDURE — 80053 COMPREHEN METABOLIC PANEL: CPT

## 2021-06-10 PROCEDURE — 84443 ASSAY THYROID STIM HORMONE: CPT

## 2021-06-10 PROCEDURE — 36415 COLL VENOUS BLD VENIPUNCTURE: CPT

## 2021-06-10 NOTE — RESULT ENCOUNTER NOTE
Labs were good but her BiluRubin level was still elevated  She has had this in the past and she does not have any abdominal symptoms or need any other treatment for this   It is likely a benign elevation of the Bilirubin that occurs in people are fasting

## 2021-06-17 ENCOUNTER — VBI (OUTPATIENT)
Dept: ADMINISTRATIVE | Facility: OTHER | Age: 61
End: 2021-06-17

## 2021-06-19 ENCOUNTER — TELEPHONE (OUTPATIENT)
Dept: OTHER | Facility: OTHER | Age: 61
End: 2021-06-19

## 2021-06-19 ENCOUNTER — TELEPHONE (OUTPATIENT)
Dept: FAMILY MEDICINE CLINIC | Facility: CLINIC | Age: 61
End: 2021-06-19

## 2021-06-19 NOTE — TELEPHONE ENCOUNTER
[employer] called to inform that patient did not get her 5 pm medications yesterday

## 2021-06-19 NOTE — TELEPHONE ENCOUNTER
Received [employer] healthcall message from Sandhya Jensen RN regarding patient  She [facility] stating that it was noted that patient missed her 5 PM doses yesterday, 6/18/2021 and needed to notify on-call provider of the mistake  Called Robert at [phone]  She clarified that patient missed Vitamin D3 400 units, Calcium-Vit D 600-400 mg-units and Pravastatin 40 mg   I notified her that she may resume these medications today without having to make up the missed doses today  She would also like this information faxed but I notified her that I am unable to do so while on-call at the hospital  She will call the clinic on Monday to have this information relayed via fax

## 2021-06-21 ENCOUNTER — CLINICAL SUPPORT (OUTPATIENT)
Dept: FAMILY MEDICINE CLINIC | Facility: CLINIC | Age: 61
End: 2021-06-21

## 2021-06-21 DIAGNOSIS — Z23 ENCOUNTER FOR IMMUNIZATION: Primary | ICD-10-CM

## 2021-06-21 PROCEDURE — 86580 TB INTRADERMAL TEST: CPT | Performed by: FAMILY MEDICINE

## 2021-06-23 ENCOUNTER — CLINICAL SUPPORT (OUTPATIENT)
Dept: FAMILY MEDICINE CLINIC | Facility: CLINIC | Age: 61
End: 2021-06-23

## 2021-06-23 DIAGNOSIS — Z11.1 ENCOUNTER FOR PPD SKIN TEST READING: Primary | ICD-10-CM

## 2021-06-23 LAB
INDURATION: 0 MM
TB SKIN TEST: NEGATIVE

## 2021-07-01 DIAGNOSIS — I48.91 NEW ONSET ATRIAL FIBRILLATION (HCC): ICD-10-CM

## 2021-07-01 RX ORDER — RIVAROXABAN 20 MG/1
TABLET, FILM COATED ORAL
Qty: 31 TABLET | Refills: 0 | Status: SHIPPED | OUTPATIENT
Start: 2021-07-01 | End: 2021-08-05

## 2021-07-02 ENCOUNTER — OFFICE VISIT (OUTPATIENT)
Dept: FAMILY MEDICINE CLINIC | Facility: CLINIC | Age: 61
End: 2021-07-02

## 2021-07-02 ENCOUNTER — TELEPHONE (OUTPATIENT)
Dept: FAMILY MEDICINE CLINIC | Facility: CLINIC | Age: 61
End: 2021-07-02

## 2021-07-02 VITALS
BODY MASS INDEX: 36.99 KG/M2 | SYSTOLIC BLOOD PRESSURE: 108 MMHG | TEMPERATURE: 97.5 F | WEIGHT: 201 LBS | HEART RATE: 94 BPM | DIASTOLIC BLOOD PRESSURE: 74 MMHG | RESPIRATION RATE: 18 BRPM | HEIGHT: 62 IN | OXYGEN SATURATION: 97 %

## 2021-07-02 DIAGNOSIS — E66.01 CLASS 2 SEVERE OBESITY DUE TO EXCESS CALORIES WITH SERIOUS COMORBIDITY AND BODY MASS INDEX (BMI) OF 37.0 TO 37.9 IN ADULT (HCC): ICD-10-CM

## 2021-07-02 DIAGNOSIS — Z00.00 MEDICARE ANNUAL WELLNESS VISIT, SUBSEQUENT: Primary | ICD-10-CM

## 2021-07-02 PROCEDURE — 1036F TOBACCO NON-USER: CPT | Performed by: NURSE PRACTITIONER

## 2021-07-02 PROCEDURE — G0439 PPPS, SUBSEQ VISIT: HCPCS | Performed by: NURSE PRACTITIONER

## 2021-07-02 PROCEDURE — 3008F BODY MASS INDEX DOCD: CPT | Performed by: PHYSICIAN ASSISTANT

## 2021-07-02 PROCEDURE — 3008F BODY MASS INDEX DOCD: CPT | Performed by: NURSE PRACTITIONER

## 2021-07-02 NOTE — PATIENT INSTRUCTIONS
Medicare Preventive Visit Patient Instructions  Thank you for completing your Welcome to Medicare Visit or Medicare Annual Wellness Visit today  Your next wellness visit will be due in one year (7/3/2022)  The screening/preventive services that you may require over the next 5-10 years are detailed below  Some tests may not apply to you based off risk factors and/or age  Screening tests ordered at today's visit but not completed yet may show as past due  Also, please note that scanned in results may not display below  Preventive Screenings:  Service Recommendations Previous Testing/Comments   Colorectal Cancer Screening  * Colonoscopy    * Fecal Occult Blood Test (FOBT)/Fecal Immunochemical Test (FIT)  * Fecal DNA/Cologuard Test  * Flexible Sigmoidoscopy Age: 54-65 years old   Colonoscopy: every 10 years (may be performed more frequently if at higher risk)  OR  FOBT/FIT: every 1 year  OR  Cologuard: every 3 years  OR  Sigmoidoscopy: every 5 years  Screening may be recommended earlier than age 48 if at higher risk for colorectal cancer  Also, an individualized decision between you and your healthcare provider will decide whether screening between the ages of 74-80 would be appropriate  Colonoscopy: 01/07/2016  FOBT/FIT: Not on file  Cologuard: Not on file  Sigmoidoscopy: Not on file    Screening Current     Breast Cancer Screening Age: 36 years old  Frequency: every 1-2 years  Not required if history of left and right mastectomy Mammogram: 07/09/2020    Screening Current   Cervical Cancer Screening Between the ages of 21-29, pap smear recommended once every 3 years  Between the ages of 33-67, can perform pap smear with HPV co-testing every 5 years     Recommendations may differ for women with a history of total hysterectomy, cervical cancer, or abnormal pap smears in past  Pap Smear: Not on file        Hepatitis C Screening Once for adults born between 1945 and 1965  More frequently in patients at high risk for Hepatitis C Hep C Antibody: 07/08/2019    Screening Current   Diabetes Screening 1-2 times per year if you're at risk for diabetes or have pre-diabetes Fasting glucose: 80 mg/dL   A1C: No results in last 5 years    Screening Current   Cholesterol Screening Once every 5 years if you don't have a lipid disorder  May order more often based on risk factors  Lipid panel: 06/10/2021    Screening Not Indicated  History Lipid Disorder     Other Preventive Screenings Covered by Medicare:  1  Abdominal Aortic Aneurysm (AAA) Screening: covered once if your at risk  You're considered to be at risk if you have a family history of AAA  2  Lung Cancer Screening: covers low dose CT scan once per year if you meet all of the following conditions: (1) Age 50-69; (2) No signs or symptoms of lung cancer; (3) Current smoker or have quit smoking within the last 15 years; (4) You have a tobacco smoking history of at least 30 pack years (packs per day multiplied by number of years you smoked); (5) You get a written order from a healthcare provider  3  Glaucoma Screening: covered annually if you're considered high risk: (1) You have diabetes OR (2) Family history of glaucoma OR (3)  aged 48 and older OR (3)  American aged 72 and older  3  Osteoporosis Screening: covered every 2 years if you meet one of the following conditions: (1) You're estrogen deficient and at risk for osteoporosis based off medical history and other findings; (2) Have a vertebral abnormality; (3) On glucocorticoid therapy for more than 3 months; (4) Have primary hyperparathyroidism; (5) On osteoporosis medications and need to assess response to drug therapy  · Last bone density test (DXA Scan): 07/25/2018  5  HIV Screening: covered annually if you're between the age of 12-76  Also covered annually if you are younger than 13 and older than 72 with risk factors for HIV infection   For pregnant patients, it is covered up to 3 times per pregnancy  Immunizations:  Immunization Recommendations   Influenza Vaccine Annual influenza vaccination during flu season is recommended for all persons aged >= 6 months who do not have contraindications   Pneumococcal Vaccine (Prevnar and Pneumovax)  * Prevnar = PCV13  * Pneumovax = PPSV23   Adults 25-60 years old: 1-3 doses may be recommended based on certain risk factors  Adults 72 years old: Prevnar (PCV13) vaccine recommended followed by Pneumovax (PPSV23) vaccine  If already received PPSV23 since turning 65, then PCV13 recommended at least one year after PPSV23 dose  Hepatitis B Vaccine 3 dose series if at intermediate or high risk (ex: diabetes, end stage renal disease, liver disease)   Tetanus (Td) Vaccine - COST NOT COVERED BY MEDICARE PART B Following completion of primary series, a booster dose should be given every 10 years to maintain immunity against tetanus  Td may also be given as tetanus wound prophylaxis  Tdap Vaccine - COST NOT COVERED BY MEDICARE PART B Recommended at least once for all adults  For pregnant patients, recommended with each pregnancy  Shingles Vaccine (Shingrix) - COST NOT COVERED BY MEDICARE PART B  2 shot series recommended in those aged 48 and above     Health Maintenance Due:      Topic Date Due    Cervical Cancer Screening  Never done    MAMMOGRAM  07/09/2022    Colorectal Cancer Screening  01/07/2026    HIV Screening  Completed    Hepatitis C Screening  Completed     Immunizations Due:      Topic Date Due    Influenza Vaccine (1) 09/01/2021     Advance Directives   What are advance directives? Advance directives are legal documents that state your wishes and plans for medical care  These plans are made ahead of time in case you lose your ability to make decisions for yourself  Advance directives can apply to any medical decision, such as the treatments you want, and if you want to donate organs  What are the types of advance directives?   There are many types of advance directives, and each state has rules about how to use them  You may choose a combination of any of the following:  · Living will: This is a written record of the treatment you want  You can also choose which treatments you do not want, which to limit, and which to stop at a certain time  This includes surgery, medicine, IV fluid, and tube feedings  · Durable power of  for healthcare [place] [facility]): This is a written record that states who you want to make healthcare choices for you when you are unable to make them for yourself  This person, called a proxy, is usually a family member or a friend  You may choose more than 1 proxy  · Do not resuscitate (DNR) order:  A DNR order is used in case your heart stops beating or you stop breathing  It is a request not to have certain forms of treatment, such as CPR  A DNR order may be included in other types of advance directives  · Medical directive: This covers the care that you want if you are in a coma, near death, or unable to make decisions for yourself  You can list the treatments you want for each condition  Treatment may include pain medicine, surgery, blood transfusions, dialysis, IV or tube feedings, and a ventilator (breathing machine)  · Values history: This document has questions about your views, beliefs, and how you feel and think about life  This information can help others choose the care that you would choose  Why are advance directives important? An advance directive helps you control your care  Although spoken wishes may be used, it is better to have your wishes written down  Spoken wishes can be misunderstood, or not followed  Treatments may be given even if you do not want them  An advance directive may make it easier for your family to make difficult choices about your care  Urinary Incontinence   Urinary incontinence (UI)  is when you lose control of your bladder   UI develops because your bladder cannot store or empty urine properly  The 3 most common types of UI are stress incontinence, urge incontinence, or both  Medicines:   · May be given to help strengthen your bladder control  Report any side effects of medication to your healthcare provider  Do pelvic muscle exercises often:  Your pelvic muscles help you stop urinating  Squeeze these muscles tight for 5 seconds, then relax for 5 seconds  Gradually work up to squeezing for 10 seconds  Do 3 sets of 15 repetitions a day, or as directed  This will help strengthen your pelvic muscles and improve bladder control  Train your bladder:  Go to the bathroom at set times, such as every 2 hours, even if you do not feel the urge to go  You can also try to hold your urine when you feel the urge to go  For example, hold your urine for 5 minutes when you feel the urge to go  As that becomes easier, hold your urine for 10 minutes  Self-care:   · Keep a UI record  Write down how often you leak urine and how much you leak  Make a note of what you were doing when you leaked urine  · Drink liquids as directed  You may need to limit the amount of liquid you drink to help control your urine leakage  Do not drink any liquid right before you go to bed  Limit or do not have drinks that contain caffeine or alcohol  · Prevent constipation  Eat a variety of high-fiber foods  Good examples are high-fiber cereals, beans, vegetables, and whole-grain breads  Walking is the best way to trigger your intestines to have a bowel movement  · Exercise regularly and maintain a healthy weight  Weight loss and exercise will decrease pressure on your bladder and help you control your leakage  · Use a catheter as directed  to help empty your bladder  A catheter is a tiny, plastic tube that is put into your bladder to drain your urine  · Go to behavior therapy as directed  Behavior therapy may be used to help you learn to control your urge to urinate      Weight Management   Why it is important to manage your weight:  Being overweight increases your risk of health conditions such as heart disease, high blood pressure, type 2 diabetes, and certain types of cancer  It can also increase your risk for osteoarthritis, sleep apnea, and other respiratory problems  Aim for a slow, steady weight loss  Even a small amount of weight loss can lower your risk of health problems  How to lose weight safely:  A safe and healthy way to lose weight is to eat fewer calories and get regular exercise  You can lose up about 1 pound a week by decreasing the number of calories you eat by 500 calories each day  Healthy meal plan for weight management:  A healthy meal plan includes a variety of foods, contains fewer calories, and helps you stay healthy  A healthy meal plan includes the following:  · Eat whole-grain foods more often  A healthy meal plan should contain fiber  Fiber is the part of grains, fruits, and vegetables that is not broken down by your body  Whole-grain foods are healthy and provide extra fiber in your diet  Some examples of whole-grain foods are whole-wheat breads and pastas, oatmeal, brown rice, and bulgur  · Eat a variety of vegetables every day  Include dark, leafy greens such as spinach, kale, yoandy greens, and mustard greens  Eat yellow and orange vegetables such as carrots, sweet potatoes, and winter squash  · Eat a variety of fruits every day  Choose fresh or canned fruit (canned in its own juice or light syrup) instead of juice  Fruit juice has very little or no fiber  · Eat low-fat dairy foods  Drink fat-free (skim) milk or 1% milk  Eat fat-free yogurt and low-fat cottage cheese  Try low-fat cheeses such as mozzarella and other reduced-fat cheeses  · Choose meat and other protein foods that are low in fat  Choose beans or other legumes such as split peas or lentils  Choose fish, skinless poultry (chicken or turkey), or lean cuts of red meat (beef or pork)   Before you cook meat or poultry, cut off any visible fat  · Use less fat and oil  Try baking foods instead of frying them  Add less fat, such as margarine, sour cream, regular salad dressing and mayonnaise to foods  Eat fewer high-fat foods  Some examples of high-fat foods include french fries, doughnuts, ice cream, and cakes  · Eat fewer sweets  Limit foods and drinks that are high in sugar  This includes candy, cookies, regular soda, and sweetened drinks  Exercise:  Exercise at least 30 minutes per day on most days of the week  Some examples of exercise include walking, biking, dancing, and swimming  You can also fit in more physical activity by taking the stairs instead of the elevator or parking farther away from stores  Ask your healthcare provider about the best exercise plan for you  © Copyright [employer] 2018 Information is for End User's use only and may not be sold, redistributed or otherwise used for commercial purposes  All illustrations and images included in CareNotes® are the copyrighted property of [employer]  or [facility] Preventive Visit Patient Instructions  Thank you for completing your Welcome to Medicare Visit or Medicare Annual Wellness Visit today  Your next wellness visit will be due in one year (7/3/2022)  The screening/preventive services that you may require over the next 5-10 years are detailed below  Some tests may not apply to you based off risk factors and/or age  Screening tests ordered at today's visit but not completed yet may show as past due  Also, please note that scanned in results may not display below    Preventive Screenings:  Service Recommendations Previous Testing/Comments   Colorectal Cancer Screening  * Colonoscopy    * Fecal Occult Blood Test (FOBT)/Fecal Immunochemical Test (FIT)  * Fecal DNA/Cologuard Test  * Flexible Sigmoidoscopy Age: 54-65 years old   Colonoscopy: every 10 years (may be performed more frequently if at higher risk)  OR  FOBT/FIT: every 1 year  OR  Cologuard: every 3 years  OR  Sigmoidoscopy: every 5 years  Screening may be recommended earlier than age 48 if at higher risk for colorectal cancer  Also, an individualized decision between you and your healthcare provider will decide whether screening between the ages of 74-80 would be appropriate  Colonoscopy: 01/07/2016  FOBT/FIT: Not on file  Cologuard: Not on file  Sigmoidoscopy: Not on file    Screening Current     Breast Cancer Screening Age: 36 years old  Frequency: every 1-2 years  Not required if history of left and right mastectomy Mammogram: 07/09/2020    Screening Current   Cervical Cancer Screening Between the ages of 21-29, pap smear recommended once every 3 years  Between the ages of 33-67, can perform pap smear with HPV co-testing every 5 years  Recommendations may differ for women with a history of total hysterectomy, cervical cancer, or abnormal pap smears in past  Pap Smear: Not on file    Screening Not Indicated   Hepatitis C Screening Once for adults born between 1945 and 1965  More frequently in patients at high risk for Hepatitis C Hep C Antibody: 07/08/2019    Screening Current   Diabetes Screening 1-2 times per year if you're at risk for diabetes or have pre-diabetes Fasting glucose: 80 mg/dL   A1C: No results in last 5 years    Screening Current   Cholesterol Screening Once every 5 years if you don't have a lipid disorder  May order more often based on risk factors  Lipid panel: 06/10/2021    Screening Not Indicated  History Lipid Disorder     Other Preventive Screenings Covered by Medicare:  6  Abdominal Aortic Aneurysm (AAA) Screening: covered once if your at risk  You're considered to be at risk if you have a family history of AAA    7  Lung Cancer Screening: covers low dose CT scan once per year if you meet all of the following conditions: (1) Age 50-69; (2) No signs or symptoms of lung cancer; (3) Current smoker or have quit smoking within the last 15 years; (4) You have a tobacco smoking history of at least 30 pack years (packs per day multiplied by number of years you smoked); (5) You get a written order from a healthcare provider  8  Glaucoma Screening: covered annually if you're considered high risk: (1) You have diabetes OR (2) Family history of glaucoma OR (3)  aged 48 and older OR (3)  American aged 72 and older  5  Osteoporosis Screening: covered every 2 years if you meet one of the following conditions: (1) You're estrogen deficient and at risk for osteoporosis based off medical history and other findings; (2) Have a vertebral abnormality; (3) On glucocorticoid therapy for more than 3 months; (4) Have primary hyperparathyroidism; (5) On osteoporosis medications and need to assess response to drug therapy  · Last bone density test (DXA Scan): 07/25/2018  10  HIV Screening: covered annually if you're between the age of 12-76  Also covered annually if you are younger than 13 and older than 72 with risk factors for HIV infection  For pregnant patients, it is covered up to 3 times per pregnancy  Immunizations:  Immunization Recommendations   Influenza Vaccine Annual influenza vaccination during flu season is recommended for all persons aged >= 6 months who do not have contraindications   Pneumococcal Vaccine (Prevnar and Pneumovax)  * Prevnar = PCV13  * Pneumovax = PPSV23   Adults 25-60 years old: 1-3 doses may be recommended based on certain risk factors  Adults 72 years old: Prevnar (PCV13) vaccine recommended followed by Pneumovax (PPSV23) vaccine  If already received PPSV23 since turning 65, then PCV13 recommended at least one year after PPSV23 dose     Hepatitis B Vaccine 3 dose series if at intermediate or high risk (ex: diabetes, end stage renal disease, liver disease)   Tetanus (Td) Vaccine - COST NOT COVERED BY MEDICARE PART B Following completion of primary series, a booster dose should be given every 10 years to maintain immunity against tetanus  Td may also be given as tetanus wound prophylaxis  Tdap Vaccine - COST NOT COVERED BY MEDICARE PART B Recommended at least once for all adults  For pregnant patients, recommended with each pregnancy  Shingles Vaccine (Shingrix) - COST NOT COVERED BY MEDICARE PART B  2 shot series recommended in those aged 48 and above     Health Maintenance Due:      Topic Date Due    Cervical Cancer Screening  07/02/2022 (Originally 9/4/1981)    MAMMOGRAM  07/09/2022    Colorectal Cancer Screening  01/07/2026    HIV Screening  Completed    Hepatitis C Screening  Completed     Immunizations Due:      Topic Date Due    Influenza Vaccine (1) 09/01/2021     Advance Directives   What are advance directives? Advance directives are legal documents that state your wishes and plans for medical care  These plans are made ahead of time in case you lose your ability to make decisions for yourself  Advance directives can apply to any medical decision, such as the treatments you want, and if you want to donate organs  What are the types of advance directives? There are many types of advance directives, and each state has rules about how to use them  You may choose a combination of any of the following:  · Living will: This is a written record of the treatment you want  You can also choose which treatments you do not want, which to limit, and which to stop at a certain time  This includes surgery, medicine, IV fluid, and tube feedings  · Durable power of  for healthcare [place] [facility]): This is a written record that states who you want to make healthcare choices for you when you are unable to make them for yourself  This person, called a proxy, is usually a family member or a friend  You may choose more than 1 proxy  · Do not resuscitate (DNR) order:  A DNR order is used in case your heart stops beating or you stop breathing  It is a request not to have certain forms of treatment, such as CPR   A DNR order may be included in other types of advance directives  · Medical directive: This covers the care that you want if you are in a coma, near death, or unable to make decisions for yourself  You can list the treatments you want for each condition  Treatment may include pain medicine, surgery, blood transfusions, dialysis, IV or tube feedings, and a ventilator (breathing machine)  · Values history: This document has questions about your views, beliefs, and how you feel and think about life  This information can help others choose the care that you would choose  Why are advance directives important? An advance directive helps you control your care  Although spoken wishes may be used, it is better to have your wishes written down  Spoken wishes can be misunderstood, or not followed  Treatments may be given even if you do not want them  An advance directive may make it easier for your family to make difficult choices about your care  Urinary Incontinence   Urinary incontinence (UI)  is when you lose control of your bladder  UI develops because your bladder cannot store or empty urine properly  The 3 most common types of UI are stress incontinence, urge incontinence, or both  Medicines:   · May be given to help strengthen your bladder control  Report any side effects of medication to your healthcare provider  Do pelvic muscle exercises often:  Your pelvic muscles help you stop urinating  Squeeze these muscles tight for 5 seconds, then relax for 5 seconds  Gradually work up to squeezing for 10 seconds  Do 3 sets of 15 repetitions a day, or as directed  This will help strengthen your pelvic muscles and improve bladder control  Train your bladder:  Go to the bathroom at set times, such as every 2 hours, even if you do not feel the urge to go  You can also try to hold your urine when you feel the urge to go  For example, hold your urine for 5 minutes when you feel the urge to go   As that becomes easier, hold your urine for 10 minutes  Self-care:   · Keep a UI record  Write down how often you leak urine and how much you leak  Make a note of what you were doing when you leaked urine  · Drink liquids as directed  You may need to limit the amount of liquid you drink to help control your urine leakage  Do not drink any liquid right before you go to bed  Limit or do not have drinks that contain caffeine or alcohol  · Prevent constipation  Eat a variety of high-fiber foods  Good examples are high-fiber cereals, beans, vegetables, and whole-grain breads  Walking is the best way to trigger your intestines to have a bowel movement  · Exercise regularly and maintain a healthy weight  Weight loss and exercise will decrease pressure on your bladder and help you control your leakage  · Use a catheter as directed  to help empty your bladder  A catheter is a tiny, plastic tube that is put into your bladder to drain your urine  · Go to behavior therapy as directed  Behavior therapy may be used to help you learn to control your urge to urinate  Weight Management   Why it is important to manage your weight:  Being overweight increases your risk of health conditions such as heart disease, high blood pressure, type 2 diabetes, and certain types of cancer  It can also increase your risk for osteoarthritis, sleep apnea, and other respiratory problems  Aim for a slow, steady weight loss  Even a small amount of weight loss can lower your risk of health problems  How to lose weight safely:  A safe and healthy way to lose weight is to eat fewer calories and get regular exercise  You can lose up about 1 pound a week by decreasing the number of calories you eat by 500 calories each day  Healthy meal plan for weight management:  A healthy meal plan includes a variety of foods, contains fewer calories, and helps you stay healthy  A healthy meal plan includes the following:  · Eat whole-grain foods more often    A healthy meal plan should contain fiber  Fiber is the part of grains, fruits, and vegetables that is not broken down by your body  Whole-grain foods are healthy and provide extra fiber in your diet  Some examples of whole-grain foods are whole-wheat breads and pastas, oatmeal, brown rice, and bulgur  · Eat a variety of vegetables every day  Include dark, leafy greens such as spinach, kale, yoandy greens, and mustard greens  Eat yellow and orange vegetables such as carrots, sweet potatoes, and winter squash  · Eat a variety of fruits every day  Choose fresh or canned fruit (canned in its own juice or light syrup) instead of juice  Fruit juice has very little or no fiber  · Eat low-fat dairy foods  Drink fat-free (skim) milk or 1% milk  Eat fat-free yogurt and low-fat cottage cheese  Try low-fat cheeses such as mozzarella and other reduced-fat cheeses  · Choose meat and other protein foods that are low in fat  Choose beans or other legumes such as split peas or lentils  Choose fish, skinless poultry (chicken or turkey), or lean cuts of red meat (beef or pork)  Before you cook meat or poultry, cut off any visible fat  · Use less fat and oil  Try baking foods instead of frying them  Add less fat, such as margarine, sour cream, regular salad dressing and mayonnaise to foods  Eat fewer high-fat foods  Some examples of high-fat foods include french fries, doughnuts, ice cream, and cakes  · Eat fewer sweets  Limit foods and drinks that are high in sugar  This includes candy, cookies, regular soda, and sweetened drinks  Exercise:  Exercise at least 30 minutes per day on most days of the week  Some examples of exercise include walking, biking, dancing, and swimming  You can also fit in more physical activity by taking the stairs instead of the elevator or parking farther away from stores  Ask your healthcare provider about the best exercise plan for you        © Copyright [employer] 2018 Information is for End User's use only and may not be sold, redistributed or otherwise used for commercial purposes  All illustrations and images included in CareNotes® are the copyrighted property of [employer]  or [facility] Preventive Visit Patient Instructions  Thank you for completing your Welcome to Medicare Visit or Medicare Annual Wellness Visit today  Your next wellness visit will be due in one year (7/7/2022)  The screening/preventive services that you may require over the next 5-10 years are detailed below  Some tests may not apply to you based off risk factors and/or age  Screening tests ordered at today's visit but not completed yet may show as past due  Also, please note that scanned in results may not display below  Preventive Screenings:  Service Recommendations Previous Testing/Comments   Colorectal Cancer Screening  * Colonoscopy    * Fecal Occult Blood Test (FOBT)/Fecal Immunochemical Test (FIT)  * Fecal DNA/Cologuard Test  * Flexible Sigmoidoscopy Age: 54-65 years old   Colonoscopy: every 10 years (may be performed more frequently if at higher risk)  OR  FOBT/FIT: every 1 year  OR  Cologuard: every 3 years  OR  Sigmoidoscopy: every 5 years  Screening may be recommended earlier than age 48 if at higher risk for colorectal cancer  Also, an individualized decision between you and your healthcare provider will decide whether screening between the ages of 74-80 would be appropriate  Colonoscopy: 01/07/2016  FOBT/FIT: Not on file  Cologuard: Not on file  Sigmoidoscopy: Not on file    Screening Current     Breast Cancer Screening Age: 36 years old  Frequency: every 1-2 years  Not required if history of left and right mastectomy Mammogram: 07/09/2020    Screening Current   Cervical Cancer Screening Between the ages of 21-29, pap smear recommended once every 3 years  Between the ages of 33-67, can perform pap smear with HPV co-testing every 5 years     Recommendations may differ for women with a history of total hysterectomy, cervical cancer, or abnormal pap smears in past  Pap Smear: Not on file    Screening Not Indicated   Hepatitis C Screening Once for adults born between 1945 and 1965  More frequently in patients at high risk for Hepatitis C Hep C Antibody: 07/08/2019    Screening Current   Diabetes Screening 1-2 times per year if you're at risk for diabetes or have pre-diabetes Fasting glucose: 80 mg/dL   A1C: No results in last 5 years    Screening Current   Cholesterol Screening Once every 5 years if you don't have a lipid disorder  May order more often based on risk factors  Lipid panel: 06/10/2021    Screening Not Indicated  History Lipid Disorder     Other Preventive Screenings Covered by Medicare:  11  Abdominal Aortic Aneurysm (AAA) Screening: covered once if your at risk  You're considered to be at risk if you have a family history of AAA  12  Lung Cancer Screening: covers low dose CT scan once per year if you meet all of the following conditions: (1) Age 50-69; (2) No signs or symptoms of lung cancer; (3) Current smoker or have quit smoking within the last 15 years; (4) You have a tobacco smoking history of at least 30 pack years (packs per day multiplied by number of years you smoked); (5) You get a written order from a healthcare provider  15  Glaucoma Screening: covered annually if you're considered high risk: (1) You have diabetes OR (2) Family history of glaucoma OR (3)  aged 48 and older OR (3)  American aged 72 and older  15  Osteoporosis Screening: covered every 2 years if you meet one of the following conditions: (1) You're estrogen deficient and at risk for osteoporosis based off medical history and other findings; (2) Have a vertebral abnormality; (3) On glucocorticoid therapy for more than 3 months; (4) Have primary hyperparathyroidism; (5) On osteoporosis medications and need to assess response to drug therapy  · Last bone density test (DXA Scan): 07/25/2018    15  HIV Screening: covered annually if you're between the age of 15-65  Also covered annually if you are younger than 13 and older than 72 with risk factors for HIV infection  For pregnant patients, it is covered up to 3 times per pregnancy  Immunizations:  Immunization Recommendations   Influenza Vaccine Annual influenza vaccination during flu season is recommended for all persons aged >= 6 months who do not have contraindications   Pneumococcal Vaccine (Prevnar and Pneumovax)  * Prevnar = PCV13  * Pneumovax = PPSV23   Adults 25-60 years old: 1-3 doses may be recommended based on certain risk factors  Adults 72 years old: Prevnar (PCV13) vaccine recommended followed by Pneumovax (PPSV23) vaccine  If already received PPSV23 since turning 65, then PCV13 recommended at least one year after PPSV23 dose  Hepatitis B Vaccine 3 dose series if at intermediate or high risk (ex: diabetes, end stage renal disease, liver disease)   Tetanus (Td) Vaccine - COST NOT COVERED BY MEDICARE PART B Following completion of primary series, a booster dose should be given every 10 years to maintain immunity against tetanus  Td may also be given as tetanus wound prophylaxis  Tdap Vaccine - COST NOT COVERED BY MEDICARE PART B Recommended at least once for all adults  For pregnant patients, recommended with each pregnancy  Shingles Vaccine (Shingrix) - COST NOT COVERED BY MEDICARE PART B  2 shot series recommended in those aged 48 and above     Health Maintenance Due:      Topic Date Due    Cervical Cancer Screening  07/02/2022 (Originally 9/4/1981)    MAMMOGRAM  07/09/2022    Colorectal Cancer Screening  01/07/2026    HIV Screening  Completed    Hepatitis C Screening  Completed     Immunizations Due:      Topic Date Due    Influenza Vaccine (1) 09/01/2021     Advance Directives   What are advance directives? Advance directives are legal documents that state your wishes and plans for medical care   These plans are made ahead of time in case you lose your ability to make decisions for yourself  Advance directives can apply to any medical decision, such as the treatments you want, and if you want to donate organs  What are the types of advance directives? There are many types of advance directives, and each state has rules about how to use them  You may choose a combination of any of the following:  · Living will: This is a written record of the treatment you want  You can also choose which treatments you do not want, which to limit, and which to stop at a certain time  This includes surgery, medicine, IV fluid, and tube feedings  · Durable power of  for healthcare [facility]): This is a written record that states who you want to make healthcare choices for you when you are unable to make them for yourself  This person, called a proxy, is usually a family member or a friend  You may choose more than 1 proxy  · Do not resuscitate (DNR) order:  A DNR order is used in case your heart stops beating or you stop breathing  It is a request not to have certain forms of treatment, such as CPR  A DNR order may be included in other types of advance directives  · Medical directive: This covers the care that you want if you are in a coma, near death, or unable to make decisions for yourself  You can list the treatments you want for each condition  Treatment may include pain medicine, surgery, blood transfusions, dialysis, IV or tube feedings, and a ventilator (breathing machine)  · Values history: This document has questions about your views, beliefs, and how you feel and think about life  This information can help others choose the care that you would choose  Why are advance directives important? An advance directive helps you control your care  Although spoken wishes may be used, it is better to have your wishes written down  Spoken wishes can be misunderstood, or not followed  Treatments may be given even if you do not want them   An advance directive may make it easier for your family to make difficult choices about your care  Urinary Incontinence   Urinary incontinence (UI)  is when you lose control of your bladder  UI develops because your bladder cannot store or empty urine properly  The 3 most common types of UI are stress incontinence, urge incontinence, or both  Medicines:   · May be given to help strengthen your bladder control  Report any side effects of medication to your healthcare provider  Do pelvic muscle exercises often:  Your pelvic muscles help you stop urinating  Squeeze these muscles tight for 5 seconds, then relax for 5 seconds  Gradually work up to squeezing for 10 seconds  Do 3 sets of 15 repetitions a day, or as directed  This will help strengthen your pelvic muscles and improve bladder control  Train your bladder:  Go to the bathroom at set times, such as every 2 hours, even if you do not feel the urge to go  You can also try to hold your urine when you feel the urge to go  For example, hold your urine for 5 minutes when you feel the urge to go  As that becomes easier, hold your urine for 10 minutes  Self-care:   · Keep a UI record  Write down how often you leak urine and how much you leak  Make a note of what you were doing when you leaked urine  · Drink liquids as directed  You may need to limit the amount of liquid you drink to help control your urine leakage  Do not drink any liquid right before you go to bed  Limit or do not have drinks that contain caffeine or alcohol  · Prevent constipation  Eat a variety of high-fiber foods  Good examples are high-fiber cereals, beans, vegetables, and whole-grain breads  Walking is the best way to trigger your intestines to have a bowel movement  · Exercise regularly and maintain a healthy weight  Weight loss and exercise will decrease pressure on your bladder and help you control your leakage  · Use a catheter as directed  to help empty your bladder   A catheter is a tiny, plastic tube that is put into your bladder to drain your urine  · Go to behavior therapy as directed  Behavior therapy may be used to help you learn to control your urge to urinate  Weight Management   Why it is important to manage your weight:  Being overweight increases your risk of health conditions such as heart disease, high blood pressure, type 2 diabetes, and certain types of cancer  It can also increase your risk for osteoarthritis, sleep apnea, and other respiratory problems  Aim for a slow, steady weight loss  Even a small amount of weight loss can lower your risk of health problems  How to lose weight safely:  A safe and healthy way to lose weight is to eat fewer calories and get regular exercise  You can lose up about 1 pound a week by decreasing the number of calories you eat by 500 calories each day  Healthy meal plan for weight management:  A healthy meal plan includes a variety of foods, contains fewer calories, and helps you stay healthy  A healthy meal plan includes the following:  · Eat whole-grain foods more often  A healthy meal plan should contain fiber  Fiber is the part of grains, fruits, and vegetables that is not broken down by your body  Whole-grain foods are healthy and provide extra fiber in your diet  Some examples of whole-grain foods are whole-wheat breads and pastas, oatmeal, brown rice, and bulgur  · Eat a variety of vegetables every day  Include dark, leafy greens such as spinach, kale, yoandy greens, and mustard greens  Eat yellow and orange vegetables such as carrots, sweet potatoes, and winter squash  · Eat a variety of fruits every day  Choose fresh or canned fruit (canned in its own juice or light syrup) instead of juice  Fruit juice has very little or no fiber  · Eat low-fat dairy foods  Drink fat-free (skim) milk or 1% milk  Eat fat-free yogurt and low-fat cottage cheese  Try low-fat cheeses such as mozzarella and other reduced-fat cheeses    · Choose meat and other protein foods that are low in fat  Choose beans or other legumes such as split peas or lentils  Choose fish, skinless poultry (chicken or turkey), or lean cuts of red meat (beef or pork)  Before you cook meat or poultry, cut off any visible fat  · Use less fat and oil  Try baking foods instead of frying them  Add less fat, such as margarine, sour cream, regular salad dressing and mayonnaise to foods  Eat fewer high-fat foods  Some examples of high-fat foods include french fries, doughnuts, ice cream, and cakes  · Eat fewer sweets  Limit foods and drinks that are high in sugar  This includes candy, cookies, regular soda, and sweetened drinks  Exercise:  Exercise at least 30 minutes per day on most days of the week  Some examples of exercise include walking, biking, dancing, and swimming  You can also fit in more physical activity by taking the stairs instead of the elevator or parking farther away from stores  Ask your healthcare provider about the best exercise plan for you  © Copyright [facility] 2018 Information is for End User's use only and may not be sold, redistributed or otherwise used for commercial purposes   All illustrations and images included in CareNotes® are the copyrighted property of [employer]  or [address] [place] [address]

## 2021-07-02 NOTE — TELEPHONE ENCOUNTER
Form dropped off by patient 07/02/21   Placed in the "forms" bin    [facility] Physical examination form

## 2021-07-02 NOTE — PROGRESS NOTES
Assessment and Plan:     Problem List Items Addressed This Visit     None           Preventive health issues were discussed with patient, and age appropriate screening tests were ordered as noted in patient's After Visit Summary  Personalized health advice and appropriate referrals for health education or preventive services given if needed, as noted in patient's After Visit Summary       History of Present Illness:     Patient presents for Medicare Annual Wellness visit    Patient Care Team:  [id number] [facility], FERNANDO as PCP - General (Family Medicine)  MD Ho Hendricks, LALA Carrasquillo     Problem List:     Patient Active Problem List   Diagnosis    Enlarged LA (left atrium)    H/O prolonged Q-T interval on ECG    Hypothyroidism    Hyperlipidemia    Vitamin D deficiency    Medication-induced movement disorder    Mental disability    Mood disorder (HCC)    Class 2 severe obesity due to excess calories with serious comorbidity and body mass index (BMI) of 37 0 to 37 9 in adult ([facility] [address] )    Overflow incontinence of urine    Severe anxiety    Atrial fibrillation (HCC)    Rash    Bilateral impacted cerumen    Acute pain of right knee      Past Medical and Surgical History:     Past Medical History:   Diagnosis Date    Anxiety disorder      Past Surgical History:   Procedure Laterality Date    HYSTERECTOMY      age 25   [de-identified]      AK CARDIOVERSION ELECTIVE ARRHYTHMIA EXTERNAL  8/23/2019           Family History:     Family History   Problem Relation Age of Onset    Other Mother         neglect or abandonment     Other Father         neglect or abandonment     No Known Problems Sister       Social History:     Social History     Socioeconomic History    Marital status: Single     Spouse name: None    Number of children: None    Years of education: None    Highest education level: None   Occupational History    None   Tobacco Use    Smoking status: Never Smoker    Smokeless tobacco: Never Used   Substance and Sexual Activity    Alcohol use: Never    Drug use: Never    Sexual activity: None   Other Topics Concern    None   Social History Narrative    Disabled    Social history reviewed unchanged      Social Determinants of Health     Financial Resource Strain:     Difficulty of Paying Living Expenses:    Food Insecurity:     Worried About Running Out of Food in the Last Year:     [address] in the Last Year:    Transportation Needs:     Lack of Transportation (Medical):  Lack of Transportation (Non-Medical):    Physical Activity:     Days of Exercise per Week:     Minutes of Exercise per Session:    Stress:     Feeling of Stress :    Social Connections:     Frequency of Communication with Friends and Family:     Frequency of Social Gatherings with Friends and Family:     Attends Orthodox Services:     Active Member of Clubs or Organizations:     Attends Club or Organization Meetings:     Marital Status:    Intimate Partner Violence:     Fear of Current or Ex-Partner:     Emotionally Abused:     Physically Abused:     Sexually Abused:       Medications and Allergies:     Current Outpatient Medications   Medication Sig Dispense Refill    acetaminophen (TYLENOL) 650 mg CR tablet Take 1 tablet (650 mg total) by mouth every 8 (eight) hours as needed for mild pain (or fever) 30 tablet 1    benzonatate (TESSALON) 200 MG capsule Take 1 capsule (200 mg total) by mouth 3 (three) times a day as needed for cough for up to 20 doses 20 capsule 0    busPIRone (BUSPAR) 10 mg tablet Take 1 tablet (10 mg total) by mouth 2 (two) times a day (Patient not taking: Reported on 6/9/2021) 60 tablet 1    Calcium 600-D 600-400 MG-UNIT TABS TAKE (1) TABLET BY MOUTH TWICE DAILY AT 8AM AND 5PM  62 tablet 5    cholecalciferol (VITAMIN D3) 400 units tablet TAKE 1 TABLET BY MOUTH THREE TIMES DAILY   93 tablet 2    diltiazem (CARDIZEM CD) 120 mg 24 hr capsule TAKE 1 CAPSULE BY MOUTH ONCE DAILY  30 capsule 5    docusate sodium (COLACE) 100 mg capsule Take 1 capsule (100 mg total) by mouth 2 (two) times a day If needed constipation 30 capsule 0    furosemide (LASIX) 20 mg tablet Take 1 tablet (20 mg total) by mouth daily as needed (edema) 30 tablet 11    hydrocortisone 1 % cream Apply topically 2 (two) times a day If needed for itchy rash 30 g 0    Ibuprofen 200 MG CAPS Take 1-2 tablets if needed for pain or fever every 6-8 hours 120 each 0    Incontinence Supply Disposable (WINGS CHOICE PLUS ADULT BRIEFS) MISC USE AS DIRECTED FOR INCONTINENCE 90 each 4    levothyroxine 100 mcg tablet TAKE (1) TABLET BY MOUTH ONCE DAILY  31 tablet 5    loperamide (IMODIUM A-D) 2 mg capsule Take 1 capsule (2 mg total) by mouth 4 (four) times a day as needed for diarrhea 30 capsule 0    neomycin-polymyxin b-bacitracin (NEOSPORIN) 3 5-400-5,000 APPLY TOPICALLY TO AFFECTED AREA FOUR TIMES A DAY AS NEEDED FOR WOUND CARE 28 4 g 0    Omega-3 Fatty Acids (fish oil) 1,000 mg TAKE (3) CAPSULES BY MOUTH ONCE DAILY  93 capsule 3    pravastatin (PRAVACHOL) 40 mg tablet TAKE (1) TABLET BY MOUTH ONCE DAILY  30 tablet 5    SODIUM FLUORIDE, DENTAL GEL, (SF 5000 PLUS) 1 1 % CREA SF 5000 Plus 1 1 % Dental Cream; 0; 22-Aug-2014; Active      Xarelto 20 MG tablet TAKE (1) TABLET BY MOUTH ONCE DAILY WITH BREAKFAST  31 tablet 0     No current facility-administered medications for this visit       No Known Allergies   Immunizations:     Immunization History   Administered Date(s) Administered    INFLUENZA 10/31/2016, 10/13/2017, 10/25/2017, 10/10/2018    Influenza, recombinant, quadrivalent,injectable, preservative free 11/15/2019, 10/14/2020    Influenza, seasonal, injectable 11/14/2014, 10/25/2017    SARS-CoV-2 / COVID-19 mRNA IM (Kourtney Li) 02/05/2021, 03/08/2021    Tdap 06/11/2013, 11/01/2017    Tuberculin Skin Test-PPD Intradermal 06/11/2013, 06/12/2015, 06/26/2017, 06/24/2019, 06/21/2021      [facility] Maintenance:         Topic Date Due    Cervical Cancer Screening  07/02/2022 (Originally 9/4/1981)    MAMMOGRAM  07/09/2022    Colorectal Cancer Screening  01/07/2026    HIV Screening  Completed    Hepatitis C Screening  Completed         Topic Date Due    Influenza Vaccine (1) 09/01/2021      Medicare Health Risk Assessment:     /74 (BP Location: Left arm, Patient Position: Sitting, Cuff Size: Standard)   Pulse 94   Temp 97 5 °F (36 4 °C) (Temporal)   Resp 18   Ht 5' 2" (1 575 m)   Wt 91 2 kg (201 lb)   SpO2 97%   BMI 36 76 kg/m²      Annual Wellness Visit    LALA Azar

## 2021-07-02 NOTE — PROGRESS NOTES
Assessment and Plan:     Problem List Items Addressed This Visit     None           Preventive health issues were discussed with patient, and age appropriate screening tests were ordered as noted in patient's After Visit Summary  Personalized health advice and appropriate referrals for health education or preventive services given if needed, as noted in patient's After Visit Summary       History of Present Illness:     Patient presents for Medicare Annual Wellness visit    Patient Care Team:  Sugar Abad PA-C as PCP - General (Family Medicine)  MD Ho Pollack PA-C Sharlett Bruckner, CRNP     Problem List:     Patient Active Problem List   Diagnosis    Enlarged LA (left atrium)    H/O prolonged Q-T interval on ECG    Hypothyroidism    Hyperlipidemia    Vitamin D deficiency    Medication-induced movement disorder    Mental disability    Mood disorder (HCC)    Class 2 severe obesity due to excess calories with serious comorbidity and body mass index (BMI) of 37 0 to 37 9 in adult ([facility] [address] )    Overflow incontinence of urine    Severe anxiety    Atrial fibrillation (HCC)    Rash    Bilateral impacted cerumen    Acute pain of right knee      Past Medical and Surgical History:     Past Medical History:   Diagnosis Date    Anxiety disorder      Past Surgical History:   Procedure Laterality Date    HYSTERECTOMY      age 25   [de-identified]      DE CARDIOVERSION ELECTIVE ARRHYTHMIA EXTERNAL  8/23/2019           Family History:     Family History   Problem Relation Age of Onset    Other Mother         neglect or abandonment     Other Father         neglect or abandonment     No Known Problems Sister       Social History:     Social History     Socioeconomic History    Marital status: Single     Spouse name: None    Number of children: None    Years of education: None    Highest education level: None   Occupational History    None   Tobacco Use    Smoking status: Never Smoker    Smokeless tobacco: Never Used   Substance and Sexual Activity    Alcohol use: Never    Drug use: Never    Sexual activity: None   Other Topics Concern    None   Social History Narrative    Disabled    Social history reviewed unchanged      Social Determinants of Health     Financial Resource Strain:     Difficulty of Paying Living Expenses:    Food Insecurity:     Worried About Running Out of Food in the Last Year:     [address] in the Last Year:    Transportation Needs:     Lack of Transportation (Medical):  Lack of Transportation (Non-Medical):    Physical Activity:     Days of Exercise per Week:     Minutes of Exercise per Session:    Stress:     Feeling of Stress :    Social Connections:     Frequency of Communication with Friends and Family:     Frequency of Social Gatherings with Friends and Family:     Attends Religion Services:     Active Member of Clubs or Organizations:     Attends Club or Organization Meetings:     Marital Status:    Intimate Partner Violence:     Fear of Current or Ex-Partner:     Emotionally Abused:     Physically Abused:     Sexually Abused:       Medications and Allergies:     Current Outpatient Medications   Medication Sig Dispense Refill    acetaminophen (TYLENOL) 650 mg CR tablet Take 1 tablet (650 mg total) by mouth every 8 (eight) hours as needed for mild pain (or fever) 30 tablet 1    benzonatate (TESSALON) 200 MG capsule Take 1 capsule (200 mg total) by mouth 3 (three) times a day as needed for cough for up to 20 doses 20 capsule 0    busPIRone (BUSPAR) 10 mg tablet Take 1 tablet (10 mg total) by mouth 2 (two) times a day (Patient not taking: Reported on 6/9/2021) 60 tablet 1    Calcium 600-D 600-400 MG-UNIT TABS TAKE (1) TABLET BY MOUTH TWICE DAILY AT 8AM AND 5PM  62 tablet 5    cholecalciferol (VITAMIN D3) 400 units tablet TAKE 1 TABLET BY MOUTH THREE TIMES DAILY   93 tablet 2    diltiazem (CARDIZEM CD) 120 mg 24 hr capsule TAKE 1 CAPSULE BY MOUTH ONCE DAILY  30 capsule 5    docusate sodium (COLACE) 100 mg capsule Take 1 capsule (100 mg total) by mouth 2 (two) times a day If needed constipation 30 capsule 0    furosemide (LASIX) 20 mg tablet Take 1 tablet (20 mg total) by mouth daily as needed (edema) 30 tablet 11    hydrocortisone 1 % cream Apply topically 2 (two) times a day If needed for itchy rash 30 g 0    Ibuprofen 200 MG CAPS Take 1-2 tablets if needed for pain or fever every 6-8 hours 120 each 0    Incontinence Supply Disposable (WINGS CHOICE PLUS ADULT BRIEFS) MISC USE AS DIRECTED FOR INCONTINENCE 90 each 4    levothyroxine 100 mcg tablet TAKE (1) TABLET BY MOUTH ONCE DAILY  31 tablet 5    loperamide (IMODIUM A-D) 2 mg capsule Take 1 capsule (2 mg total) by mouth 4 (four) times a day as needed for diarrhea 30 capsule 0    neomycin-polymyxin b-bacitracin (NEOSPORIN) 3 5-400-5,000 APPLY TOPICALLY TO AFFECTED AREA FOUR TIMES A DAY AS NEEDED FOR WOUND CARE 28 4 g 0    Omega-3 Fatty Acids (fish oil) 1,000 mg TAKE (3) CAPSULES BY MOUTH ONCE DAILY  93 capsule 3    pravastatin (PRAVACHOL) 40 mg tablet TAKE (1) TABLET BY MOUTH ONCE DAILY  30 tablet 5    SODIUM FLUORIDE, DENTAL GEL, (SF 5000 PLUS) 1 1 % CREA SF 5000 Plus 1 1 % Dental Cream; 0; 22-Aug-2014; Active      Xarelto 20 MG tablet TAKE (1) TABLET BY MOUTH ONCE DAILY WITH BREAKFAST  31 tablet 0     No current facility-administered medications for this visit       No Known Allergies   Immunizations:     Immunization History   Administered Date(s) Administered    INFLUENZA 10/31/2016, 10/13/2017, 10/25/2017, 10/10/2018    Influenza, recombinant, quadrivalent,injectable, preservative free 11/15/2019, 10/14/2020    Influenza, seasonal, injectable 11/14/2014, 10/25/2017    SARS-CoV-2 / COVID-19 mRNA IM ([facility]) 02/05/2021, 03/08/2021    Tdap 06/11/2013, 11/01/2017    Tuberculin Skin Test-PPD Intradermal 06/11/2013, 06/12/2015, 06/26/2017, 06/24/2019, 06/21/2021      [facility] Maintenance:         Topic Date Due    Cervical Cancer Screening  Never done    MAMMOGRAM  07/09/2022    Colorectal Cancer Screening  01/07/2026    HIV Screening  Completed    Hepatitis C Screening  Completed         Topic Date Due    Influenza Vaccine (1) 09/01/2021      Medicare Health Risk Assessment:     /74 (BP Location: Left arm, Patient Position: Sitting, Cuff Size: Standard)   Pulse 94   Temp 97 5 °F (36 4 °C) (Temporal)   Resp 18   Ht 5' 2" (1 575 m)   Wt 91 2 kg (201 lb)   SpO2 97%   BMI 36 76 kg/m²      Cindy Jimenez is here for her Subsequent Wellness visit  Health Risk Assessment:   Patient rates overall health as fair  Patient feels that their physical health rating is same  Patient is satisfied with their life  Eyesight was rated as same  Hearing was rated as same  Patient feels that their emotional and mental health rating is slightly better  Patients states they are never, rarely angry  Patient states they are never, rarely unusually tired/fatigued  Pain experienced in the last 7 days has been none  Patient states that she has experienced no weight loss or gain in last 6 months  Fall Risk Screening: In the past year, patient has experienced: no history of falling in past year      Urinary Incontinence Screening:   Patient has leaked urine accidently in the last six months  Home Safety:  Patient does not have trouble with stairs inside or outside of their home  Patient has working smoke alarms and has working carbon monoxide detector  Home safety hazards include: none  Nutrition:   Current diet is Regular  Medications:   Patient is not currently taking any over-the-counter supplements  Patient is not able to manage medications  Activities of Daily Living (ADLs)/Instrumental Activities of Daily Living (IADLs):   Walk and transfer into and out of bed and chair?: Yes  Dress and groom yourself?: Yes    Bathe or shower yourself?: Yes    Feed yourself?  Yes  Do your laundry/housekeeping?: Yes  Manage your money, pay your bills and track your expenses?: No  Make your own meals?: Yes    Do your own shopping?: No    Previous Hospitalizations:   Any hospitalizations or ED visits within the last 12 months?: No      Advance Care Planning:   Living will: No    Durable POA for healthcare: Yes    Advanced directive: No    Advanced directive counseling given: Yes    End of Life Decisions reviewed with patient: Yes      Comments: Group home conducting advanced directive counseling with family  Cognitive Screening:   Provider or family/friend/caregiver concerned regarding cognition?: No    PREVENTIVE SCREENINGS      Cardiovascular Screening:    General: Screening Not Indicated and History Lipid Disorder      Diabetes Screening:     General: Screening Current      Colorectal Cancer Screening:     General: Screening Current      Breast Cancer Screening:     General: Screening Current      Cervical Cancer Screening:    General: Screening Not Indicated      Abdominal Aortic Aneurysm (AAA) Screening:        General: Screening Not Indicated      Lung Cancer Screening:     General: Screening Not Indicated      Hepatitis C Screening:    General: Screening Current    Screening, Brief Intervention, and Referral to Treatment (SBIRT)    Screening  Typical number of drinks in a day: 0  Typical number of drinks in a week: 0  Interpretation: Low risk drinking behavior  Other Counseling Topics:   Car/seat belt/driving safety, skin self-exam, sunscreen and calcium and vitamin D intake and regular weightbearing exercise         LALA Bates

## 2021-07-06 NOTE — PROGRESS NOTES
Assessment and Plan:     Problem List Items Addressed This Visit        Other    Class 2 severe obesity due to excess calories with serious comorbidity and body mass index (BMI) of 37 0 to 37 9 in [facility])     Body mass index is 36 76 kg/m²  BMI above normal  She has lost weight sticking to a 2,000 calorie diet and begun daily walks  Continue with 2,000 calorie diet restriction and continue exercise  Avoid sugar/carbs within that 2,000 calorie diet  Other Visit Diagnoses     Medicare annual wellness visit, subsequent    -  Primary           Preventive health issues were discussed with patient, and age appropriate screening tests were ordered as noted in patient's After Visit Summary  Personalized health advice and appropriate referrals for health education or preventive services given if needed, as noted in patient's After Visit Summary  History of Present Illness:     Patient presents for Medicare Annual Wellness visit  She is accompanied by  from the group home at which she lives  Her sister is her power of   The  states that they have been restricting her to a 2000 calorie diet per instructions from PCP at last encounter  She states that the patient has learned portion control and is now making meals herself that are appropriate and within this goal   She has begun daily walks  She has no new health concerns at this time and is stable on her current medication regimen      Patient Care Team:  [address] [place] [address], PAANAHI as PCP - General (Family Medicine)  MD Ho Monzon PA-C Roena Mills, CRNP     Problem List:     Patient Active Problem List   Diagnosis    Enlarged LA (left atrium)    H/O prolonged Q-T interval on ECG    Hypothyroidism    Hyperlipidemia    Vitamin D deficiency    Medication-induced movement disorder    Mental disability    Mood disorder ([facility] [address] )    Class 2 severe obesity due to excess calories with serious comorbidity and body mass index (BMI) of 37 0 to 37 9 in adult (HCC)    Overflow incontinence of urine    Severe anxiety    Atrial fibrillation (HCC)    Rash    Bilateral impacted cerumen    Acute pain of right knee      Past Medical and Surgical History:     Past Medical History:   Diagnosis Date    Anxiety disorder      Past Surgical History:   Procedure Laterality Date    HYSTERECTOMY      age 25   [de-identified]      MO CARDIOVERSION ELECTIVE ARRHYTHMIA EXTERNAL  8/23/2019           Family History:     Family History   Problem Relation Age of Onset    Other Mother         neglect or abandonment     Other Father         neglect or abandonment     No Known Problems Sister       Social History:     Social History     Socioeconomic History    Marital status: Single     Spouse name: None    Number of children: None    Years of education: None    Highest education level: None   Occupational History    None   Tobacco Use    Smoking status: Never Smoker    Smokeless tobacco: Never Used   Substance and Sexual Activity    Alcohol use: Never    Drug use: Never    Sexual activity: None   Other Topics Concern    None   Social History Narrative    Disabled    Social history reviewed unchanged      Social Determinants of Health     Financial Resource Strain:     Difficulty of Paying Living Expenses:    Food Insecurity:     Worried About Running Out of Food in the Last Year:     Ran Out of Food in the Last Year:    Transportation Needs:     Lack of Transportation (Medical):      Lack of Transportation (Non-Medical):    Physical Activity:     Days of Exercise per Week:     Minutes of Exercise per Session:    Stress:     Feeling of Stress :    Social Connections:     Frequency of Communication with Friends and Family:     Frequency of Social Gatherings with Friends and Family:     Attends [employer] Services:     Active Member of Clubs or Organizations:     Attends Club or Organization Meetings:     Marital Status:    Intimate Partner Violence:     Fear of Current or Ex-Partner:     Emotionally Abused:     Physically Abused:     Sexually Abused:       Medications and Allergies:     Current Outpatient Medications   Medication Sig Dispense Refill    acetaminophen (TYLENOL) 650 mg CR tablet Take 1 tablet (650 mg total) by mouth every 8 (eight) hours as needed for mild pain (or fever) 30 tablet 1    benzonatate (TESSALON) 200 MG capsule Take 1 capsule (200 mg total) by mouth 3 (three) times a day as needed for cough for up to 20 doses 20 capsule 0    busPIRone (BUSPAR) 10 mg tablet Take 1 tablet (10 mg total) by mouth 2 (two) times a day (Patient not taking: Reported on 6/9/2021) 60 tablet 1    Calcium 600-D 600-400 MG-UNIT TABS TAKE (1) TABLET BY MOUTH TWICE DAILY AT 8AM AND 5PM  62 tablet 5    cholecalciferol (VITAMIN D3) 400 units tablet TAKE 1 TABLET BY MOUTH THREE TIMES DAILY  93 tablet 2    diltiazem (CARDIZEM CD) 120 mg 24 hr capsule TAKE 1 CAPSULE BY MOUTH ONCE DAILY  30 capsule 5    docusate sodium (COLACE) 100 mg capsule Take 1 capsule (100 mg total) by mouth 2 (two) times a day If needed constipation 30 capsule 0    furosemide (LASIX) 20 mg tablet Take 1 tablet (20 mg total) by mouth daily as needed (edema) 30 tablet 11    hydrocortisone 1 % cream Apply topically 2 (two) times a day If needed for itchy rash 30 g 0    Ibuprofen 200 MG CAPS Take 1-2 tablets if needed for pain or fever every 6-8 hours 120 each 0    Incontinence Supply Disposable (WINGS CHOICE PLUS ADULT BRIEFS) MISC USE AS DIRECTED FOR INCONTINENCE 90 each 4    levothyroxine 100 mcg tablet TAKE (1) TABLET BY MOUTH ONCE DAILY   31 tablet 5    loperamide (IMODIUM A-D) 2 mg capsule Take 1 capsule (2 mg total) by mouth 4 (four) times a day as needed for diarrhea 30 capsule 0    neomycin-polymyxin b-bacitracin (NEOSPORIN) 3 5-400-5,000 APPLY TOPICALLY TO AFFECTED AREA FOUR TIMES A DAY AS NEEDED FOR WOUND CARE 28 4 g 0    Omega-3 Fatty Acids (fish oil) 1,000 mg TAKE (3) CAPSULES BY MOUTH ONCE DAILY  93 capsule 3    pravastatin (PRAVACHOL) 40 mg tablet TAKE (1) TABLET BY MOUTH ONCE DAILY  30 tablet 5    SODIUM FLUORIDE, DENTAL GEL, (SF 5000 PLUS) 1 1 % CREA SF 5000 Plus 1 1 % Dental Cream; 0; 22-Aug-2014; Active      Xarelto 20 MG tablet TAKE (1) TABLET BY MOUTH ONCE DAILY WITH BREAKFAST  31 tablet 0     No current facility-administered medications for this visit  No Known Allergies   Immunizations:     Immunization History   Administered Date(s) Administered    INFLUENZA 10/31/2016, 10/13/2017, 10/25/2017, 10/10/2018    Influenza, recombinant, quadrivalent,injectable, preservative free 11/15/2019, 10/14/2020    Influenza, seasonal, injectable 11/14/2014, 10/25/2017    SARS-CoV-2 / COVID-19 mRNA IM ([facility]) 02/05/2021, 03/08/2021    Tdap 06/11/2013, 11/01/2017    Tuberculin Skin Test-PPD Intradermal 06/11/2013, 06/12/2015, 06/26/2017, 06/24/2019, 06/21/2021      Health Maintenance:         Topic Date Due    Cervical Cancer Screening  07/02/2022 (Originally 9/4/1981)    MAMMOGRAM  07/09/2022    Colorectal Cancer Screening  01/07/2026    HIV Screening  Completed    Hepatitis C Screening  Completed         Topic Date Due    Influenza Vaccine (1) 09/01/2021      Medicare Health Risk Assessment:     /74 (BP Location: Left arm, Patient Position: Sitting, Cuff Size: Standard)   Pulse 94   Temp 97 5 °F (36 4 °C) (Temporal)   Resp 18   Ht 5' 2" (1 575 m)   Wt 91 2 kg (201 lb)   SpO2 97%   BMI 36 76 kg/m²      Griselda Adam is here for her Subsequent Wellness visit  Health Risk Assessment:   Patient rates overall health as very good  Patient feels that their physical health rating is slightly better  Patient is satisfied with their life  Eyesight was rated as same  Hearing was rated as same  Patient feels that their emotional and mental health rating is slightly better  Patients states they are never, rarely angry   Patient states they are sometimes unusually tired/fatigued  Pain experienced in the last 7 days has been none  Patient states that she has experienced no weight loss or gain in last 6 months  Fall Risk Screening: In the past year, patient has experienced: no history of falling in past year      Urinary Incontinence Screening:   Patient has leaked urine accidently in the last six months  Once in last 6 months  Controlled per patient and   Home Safety:  Patient does not have trouble with stairs inside or outside of their home  Patient has working smoke alarms and has working carbon monoxide detector  Nutrition:   Current diet is Regular  Medications:   Patient is not currently taking any over-the-counter supplements  Patient is not able to manage medications  Activities of Daily Living (ADLs)/Instrumental Activities of Daily Living (IADLs):   Walk and transfer into and out of bed and chair?: Yes  Dress and groom yourself?: Yes    Bathe or shower yourself?: Yes    Feed yourself? Yes  Do your laundry/housekeeping?: Yes  Manage your money, pay your bills and track your expenses?: Yes  Make your own meals?: Yes    Do your own shopping?: Yes    Advance Care Planning:   Living will: Yes    Durable POA for healthcare:  Yes    Advanced directive: Yes    Advanced directive counseling given: Yes    End of Life Decisions reviewed with patient: Yes      Comments: Group home conducting advanced directive counseling with family    PREVENTIVE SCREENINGS      Cardiovascular Screening:    General: Screening Not Indicated and History Lipid Disorder      Diabetes Screening:     General: Screening Current      Colorectal Cancer Screening:     General: Screening Current      Breast Cancer Screening:     General: Screening Current      Cervical Cancer Screening:    General: Screening Not Indicated      Osteoporosis Screening:    General: Screening Not Indicated      Abdominal Aortic Aneurysm (AAA) Screening: General: Screening Not Indicated      Lung Cancer Screening:     General: Screening Not Indicated      Hepatitis C Screening:    General: Screening Current    Screening, Brief Intervention, and Referral to Treatment (SBIRT)    Screening  Typical number of drinks in a day: 0  Typical number of drinks in a week: 0  Interpretation: Low risk drinking behavior  Single Item Drug Screening:  How often have you used an illegal drug (including marijuana) or a prescription medication for non-medical reasons in the past year? never    Single Item Drug Screen Score: 0  Interpretation: Negative screen for possible drug use disorder    Other Counseling Topics:   Skin self-exam, sunscreen and calcium and vitamin D intake and regular weightbearing exercise         LALA Matrinez

## 2021-07-06 NOTE — ASSESSMENT & PLAN NOTE
Body mass index is 36 76 kg/m²  BMI above normal  She has lost weight sticking to a 2,000 calorie diet and begun daily walks  Continue with 2,000 calorie diet restriction and continue exercise  Avoid sugar/carbs within that 2,000 calorie diet

## 2021-07-15 ENCOUNTER — HOSPITAL ENCOUNTER (OUTPATIENT)
Dept: RADIOLOGY | Age: 61
Discharge: HOME | End: 2021-07-15
Payer: COMMERCIAL

## 2021-07-15 VITALS — HEIGHT: 62 IN | WEIGHT: 200 LBS | BODY MASS INDEX: 36.8 KG/M2

## 2021-07-15 DIAGNOSIS — Z12.31 ENCOUNTER FOR SCREENING MAMMOGRAM FOR MALIGNANT NEOPLASM OF BREAST: ICD-10-CM

## 2021-07-15 PROCEDURE — 77067 SCR MAMMO BI INCL CAD: CPT

## 2021-07-15 PROCEDURE — 77063 BREAST TOMOSYNTHESIS BI: CPT

## 2021-07-26 ENCOUNTER — HOSPITAL ENCOUNTER (OUTPATIENT)
Dept: NON INVASIVE DIAGNOSTICS | Facility: CLINIC | Age: 61
Discharge: HOME | End: 2021-07-26
Payer: COMMERCIAL

## 2021-07-26 DIAGNOSIS — R07.9 CHEST PAIN, UNSPECIFIED TYPE: ICD-10-CM

## 2021-07-26 DIAGNOSIS — I48.91 ATRIAL FIBRILLATION, UNSPECIFIED TYPE (HCC): ICD-10-CM

## 2021-07-26 PROCEDURE — 93351 STRESS TTE COMPLETE: CPT | Performed by: INTERNAL MEDICINE

## 2021-07-26 PROCEDURE — 93350 STRESS TTE ONLY: CPT

## 2021-07-27 LAB
MAX DIASTOLIC BP: 78 MMHG
MAX HEART RATE: 157 BPM
MAX PREDICTED HEART RATE: 160 BPM
MAX. SYSTOLIC BP: 142 MMHG
PROTOCOL NAME: NORMAL
TARGET HR FORMULA: NORMAL
TEST INDICATION: NORMAL
TIME IN EXERCISE PHASE: NORMAL

## 2021-08-03 DIAGNOSIS — I48.91 NEW ONSET ATRIAL FIBRILLATION (HCC): ICD-10-CM

## 2021-08-05 RX ORDER — RIVAROXABAN 20 MG/1
TABLET, FILM COATED ORAL
Qty: 30 TABLET | Refills: 0 | Status: SHIPPED | OUTPATIENT
Start: 2021-08-05 | End: 2021-09-02

## 2021-09-01 DIAGNOSIS — E55.9 VITAMIN D DEFICIENCY: ICD-10-CM

## 2021-09-01 DIAGNOSIS — I48.91 NEW ONSET ATRIAL FIBRILLATION (HCC): ICD-10-CM

## 2021-09-01 DIAGNOSIS — E78.5 HYPERLIPIDEMIA, UNSPECIFIED HYPERLIPIDEMIA TYPE: ICD-10-CM

## 2021-09-02 RX ORDER — PRAVASTATIN SODIUM 40 MG
TABLET ORAL
Qty: 31 TABLET | Refills: 0 | Status: SHIPPED | OUTPATIENT
Start: 2021-09-02 | End: 2021-10-04 | Stop reason: SDUPTHER

## 2021-09-02 RX ORDER — OMEGA-3S/DHA/EPA/FISH OIL/D3 300MG-1000
CAPSULE ORAL
Qty: 93 TABLET | Refills: 0 | Status: SHIPPED | OUTPATIENT
Start: 2021-09-02 | End: 2021-10-04 | Stop reason: SDUPTHER

## 2021-09-02 RX ORDER — RIVAROXABAN 20 MG/1
TABLET, FILM COATED ORAL
Qty: 31 TABLET | Refills: 0 | Status: SHIPPED | OUTPATIENT
Start: 2021-09-02 | End: 2021-10-05

## 2021-09-20 ENCOUNTER — APPOINTMENT (EMERGENCY)
Dept: CT IMAGING | Facility: HOSPITAL | Age: 61
End: 2021-09-20
Payer: COMMERCIAL

## 2021-09-20 ENCOUNTER — HOSPITAL ENCOUNTER (EMERGENCY)
Facility: HOSPITAL | Age: 61
Discharge: HOME | End: 2021-09-20
Attending: EMERGENCY MEDICINE | Admitting: EMERGENCY MEDICINE
Payer: COMMERCIAL

## 2021-09-20 VITALS
DIASTOLIC BLOOD PRESSURE: 62 MMHG | SYSTOLIC BLOOD PRESSURE: 143 MMHG | OXYGEN SATURATION: 98 % | HEART RATE: 66 BPM | TEMPERATURE: 98.7 F | RESPIRATION RATE: 16 BRPM

## 2021-09-20 DIAGNOSIS — R31.9 HEMATURIA: ICD-10-CM

## 2021-09-20 DIAGNOSIS — N20.0 KIDNEY STONE: Primary | ICD-10-CM

## 2021-09-20 DIAGNOSIS — N17.9 AKI (ACUTE KIDNEY INJURY) (HCC): ICD-10-CM

## 2021-09-20 LAB
ALBUMIN SERPL BCP-MCNC: 3.8 G/DL (ref 3.5–5)
ALP SERPL-CCNC: 126 U/L (ref 46–116)
ALT SERPL W P-5'-P-CCNC: 19 U/L (ref 12–78)
ANION GAP SERPL CALCULATED.3IONS-SCNC: 12 MMOL/L (ref 4–13)
AST SERPL W P-5'-P-CCNC: 15 U/L (ref 5–45)
BACTERIA UR QL AUTO: ABNORMAL /HPF
BASOPHILS # BLD AUTO: 0.04 THOUSANDS/ΜL (ref 0–0.1)
BASOPHILS NFR BLD AUTO: 0 % (ref 0–1)
BILIRUB SERPL-MCNC: 1.64 MG/DL (ref 0.2–1)
BILIRUB UR QL STRIP: NEGATIVE
BUN SERPL-MCNC: 25 MG/DL (ref 5–25)
CALCIUM SERPL-MCNC: 9.2 MG/DL (ref 8.3–10.1)
CHLORIDE SERPL-SCNC: 104 MMOL/L (ref 100–108)
CLARITY UR: ABNORMAL
CO2 SERPL-SCNC: 25 MMOL/L (ref 21–32)
COLOR UR: YELLOW
CREAT SERPL-MCNC: 1.41 MG/DL (ref 0.6–1.3)
EOSINOPHIL # BLD AUTO: 0.04 THOUSAND/ΜL (ref 0–0.61)
EOSINOPHIL NFR BLD AUTO: 0 % (ref 0–6)
ERYTHROCYTE [DISTWIDTH] IN BLOOD BY AUTOMATED COUNT: 13.2 % (ref 11.6–15.1)
GFR SERPL CREATININE-BSD FRML MDRD: 40 ML/MIN/1.73SQ M
GLUCOSE SERPL-MCNC: 118 MG/DL (ref 65–140)
GLUCOSE UR STRIP-MCNC: NEGATIVE MG/DL
HCT VFR BLD AUTO: 46.2 % (ref 34.8–46.1)
HGB BLD-MCNC: 14.7 G/DL (ref 11.5–15.4)
HGB UR QL STRIP.AUTO: ABNORMAL
IMM GRANULOCYTES # BLD AUTO: 0.06 THOUSAND/UL (ref 0–0.2)
IMM GRANULOCYTES NFR BLD AUTO: 1 % (ref 0–2)
KETONES UR STRIP-MCNC: NEGATIVE MG/DL
LEUKOCYTE ESTERASE UR QL STRIP: ABNORMAL
LIPASE SERPL-CCNC: 52 U/L (ref 73–393)
LYMPHOCYTES # BLD AUTO: 1.34 THOUSANDS/ΜL (ref 0.6–4.47)
LYMPHOCYTES NFR BLD AUTO: 11 % (ref 14–44)
MCH RBC QN AUTO: 29.2 PG (ref 26.8–34.3)
MCHC RBC AUTO-ENTMCNC: 31.8 G/DL (ref 31.4–37.4)
MCV RBC AUTO: 92 FL (ref 82–98)
MONOCYTES # BLD AUTO: 0.62 THOUSAND/ΜL (ref 0.17–1.22)
MONOCYTES NFR BLD AUTO: 5 % (ref 4–12)
MUCOUS THREADS UR QL AUTO: ABNORMAL
NEUTROPHILS # BLD AUTO: 9.76 THOUSANDS/ΜL (ref 1.85–7.62)
NEUTS SEG NFR BLD AUTO: 83 % (ref 43–75)
NITRITE UR QL STRIP: NEGATIVE
NON-SQ EPI CELLS URNS QL MICRO: ABNORMAL /HPF
NRBC BLD AUTO-RTO: 0 /100 WBCS
OTHER STN SPEC: ABNORMAL
PH UR STRIP.AUTO: 6.5 [PH]
PLATELET # BLD AUTO: 155 THOUSANDS/UL (ref 149–390)
PMV BLD AUTO: 11 FL (ref 8.9–12.7)
POTASSIUM SERPL-SCNC: 3.6 MMOL/L (ref 3.5–5.3)
PROT SERPL-MCNC: 7.9 G/DL (ref 6.4–8.2)
PROT UR STRIP-MCNC: ABNORMAL MG/DL
RBC # BLD AUTO: 5.04 MILLION/UL (ref 3.81–5.12)
RBC #/AREA URNS AUTO: ABNORMAL /HPF
SODIUM SERPL-SCNC: 141 MMOL/L (ref 136–145)
SP GR UR STRIP.AUTO: 1.01 (ref 1–1.03)
UROBILINOGEN UR QL STRIP.AUTO: 0.2 E.U./DL
WBC # BLD AUTO: 11.86 THOUSAND/UL (ref 4.31–10.16)
WBC #/AREA URNS AUTO: ABNORMAL /HPF

## 2021-09-20 PROCEDURE — 96374 THER/PROPH/DIAG INJ IV PUSH: CPT

## 2021-09-20 PROCEDURE — 80053 COMPREHEN METABOLIC PANEL: CPT | Performed by: EMERGENCY MEDICINE

## 2021-09-20 PROCEDURE — G1004 CDSM NDSC: HCPCS

## 2021-09-20 PROCEDURE — 74176 CT ABD & PELVIS W/O CONTRAST: CPT

## 2021-09-20 PROCEDURE — 83690 ASSAY OF LIPASE: CPT | Performed by: EMERGENCY MEDICINE

## 2021-09-20 PROCEDURE — 99283 EMERGENCY DEPT VISIT LOW MDM: CPT | Performed by: EMERGENCY MEDICINE

## 2021-09-20 PROCEDURE — 96361 HYDRATE IV INFUSION ADD-ON: CPT

## 2021-09-20 PROCEDURE — 81001 URINALYSIS AUTO W/SCOPE: CPT | Performed by: EMERGENCY MEDICINE

## 2021-09-20 PROCEDURE — 36415 COLL VENOUS BLD VENIPUNCTURE: CPT | Performed by: EMERGENCY MEDICINE

## 2021-09-20 PROCEDURE — 99284 EMERGENCY DEPT VISIT MOD MDM: CPT

## 2021-09-20 PROCEDURE — 85025 COMPLETE CBC W/AUTO DIFF WBC: CPT | Performed by: EMERGENCY MEDICINE

## 2021-09-20 RX ORDER — ONDANSETRON 4 MG/1
4 TABLET, ORALLY DISINTEGRATING ORAL EVERY 6 HOURS PRN
Qty: 20 TABLET | Refills: 0 | Status: SHIPPED | OUTPATIENT
Start: 2021-09-20 | End: 2021-12-13 | Stop reason: ALTCHOICE

## 2021-09-20 RX ORDER — ONDANSETRON 2 MG/ML
4 INJECTION INTRAMUSCULAR; INTRAVENOUS ONCE
Status: COMPLETED | OUTPATIENT
Start: 2021-09-20 | End: 2021-09-20

## 2021-09-20 RX ORDER — ONDANSETRON 2 MG/ML
4 INJECTION INTRAMUSCULAR; INTRAVENOUS ONCE
Status: DISCONTINUED | OUTPATIENT
Start: 2021-09-20 | End: 2021-09-20

## 2021-09-20 RX ORDER — TAMSULOSIN HYDROCHLORIDE 0.4 MG/1
0.4 CAPSULE ORAL
Qty: 14 CAPSULE | Refills: 0 | Status: SHIPPED | OUTPATIENT
Start: 2021-09-20 | End: 2021-12-13 | Stop reason: ALTCHOICE

## 2021-09-20 RX ADMIN — SODIUM CHLORIDE 1000 ML: 0.9 INJECTION, SOLUTION INTRAVENOUS at 10:16

## 2021-09-20 RX ADMIN — ONDANSETRON 4 MG: 2 INJECTION INTRAMUSCULAR; INTRAVENOUS at 10:13

## 2021-09-20 NOTE — ED PROVIDER NOTES
History  Chief Complaint   Patient presents with    Blood in Urine     pt from group home via ems for blood in urine  group home staff noted "blood in toilet this morning after she used the bathroom"  Patient is a 60-year-old female with a history intellectual disability and atrial fibrillation on Xarelto who presents with blood in her urine  Patient resides at a group home and staff noted blood in the toilet this morning  Patient is unable to provide any additional history at this time and staff is present  Patient has no complaints at this time  She is on Xarelto daily  Staff from living facility presented to the emergency department and provided some additional history  She states that patient was complaining of not feeling well this morning, which is unlike her  She then had an episode of vomiting in the bathroom  She vomited on the floor and appeared nonbloody according to staff  However when they looked in the toilet they noted dark blood within the toilet bowl  It was unclear to them whether it had come from her urine  But she has a hysterectomy so they assumed it was not vaginal bleeding  History provided by:  EMS personnel  Blood in Urine  This is a new problem  The current episode started today  She describes the hematuria as gross hematuria  Pertinent negatives include no abdominal pain, chills, dysuria, fever, nausea or vomiting  Prior to Admission Medications   Prescriptions Last Dose Informant Patient Reported? Taking? Calcium 600-D 600-400 MG-UNIT TABS   No Yes   Sig: TAKE (1) TABLET BY MOUTH TWICE DAILY AT 8AM AND 5PM    Ibuprofen 200 MG CAPS  Care Giver No Yes   Sig: Take 1-2 tablets if needed for pain or fever every 6-8 hours   Incontinence Supply Disposable (WINGS CHOICE PLUS ADULT BRIEFS) MISC  Care Giver No No   Sig: USE AS DIRECTED FOR INCONTINENCE   Omega-3 Fatty Acids (fish oil) 1,000 mg  Care Giver No Yes   Sig: TAKE (3) CAPSULES BY MOUTH ONCE DAILY     SODIUM FLUORIDE, DENTAL GEL, (SF 5000 PLUS) 1 1 % CREA  Care Giver Yes No   Sig: SF 5000 Plus 1 1 % Dental Cream; 0; 22-Aug-2014; Active   Xarelto 20 MG tablet   No Yes   Sig: TAKE (1) TABLET BY MOUTH ONCE DAILY WITH BREAKFAST  acetaminophen (TYLENOL) 650 mg CR tablet  Care Giver No Yes   Sig: Take 1 tablet (650 mg total) by mouth every 8 (eight) hours as needed for mild pain (or fever)   benzonatate (TESSALON) 200 MG capsule  Care Giver No Yes   Sig: Take 1 capsule (200 mg total) by mouth 3 (three) times a day as needed for cough for up to 20 doses   busPIRone (BUSPAR) 10 mg tablet Not Taking at Unknown time Care Giver No No   Sig: Take 1 tablet (10 mg total) by mouth 2 (two) times a day   Patient not taking: Reported on 6/9/2021   cholecalciferol (VITAMIN D3) 400 units tablet   No Yes   Sig: TAKE 1 TABLET BY MOUTH THREE TIMES DAILY  diltiazem (CARDIZEM CD) 120 mg 24 hr capsule  Care Giver No Yes   Sig: TAKE 1 CAPSULE BY MOUTH ONCE DAILY  docusate sodium (COLACE) 100 mg capsule  Care Giver No Yes   Sig: Take 1 capsule (100 mg total) by mouth 2 (two) times a day If needed constipation   furosemide (LASIX) 20 mg tablet  Care Giver No Yes   Sig: Take 1 tablet (20 mg total) by mouth daily as needed (edema)   hydrocortisone-aloe 1 % cream   No Yes   Sig: APPLY TO AFFECTED AREA TWICE A DAY AS NEEDED FOR ITCH AND RASH    levothyroxine 100 mcg tablet   No Yes   Sig: TAKE (1) TABLET BY MOUTH ONCE DAILY  loperamide (IMODIUM A-D) 2 mg capsule  Care Giver No Yes   Sig: Take 1 capsule (2 mg total) by mouth 4 (four) times a day as needed for diarrhea   neomycin-polymyxin b-bacitracin (NEOSPORIN) 3 5-400-5,000   No Yes   Sig: APPLY TOPICALLY TO AFFECTED AREA FOUR TIMES A DAY AS NEEDED FOR WOUND CARE   pravastatin (PRAVACHOL) 40 mg tablet   No Yes   Sig: TAKE (1) TABLET BY MOUTH ONCE DAILY        Facility-Administered Medications: None       Past Medical History:   Diagnosis Date    Anxiety disorder        Past Surgical History: Procedure Laterality Date    HYSTERECTOMY      age 25   Ell[facility] Hero OOPHORECTOMY      DE CARDIOVERSION ELECTIVE ARRHYTHMIA EXTERNAL  8/23/2019            Family History   Problem Relation Age of Onset    Other Mother         neglect or abandonment     Other Father         neglect or abandonment     No Known Problems Sister     No Known Problems Maternal Grandmother     No Known Problems Maternal Grandfather     No Known Problems Paternal Grandmother     No Known Problems Paternal Grandfather      I have reviewed and agree with the history as documented  E-Cigarette/Vaping     E-Cigarette/Vaping Substances     Social History     Tobacco Use    Smoking status: Never Smoker    Smokeless tobacco: Never Used   Substance Use Topics    Alcohol use: Never    Drug use: Never       Review of Systems   Constitutional: Negative for chills, diaphoresis and fever  HENT: Negative for nosebleeds, sore throat and trouble swallowing  Eyes: Negative for photophobia, pain and visual disturbance  Respiratory: Negative for cough, chest tightness and shortness of breath  Cardiovascular: Negative for chest pain, palpitations and leg swelling  Gastrointestinal: Negative for abdominal pain, constipation, diarrhea, nausea and vomiting  Endocrine: Negative for polydipsia and polyuria  Genitourinary: Positive for hematuria  Negative for difficulty urinating, dysuria, pelvic pain, vaginal bleeding and vaginal discharge  Musculoskeletal: Negative for back pain, neck pain and neck stiffness  Skin: Negative for pallor and rash  Neurological: Negative for dizziness, seizures, light-headedness and headaches  All other systems reviewed and are negative  Physical Exam  Physical Exam  Vitals and nursing note reviewed  Constitutional:       General: She is not in acute distress  Appearance: She is well-developed  HENT:      Head: Normocephalic and atraumatic     Eyes:      Pupils: Pupils are equal, round, and reactive to light  Cardiovascular:      Rate and Rhythm: Normal rate and regular rhythm  Pulses: Normal pulses  Heart sounds: Normal heart sounds  Pulmonary:      Effort: Pulmonary effort is normal  No respiratory distress  Breath sounds: Normal breath sounds  Abdominal:      General: There is no distension  Palpations: Abdomen is soft  Abdomen is not rigid  Tenderness: There is no abdominal tenderness  There is no guarding or rebound  Genitourinary:     Exam position: Supine  Rectum: Normal  Guaiac result negative  Comments: No evidence of genital lesions or signs bleeding  Musculoskeletal:         General: No tenderness  Normal range of motion  Cervical back: Normal range of motion and neck supple  Lymphadenopathy:      Cervical: No cervical adenopathy  Skin:     General: Skin is warm and dry  Capillary Refill: Capillary refill takes less than 2 seconds  Neurological:      Mental Status: She is alert and oriented to person, place, and time  Cranial Nerves: No cranial nerve deficit  Sensory: No sensory deficit           Vital Signs  ED Triage Vitals   Temperature Pulse Respirations Blood Pressure SpO2   09/20/21 0854 09/20/21 0854 09/20/21 0854 09/20/21 0854 09/20/21 0854   98 7 °F (37 1 °C) 73 16 138/98 99 %      Temp Source Heart Rate Source Patient Position - Orthostatic VS BP Location FiO2 (%)   09/20/21 0854 09/20/21 0854 09/20/21 0854 09/20/21 0854 --   Oral Monitor Sitting Right arm       Pain Score       09/20/21 1301       No Pain           Vitals:    09/20/21 0854 09/20/21 1301   BP: 138/98 143/62   Pulse: 73 66   Patient Position - Orthostatic VS: Sitting Lying         Visual Acuity      ED Medications  Medications   sodium chloride 0 9 % bolus 1,000 mL (0 mL Intravenous Stopped 9/20/21 1123)   ondansetron (ZOFRAN) injection 4 mg (4 mg Intravenous Given 9/20/21 1013)       Diagnostic Studies  Results Reviewed     Procedure Component Value Units Date/Time    Urine Microscopic [[id number]]  (Abnormal) Collected: 09/20/21 1129    Lab Status: Final result Specimen: Urine, Clean Catch Updated: 09/20/21 1204     RBC, UA Innumerable /hpf      WBC, UA 0-1 /hpf      Epithelial Cells Occasional /hpf      Bacteria, UA Moderate /hpf      OTHER OBSERVATIONS Transitional Epithelial Cells     MUCUS THREADS Occasional    UA (URINE) with reflex to Scope [[id number]]  (Abnormal) Collected: 09/20/21 1129    Lab Status: Final result Specimen: Urine, Clean Catch Updated: 09/20/21 1149     Color, UA Yellow     Clarity, UA Slightly Cloudy     Specific Gravity, UA 1 015     pH, UA 6 5     Leukocytes, UA Trace     Nitrite, UA Negative     Protein, UA Trace mg/dl      Glucose, UA Negative mg/dl      Ketones, UA Negative mg/dl      Urobilinogen, UA 0 2 E U /dl      Bilirubin, UA Negative     Blood, UA Large    Comprehensive metabolic panel [[id number]]  (Abnormal) Collected: 09/20/21 0924    Lab Status: Final result Specimen: Blood from Arm, Left Updated: 09/20/21 0950     Sodium 141 mmol/L      Potassium 3 6 mmol/L      Chloride 104 mmol/L      CO2 25 mmol/L      ANION GAP 12 mmol/L      BUN 25 mg/dL      Creatinine 1 41 mg/dL      Glucose 118 mg/dL      Calcium 9 2 mg/dL      AST 15 U/L      ALT 19 U/L      Alkaline Phosphatase 126 U/L      Total Protein 7 9 g/dL      Albumin 3 8 g/dL      Total Bilirubin 1 64 mg/dL      eGFR 40 ml/min/1 73sq m     Narrative:      Kathi guidelines for Chronic Kidney Disease (CKD):     Stage 1 with normal or high GFR (GFR > 90 mL/min/1 73 square meters)    Stage 2 Mild CKD (GFR = 60-89 mL/min/1 73 square meters)    Stage 3A Moderate CKD (GFR = 45-59 mL/min/1 73 square meters)    Stage 3B Moderate CKD (GFR = 30-44 mL/min/1 73 square meters)    Stage 4 Severe CKD (GFR = 15-29 mL/min/1 73 square meters)    Stage 5 End Stage CKD (GFR <15 mL/min/1 73 square meters)  Note: GFR calculation is accurate only with a steady state creatinine    Lipase [[id number]]  (Abnormal) Collected: 09/20/21 0924    Lab Status: Final result Specimen: Blood from Arm, Left Updated: 09/20/21 0950     Lipase 52 u/L     CBC and differential [[id number]]  (Abnormal) Collected: 09/20/21 0924    Lab Status: Final result Specimen: Blood from Arm, Left Updated: 09/20/21 0931     WBC 11 86 Thousand/uL      RBC 5 04 Million/uL      Hemoglobin 14 7 g/dL      Hematocrit 46 2 %      MCV 92 fL      MCH 29 2 pg      MCHC 31 8 g/dL      RDW 13 2 %      MPV 11 0 fL      Platelets 510 Thousands/uL      nRBC 0 /100 WBCs      Neutrophils Relative 83 %      Immat GRANS % 1 %      Lymphocytes Relative 11 %      Monocytes Relative 5 %      Eosinophils Relative 0 %      Basophils Relative 0 %      Neutrophils Absolute 9 76 Thousands/µL      Immature Grans Absolute 0 06 Thousand/uL      Lymphocytes Absolute 1 34 Thousands/µL      Monocytes Absolute 0 62 Thousand/µL      Eosinophils Absolute 0 04 Thousand/µL      Basophils Absolute 0 04 Thousands/µL                  CT renal stone study abdomen pelvis wo contrast   Final Result by María Bardales MD (09/20 1313)      Proximal to mid ureteral calculus, 6 mm in size resulting in left hydroureteronephrosis with perinephric stranding, accounting for hematuria  Workstation performed: [id number]                    Procedures  Procedures         ED Course  ED Course as of Sep 20 1858   Mon Sep 20, 2021   1013 Creatinine elevated from baseline  Will give IV fluids  1155 Bedside ultrasound performed by resident and reveals no hydronephrosis  Awaiting urine microscopic  SBIRT 22yo+      Most Recent Value   SBIRT (22 yo +)   In order to provide better care to our patients, we are screening all of our patients for alcohol and drug use  Would it be okay to ask you these screening questions? Yes Filed at: 09/20/2021 1020   Initial Alcohol Screen: US AUDIT-C    1   How often do you have a drink containing alcohol?  0 Filed at: 09/20/2021 1020   2  How many drinks containing alcohol do you have on a typical day you are drinking? 0 Filed at: 09/20/2021 1020   3a  Male UNDER 65: How often do you have five or more drinks on one occasion? 0 Filed at: 09/20/2021 1020   3b  FEMALE Any Age, or MALE 65+: How often do you have 4 or more drinks on one occassion? 0 Filed at: 09/20/2021 1020   Audit-C Score  0 Filed at: 09/20/2021 1020   FARA: How many times in the past year have you    Used an illegal drug or used a prescription medication for non-medical reasons? Never Filed at: 09/20/2021 1020                    MDM  Number of Diagnoses or Management Options  MAYA (acute kidney injury) [facility]): new and requires workup  Hematuria: new and requires workup  Kidney stone: new and requires workup  Diagnosis management comments: Patient presents with vomiting and suspected hematuria  Urinalysis does reveal hematuria  Do not suspect hemorrhagic cystitis  Patient has evidence of a 6 mm ureteral stone which is the likely source of bleeding  Bleeding exacerbated by anticoagulation  Patient does not have intractable pain and is not vomiting  She does not appear septic  She has an elevated creatinine and was given IV fluids  Staff reports that patient does not drink lot of p o  Fluids on a regular basis  Given the stone is unilateral, I suspect elevated creatinine is more likely secondary to pre renal azotemia  I discussed case with Urology who agrees with outpatient follow-up  They will have the office call facility to arrange follow-up  In the meantime, will give Zofran and Flomax  Staff will observe for pain, recurrent vomiting or worsening bleeding  Will return to ED with worsening symptoms  Otherwise will follow-up with Urology patient      Portions of the above record have been created with voice recognition software   Occasional wrong word or "sound alike" substitutions may have occurred due to the inherent limitations of voice recognition software   Read the chart carefully and recognize, using context, where substitutions may have occurred  Amount and/or Complexity of Data Reviewed  Clinical lab tests: ordered and reviewed  Tests in the radiology section of CPT®: ordered and reviewed  Tests in the medicine section of CPT®: ordered and reviewed  Review and summarize past medical records: yes  Discuss the patient with other providers: yes  Independent visualization of images, tracings, or specimens: yes    Risk of Complications, Morbidity, and/or Mortality  Presenting problems: high  Diagnostic procedures: high  Management options: moderate    Patient Progress  Patient progress: improved      Disposition  Final diagnoses:   Kidney stone   Hematuria   MAYA (acute kidney injury) ([facility] [address] )     Time reflects when diagnosis was documented in both MDM as applicable and the Disposition within this note     Time User Action Codes Description Comment    9/20/2021  1:38 PM Jumana Hidden Add [N20 0] Kidney stone     9/20/2021  1:38 PM Jumana Hidden Add [R31 9] Hematuria     9/20/2021  1:38 PM Ardell Nan L Add [N17 9] MAYA (acute kidney injury) [facility])       ED Disposition     ED Disposition Condition Date/Time Comment    Discharge Stable Mon Sep 20, 2021  1:37 PM Shabnam Barton discharge to home/self care              Follow-up Information     Follow up With Specialties Details Why Contact Info    Ho Valles PA-C Family Medicine, Physician Assistant Schedule an appointment as soon as possible for a visit  Return to ED sooner if symptoms worsen or persist  [address]  [address] [facility]  [place] [address]  [phone]            Discharge Medication List as of 9/20/2021  1:52 PM      START taking these medications    Details   ondansetron (ZOFRAN-ODT) 4 mg disintegrating tablet Take 1 tablet (4 mg total) by mouth every 6 (six) hours as needed for nausea or vomiting, Starting Mon 9/20/2021, Normal tamsulosin (FLOMAX) 0 4 mg Take 1 capsule (0 4 mg total) by mouth daily with dinner, Starting Mon 9/20/2021, Normal         CONTINUE these medications which have NOT CHANGED    Details   acetaminophen (TYLENOL) 650 mg CR tablet Take 1 tablet (650 mg total) by mouth every 8 (eight) hours as needed for mild pain (or fever), Starting Mon 6/24/2019, Normal      benzonatate (TESSALON) 200 MG capsule Take 1 capsule (200 mg total) by mouth 3 (three) times a day as needed for cough for up to 20 doses, Starting Tue 8/6/2019, Normal      Calcium 600-D 600-400 MG-UNIT TABS TAKE (1) TABLET BY MOUTH TWICE DAILY AT 8AM AND 5PM , Normal      cholecalciferol (VITAMIN D3) 400 units tablet TAKE 1 TABLET BY MOUTH THREE TIMES DAILY , Normal      diltiazem (CARDIZEM CD) 120 mg 24 hr capsule TAKE 1 CAPSULE BY MOUTH ONCE DAILY , Normal      docusate sodium (COLACE) 100 mg capsule Take 1 capsule (100 mg total) by mouth 2 (two) times a day If needed constipation, Starting Mon 6/24/2019, Normal      furosemide (LASIX) 20 mg tablet Take 1 tablet (20 mg total) by mouth daily as needed (edema), Starting Thu 12/10/2020, Normal      hydrocortisone-aloe 1 % cream APPLY TO AFFECTED AREA TWICE A DAY AS NEEDED FOR ITCH AND RASH , Normal      Ibuprofen 200 MG CAPS Take 1-2 tablets if needed for pain or fever every 6-8 hours, Normal      levothyroxine 100 mcg tablet TAKE (1) TABLET BY MOUTH ONCE DAILY , Normal      loperamide (IMODIUM A-D) 2 mg capsule Take 1 capsule (2 mg total) by mouth 4 (four) times a day as needed for diarrhea, Starting Mon 6/24/2019, Normal      neomycin-polymyxin b-bacitracin (NEOSPORIN) 3 5-400-5,000 APPLY TOPICALLY TO AFFECTED AREA FOUR TIMES A DAY AS NEEDED FOR WOUND CARE, Normal      Omega-3 Fatty Acids (fish oil) 1,000 mg TAKE (3) CAPSULES BY MOUTH ONCE DAILY , Normal      pravastatin (PRAVACHOL) 40 mg tablet TAKE (1) TABLET BY MOUTH ONCE DAILY , Normal      Xarelto 20 MG tablet TAKE (1) TABLET BY MOUTH ONCE DAILY WITH BREAKFAST , Normal      busPIRone (BUSPAR) 10 mg tablet Take 1 tablet (10 mg total) by mouth 2 (two) times a day, Starting Tue 9/15/2020, Normal      Incontinence Supply Disposable (WINGS CHOICE PLUS ADULT BRIEFS) MISC USE AS DIRECTED FOR INCONTINENCE, Normal      SODIUM FLUORIDE, DENTAL GEL, (SF 5000 PLUS) 1 1 % CREA SF 5000 Plus 1 1 % Dental Cream; 0; 22-Aug-2014; Active, Historical Med           No discharge procedures on file      PDMP Review     None          ED Provider  Electronically Signed by           Shaji Solorzano DO  09/20/21 8636

## 2021-09-20 NOTE — ED NOTES
Ambulated patient to bathroom  Minimal assistance required       April Sindy Godinez RN  09/20/21 5563

## 2021-09-29 ENCOUNTER — OFFICE VISIT (OUTPATIENT)
Dept: UROLOGY | Facility: CLINIC | Age: 61
End: 2021-09-29
Payer: COMMERCIAL

## 2021-09-29 VITALS
WEIGHT: 189 LBS | BODY MASS INDEX: 34.78 KG/M2 | HEART RATE: 78 BPM | HEIGHT: 62 IN | DIASTOLIC BLOOD PRESSURE: 78 MMHG | SYSTOLIC BLOOD PRESSURE: 120 MMHG

## 2021-09-29 DIAGNOSIS — N20.0 KIDNEY STONES: Primary | ICD-10-CM

## 2021-09-29 DIAGNOSIS — N39.0 URINARY TRACT INFECTION WITHOUT HEMATURIA, SITE UNSPECIFIED: ICD-10-CM

## 2021-09-29 PROCEDURE — 3008F BODY MASS INDEX DOCD: CPT | Performed by: PHYSICIAN ASSISTANT

## 2021-09-29 PROCEDURE — 99203 OFFICE O/P NEW LOW 30 MIN: CPT | Performed by: PHYSICIAN ASSISTANT

## 2021-09-29 RX ORDER — SULFAMETHOXAZOLE AND TRIMETHOPRIM 800; 160 MG/1; MG/1
1 TABLET ORAL EVERY 12 HOURS SCHEDULED
Qty: 14 TABLET | Refills: 0 | Status: SHIPPED | OUTPATIENT
Start: 2021-09-29 | End: 2021-10-06

## 2021-09-29 NOTE — PROGRESS NOTES
UROLOGY CONSULTATION NOTE     Patient Identifiers: Airam Hays (MRN: [id number])  Service Requesting Consultation: Everette Dye PA-C  Service Providing Consultation:  Urology, Armen Ramirez PA-C  Consults  Date of Service: 9/29/2021    Reason for Consultation:  Left ureteral calculi    History of Present Illness:     Airam Hays is a 64 y o  Female with no prior urologic history comes from a group home  She went to emergency room September 20th with left flank pain gross hematuria and vomiting  A CT scan showed a 6 mm stone in the proximal left ureter with hydronephrosis  She was given pain medication and tamsulosin and discharged  She has now been pain free and is accompanied by her caregiver  Emergency room urinalysis showed innumerable RBCs and bacteria  Past Medical, Past Surgical History:     Past Medical History:   Diagnosis Date    Anxiety disorder    :    Past Surgical History:   Procedure Laterality Date    HYSTERECTOMY      age 25   [de-identified]      [place] CARDIOVERSION ELECTIVE ARRHYTHMIA EXTERNAL  8/23/2019        :    Medications, Allergies:     Current Outpatient Medications:     acetaminophen (TYLENOL) 650 mg CR tablet, Take 1 tablet (650 mg total) by mouth every 8 (eight) hours as needed for mild pain (or fever), Disp: 30 tablet, Rfl: 1    benzonatate (TESSALON) 200 MG capsule, Take 1 capsule (200 mg total) by mouth 3 (three) times a day as needed for cough for up to 20 doses, Disp: 20 capsule, Rfl: 0    Calcium 600-D 600-400 MG-UNIT TABS, TAKE (1) TABLET BY MOUTH TWICE DAILY AT 8AM AND 5PM , Disp: 62 tablet, Rfl: 5    cholecalciferol (VITAMIN D3) 400 units tablet, TAKE 1 TABLET BY MOUTH THREE TIMES DAILY  , Disp: 93 tablet, Rfl: 0    diltiazem (CARDIZEM CD) 120 mg 24 hr capsule, TAKE 1 CAPSULE BY MOUTH ONCE DAILY  , Disp: 30 capsule, Rfl: 5    docusate sodium (COLACE) 100 mg capsule, Take 1 capsule (100 mg total) by mouth 2 (two) times a day If needed constipation, Disp: 30 capsule, Rfl: 0    furosemide (LASIX) 20 mg tablet, Take 1 tablet (20 mg total) by mouth daily as needed (edema), Disp: 30 tablet, Rfl: 11    hydrocortisone-aloe 1 % cream, APPLY TO AFFECTED AREA TWICE A DAY AS NEEDED FOR ITCH AND RASH , Disp: 28 g, Rfl: 2    Ibuprofen 200 MG CAPS, Take 1-2 tablets if needed for pain or fever every 6-8 hours, Disp: 120 each, Rfl: 0    Incontinence Supply Disposable (WINGS CHOICE PLUS ADULT BRIEFS) MISC, USE AS DIRECTED FOR INCONTINENCE, Disp: 90 each, Rfl: 4    levothyroxine 100 mcg tablet, TAKE (1) TABLET BY MOUTH ONCE DAILY  , Disp: 31 tablet, Rfl: 5    loperamide (IMODIUM A-D) 2 mg capsule, Take 1 capsule (2 mg total) by mouth 4 (four) times a day as needed for diarrhea, Disp: 30 capsule, Rfl: 0    neomycin-polymyxin b-bacitracin (NEOSPORIN) 3 5-400-5,000, APPLY TOPICALLY TO AFFECTED AREA FOUR TIMES A DAY AS NEEDED FOR WOUND CARE, Disp: 28 4 g, Rfl: 2    Omega-3 Fatty Acids (fish oil) 1,000 mg, TAKE (3) CAPSULES BY MOUTH ONCE DAILY  , Disp: 93 capsule, Rfl: 3    ondansetron (ZOFRAN-ODT) 4 mg disintegrating tablet, Take 1 tablet (4 mg total) by mouth every 6 (six) hours as needed for nausea or vomiting, Disp: 20 tablet, Rfl: 0    pravastatin (PRAVACHOL) 40 mg tablet, TAKE (1) TABLET BY MOUTH ONCE DAILY  , Disp: 31 tablet, Rfl: 0    tamsulosin (FLOMAX) 0 4 mg, Take 1 capsule (0 4 mg total) by mouth daily with dinner, Disp: 14 capsule, Rfl: 0    Xarelto 20 MG tablet, TAKE (1) TABLET BY MOUTH ONCE DAILY WITH BREAKFAST , Disp: 31 tablet, Rfl: 0    busPIRone (BUSPAR) 10 mg tablet, Take 1 tablet (10 mg total) by mouth 2 (two) times a day (Patient not taking: Reported on 6/9/2021), Disp: 60 tablet, Rfl: 1    SODIUM FLUORIDE, DENTAL GEL, (SF 5000 PLUS) 1 1 % CREA, SF 5000 Plus 1 1 % Dental Cream; 0; 22-Aug-2014;  Active (Patient not taking: Reported on 9/29/2021), Disp: , Rfl:     sulfamethoxazole-trimethoprim (BACTRIM DS) 800-160 mg per tablet, Take 1 tablet by mouth every 12 (twelve) hours for 7 days, Disp: 14 tablet, Rfl: 0    Allergies:  No Known Allergies:    Social and Family History:   Social History:   Social History     Tobacco Use    Smoking status: Never Smoker    Smokeless tobacco: Never Used   Substance Use Topics    Alcohol use: Never    Drug use: Never     Social History     Tobacco Use   Smoking Status Never Smoker   Smokeless Tobacco Never Used       Family History:  Family History   Problem Relation Age of Onset    Other Mother         neglect or abandonment     Other Father         neglect or abandonment     No Known Problems Sister     No Known Problems Maternal Grandmother     No Known Problems Maternal Grandfather     No Known Problems Paternal Grandmother     No Known Problems Paternal Grandfather    :     Review of Systems:     General: Fever, chills, or night sweats: negative  Cardiac: Negative for chest pain  Pulmonary: Negative for shortness of breath  Gastrointestinal: Abdominal pain negative  Nausea, vomiting, or diarrhea negative,  Genitourinary: See HPI above  Patient does not have hematuria  All other systems queried were negative  Physical Exam:   General: Patient is pleasant and in NAD  Awake and alert  /78 (BP Location: Left arm, Patient Position: Sitting)   Pulse 78   Ht 5' 2" (1 575 m)   Wt 85 7 kg (189 lb)   BMI 34 57 kg/m²   HEENT: conjunctiva are clear  Constitutional:  pleasant and cooperative     no apparent distress  Cardiac: Peripheral edema: negative  Pulmonary: Non-labored breathing  Abdomen: Soft, non-tender, non-distended  No surgical scars  No masses, tenderness, hernias noted  Genitourinary: Negative CVA tenderness, negative suprapubic tenderness  Extremities:  Moves all extremities  Neurological:CNII-XII intact  No numbness or tingling   Essentially non focal neurologic exam  Psychiatric:mood affect and behavior normal      Labs:     Lab Results   Component Value Date    HGB 14 7 09/20/2021    HCT 46  2 (H) 09/20/2021    WBC 11 86 (H) 09/20/2021     09/20/2021   ]    Lab Results   Component Value Date    K 3 6 09/20/2021     09/20/2021    CO2 25 09/20/2021    BUN 25 09/20/2021    CREATININE 1 41 (H) 09/20/2021    CALCIUM 9 2 09/20/2021   ]    Imaging:   I personally reviewed the images and report of the following studies, and reviewed them with the patient:   CT ABDOMEN AND PELVIS WITHOUT IV CONTRAST - LOW DOSE RENAL STONE   IMPRESSION:     Proximal to mid ureteral calculus, 6 mm in size resulting in left hydroureteronephrosis with perinephric stranding, accounting for hematuria  ASSESSMENT:      1   6 mm proximal left ureter calculus with hydronephrosis      PLAN:   - I started her on Bactrim DS for 7 days  - will follow-up in 2-3 week for an ultrasound and KUB prior to visit  - I would like her to get a urine culture prior to initiating treatment      Thank you for allowing me to participate in this patients care  Please do not hesitate to call with any additional questions    Jc Dangelo PA-C

## 2021-10-04 DIAGNOSIS — E55.9 VITAMIN D DEFICIENCY: ICD-10-CM

## 2021-10-04 DIAGNOSIS — E78.5 HYPERLIPIDEMIA, UNSPECIFIED HYPERLIPIDEMIA TYPE: ICD-10-CM

## 2021-10-04 DIAGNOSIS — I48.91 NEW ONSET ATRIAL FIBRILLATION (HCC): ICD-10-CM

## 2021-10-04 RX ORDER — OMEGA-3S/DHA/EPA/FISH OIL/D3 300MG-1000
CAPSULE ORAL
Qty: 90 TABLET | Refills: 0 | Status: SHIPPED | OUTPATIENT
Start: 2021-10-04 | End: 2021-11-03

## 2021-10-04 RX ORDER — PRAVASTATIN SODIUM 40 MG
TABLET ORAL
Qty: 30 TABLET | Refills: 0 | Status: SHIPPED | OUTPATIENT
Start: 2021-10-04 | End: 2021-11-03

## 2021-10-05 ENCOUNTER — HOSPITAL ENCOUNTER (OUTPATIENT)
Dept: RADIOLOGY | Age: 61
Discharge: HOME | End: 2021-10-05
Payer: COMMERCIAL

## 2021-10-05 ENCOUNTER — APPOINTMENT (OUTPATIENT)
Dept: RADIOLOGY | Age: 61
End: 2021-10-05
Payer: COMMERCIAL

## 2021-10-05 DIAGNOSIS — E78.5 HYPERLIPIDEMIA, UNSPECIFIED HYPERLIPIDEMIA TYPE: ICD-10-CM

## 2021-10-05 DIAGNOSIS — N20.0 KIDNEY STONES: ICD-10-CM

## 2021-10-05 PROCEDURE — 76770 US EXAM ABDO BACK WALL COMP: CPT

## 2021-10-05 PROCEDURE — 74018 RADEX ABDOMEN 1 VIEW: CPT

## 2021-10-05 RX ORDER — RIVAROXABAN 20 MG/1
TABLET, FILM COATED ORAL
Qty: 30 TABLET | Refills: 0 | Status: SHIPPED | OUTPATIENT
Start: 2021-10-05 | End: 2021-11-05

## 2021-10-06 ENCOUNTER — OFFICE VISIT (OUTPATIENT)
Dept: FAMILY MEDICINE CLINIC | Facility: CLINIC | Age: 61
End: 2021-10-06

## 2021-10-06 VITALS
SYSTOLIC BLOOD PRESSURE: 112 MMHG | DIASTOLIC BLOOD PRESSURE: 78 MMHG | HEIGHT: 62 IN | TEMPERATURE: 98 F | HEART RATE: 100 BPM | BODY MASS INDEX: 37.36 KG/M2 | WEIGHT: 203 LBS | OXYGEN SATURATION: 97 % | RESPIRATION RATE: 16 BRPM

## 2021-10-06 DIAGNOSIS — Z23 NEEDS FLU SHOT: Primary | ICD-10-CM

## 2021-10-06 DIAGNOSIS — R60.0 LEG EDEMA: ICD-10-CM

## 2021-10-06 DIAGNOSIS — R60.9 EDEMA, UNSPECIFIED TYPE: ICD-10-CM

## 2021-10-06 PROCEDURE — 99214 OFFICE O/P EST MOD 30 MIN: CPT | Performed by: PHYSICIAN ASSISTANT

## 2021-10-06 PROCEDURE — G0008 ADMIN INFLUENZA VIRUS VAC: HCPCS | Performed by: PHYSICIAN ASSISTANT

## 2021-10-06 PROCEDURE — 1036F TOBACCO NON-USER: CPT | Performed by: PHYSICIAN ASSISTANT

## 2021-10-06 PROCEDURE — 90682 RIV4 VACC RECOMBINANT DNA IM: CPT | Performed by: PHYSICIAN ASSISTANT

## 2021-10-06 RX ORDER — CHLORAL HYDRATE 500 MG
CAPSULE ORAL
Qty: 90 CAPSULE | Refills: 0 | Status: SHIPPED | OUTPATIENT
Start: 2021-10-06 | End: 2021-11-03

## 2021-10-07 PROBLEM — N20.0 RENAL CALCULI: Status: ACTIVE | Noted: 2021-10-07

## 2021-10-07 PROBLEM — R60.0 LEG EDEMA: Status: ACTIVE | Noted: 2021-10-07

## 2021-10-12 ENCOUNTER — OFFICE VISIT (OUTPATIENT)
Dept: UROLOGY | Facility: CLINIC | Age: 61
End: 2021-10-12
Payer: COMMERCIAL

## 2021-10-12 VITALS
DIASTOLIC BLOOD PRESSURE: 82 MMHG | HEIGHT: 62 IN | HEART RATE: 72 BPM | SYSTOLIC BLOOD PRESSURE: 128 MMHG | WEIGHT: 201 LBS | BODY MASS INDEX: 36.99 KG/M2

## 2021-10-12 DIAGNOSIS — N20.0 CALCULUS OF KIDNEY: Primary | ICD-10-CM

## 2021-10-12 PROCEDURE — 99213 OFFICE O/P EST LOW 20 MIN: CPT | Performed by: PHYSICIAN ASSISTANT

## 2021-10-12 PROCEDURE — 3008F BODY MASS INDEX DOCD: CPT | Performed by: PHYSICIAN ASSISTANT

## 2021-10-12 PROCEDURE — 1036F TOBACCO NON-USER: CPT | Performed by: PHYSICIAN ASSISTANT

## 2021-10-18 ENCOUNTER — TELEPHONE (OUTPATIENT)
Dept: UROLOGY | Facility: AMBULATORY SURGERY CENTER | Age: 61
End: 2021-10-18

## 2021-11-02 DIAGNOSIS — I48.91 NEW ONSET ATRIAL FIBRILLATION (HCC): ICD-10-CM

## 2021-11-05 RX ORDER — RIVAROXABAN 20 MG/1
TABLET, FILM COATED ORAL
Qty: 31 TABLET | Refills: 0 | Status: SHIPPED | OUTPATIENT
Start: 2021-11-05 | End: 2021-12-03

## 2021-11-08 ENCOUNTER — ANNUAL EXAM (OUTPATIENT)
Dept: OBGYN CLINIC | Facility: MEDICAL CENTER | Age: 61
End: 2021-11-08
Payer: COMMERCIAL

## 2021-11-08 VITALS — BODY MASS INDEX: 37.2 KG/M2 | WEIGHT: 203.4 LBS | SYSTOLIC BLOOD PRESSURE: 102 MMHG | DIASTOLIC BLOOD PRESSURE: 78 MMHG

## 2021-11-08 DIAGNOSIS — Z12.31 ENCOUNTER FOR SCREENING MAMMOGRAM FOR MALIGNANT NEOPLASM OF BREAST: Primary | ICD-10-CM

## 2021-11-08 DIAGNOSIS — Z01.419 ENCOUNTER FOR GYNECOLOGICAL EXAMINATION (GENERAL) (ROUTINE) WITHOUT ABNORMAL FINDINGS: ICD-10-CM

## 2021-11-08 PROCEDURE — G0101 CA SCREEN;PELVIC/BREAST EXAM: HCPCS | Performed by: NURSE PRACTITIONER

## 2021-11-08 RX ORDER — FLUTICASONE PROPIONATE 50 MCG
SPRAY, SUSPENSION (ML) NASAL DAILY
COMMUNITY

## 2021-11-29 DIAGNOSIS — F71 MODERATE INTELLECTUAL DISABILITIES: ICD-10-CM

## 2021-11-29 DIAGNOSIS — M25.561 ACUTE PAIN OF RIGHT KNEE: Primary | ICD-10-CM

## 2021-11-30 RX ORDER — IBUPROFEN 200 MG/1
TABLET, FILM COATED ORAL
Qty: 120 TABLET | Refills: 0 | Status: SHIPPED | OUTPATIENT
Start: 2021-11-30 | End: 2021-12-13 | Stop reason: SDUPTHER

## 2021-11-30 RX ORDER — ACETAMINOPHEN 650 MG/1
TABLET, FILM COATED, EXTENDED RELEASE ORAL
Qty: 30 TABLET | Refills: 0 | Status: SHIPPED | OUTPATIENT
Start: 2021-11-30 | End: 2022-06-16

## 2021-12-02 DIAGNOSIS — I48.91 NEW ONSET ATRIAL FIBRILLATION (HCC): ICD-10-CM

## 2021-12-03 RX ORDER — RIVAROXABAN 20 MG/1
TABLET, FILM COATED ORAL
Qty: 31 TABLET | Refills: 0 | Status: SHIPPED | OUTPATIENT
Start: 2021-12-03 | End: 2022-01-05

## 2021-12-13 ENCOUNTER — OFFICE VISIT (OUTPATIENT)
Dept: CARDIOLOGY CLINIC | Facility: CLINIC | Age: 61
End: 2021-12-13
Payer: COMMERCIAL

## 2021-12-13 VITALS
HEART RATE: 69 BPM | BODY MASS INDEX: 38.63 KG/M2 | HEIGHT: 62 IN | WEIGHT: 209.9 LBS | DIASTOLIC BLOOD PRESSURE: 82 MMHG | SYSTOLIC BLOOD PRESSURE: 130 MMHG

## 2021-12-13 DIAGNOSIS — I48.91 ATRIAL FIBRILLATION, UNSPECIFIED TYPE (HCC): Primary | ICD-10-CM

## 2021-12-13 PROCEDURE — 93000 ELECTROCARDIOGRAM COMPLETE: CPT | Performed by: INTERNAL MEDICINE

## 2021-12-13 PROCEDURE — 99214 OFFICE O/P EST MOD 30 MIN: CPT | Performed by: INTERNAL MEDICINE

## 2021-12-17 ENCOUNTER — TELEPHONE (OUTPATIENT)
Dept: FAMILY MEDICINE CLINIC | Facility: CLINIC | Age: 61
End: 2021-12-17

## 2021-12-27 ENCOUNTER — APPOINTMENT (OUTPATIENT)
Dept: LAB | Age: 61
End: 2021-12-27
Payer: COMMERCIAL

## 2021-12-27 LAB
ANION GAP SERPL CALCULATED.3IONS-SCNC: 4 MMOL/L (ref 4–13)
BUN SERPL-MCNC: 18 MG/DL (ref 5–25)
CALCIUM SERPL-MCNC: 9.8 MG/DL (ref 8.3–10.1)
CHLORIDE SERPL-SCNC: 104 MMOL/L (ref 100–108)
CO2 SERPL-SCNC: 31 MMOL/L (ref 21–32)
CREAT SERPL-MCNC: 0.89 MG/DL (ref 0.6–1.3)
GFR SERPL CREATININE-BSD FRML MDRD: 70 ML/MIN/1.73SQ M
GLUCOSE P FAST SERPL-MCNC: 83 MG/DL (ref 65–99)
POTASSIUM SERPL-SCNC: 3.8 MMOL/L (ref 3.5–5.3)
SODIUM SERPL-SCNC: 139 MMOL/L (ref 136–145)

## 2021-12-27 PROCEDURE — 36415 COLL VENOUS BLD VENIPUNCTURE: CPT | Performed by: INTERNAL MEDICINE

## 2021-12-27 PROCEDURE — 80048 BASIC METABOLIC PNL TOTAL CA: CPT | Performed by: INTERNAL MEDICINE

## 2022-01-05 DIAGNOSIS — I48.91 NEW ONSET ATRIAL FIBRILLATION (HCC): ICD-10-CM

## 2022-01-05 RX ORDER — RIVAROXABAN 20 MG/1
TABLET, FILM COATED ORAL
Qty: 30 TABLET | Refills: 0 | Status: SHIPPED | OUTPATIENT
Start: 2022-01-05 | End: 2022-01-31

## 2022-01-31 DIAGNOSIS — I48.91 NEW ONSET ATRIAL FIBRILLATION (HCC): ICD-10-CM

## 2022-01-31 RX ORDER — RIVAROXABAN 20 MG/1
TABLET, FILM COATED ORAL
Qty: 31 TABLET | Refills: 0 | Status: SHIPPED | OUTPATIENT
Start: 2022-01-31 | End: 2022-03-01

## 2022-03-01 DIAGNOSIS — I48.91 NEW ONSET ATRIAL FIBRILLATION (HCC): ICD-10-CM

## 2022-03-01 RX ORDER — RIVAROXABAN 20 MG/1
TABLET, FILM COATED ORAL
Qty: 30 TABLET | Refills: 0 | Status: SHIPPED | OUTPATIENT
Start: 2022-03-01 | End: 2022-04-04

## 2022-03-24 ENCOUNTER — TELEPHONE (OUTPATIENT)
Dept: FAMILY MEDICINE CLINIC | Facility: CLINIC | Age: 62
End: 2022-03-24

## 2022-03-24 NOTE — TELEPHONE ENCOUNTER
[employer] form received on 03/24/22  to be completed by PCP  Copy made and placed in PCP folder  Forms to be delivered to PCP mailbox at assigned time

## 2022-04-04 DIAGNOSIS — E55.9 VITAMIN D DEFICIENCY: ICD-10-CM

## 2022-04-04 DIAGNOSIS — I48.91 NEW ONSET ATRIAL FIBRILLATION (HCC): ICD-10-CM

## 2022-04-04 DIAGNOSIS — E03.9 HYPOTHYROIDISM, UNSPECIFIED TYPE: ICD-10-CM

## 2022-04-04 RX ORDER — LEVOTHYROXINE SODIUM 0.1 MG/1
TABLET ORAL
Qty: 31 TABLET | Refills: 0 | Status: SHIPPED | OUTPATIENT
Start: 2022-04-04 | End: 2022-05-10

## 2022-04-04 RX ORDER — RIVAROXABAN 20 MG/1
TABLET, FILM COATED ORAL
Qty: 31 TABLET | Refills: 0 | Status: SHIPPED | OUTPATIENT
Start: 2022-04-04 | End: 2022-05-03

## 2022-04-04 RX ORDER — CALCIUM CARBONATE/VITAMIN D3 600 MG-10
TABLET ORAL
Qty: 62 TABLET | Refills: 0 | Status: SHIPPED | OUTPATIENT
Start: 2022-04-04 | End: 2022-05-10

## 2022-04-04 NOTE — TELEPHONE ENCOUNTER
Requested medication(s) are due for refill today: Yes  Patient has already received a courtesy refill: Yes  Other reason request has been forwarded to provider:   Hematology: Anticoagulants - rivaroxaban Failed 04/04/2022 02:28 PM   Protocol Details  ALT in normal range and within 180 days    AST in normal range and within 180 days    HCT in normal range and within

## 2022-04-18 ENCOUNTER — OFFICE VISIT (OUTPATIENT)
Dept: FAMILY MEDICINE CLINIC | Facility: CLINIC | Age: 62
End: 2022-04-18

## 2022-04-18 VITALS
SYSTOLIC BLOOD PRESSURE: 122 MMHG | DIASTOLIC BLOOD PRESSURE: 80 MMHG | BODY MASS INDEX: 37.36 KG/M2 | TEMPERATURE: 97.5 F | OXYGEN SATURATION: 97 % | RESPIRATION RATE: 18 BRPM | HEIGHT: 62 IN | WEIGHT: 203 LBS | HEART RATE: 73 BPM

## 2022-04-18 DIAGNOSIS — R60.0 LEG EDEMA: ICD-10-CM

## 2022-04-18 DIAGNOSIS — E55.9 VITAMIN D DEFICIENCY: ICD-10-CM

## 2022-04-18 DIAGNOSIS — E03.9 HYPOTHYROIDISM, UNSPECIFIED TYPE: ICD-10-CM

## 2022-04-18 DIAGNOSIS — E28.39 ESTROGEN DEFICIENCY: Primary | ICD-10-CM

## 2022-04-18 DIAGNOSIS — E66.01 OBESITY, MORBID (HCC): ICD-10-CM

## 2022-04-18 DIAGNOSIS — I48.91 ATRIAL FIBRILLATION, UNSPECIFIED TYPE (HCC): ICD-10-CM

## 2022-04-18 DIAGNOSIS — F39 MOOD DISORDER (HCC): ICD-10-CM

## 2022-04-18 DIAGNOSIS — G25.70 MEDICATION-INDUCED MOVEMENT DISORDER: ICD-10-CM

## 2022-04-18 PROCEDURE — 99214 OFFICE O/P EST MOD 30 MIN: CPT | Performed by: NURSE PRACTITIONER

## 2022-04-18 NOTE — ASSESSMENT & PLAN NOTE
Lab Results   Component Value Date    [id number] 1 710 06/10/2021     Continue current dose levothyroxine 100 mcg daily   Recheck TSH before next visit

## 2022-04-18 NOTE — PROGRESS NOTES
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   No longer using     Carole was seen today for hypothyroidism  Diagnoses and all orders for this visit:    Estrogen deficiency  -     Vitamin D 25 hydroxy; Future    Leg edema  -     CBC and differential; Future    Vitamin D deficiency  -     Vitamin D 25 hydroxy; Future    Hypothyroidism, unspecified type  -     TSH, 3rd generation with Free T4 reflex; Future    Obesity, morbid (HCC)  -     CBC and differential; Future  -     Comprehensive metabolic panel; Future  -     Hemoglobin A1C; Future  -     Lipid panel; Future    Mood disorder [facility])    Atrial fibrillation, unspecified type ([facility] )    Medication-induced movement disorder        Return in about 3 months (around 7/18/2022) for AWV  Patient Instructions     Hypothyroidism   WHAT YOU NEED TO KNOW:   Hypothyroidism is a condition that develops when the thyroid gland does not make enough thyroid hormone  Thyroid hormones help control body temperature, heart rate, growth, and weight  DISCHARGE INSTRUCTIONS:   Call your local emergency number (911 in the [address] [place] [address],[address]) or have someone call if:   · You have sudden chest pain or shortness of breath  · You have a seizure  · You feel like you are going to faint  Return to the emergency department if:   · You have diarrhea, tremors, or trouble sleeping  · Your legs, ankles, or feet are swollen  Call your doctor or endocrinologist if:   · You have a fever  · You have chills, a cough, or feel weak and achy      · You have pain and swelling in your muscles and joints  · Your skin is itchy, swollen, or you have a rash  · Your signs and symptoms return or get worse, even after treatment  · You have questions or concerns about your condition or care  Medicines:   · Thyroid hormone replacement medicine  helps bring your thyroid hormone level back to normal  You will need to take this medicine for the rest of your life to control hypothyroidism  It is important to take the medicine every day as directed  You will be given instructions for what to do if you miss a dose  · Take your medicine as directed  Contact your healthcare provider if you think your medicine is not helping or if you have side effects  Tell him or her if you are allergic to any medicine  Keep a list of the medicines, vitamins, and herbs you take  Include the amounts, and when and why you take them  Bring the list or the pill bottles to follow-up visits  Carry your medicine list with you in case of an emergency  Manage hypothyroidism:   · Get more iodine  The thyroid gland uses iodine to work correctly and to make thyroid hormones  Your healthcare provider may tell you to eat foods that are rich in iodine  He or she will tell you how much of these foods to eat  Milk and seafood are good sources of iodine  You may also need iodine supplements  · Keep track of your blood pressure and weight:      ? Check your blood pressure  and write it down as often as directed  It is important to measure your blood pressure on the same arm and in the same position each time  Keep track of your blood pressure readings, along with the date and time you took them  Take this record with you to your followup visits  ? Weigh yourself daily  before breakfast after you urinate  Weight gain may be a sign of extra fluid in your body  Keep track of your daily weights and take the record with you to your followup visits         Follow up with your doctor or endocrinologist as directed: You may need to return for more blood tests to check your thyroid hormone level  This will show if you are getting the right amount of thyroid medicine  Write down your questions so you remember to ask them during your visits  © Copyright [employer] 2022 Information is for End User's use only and may not be sold, redistributed or otherwise used for commercial purposes  All illustrations and images included in CareNotes® are the copyrighted property of A D A M , Inc  or [facility] Kelli Iglesias   The above information is an  only  It is not intended as medical advice for individual conditions or treatments  Talk to your doctor, nurse or pharmacist before following any medical regimen to see if it is safe and effective for you  Subjective:     Carroll Gray is a 64 y o  female who  has a past medical history of Anxiety disorder and Atrial fibrillation ([address] )  who presented to the office today for follow up  Accompanied by aid  There are no concerns today  Due for cardiology follow up in June  The following portions of the patient's history were reviewed and updated as appropriate: allergies, current medications, past family history, past medical history, past social history, past surgical history and problem list     Current Outpatient Medications on File Prior to Visit   Medication Sig Dispense Refill    benzonatate (TESSALON) 200 MG capsule Take 1 capsule (200 mg total) by mouth 3 (three) times a day as needed for cough for up to 20 doses 20 capsule 0    Calcium + Vitamin D3 600-10 MG-MCG TABS TAKE (1) TABLET BY MOUTH TWICE DAILY AT 8AM AND 5PM  62 tablet 0    cholecalciferol (VITAMIN D3) 400 units tablet TAKE 1 TABLET BY MOUTH THREE TIMES DAILY  93 tablet 5    diltiazem (CARDIZEM CD) 120 mg 24 hr capsule TAKE 1 CAPSULE BY MOUTH ONCE DAILY   31 capsule 5    docusate sodium (COLACE) 100 mg capsule Take 1 capsule (100 mg total) by mouth 2 (two) times a day If needed constipation 30 capsule 0    fluticasone (FLONASE) 50 mcg/act nasal spray into each nostril daily (Patient not taking: Reported on 12/13/2021 )      furosemide (LASIX) 20 mg tablet Take 1 tablet (20 mg total) by mouth daily as needed (edema) 30 tablet 11    hydrocortisone-aloe 1 % cream APPLY TO AFFECTED AREA TWICE A DAY AS NEEDED FOR ITCH AND RASH  28 g 2    Ibuprofen 200 MG CAPS Take 1-2 tablets if needed for pain or fever every 6-8 hours 120 each 0    Incontinence Supply Disposable (WINGS CHOICE PLUS ADULT BRIEFS) MISC USE AS DIRECTED FOR INCONTINENCE 90 each 4    levothyroxine 100 mcg tablet TAKE (1) TABLET BY MOUTH ONCE DAILY  31 tablet 0    loperamide (IMODIUM A-D) 2 mg capsule Take 1 capsule (2 mg total) by mouth 4 (four) times a day as needed for diarrhea 30 capsule 0    neomycin-polymyxin b-bacitracin (NEOSPORIN) 3 5-400-5,000 APPLY TOPICALLY TO AFFECTED AREA FOUR TIMES A DAY AS NEEDED FOR WOUND CARE 28 4 g 2    Omega-3 Fatty Acids (fish oil) 1,000 mg TAKE (3) CAPSULES BY MOUTH ONCE DAILY  93 capsule 5    pravastatin (PRAVACHOL) 40 mg tablet TAKE (1) TABLET BY MOUTH ONCE DAILY  31 tablet 5    QC Arthritis Pain Relief 650 MG CR tablet TAKE 1 TABLET BY MOUTH EVERY 8 HOURS AS NEEDED FOR MILD PAIN OR FEVER 30 tablet 0    Xarelto 20 MG tablet TAKE (1) TABLET BY MOUTH ONCE DAILY WITH BREAKFAST  31 tablet 0     No current facility-administered medications on file prior to visit  Review of Systems   Constitutional: Negative for chills and fever  HENT: Negative for ear pain and sore throat  Eyes: Negative for pain and visual disturbance  Respiratory: Negative for cough and shortness of breath  Cardiovascular: Positive for leg swelling  Negative for chest pain and palpitations  Gastrointestinal: Negative for abdominal pain and vomiting  Genitourinary: Negative for dysuria and hematuria  Musculoskeletal: Negative for arthralgias and back pain  Skin: Negative for color change and rash  Neurological: Negative for seizures and syncope  All other systems reviewed and are negative  BMI Counseling: Body mass index is 37 13 kg/m²  The BMI is above normal  Nutrition recommendations include consuming healthier snacks  Rationale for BMI follow-up plan is due to patient being overweight or obese  Objective:    /80 (BP Location: Left arm, Patient Position: Sitting, Cuff Size: Standard)   Pulse 73   Temp 97 5 °F (36 4 °C) (Temporal)   Resp 18   Ht 5' 2" (1 575 m)   Wt 92 1 kg (203 lb)   SpO2 97%   BMI 37 13 kg/m²     Physical Exam  Vitals and nursing note reviewed  Constitutional:       General: She is not in acute distress  Appearance: She is well-developed  She is not diaphoretic  HENT:      Head: Normocephalic and atraumatic  Right Ear: External ear normal       Left Ear: External ear normal    Eyes:      Conjunctiva/sclera: Conjunctivae normal       Pupils: Pupils are equal, round, and reactive to light  Cardiovascular:      Rate and Rhythm: Normal rate  Rhythm irregular  Pulmonary:      Effort: Pulmonary effort is normal  No respiratory distress  Breath sounds: Normal breath sounds  No wheezing  Abdominal:      General: Bowel sounds are normal  There is no distension  Palpations: Abdomen is soft  Tenderness: There is no abdominal tenderness  Musculoskeletal:         General: No deformity  Normal range of motion  Cervical back: Normal range of motion and neck supple  Lymphadenopathy:      Cervical: No cervical adenopathy  Skin:     General: Skin is warm and dry  Capillary Refill: Capillary refill takes less than 2 seconds  Findings: No rash  Neurological:      Mental Status: She is alert  Mental status is at baseline     Psychiatric:         Mood and Affect: Mood normal          Behavior: Behavior normal          LALA Javier  04/18/22  11:22 AM

## 2022-04-18 NOTE — PATIENT INSTRUCTIONS
Hypothyroidism   WHAT YOU NEED TO KNOW:   Hypothyroidism is a condition that develops when the thyroid gland does not make enough thyroid hormone  Thyroid hormones help control body temperature, heart rate, growth, and weight  DISCHARGE INSTRUCTIONS:   Call your local emergency number (911 in the [address] [facility],[address]) or have someone call if:   · You have sudden chest pain or shortness of breath  · You have a seizure  · You feel like you are going to faint  Return to the emergency department if:   · You have diarrhea, tremors, or trouble sleeping  · Your legs, ankles, or feet are swollen  Call your doctor or endocrinologist if:   · You have a fever  · You have chills, a cough, or feel weak and achy  · You have pain and swelling in your muscles and joints  · Your skin is itchy, swollen, or you have a rash  · Your signs and symptoms return or get worse, even after treatment  · You have questions or concerns about your condition or care  Medicines:   · Thyroid hormone replacement medicine  helps bring your thyroid hormone level back to normal  You will need to take this medicine for the rest of your life to control hypothyroidism  It is important to take the medicine every day as directed  You will be given instructions for what to do if you miss a dose  · Take your medicine as directed  Contact your healthcare provider if you think your medicine is not helping or if you have side effects  Tell him or her if you are allergic to any medicine  Keep a list of the medicines, vitamins, and herbs you take  Include the amounts, and when and why you take them  Bring the list or the pill bottles to follow-up visits  Carry your medicine list with you in case of an emergency  Manage hypothyroidism:   · Get more iodine  The thyroid gland uses iodine to work correctly and to make thyroid hormones  Your healthcare provider may tell you to eat foods that are rich in iodine   He or she will tell you how much of these foods to eat  Milk and seafood are good sources of iodine  You may also need iodine supplements  · Keep track of your blood pressure and weight:      ? Check your blood pressure  and write it down as often as directed  It is important to measure your blood pressure on the same arm and in the same position each time  Keep track of your blood pressure readings, along with the date and time you took them  Take this record with you to your followup visits  ? Weigh yourself daily  before breakfast after you urinate  Weight gain may be a sign of extra fluid in your body  Keep track of your daily weights and take the record with you to your followup visits  Follow up with your doctor or endocrinologist as directed: You may need to return for more blood tests to check your thyroid hormone level  This will show if you are getting the right amount of thyroid medicine  Write down your questions so you remember to ask them during your visits  © [employer] 2022 Information is for End User's use only and may not be sold, redistributed or otherwise used for commercial purposes  All illustrations and images included in CareNotes® are the copyrighted property of A D A M , Inc  or [facility] Kelli Iglesias   The above information is an  only  It is not intended as medical advice for individual conditions or treatments  Talk to your doctor, nurse or pharmacist before following any medical regimen to see if it is safe and effective for you

## 2022-04-25 ENCOUNTER — APPOINTMENT (OUTPATIENT)
Dept: LAB | Age: 62
End: 2022-04-25
Payer: COMMERCIAL

## 2022-04-25 LAB
BILIRUB UR QL STRIP: NEGATIVE
CLARITY UR: CLEAR
COLOR UR: NORMAL
GLUCOSE UR STRIP-MCNC: NEGATIVE MG/DL
HGB UR QL STRIP.AUTO: NEGATIVE
KETONES UR STRIP-MCNC: NEGATIVE MG/DL
LEUKOCYTE ESTERASE UR QL STRIP: NEGATIVE
NITRITE UR QL STRIP: NEGATIVE
PH UR STRIP.AUTO: 6 [PH]
PROT UR STRIP-MCNC: NEGATIVE MG/DL
SP GR UR STRIP.AUTO: 1.02 (ref 1–1.03)
UROBILINOGEN UR STRIP-ACNC: <2 MG/DL

## 2022-04-25 PROCEDURE — 81003 URINALYSIS AUTO W/O SCOPE: CPT | Performed by: PHYSICIAN ASSISTANT

## 2022-05-03 DIAGNOSIS — I48.91 NEW ONSET ATRIAL FIBRILLATION (HCC): ICD-10-CM

## 2022-05-03 RX ORDER — RIVAROXABAN 20 MG/1
TABLET, FILM COATED ORAL
Qty: 30 TABLET | Refills: 0 | Status: SHIPPED | OUTPATIENT
Start: 2022-05-03 | End: 2022-06-02

## 2022-05-03 NOTE — TELEPHONE ENCOUNTER
Requested medication(s) are due for refill today: Yes  Patient has already received a courtesy refill: No  Other reason request has been forwarded to provider:    Hematology: Anticoagulants - rivaroxaban Failed 05/03/2022 02:50 PM   Protocol Details  ALT in normal range and within 180 days    AST in normal range and within 180 days    HCT in normal range and within 360 days

## 2022-05-04 DIAGNOSIS — E78.5 HYPERLIPIDEMIA, UNSPECIFIED HYPERLIPIDEMIA TYPE: ICD-10-CM

## 2022-05-04 RX ORDER — CHLORAL HYDRATE 500 MG
CAPSULE ORAL
Qty: 90 CAPSULE | Refills: 0 | Status: SHIPPED | OUTPATIENT
Start: 2022-05-04 | End: 2022-06-02

## 2022-05-10 DIAGNOSIS — E55.9 VITAMIN D DEFICIENCY: ICD-10-CM

## 2022-05-10 DIAGNOSIS — E03.9 HYPOTHYROIDISM, UNSPECIFIED TYPE: ICD-10-CM

## 2022-05-10 RX ORDER — CALCIUM CARBONATE/VITAMIN D3 600 MG-10
TABLET ORAL
Qty: 60 TABLET | Refills: 0 | Status: SHIPPED | OUTPATIENT
Start: 2022-05-10 | End: 2022-06-02

## 2022-05-10 RX ORDER — LEVOTHYROXINE SODIUM 0.1 MG/1
TABLET ORAL
Qty: 30 TABLET | Refills: 0 | Status: SHIPPED | OUTPATIENT
Start: 2022-05-10 | End: 2022-06-02

## 2022-06-02 DIAGNOSIS — E78.5 HYPERLIPIDEMIA, UNSPECIFIED HYPERLIPIDEMIA TYPE: ICD-10-CM

## 2022-06-02 DIAGNOSIS — E03.9 HYPOTHYROIDISM, UNSPECIFIED TYPE: ICD-10-CM

## 2022-06-02 DIAGNOSIS — E55.9 VITAMIN D DEFICIENCY: ICD-10-CM

## 2022-06-02 DIAGNOSIS — I48.91 NEW ONSET ATRIAL FIBRILLATION (HCC): ICD-10-CM

## 2022-06-02 RX ORDER — OMEGA-3S/DHA/EPA/FISH OIL/D3 300MG-1000
CAPSULE ORAL
Qty: 93 TABLET | Refills: 0 | Status: SHIPPED | OUTPATIENT
Start: 2022-06-02 | End: 2022-07-05 | Stop reason: SDUPTHER

## 2022-06-02 RX ORDER — LEVOTHYROXINE SODIUM 0.1 MG/1
TABLET ORAL
Qty: 31 TABLET | Refills: 0 | Status: SHIPPED | OUTPATIENT
Start: 2022-06-02 | End: 2022-07-06

## 2022-06-02 RX ORDER — CALCIUM CARBONATE/VITAMIN D3 600 MG-10
TABLET ORAL
Qty: 62 TABLET | Refills: 0 | Status: SHIPPED | OUTPATIENT
Start: 2022-06-02 | End: 2022-07-05

## 2022-06-02 RX ORDER — DILTIAZEM HYDROCHLORIDE 120 MG/1
CAPSULE, COATED, EXTENDED RELEASE ORAL
Qty: 31 CAPSULE | Refills: 0 | Status: SHIPPED | OUTPATIENT
Start: 2022-06-02 | End: 2022-07-05 | Stop reason: SDUPTHER

## 2022-06-02 RX ORDER — RIVAROXABAN 20 MG/1
TABLET, FILM COATED ORAL
Qty: 31 TABLET | Refills: 0 | Status: SHIPPED | OUTPATIENT
Start: 2022-06-02 | End: 2022-07-06

## 2022-06-02 RX ORDER — CHLORAL HYDRATE 500 MG
CAPSULE ORAL
Qty: 93 CAPSULE | Refills: 0 | Status: SHIPPED | OUTPATIENT
Start: 2022-06-02 | End: 2022-07-05 | Stop reason: SDUPTHER

## 2022-06-02 RX ORDER — PRAVASTATIN SODIUM 40 MG
TABLET ORAL
Qty: 31 TABLET | Refills: 0 | Status: SHIPPED | OUTPATIENT
Start: 2022-06-02 | End: 2022-07-05 | Stop reason: SDUPTHER

## 2022-06-07 ENCOUNTER — OFFICE VISIT (OUTPATIENT)
Dept: CARDIOLOGY CLINIC | Facility: CLINIC | Age: 62
End: 2022-06-07
Payer: MEDICARE

## 2022-06-07 VITALS
BODY MASS INDEX: 37.11 KG/M2 | DIASTOLIC BLOOD PRESSURE: 72 MMHG | SYSTOLIC BLOOD PRESSURE: 115 MMHG | HEART RATE: 84 BPM | WEIGHT: 202.9 LBS

## 2022-06-07 DIAGNOSIS — I48.91 ATRIAL FIBRILLATION, UNSPECIFIED TYPE (HCC): Primary | ICD-10-CM

## 2022-06-07 DIAGNOSIS — R60.9 EDEMA, UNSPECIFIED TYPE: ICD-10-CM

## 2022-06-07 PROCEDURE — 93000 ELECTROCARDIOGRAM COMPLETE: CPT | Performed by: INTERNAL MEDICINE

## 2022-06-07 PROCEDURE — 99214 OFFICE O/P EST MOD 30 MIN: CPT | Performed by: INTERNAL MEDICINE

## 2022-06-07 RX ORDER — FUROSEMIDE 20 MG/1
20 TABLET ORAL DAILY
Qty: 90 TABLET | Refills: 3 | Status: SHIPPED | OUTPATIENT
Start: 2022-06-07

## 2022-06-07 NOTE — PROGRESS NOTES
HEART  [place] [facility]    Outpatient f/u  Today's Date: 06/07/22        Patient name: Mike Maloney  YOB: 1960  Sex: female         Chief Complaint: f/u for afib    ASSESSMENT:  Problem List Items Addressed This Visit        Cardiovascular and Mediastinum    Atrial fibrillation [facility]) - Primary    Relevant Orders    POCT ECG            63 yo female  1) Afib, unclear duration or type, found incidentally at office visit in Aug 2019 and sent in for MARCIE -DCCV but found to be back in afib persistetnly on Zio patch , she is on xarelto and diltiazem now  [id number] female   She has developmental delay and lives in care facility, they check her BP/HR daily  2) Stress echo this year, 3min on Marshal, Moderate MR/TR, normal EF,  Moderate LAE  Otherwise has d MR/TR normal EF on TTE  Moderate LAE    3) 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    4) Mild ankle edema, no pitting, takes prn lasix but edema getting worse  5) MAYA vs CKD, last BMP Cr was 1 4 up from 0 9  PLAN:  1  Cont xarelto and diltiazem, will not pursue rhythm control since asymptomatic  Went over things to look for if afib not well controlled w care giver  Check BP/HR once a week , gave parameter to call office  2  Check BMP given elevate Cr last time  3  Increase lasix to 20mg daily             Follow up in: 6 months    Orders Placed This Encounter   Procedures    POCT ECG     There are no discontinued medications    HPI/Subjective:  63 yo female  1) Afib, unclear duration or type, found incidentally at office visit in Aug 2019 and sent in for MARCIE -DCCV but found to be back in afib persistetnly on Zio patch , she is on xarelto and diltiazem now  [id number] female   She has developmental delay and lives in care facility, they check her BP/HR daily    2) Stress echo this year, 3min on Marshal, Moderate MR/TR, normal EF,  Moderate LAE  Otherwise has d MR/TR normal EF on TTE  Moderate LAE    3) 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    4) Mild ankle edema, no pitting, takes prn lasix but edema getting worse  5) MAYA vs CKD, last BMP Cr was 1 4 up from 0 9  Complete 12 point ROS reviewed and otherwise non pertinent or negative except as per HPI  Please see paper chart for outpatient clinic patients where the patient completed the 12 point ROS survey  Past Medical History:   Diagnosis Date    Anxiety disorder     Atrial fibrillation (HCC)        No Known Allergies  I reviewed the Home Medication list and Allergies in the chart  Scheduled Meds:  Current Outpatient Medications   Medication Sig Dispense Refill    benzonatate (TESSALON) 200 MG capsule Take 1 capsule (200 mg total) by mouth 3 (three) times a day as needed for cough for up to 20 doses 20 capsule 0    Calcium + Vitamin D3 600-10 MG-MCG TABS TAKE (1) TABLET BY MOUTH TWICE DAILY AT 8AM AND 5PM  62 tablet 0    cholecalciferol (VITAMIN D3) 400 units tablet TAKE 1 TABLET BY MOUTH THREE TIMES DAILY  93 tablet 0    diltiazem (CARDIZEM CD) 120 mg 24 hr capsule TAKE 1 CAPSULE BY MOUTH ONCE DAILY  31 capsule 0    docusate sodium (COLACE) 100 mg capsule Take 1 capsule (100 mg total) by mouth 2 (two) times a day If needed constipation 30 capsule 0    furosemide (LASIX) 20 mg tablet Take 1 tablet (20 mg total) by mouth daily as needed (edema) 30 tablet 11    hydrocortisone-aloe 1 % cream APPLY TO AFFECTED AREA TWICE A DAY AS NEEDED FOR ITCH AND RASH  28 g 2    Ibuprofen 200 MG CAPS Take 1-2 tablets if needed for pain or fever every 6-8 hours 120 each 0    Incontinence Supply Disposable (WINGS CHOICE PLUS ADULT BRIEFS) MISC USE AS DIRECTED FOR INCONTINENCE 90 each 4    levothyroxine 100 mcg tablet TAKE (1) TABLET BY MOUTH ONCE DAILY   31 tablet 0    loperamide (IMODIUM A-D) 2 mg capsule Take 1 capsule (2 mg total) by mouth 4 (four) times a day as needed for diarrhea 30 capsule 0    neomycin-polymyxin b-bacitracin (NEOSPORIN) 3 5-400-5,000 APPLY TOPICALLY TO AFFECTED AREA FOUR TIMES A DAY AS NEEDED FOR WOUND CARE 28 4 g 2    Omega-3 Fatty Acids (fish oil) 1,000 mg TAKE (3) CAPSULES BY MOUTH ONCE DAILY  93 capsule 0    pravastatin (PRAVACHOL) 40 mg tablet TAKE (1) TABLET BY MOUTH ONCE DAILY  31 tablet 0    QC Arthritis Pain Relief 650 MG CR tablet TAKE 1 TABLET BY MOUTH EVERY 8 HOURS AS NEEDED FOR MILD PAIN OR FEVER 30 tablet 0    Xarelto 20 MG tablet TAKE (1) TABLET BY MOUTH ONCE DAILY WITH BREAKFAST  31 tablet 0    fluticasone (FLONASE) 50 mcg/act nasal spray into each nostril daily (Patient not taking: No sig reported)       No current facility-administered medications for this visit       PRN Meds:         Family History   Problem Relation Age of Onset    Other Mother         neglect or abandonment     Other Father         neglect or abandonment     No Known Problems Sister     No Known Problems Maternal Grandmother     No Known Problems Maternal Grandfather     No Known Problems Paternal Grandmother     No Known Problems Paternal Grandfather        Social History     Socioeconomic History    Marital status: Single     Spouse name: Not on file    Number of children: Not on file    Years of education: Not on file    Highest education level: Not on file   Occupational History    Not on file   Tobacco Use    Smoking status: Never Smoker    Smokeless tobacco: Never Used   Substance and Sexual Activity    Alcohol use: Never    Drug use: Never    Sexual activity: Never     Birth control/protection: Female Sterilization   Other Topics Concern    Not on file   Social History Narrative    Disabled    Social history reviewed unchanged      Social Determinants of Health     Financial Resource Strain: Not on file   Food Insecurity: Not on file   Transportation Needs: Not on file   Physical Activity: Not on file   Stress: Not on file   Social Connections: Not on file   Intimate Partner Violence: Not on file   Housing Stability: Not on file         OBJECTIVE:    /72 (BP Location: Left arm, Patient Position: Sitting, Cuff Size: Extra-Large)   Pulse 84   Wt 92 kg (202 lb 14 4 oz)   BMI 37 11 kg/m²   Vitals:    06/07/22 1423   Weight: 92 kg (202 lb 14 4 oz)       /72 (BP Location: Left arm, Patient Position: Sitting, Cuff Size: Extra-Large)   Pulse 84   Wt 92 kg (202 lb 14 4 oz)   BMI 37 11 kg/m²   Vitals:    06/07/22 1423   Weight: 92 kg (202 lb 14 4 oz)     GEN: No acute distress, Alert and oriented, well appearing  HEENT:External ears normal, wearing a mask  EYES: Pupils equal, sclera anicteric, midline, normal conjuctiva  NECK: No JVD, supple, no obvious masses or thryomegaly or goiter  CARDIOVASCULAR: irreg irreg, No murmur, rub, gallops S1,S2  LUNGS: Clear To auscultation bilaterally, normal effort, no rales, rhonchi, crackles   ABDOMEN:  nondistended,  without obvious organomegaly or ascites  EXTREMITIES/VASCULAR: 2+  edema  warm an well perfused  PSYCH: Normal Affect,  linear speech pattern without evidence of psychosis  NEURO: Grossly intact, moving all extremiteis equal, face symmetric, alert and responsive, no obvious focal defecits   GAIT:  Ambulates normally without difficulty  HEME: No bleeding, bruising, petechia, purpura   SKIN: No significant rashes on visibile skin, warm, no diaphoresis or pallor           Lab Results:       LABS:      Chemistry        Component Value Date/Time    K 3 8 12/27/2021 0959     12/27/2021 0959    CO2 31 12/27/2021 0959    BUN 18 12/27/2021 0959    CREATININE 0 89 12/27/2021 0959        Component Value Date/Time    CALCIUM 9 8 12/27/2021 0959    ALKPHOS 126 (H) 09/20/2021 0924    AST 15 09/20/2021 0924    ALT 19 09/20/2021 0924            No results found for: CHOL  Lab Results   Component Value Date    HDL 47 06/10/2021    HDL 47 2020    HDL 53 07/10/2019     Lab Results   Component Value Date    LDLCALC 64 06/10/2021    LDLCALC 69 2020    LDLCALC 56 07/10/2019     Lab Results   Component Value Date    TRIG 109 06/10/2021    TRIG 134 2020    TRIG 89 07/10/2019     No results found for: CHOLHDL    IMAGING: No results found  Cardiac testing:   Results for orders placed during the hospital encounter of 18   Echo complete with contrast if indicated    Narrative [address] [place] [address] 35  [facility], [address]  [phone]    Transthoracic Echocardiogram  2D, M-mode, Doppler, and Color Doppler    Study date:  2018    Patient: Karlene Al  MR number: [id number]  Account number: [de-identified]  : 1960  Age: 62 years  Gender: Female  Status: Outpatient  Location: [address] [place] [address]  Height: 61 in  Weight: 194 lb  BP: 102/ 70 mmHg    Indications: Abnormal EKG  Diagnoses: R94 31 - Abnormal electrocardiogram [ECG] [EKG]    Sonographer:  Octavia Bustillos RDCS  Primary Physician:  LALA Dougherty  Referring Physician:  Teodoro Duvall MD  Group:  [facility]  Interpreting Physician:  Teodoro Duvall MD    SUMMARY    LEFT VENTRICLE:  Size was at the upper limits of normal   Systolic function was normal  Ejection fraction was estimated to be 55 %  There were no regional wall motion abnormalities  Features were consistent with a pseudonormal left ventricular filling pattern, with concomitant abnormal relaxation and increased filling pressure (grade 2 diastolic dysfunction)  LEFT ATRIUM:  The atrium was moderately dilated  MITRAL VALVE:  There was mild regurgitation  TRICUSPID VALVE:  There was mild regurgitation  HISTORY: PRIOR HISTORY: Hyperlipidemia, Dyspnea on exertion, Mental disability  PROCEDURE: The study was performed in the [address] [place] [address]  This was a routine study   The transthoracic approach was used  The study included complete 2D imaging, M-mode, complete spectral Doppler, and color Doppler  The  heart rate was 63 bpm, at the start of the study  Images were obtained from the parasternal, apical, subcostal, and suprasternal notch acoustic windows  Image quality was adequate  LEFT VENTRICLE: Size was at the upper limits of normal  Systolic function was normal  Ejection fraction was estimated to be 55 %  There were no regional wall motion abnormalities  Wall thickness was normal  No evidence of apical thrombus  DOPPLER: Features were consistent with a pseudonormal left ventricular filling pattern, with concomitant abnormal relaxation and increased filling pressure (grade 2 diastolic dysfunction)  RIGHT VENTRICLE: The size was normal  Systolic function was normal  Wall thickness was normal     LEFT ATRIUM: The atrium was moderately dilated  RIGHT ATRIUM: Size was normal     MITRAL VALVE: Valve structure was normal  There was normal leaflet separation  DOPPLER: The transmitral velocity was within the normal range  There was no evidence for stenosis  There was mild regurgitation  AORTIC VALVE: The valve was trileaflet  Leaflets exhibited normal thickness and normal cuspal separation  DOPPLER: Transaortic velocity was within the normal range  There was no evidence for stenosis  There was no significant  regurgitation  TRICUSPID VALVE: The valve structure was normal  There was normal leaflet separation  DOPPLER: The transtricuspid velocity was within the normal range  There was no evidence for stenosis  There was mild regurgitation  Estimated peak PA  pressure was 30 mmHg  PULMONIC VALVE: Not well visualized  DOPPLER: The transpulmonic velocity was within the normal range  There was no significant regurgitation  PERICARDIUM: There was no pericardial effusion  The pericardium was normal in appearance  AORTA: The root exhibited normal size      SYSTEMIC VEINS: IVC: The inferior vena cava was normal in size  SYSTEM MEASUREMENT TABLES    2D  %FS: 31 2 %  Ao Diam: 2 9 cm  EDV(Teich): 161 8 ml  EF(Teich): 58 3 %  ESV(Teich): 67 5 ml  IVSd: 1 cm  LA Area: 25 7 cm2  LA Diam: 5 3 cm  LVEDV MOD A4C: 62 6 ml  LVEF MOD A4C: 57 8 %  LVESV MOD A4C: 26 4 ml  LVIDd: 5 7 cm  LVIDs: 3 9 cm  LVLd A4C: 6 2 cm  LVLs A4C: 5 5 cm  LVPWd: 1 cm  RA Area: 12 4 cm2  RVIDd: 3 1 cm  SV MOD A4C: 36 2 ml  SV(Teich): 94 4 ml    CW  TR Vmax: 2 6 m/s  TR maxP 5 mmHg    MM  TAPSE: 2 3 cm    PW  E': 0 1 m/s  E/E': 11 8  MV A Adrian: 0 2 m/s  MV Dec Moody: 5 9 m/s2  MV DecT: 141 1 ms  MV E Adrian: 0 8 m/s  MV E/A Ratio: 3 8  MV PHT: 40 9 ms  MVA By PHT: 5 4 cm2    [facility]    Prepared and electronically signed by    Tiara Snow MD  Signed [id number] 08:58:33       Results for orders placed during the hospital encounter of 19   MARCIE    Narrative [address] [place] [address], [address]  [phone]    Transesophageal Echocardiogram  2D, M-mode, Doppler, and Color Doppler    Study date:  23-Aug-2019    Patient: Surendra Gonzalez  MR number: [id number]  Account number: [de-identified]  : 1960  Age: 62 years  Gender: Female  Status: Inpatient  Location: Cath lab  Height: 61 in  Weight: 196 7 lb  BP: 141/ 78 mmHg    Indications: Atrial fibrillation  Diagnoses: I48 0 - Atrial fibrillation    Sonographer:  Jim Leonard RDCS  Interpreting Physician:  Precious Tejada MD  Primary Physician:  Gracy Lau PA-C  Referring Physician:  Tiara Snow MD  Group:  [facility]  RN:  Celestino Cervantes RN BSN    IMPRESSIONS:  No evidence of left atrial or left atrial appendage thrombus on transesophageal echocardiogram   Other echocardiographic findings as noted below      SUMMARY    LEFT VENTRICLE:  Normal left ventricular cavity size mild concentric left ventricular hypertrophy, normal left ventricular systolic function  Regional wall motion and ejection fraction could not be reliably assessed due to rapid ventricular rates  RIGHT VENTRICLE:  Normal right ventricular size and visually normal right ventricular systolic function  LEFT ATRIUM:  Moderate left atrial cavity enlargement  No spontaneous contrast, mass or thrombus noted in left atrial cavity  LEFT ATRIAL APPENDAGE:  Enlarged single lobe left atrial appendage with moderately reduced flow velocities  No masses, vegetations or thrombus identified in left atrial appendage cavity  ATRIAL SEPTUM:  Intact interatrial septum  No color flow Doppler evidence of interatrial shunts  RIGHT ATRIUM:  Mild right atrial cavity enlargement  MITRAL VALVE:  Mild mitral valve leaflet thickening, adequate leaflet mobility  Mild mitral valve regurgitation noted  AORTIC VALVE:  Tricuspid aortic valve with mild sclerosis  Adequate cuspal separation  No aortic valve stenosis or regurgitation noted  TRICUSPID VALVE:  Mild tricuspid valve leaflet thickening  Mild tricuspid valve regurgitation  PULMONIC VALVE:  Unremarkable pulmonic valve leaflet morphology  No pulmonic valve stenosis or regurgitation noted  AORTA:  Aortic root and proximal ascending aorta are normal in size on 2D imaging  There are mild atherosclerotic changes noted in visualized portions of ascending and descending aorta  No mobile plaques noted  PERICARDIUM:  No pericardial effusion  HISTORY: PRIOR HISTORY: Long QT interval, hypothyroidism, hyperlipidemia, atrial fibrillation, mental retardation, left atrial enlargement, obesity, anxiety  PROCEDURE: The procedure was performed in the catheterization laboratory  The risks and alternatives of the procedure were explained to the patient's next of kin and informed consent was obtained  The transesophageal approach was used  The  study included complete 2D imaging, M-mode, complete spectral Doppler, and color Doppler   The heart rate was 156 bpm, at the start of the study  An adult omniplane probe was inserted by the attending cardiologist  Intubated with ease  One  intubation attempt(s)  There was no blood detected on the probe  Image quality was adequate  There were no complications during the procedure  MEDICATIONS: Anesthesia administered by anesthesia team     LEFT VENTRICLE: Normal left ventricular cavity size mild concentric left ventricular hypertrophy, normal left ventricular systolic function  Regional wall motion and ejection fraction could not be reliably assessed due to rapid ventricular  rates  RIGHT VENTRICLE: Normal right ventricular size and visually normal right ventricular systolic function  LEFT ATRIUM: Moderate left atrial cavity enlargement  No spontaneous contrast, mass or thrombus noted in left atrial cavity  APPENDAGE: Enlarged single lobe left atrial appendage with moderately reduced flow velocities  No masses,  vegetations or thrombus identified in left atrial appendage cavity  ATRIAL SEPTUM: Intact interatrial septum  No color flow Doppler evidence of interatrial shunts  RIGHT ATRIUM: Mild right atrial cavity enlargement  MITRAL VALVE: Mild mitral valve leaflet thickening, adequate leaflet mobility  Mild mitral valve regurgitation noted  AORTIC VALVE: Tricuspid aortic valve with mild sclerosis  Adequate cuspal separation  No aortic valve stenosis or regurgitation noted  TRICUSPID VALVE: Mild tricuspid valve leaflet thickening  Mild tricuspid valve regurgitation  PULMONIC VALVE: Unremarkable pulmonic valve leaflet morphology  No pulmonic valve stenosis or regurgitation noted  PERICARDIUM: No pericardial effusion  AORTA: Aortic root and proximal ascending aorta are normal in size on 2D imaging  There are mild atherosclerotic changes noted in visualized portions of ascending and descending aorta  No mobile plaques noted      SYSTEM MEASUREMENT TABLES    CW  TR Vmax: 2 1 m/s  TR maxP 7 mmHg    [facility]    Prepared and electronically signed by    Calixto Thompson MD  Signed 23-Aug-2019 19:27:14       No results found for this or any previous visit  No results found for this or any previous visit          I reviewed and interpreted the following LABS/EKG/TELE/IMAGING and below is summary of my interpretation (if data available):      Current EKG and Rhythm Strip:afib rate controlled 84bpm    Reviewed echo images, moderate MR/TR and moderate LAE, normal EF

## 2022-06-15 DIAGNOSIS — F71 MODERATE INTELLECTUAL DISABILITIES: ICD-10-CM

## 2022-06-16 ENCOUNTER — OFFICE VISIT (OUTPATIENT)
Dept: FAMILY MEDICINE CLINIC | Facility: CLINIC | Age: 62
End: 2022-06-16

## 2022-06-16 ENCOUNTER — APPOINTMENT (OUTPATIENT)
Dept: LAB | Age: 62
End: 2022-06-16
Payer: MEDICARE

## 2022-06-16 VITALS
TEMPERATURE: 96.7 F | BODY MASS INDEX: 37.17 KG/M2 | DIASTOLIC BLOOD PRESSURE: 78 MMHG | WEIGHT: 202 LBS | SYSTOLIC BLOOD PRESSURE: 112 MMHG | RESPIRATION RATE: 18 BRPM | HEART RATE: 78 BPM | HEIGHT: 62 IN | OXYGEN SATURATION: 96 %

## 2022-06-16 DIAGNOSIS — S09.90XD INJURY OF HEAD, SUBSEQUENT ENCOUNTER: Primary | ICD-10-CM

## 2022-06-16 DIAGNOSIS — H61.23 BILATERAL IMPACTED CERUMEN: ICD-10-CM

## 2022-06-16 PROBLEM — S09.90XA HEAD INJURY: Status: ACTIVE | Noted: 2022-06-16

## 2022-06-16 LAB
ANION GAP SERPL CALCULATED.3IONS-SCNC: 2 MMOL/L (ref 4–13)
BASOPHILS # BLD AUTO: 0.03 THOUSANDS/ΜL (ref 0–0.1)
BASOPHILS NFR BLD AUTO: 0 % (ref 0–1)
BUN SERPL-MCNC: 16 MG/DL (ref 5–25)
CALCIUM SERPL-MCNC: 9.8 MG/DL (ref 8.3–10.1)
CHLORIDE SERPL-SCNC: 103 MMOL/L (ref 100–108)
CO2 SERPL-SCNC: 34 MMOL/L (ref 21–32)
CREAT SERPL-MCNC: 0.93 MG/DL (ref 0.6–1.3)
EOSINOPHIL # BLD AUTO: 0.23 THOUSAND/ΜL (ref 0–0.61)
EOSINOPHIL NFR BLD AUTO: 3 % (ref 0–6)
ERYTHROCYTE [DISTWIDTH] IN BLOOD BY AUTOMATED COUNT: 13.3 % (ref 11.6–15.1)
GFR SERPL CREATININE-BSD FRML MDRD: 66 ML/MIN/1.73SQ M
GLUCOSE P FAST SERPL-MCNC: 91 MG/DL (ref 65–99)
HCT VFR BLD AUTO: 44.5 % (ref 34.8–46.1)
HGB BLD-MCNC: 14.5 G/DL (ref 11.5–15.4)
IMM GRANULOCYTES # BLD AUTO: 0.02 THOUSAND/UL (ref 0–0.2)
IMM GRANULOCYTES NFR BLD AUTO: 0 % (ref 0–2)
LYMPHOCYTES # BLD AUTO: 1.58 THOUSANDS/ΜL (ref 0.6–4.47)
LYMPHOCYTES NFR BLD AUTO: 22 % (ref 14–44)
MCH RBC QN AUTO: 29.1 PG (ref 26.8–34.3)
MCHC RBC AUTO-ENTMCNC: 32.6 G/DL (ref 31.4–37.4)
MCV RBC AUTO: 89 FL (ref 82–98)
MONOCYTES # BLD AUTO: 0.44 THOUSAND/ΜL (ref 0.17–1.22)
MONOCYTES NFR BLD AUTO: 6 % (ref 4–12)
NEUTROPHILS # BLD AUTO: 5 THOUSANDS/ΜL (ref 1.85–7.62)
NEUTS SEG NFR BLD AUTO: 69 % (ref 43–75)
NRBC BLD AUTO-RTO: 0 /100 WBCS
PLATELET # BLD AUTO: 158 THOUSANDS/UL (ref 149–390)
PMV BLD AUTO: 11.2 FL (ref 8.9–12.7)
POTASSIUM SERPL-SCNC: 4 MMOL/L (ref 3.5–5.3)
RBC # BLD AUTO: 4.99 MILLION/UL (ref 3.81–5.12)
SODIUM SERPL-SCNC: 139 MMOL/L (ref 136–145)
WBC # BLD AUTO: 7.3 THOUSAND/UL (ref 4.31–10.16)

## 2022-06-16 PROCEDURE — 85025 COMPLETE CBC W/AUTO DIFF WBC: CPT | Performed by: INTERNAL MEDICINE

## 2022-06-16 PROCEDURE — 99213 OFFICE O/P EST LOW 20 MIN: CPT

## 2022-06-16 PROCEDURE — 36415 COLL VENOUS BLD VENIPUNCTURE: CPT | Performed by: INTERNAL MEDICINE

## 2022-06-16 PROCEDURE — 80048 BASIC METABOLIC PNL TOTAL CA: CPT | Performed by: INTERNAL MEDICINE

## 2022-06-16 RX ORDER — SENNOSIDES 8.6 MG
CAPSULE ORAL
Qty: 30 TABLET | Refills: 0 | Status: SHIPPED | OUTPATIENT
Start: 2022-06-16

## 2022-06-16 NOTE — PROGRESS NOTES
Assessment/Plan:    Head injury  Pt seen in ER on 5/2/22 after accidentally striking head while getting into vehicle  CT head 5/2/22 revealed no acute hemorrhage, mass, or ischemia  Pt poor historian but group home staff member present at today's visit reports no change in mental status, pain, or other symptoms since injury   - No new abnormal exam findings  Future follow-up PRN  Bilateral impacted cerumen  Bilateral impacted cerumen  Group home staff member states pt would not tolerate ear flush  - Debrox 5 drops each ear 1-2 daily x4 days, may repeat monthly PRN       Diagnoses and all orders for this visit:    Injury of head, subsequent encounter    Bilateral impacted cerumen  -     carbamide peroxide (DEBROX) 6 5 % otic solution; Administer 5 drops into both ears 2 (two) times a day for 4 days          Subjective:      Patient ID: Narendra Benedict is a 64 y o  female  HPI    Carole presented to office accompanied by group home staff member to follow up after ED visit on 5/2/22 for head injury  Pt was taken to ED for assessment on that date due to pt being on Xarelto  The following portions of the patient's history were reviewed and updated as appropriate: allergies, current medications, past family history, past medical history, past social history, past surgical history and problem list     Review of Systems   Reason unable to perform ROS: Most information provided by group home staff member  Constitutional: Negative for activity change, fatigue and fever  Respiratory: Negative for cough and shortness of breath  Cardiovascular: Negative for chest pain and palpitations  Musculoskeletal: Negative for neck pain and neck stiffness  Negative for pain at site of injury  Skin: Negative for wound  Neurological: Negative for dizziness, seizures, syncope and headaches  Psychiatric/Behavioral: Negative for behavioral problems and confusion  All other systems reviewed and are negative  Objective:      /78 (BP Location: Left arm, Patient Position: Sitting, Cuff Size: Adult)   Pulse 78   Temp (!) 96 7 °F (35 9 °C) (Temporal)   Resp 18   Ht 5' 2" (1 575 m)   Wt 91 6 kg (202 lb)   SpO2 96%   BMI 36 95 kg/m²          Physical Exam  Vitals reviewed  Constitutional:       General: She is not in acute distress  Appearance: She is obese  She is not ill-appearing or diaphoretic  HENT:      Head: Normocephalic and atraumatic  Right Ear: External ear normal  There is impacted cerumen  Left Ear: External ear normal  There is impacted cerumen  Nose: Nose normal       Mouth/Throat:      Mouth: Mucous membranes are moist       Pharynx: Oropharynx is clear  Eyes:      General: Lids are normal       Pupils: Pupils are equal, round, and reactive to light  Comments: Gaze inappropriately aligned (chronic)  Cardiovascular:      Rate and Rhythm: Normal rate  Rhythm irregularly irregular  Heart sounds: Normal heart sounds  No murmur heard  Pulmonary:      Effort: Pulmonary effort is normal       Breath sounds: Normal breath sounds  No decreased breath sounds or wheezing  Lymphadenopathy:      Cervical: No cervical adenopathy  Skin:     General: Skin is warm and dry  Neurological:      Mental Status: She is alert  Mental status is at baseline  Motor: Motor function is intact  Gait: Gait is intact     Psychiatric:         Mood and Affect: Mood and affect normal

## 2022-06-17 ENCOUNTER — TELEPHONE (OUTPATIENT)
Dept: FAMILY MEDICINE CLINIC | Facility: CLINIC | Age: 62
End: 2022-06-17

## 2022-06-17 NOTE — ASSESSMENT & PLAN NOTE
Pt seen in ER on 5/2/22 after accidentally striking head while getting into vehicle  CT head 5/2/22 revealed no acute hemorrhage, mass, or ischemia  Pt poor historian but group home staff member present at today's visit reports no change in mental status, pain, or other symptoms since injury   - No new abnormal exam findings  Future follow-up PRN

## 2022-06-17 NOTE — ASSESSMENT & PLAN NOTE
Bilateral impacted cerumen  Group home staff member states pt would not tolerate ear flush    - Debrox 5 drops each ear 1-2 daily x4 days, may repeat monthly PRN

## 2022-06-27 ENCOUNTER — TELEPHONE (OUTPATIENT)
Dept: CARDIOLOGY CLINIC | Facility: CLINIC | Age: 62
End: 2022-06-27

## 2022-06-27 NOTE — TELEPHONE ENCOUNTER
Patient of Roque Hernandez last had a visit on 6/7/22  Patient's home nurse called today stating Carole is having a heart rate of 49-50's  Patient does not have any symptoms  The nurse stated they had instructions from Dr Lewis that if her heart rate is around 50's to hold diltiazem, in which the patient did not take it today     Please advise, thank you

## 2022-06-27 NOTE — TELEPHONE ENCOUNTER
As per rhea, The protocol is going to change, continue giving patient diltiazem 120 daily and they can call us if her heart rate drops below 45bpm"  I called and gave them the instructions   Thank you

## 2022-06-28 NOTE — TELEPHONE ENCOUNTER
Elbert Holguin from patient's group home asked for a letter documenting the instructions for holding CCB for low HR  I will send letter, according to your instructions

## 2022-07-05 ENCOUNTER — OFFICE VISIT (OUTPATIENT)
Dept: FAMILY MEDICINE CLINIC | Facility: CLINIC | Age: 62
End: 2022-07-05

## 2022-07-05 VITALS
TEMPERATURE: 97 F | RESPIRATION RATE: 18 BRPM | SYSTOLIC BLOOD PRESSURE: 128 MMHG | DIASTOLIC BLOOD PRESSURE: 92 MMHG | OXYGEN SATURATION: 97 % | HEIGHT: 62 IN | WEIGHT: 202.2 LBS | HEART RATE: 55 BPM | BODY MASS INDEX: 37.21 KG/M2

## 2022-07-05 DIAGNOSIS — F39 MOOD DISORDER (HCC): ICD-10-CM

## 2022-07-05 DIAGNOSIS — I48.20 CHRONIC ATRIAL FIBRILLATION (HCC): ICD-10-CM

## 2022-07-05 DIAGNOSIS — E03.2 HYPOTHYROIDISM DUE TO MEDICATION: ICD-10-CM

## 2022-07-05 DIAGNOSIS — E66.01 CLASS 2 SEVERE OBESITY DUE TO EXCESS CALORIES WITH SERIOUS COMORBIDITY AND BODY MASS INDEX (BMI) OF 37.0 TO 37.9 IN ADULT (HCC): ICD-10-CM

## 2022-07-05 DIAGNOSIS — E55.9 VITAMIN D DEFICIENCY: ICD-10-CM

## 2022-07-05 DIAGNOSIS — Z00.00 MEDICARE ANNUAL WELLNESS VISIT, INITIAL: Primary | ICD-10-CM

## 2022-07-05 DIAGNOSIS — Z87.898 H/O PROLONGED Q-T INTERVAL ON ECG: ICD-10-CM

## 2022-07-05 DIAGNOSIS — S09.90XD INJURY OF HEAD, SUBSEQUENT ENCOUNTER: ICD-10-CM

## 2022-07-05 DIAGNOSIS — R32 URINARY INCONTINENCE, UNSPECIFIED TYPE: ICD-10-CM

## 2022-07-05 DIAGNOSIS — E28.39 ESTROGEN DEFICIENCY: ICD-10-CM

## 2022-07-05 DIAGNOSIS — I48.91 NEW ONSET ATRIAL FIBRILLATION (HCC): ICD-10-CM

## 2022-07-05 DIAGNOSIS — E28.319 PREMATURE MENOPAUSE: ICD-10-CM

## 2022-07-05 DIAGNOSIS — R60.0 LEG EDEMA: ICD-10-CM

## 2022-07-05 DIAGNOSIS — G25.70 MEDICATION-INDUCED MOVEMENT DISORDER: ICD-10-CM

## 2022-07-05 DIAGNOSIS — H61.23 BILATERAL IMPACTED CERUMEN: ICD-10-CM

## 2022-07-05 DIAGNOSIS — E78.5 HYPERLIPIDEMIA, UNSPECIFIED HYPERLIPIDEMIA TYPE: ICD-10-CM

## 2022-07-05 DIAGNOSIS — F79 MENTAL DISABILITY: ICD-10-CM

## 2022-07-05 PROCEDURE — G0439 PPPS, SUBSEQ VISIT: HCPCS | Performed by: NURSE PRACTITIONER

## 2022-07-05 PROCEDURE — 99214 OFFICE O/P EST MOD 30 MIN: CPT | Performed by: NURSE PRACTITIONER

## 2022-07-05 RX ORDER — PRAVASTATIN SODIUM 40 MG
40 TABLET ORAL DAILY
Qty: 90 TABLET | Refills: 3 | Status: SHIPPED | OUTPATIENT
Start: 2022-07-05

## 2022-07-05 RX ORDER — DIAPER,BRIEF,ADULT, DISPOSABLE
EACH MISCELLANEOUS
Qty: 90 EACH | Refills: 4 | Status: SHIPPED | OUTPATIENT
Start: 2022-07-05

## 2022-07-05 RX ORDER — CHLORAL HYDRATE 500 MG
1000 CAPSULE ORAL DAILY
Qty: 90 CAPSULE | Refills: 3 | Status: SHIPPED | OUTPATIENT
Start: 2022-07-05

## 2022-07-05 RX ORDER — DILTIAZEM HYDROCHLORIDE 120 MG/1
120 CAPSULE, COATED, EXTENDED RELEASE ORAL DAILY
Qty: 90 CAPSULE | Refills: 3 | Status: SHIPPED | OUTPATIENT
Start: 2022-07-05

## 2022-07-05 RX ORDER — OMEGA-3S/DHA/EPA/FISH OIL/D3 300MG-1000
400 CAPSULE ORAL 3 TIMES DAILY
Qty: 360 TABLET | Refills: 3 | Status: SHIPPED | OUTPATIENT
Start: 2022-07-05

## 2022-07-05 NOTE — PROGRESS NOTES
Assessment and Plan:     Problem List Items Addressed This Visit        Endocrine    Hypothyroidism     Lab Results   Component Value Date    [id number] 1 710 06/10/2021     Continue current dose levothyroxine 100 mcg daily   Recheck TSH before next visit               Cardiovascular and Mediastinum    Atrial fibrillation ([place] [address] )     Follows with cardiology   Rate controlled   Continue with Verba Ng and Cardizem            Relevant Medications    diltiazem (CARDIZEM CD) 120 mg 24 hr capsule       Nervous and Auditory    Medication-induced movement disorder     2/2 previous antipsychotic use   No longer using            Bilateral impacted cerumen     Debrox bilaterally, follows with ENT               Other    H/O prolonged Q-T interval on ECG    Hyperlipidemia     Continue pravastatin 40 mg daily   Repeat lipid panel            Relevant Medications    pravastatin (PRAVACHOL) 40 mg tablet    Omega-3 Fatty Acids (fish oil) 1,000 mg    Vitamin D deficiency    Relevant Medications    cholecalciferol (VITAMIN D3) 400 units tablet    Calcium Carbonate-Vitamin D3 (Calcium + Vitamin D3) 600-400 MG-UNIT TABS    Mental disability     Lives in group home            Mood disorder ([place] [address] )    Class 2 severe obesity due to excess calories with serious comorbidity and body mass index (BMI) of 37 0 to 37 9 in adult ([place] [address] )     -Encouraged diet and lifestyle changes: decrease processed foods (cakes, cookies, chips, soda), decrease total carbohydrate intake, decrease fried/fatty foods, increase fruits and vegetables, increase lean proteins (chicken, turkey), increase healthy fats (avocado, fish, nuts), drink plenty of water (at least four 16 oz bottles per day)             Leg edema     +1 BLLE, non pitting   Recommend CHILO stockings, elevation at rest   Limited dietary sodium            Head injury      Other Visit Diagnoses     Medicare annual wellness visit, initial    -  Primary    Urinary incontinence, unspecified type        Relevant Medications    Incontinence Supply Disposable ([employer] Plus Adult Briefs) MISC    New onset atrial fibrillation (HCC)        Relevant Medications    diltiazem (CARDIZEM CD) 120 mg 24 hr capsule    Premature menopause        Relevant Orders    DXA bone density spine hip and pelvis    Estrogen deficiency        Relevant Orders    DXA bone density spine hip and pelvis           Preventive health issues were discussed with patient, and age appropriate screening tests were ordered as noted in patient's After Visit Summary  Personalized health advice and appropriate referrals for health education or preventive services given if needed, as noted in patient's After Visit Summary  History of Present Illness:     Patient presents for a Medicare Wellness Visit  She lives in a group home and is accompanied by staff  There are no new concerns from patient or caregiver  Patient is excited to go to [facility] after visit  Patient Care Team:  Wan Brewster as PCP - General (Family Medicine)  MD Ho Azul PA-C Lynnie Solomon, CRNP     Review of Systems:     Review of Systems   Constitutional: Negative for chills and fever  HENT: Negative for ear pain and sore throat  Eyes: Negative for pain and visual disturbance  Respiratory: Negative for cough and shortness of breath  Cardiovascular: Negative for chest pain and palpitations  Gastrointestinal: Negative for abdominal pain and vomiting  Genitourinary: Negative for dysuria and hematuria  Musculoskeletal: Negative for arthralgias and back pain  Skin: Negative for color change and rash  Neurological: Negative for seizures and syncope  All other systems reviewed and are negative         Problem List:     Patient Active Problem List   Diagnosis    Enlarged LA (left atrium)    H/O prolonged Q-T interval on ECG    Hypothyroidism    Hyperlipidemia    Vitamin D deficiency    Medication-induced movement disorder    Mental disability    Mood disorder (HCC)    Class 2 severe obesity due to excess calories with serious comorbidity and body mass index (BMI) of 37 0 to 37 9 in adult ([facility] [address] )    Overflow incontinence of urine    Severe anxiety    Atrial fibrillation (HCC)    Bilateral impacted cerumen    Leg edema    Encounter for gynecological examination (general) (routine) without abnormal findings    Head injury      Past Medical and Surgical History:     Past Medical History:   Diagnosis Date    Anxiety disorder     Atrial fibrillation (HCC)      Past Surgical History:   Procedure Laterality Date    HYSTERECTOMY      age 25   [de-identified]      MI CARDIOVERSION ELECTIVE ARRHYTHMIA EXTERNAL  8/23/2019           Family History:     Family History   Problem Relation Age of Onset    Other Mother         neglect or abandonment     Other Father         neglect or abandonment     No Known Problems Sister     No Known Problems Maternal Grandmother     No Known Problems Maternal Grandfather     No Known Problems Paternal Grandmother     No Known Problems Paternal Grandfather       Social History:     Social History     Socioeconomic History    Marital status: Single     Spouse name: None    Number of children: None    Years of education: None    Highest education level: None   Occupational History    None   Tobacco Use    Smoking status: Never Smoker    Smokeless tobacco: Never Used   Substance and Sexual Activity    Alcohol use: Never    Drug use: Never    Sexual activity: Never     Birth control/protection: Female Sterilization   Other Topics Concern    None   Social History Narrative    Disabled    Social history reviewed unchanged      Social Determinants of Health     Financial Resource Strain: Low Risk     Difficulty of Paying Living Expenses: Not hard at all   Food Insecurity: No Food Insecurity    Worried About Running Out of Food in the Last Year: Never true    Elisabeth of Food in the Last Year: Never true   Transportation Needs: No Transportation Needs    Lack of Transportation (Medical): No    Lack of Transportation (Non-Medical): No   Physical Activity: Not on file   Stress: Not on file   Social Connections: Not on file   Intimate Partner Violence: Not on file   Housing Stability: Not on file      Medications and Allergies:     Current Outpatient Medications   Medication Sig Dispense Refill    Calcium Carbonate-Vitamin D3 (Calcium + Vitamin D3) 600-400 MG-UNIT TABS Take 1 tablet by mouth 2 (two) times a day 180 tablet 3    cholecalciferol (VITAMIN D3) 400 units tablet Take 1 tablet (400 Units total) by mouth 3 (three) times a day 360 tablet 3    diltiazem (CARDIZEM CD) 120 mg 24 hr capsule Take 1 capsule (120 mg total) by mouth daily 90 capsule 3    Incontinence Supply Disposable (Wings [employer] Plus Adult Briefs) MISC USE AS DIRECTED FOR INCONTINENCE 90 each 4    Omega-3 Fatty Acids (fish oil) 1,000 mg Take 1 capsule (1,000 mg total) by mouth daily 90 capsule 3    pravastatin (PRAVACHOL) 40 mg tablet Take 1 tablet (40 mg total) by mouth daily 90 tablet 3    acetaminophen (TYLENOL) 650 mg CR tablet TAKE 1 TABLET BY MOUTH EVERY 8 HOURS AS NEEDED FOR MILD PAIN OR FEVER 30 tablet 0    benzonatate (TESSALON) 200 MG capsule Take 1 capsule (200 mg total) by mouth 3 (three) times a day as needed for cough for up to 20 doses 20 capsule 0    carbamide peroxide (DEBROX) 6 5 % otic solution Administer 5 drops into both ears 2 (two) times a day for 4 days 15 mL 0    docusate sodium (COLACE) 100 mg capsule Take 1 capsule (100 mg total) by mouth 2 (two) times a day If needed constipation 30 capsule 0    fluticasone (FLONASE) 50 mcg/act nasal spray into each nostril daily (Patient not taking: No sig reported)      furosemide (LASIX) 20 mg tablet Take 1 tablet (20 mg total) by mouth daily 90 tablet 3    hydrocortisone-aloe 1 % cream APPLY TO AFFECTED AREA TWICE A DAY AS NEEDED FOR ITCH AND RASH   28 g 2    Ibuprofen 200 MG CAPS Take 1-2 tablets if needed for pain or fever every 6-8 hours 120 each 0    levothyroxine 100 mcg tablet TAKE (1) TABLET BY MOUTH ONCE DAILY  31 tablet 0    loperamide (IMODIUM A-D) 2 mg capsule Take 1 capsule (2 mg total) by mouth 4 (four) times a day as needed for diarrhea 30 capsule 0    neomycin-polymyxin b-bacitracin (NEOSPORIN) 3 5-400-5,000 APPLY TOPICALLY TO AFFECTED AREA FOUR TIMES A DAY AS NEEDED FOR WOUND CARE 28 4 g 2    Xarelto 20 MG tablet TAKE (1) TABLET BY MOUTH ONCE DAILY WITH BREAKFAST  31 tablet 0     No current facility-administered medications for this visit  No Known Allergies   Immunizations:     Immunization History   Administered Date(s) Administered    COVID-19 MODERNA VACC 0 5 ML IM 02/05/2021, 03/08/2021    INFLUENZA 10/31/2016, 10/13/2017, 10/25/2017, 10/10/2018    Influenza, recombinant, quadrivalent,injectable, preservative free 11/15/2019, 10/14/2020, 10/06/2021    Influenza, seasonal, injectable 11/14/2014, 10/25/2017    Tdap 06/11/2013, 11/01/2017    Tuberculin Skin Test-PPD Intradermal 06/11/2013, 06/12/2015, 06/26/2017, 06/24/2019, 06/21/2021      Health Maintenance:         Topic Date Due    Cervical Cancer Screening  Never done    Breast Cancer Screening: Mammogram  07/15/2023    Colorectal Cancer Screening  01/07/2026    HIV Screening  Completed    Hepatitis C Screening  Completed         Topic Date Due    COVID-19 Vaccine (3 - Booster for Rodger Naegeli series) 08/08/2021    Influenza Vaccine (1) 09/01/2022      Medicare Screening Tests and Risk Assessments:     Carole is here for her Subsequent Wellness visit  Historian  Patient cannot answer questions due to cognitive impairment, intelluctual disability, or expressive limitations  Information provided by: other and caregiver  Health Risk Assessment:   Patient rates overall health as very good  Patient feels that their physical health rating is same   Patient is satisfied with their life  Leatha Gonzalez was rated as same  Hearing was rated as same  Patient feels that their emotional and mental health rating is same  Patients states they are never, rarely angry  Patient states they are never, rarely unusually tired/fatigued  Pain experienced in the last 7 days has been none  Patient states that she has experienced no weight loss or gain in last 6 months  Depression Screening:   PHQ-2 Score: 0      Fall Risk Screening: In the past year, patient has experienced: no history of falling in past year      Urinary Incontinence Screening:   Patient has leaked urine accidently in the last six months  Home Safety:  Patient does not have trouble with stairs inside or outside of their home  Patient has working smoke alarms and has working carbon monoxide detector  Home safety hazards include: medications that cause fatigue  Nutrition:   Current diet is Regular and Limited junk food  Medications:   Patient is not currently taking any over-the-counter supplements  Patient is not able to manage medications  Activities of Daily Living (ADLs)/Instrumental Activities of Daily Living (IADLs):   Walk and transfer into and out of bed and chair?: Yes  Dress and groom yourself?: Yes    Bathe or shower yourself?: No    Feed yourself?  Yes  Do your laundry/housekeeping?: No  Manage your money, pay your bills and track your expenses?: No  Make your own meals?: No    Do your own shopping?: Yes    Previous Hospitalizations:   Any hospitalizations or ED visits within the last 12 months?: No      Advance Care Planning:   Living will: No      Cognitive Screening:   Provider or family/friend/caregiver concerned regarding cognition?: No    PREVENTIVE SCREENINGS      Cardiovascular Screening:    General: Screening Not Indicated, History Lipid Disorder and Risks and Benefits Discussed    Due for: Lipid Panel      Diabetes Screening:     General: Screening Current and Risks and Benefits Discussed      Colorectal Cancer Screening:     General: Screening Current      Breast Cancer Screening:     General: Screening Current      Cervical Cancer Screening:    General: Screening Current and Risks and Benefits Discussed      Osteoporosis Screening:    General: Risks and Benefits Discussed    Due for: DXA Axial      Abdominal Aortic Aneurysm (AAA) Screening:        General: Screening Not Indicated      Lung Cancer Screening:     General: Screening Not Indicated      Hepatitis C Screening:    General: Screening Current    Screening, Brief Intervention, and Referral to Treatment (SBIRT)    Screening  Typical number of drinks in a day: 0  Typical number of drinks in a week: 0  Interpretation: Low risk drinking behavior  Single Item Drug Screening:  How often have you used an illegal drug (including marijuana) or a prescription medication for non-medical reasons in the past year? never    Single Item Drug Screen Score: 0  Interpretation: Negative screen for possible drug use disorder    Other Counseling Topics:   Car/seat belt/driving safety, skin self-exam, sunscreen and calcium and vitamin D intake  No exam data present     Physical Exam:     /92 (BP Location: Right arm, Patient Position: Sitting, Cuff Size: Standard)   Pulse 55   Temp (!) 97 °F (36 1 °C) (Temporal)   Resp 18   Ht 5' 2" (1 575 m)   Wt 91 7 kg (202 lb 3 2 oz)   SpO2 97%   BMI 36 98 kg/m²     Physical Exam  Vitals and nursing note reviewed  Constitutional:       General: She is not in acute distress  Appearance: She is well-developed  She is obese  HENT:      Head: Normocephalic and atraumatic  Right Ear: External ear normal  There is impacted cerumen  Left Ear: External ear normal  There is impacted cerumen  Eyes:      Conjunctiva/sclera: Conjunctivae normal       Pupils: Pupils are equal, round, and reactive to light  Cardiovascular:      Rate and Rhythm: Normal rate and regular rhythm  Pulses: Normal pulses  Pulmonary:      Effort: Pulmonary effort is normal  No respiratory distress  Breath sounds: Normal breath sounds  Abdominal:      General: Bowel sounds are normal  There is no distension  Palpations: Abdomen is soft  Tenderness: There is no abdominal tenderness  Musculoskeletal:      Cervical back: Neck supple  Right lower leg: Edema present  Left lower leg: Edema present  Comments: Trace BLLE edema    Skin:     General: Skin is warm and dry  Capillary Refill: Capillary refill takes less than 2 seconds  Neurological:      Mental Status: She is alert  Mental status is at baseline     Psychiatric:         Behavior: Behavior normal           LALA Ashby

## 2022-07-05 NOTE — PATIENT INSTRUCTIONS
Medicare Preventive Visit Patient Instructions  Thank you for completing your Welcome to Medicare Visit or Medicare Annual Wellness Visit today  Your next wellness visit will be due in one year (7/6/2023)  The screening/preventive services that you may require over the next 5-10 years are detailed below  Some tests may not apply to you based off risk factors and/or age  Screening tests ordered at today's visit but not completed yet may show as past due  Also, please note that scanned in results may not display below  Preventive Screenings:  Service Recommendations Previous Testing/Comments   Colorectal Cancer Screening  * Colonoscopy    * Fecal Occult Blood Test (FOBT)/Fecal Immunochemical Test (FIT)  * Fecal DNA/Cologuard Test  * Flexible Sigmoidoscopy Age: 54-65 years old   Colonoscopy: every 10 years (may be performed more frequently if at higher risk)  OR  FOBT/FIT: every 1 year  OR  Cologuard: every 3 years  OR  Sigmoidoscopy: every 5 years  Screening may be recommended earlier than age 48 if at higher risk for colorectal cancer  Also, an individualized decision between you and your healthcare provider will decide whether screening between the ages of 74-80 would be appropriate  Colonoscopy: 01/16/2016  FOBT/FIT: Not on file  Cologuard: Not on file  Sigmoidoscopy: Not on file    Screening Current     Breast Cancer Screening Age: 36 years old  Frequency: every 1-2 years  Not required if history of left and right mastectomy Mammogram: 07/15/2021    Screening Current   Cervical Cancer Screening Between the ages of 21-29, pap smear recommended once every 3 years  Between the ages of 33-67, can perform pap smear with HPV co-testing every 5 years     Recommendations may differ for women with a history of total hysterectomy, cervical cancer, or abnormal pap smears in past  Pap Smear: 11/08/2021    Screening Current   Hepatitis C Screening Once for adults born between 1945 and 1965  More frequently in patients at high risk for Hepatitis C Hep C Antibody: 07/08/2019    Screening Current   Diabetes Screening 1-2 times per year if you're at risk for diabetes or have pre-diabetes Fasting glucose: 91 mg/dL   A1C: No results in last 5 years    Screening Current   Cholesterol Screening Once every 5 years if you don't have a lipid disorder  May order more often based on risk factors  Lipid panel: 06/10/2021    Screening Not Indicated  History Lipid Disorder     Other Preventive Screenings Covered by Medicare:  1  Abdominal Aortic Aneurysm (AAA) Screening: covered once if your at risk  You're considered to be at risk if you have a family history of AAA  2  Lung Cancer Screening: covers low dose CT scan once per year if you meet all of the following conditions: (1) Age 50-69; (2) No signs or symptoms of lung cancer; (3) Current smoker or have quit smoking within the last 15 years; (4) You have a tobacco smoking history of at least 30 pack years (packs per day multiplied by number of years you smoked); (5) You get a written order from a healthcare provider  3  Glaucoma Screening: covered annually if you're considered high risk: (1) You have diabetes OR (2) Family history of glaucoma OR (3)  aged 48 and older OR (3)  American aged 72 and older  3  Osteoporosis Screening: covered every 2 years if you meet one of the following conditions: (1) You're estrogen deficient and at risk for osteoporosis based off medical history and other findings; (2) Have a vertebral abnormality; (3) On glucocorticoid therapy for more than 3 months; (4) Have primary hyperparathyroidism; (5) On osteoporosis medications and need to assess response to drug therapy  · Last bone density test (DXA Scan): 07/25/2018  5  HIV Screening: covered annually if you're between the age of 12-76  Also covered annually if you are younger than 13 and older than 72 with risk factors for HIV infection   For pregnant patients, it is covered up to 3 times per pregnancy  Immunizations:  Immunization Recommendations   Influenza Vaccine Annual influenza vaccination during flu season is recommended for all persons aged >= 6 months who do not have contraindications   Pneumococcal Vaccine (Prevnar and Pneumovax)  * Prevnar = PCV13  * Pneumovax = PPSV23   Adults 25-60 years old: 1-3 doses may be recommended based on certain risk factors  Adults 72 years old: Prevnar (PCV13) vaccine recommended followed by Pneumovax (PPSV23) vaccine  If already received PPSV23 since turning 65, then PCV13 recommended at least one year after PPSV23 dose  Hepatitis B Vaccine 3 dose series if at intermediate or high risk (ex: diabetes, end stage renal disease, liver disease)   Tetanus (Td) Vaccine - COST NOT COVERED BY MEDICARE PART B Following completion of primary series, a booster dose should be given every 10 years to maintain immunity against tetanus  Td may also be given as tetanus wound prophylaxis  Tdap Vaccine - COST NOT COVERED BY MEDICARE PART B Recommended at least once for all adults  For pregnant patients, recommended with each pregnancy  Shingles Vaccine (Shingrix) - COST NOT COVERED BY MEDICARE PART B  2 shot series recommended in those aged 48 and above     Health Maintenance Due:      Topic Date Due    Cervical Cancer Screening  Never done    Breast Cancer Screening: Mammogram  07/15/2023    Colorectal Cancer Screening  01/07/2026    HIV Screening  Completed    Hepatitis C Screening  Completed     Immunizations Due:      Topic Date Due    COVID-19 Vaccine (3 - Booster for Moderna series) 08/08/2021    Influenza Vaccine (1) 09/01/2022     Advance Directives   What are advance directives? Advance directives are legal documents that state your wishes and plans for medical care  These plans are made ahead of time in case you lose your ability to make decisions for yourself   Advance directives can apply to any medical decision, such as the treatments you want, and if you want to donate organs  What are the types of advance directives? There are many types of advance directives, and each state has rules about how to use them  You may choose a combination of any of the following:  · Living will: This is a written record of the treatment you want  You can also choose which treatments you do not want, which to limit, and which to stop at a certain time  This includes surgery, medicine, IV fluid, and tube feedings  · Durable power of  for healthcare [facility]): This is a written record that states who you want to make healthcare choices for you when you are unable to make them for yourself  This person, called a proxy, is usually a family member or a friend  You may choose more than 1 proxy  · Do not resuscitate (DNR) order:  A DNR order is used in case your heart stops beating or you stop breathing  It is a request not to have certain forms of treatment, such as CPR  A DNR order may be included in other types of advance directives  · Medical directive: This covers the care that you want if you are in a coma, near death, or unable to make decisions for yourself  You can list the treatments you want for each condition  Treatment may include pain medicine, surgery, blood transfusions, dialysis, IV or tube feedings, and a ventilator (breathing machine)  · Values history: This document has questions about your views, beliefs, and how you feel and think about life  This information can help others choose the care that you would choose  Why are advance directives important? An advance directive helps you control your care  Although spoken wishes may be used, it is better to have your wishes written down  Spoken wishes can be misunderstood, or not followed  Treatments may be given even if you do not want them  An advance directive may make it easier for your family to make difficult choices about your care     Urinary Incontinence   Urinary incontinence (UI)  is when you lose control of your bladder  UI develops because your bladder cannot store or empty urine properly  The 3 most common types of UI are stress incontinence, urge incontinence, or both  Medicines:   · May be given to help strengthen your bladder control  Report any side effects of medication to your healthcare provider  Do pelvic muscle exercises often:  Your pelvic muscles help you stop urinating  Squeeze these muscles tight for 5 seconds, then relax for 5 seconds  Gradually work up to squeezing for 10 seconds  Do 3 sets of 15 repetitions a day, or as directed  This will help strengthen your pelvic muscles and improve bladder control  Train your bladder:  Go to the bathroom at set times, such as every 2 hours, even if you do not feel the urge to go  You can also try to hold your urine when you feel the urge to go  For example, hold your urine for 5 minutes when you feel the urge to go  As that becomes easier, hold your urine for 10 minutes  Self-care:   · Keep a UI record  Write down how often you leak urine and how much you leak  Make a note of what you were doing when you leaked urine  · Drink liquids as directed  You may need to limit the amount of liquid you drink to help control your urine leakage  Do not drink any liquid right before you go to bed  Limit or do not have drinks that contain caffeine or alcohol  · Prevent constipation  Eat a variety of high-fiber foods  Good examples are high-fiber cereals, beans, vegetables, and whole-grain breads  Walking is the best way to trigger your intestines to have a bowel movement  · Exercise regularly and maintain a healthy weight  Weight loss and exercise will decrease pressure on your bladder and help you control your leakage  · Use a catheter as directed  to help empty your bladder  A catheter is a tiny, plastic tube that is put into your bladder to drain your urine  · Go to behavior therapy as directed    Behavior therapy may be used to help you learn to control your urge to urinate  Weight Management   Why it is important to manage your weight:  Being overweight increases your risk of health conditions such as heart disease, high blood pressure, type 2 diabetes, and certain types of cancer  It can also increase your risk for osteoarthritis, sleep apnea, and other respiratory problems  Aim for a slow, steady weight loss  Even a small amount of weight loss can lower your risk of health problems  How to lose weight safely:  A safe and healthy way to lose weight is to eat fewer calories and get regular exercise  You can lose up about 1 pound a week by decreasing the number of calories you eat by 500 calories each day  Healthy meal plan for weight management:  A healthy meal plan includes a variety of foods, contains fewer calories, and helps you stay healthy  A healthy meal plan includes the following:  · Eat whole-grain foods more often  A healthy meal plan should contain fiber  Fiber is the part of grains, fruits, and vegetables that is not broken down by your body  Whole-grain foods are healthy and provide extra fiber in your diet  Some examples of whole-grain foods are whole-wheat breads and pastas, oatmeal, brown rice, and bulgur  · Eat a variety of vegetables every day  Include dark, leafy greens such as spinach, kale, yoandy greens, and mustard greens  Eat yellow and orange vegetables such as carrots, sweet potatoes, and winter squash  · Eat a variety of fruits every day  Choose fresh or canned fruit (canned in its own juice or light syrup) instead of juice  Fruit juice has very little or no fiber  · Eat low-fat dairy foods  Drink fat-free (skim) milk or 1% milk  Eat fat-free yogurt and low-fat cottage cheese  Try low-fat cheeses such as mozzarella and other reduced-fat cheeses  · Choose meat and other protein foods that are low in fat  Choose beans or other legumes such as split peas or lentils   Choose fish, skinless poultry (chicken or turkey), or lean cuts of red meat (beef or pork)  Before you cook meat or poultry, cut off any visible fat  · Use less fat and oil  Try baking foods instead of frying them  Add less fat, such as margarine, sour cream, regular salad dressing and mayonnaise to foods  Eat fewer high-fat foods  Some examples of high-fat foods include french fries, doughnuts, ice cream, and cakes  · Eat fewer sweets  Limit foods and drinks that are high in sugar  This includes candy, cookies, regular soda, and sweetened drinks  Exercise:  Exercise at least 30 minutes per day on most days of the week  Some examples of exercise include walking, biking, dancing, and swimming  You can also fit in more physical activity by taking the stairs instead of the elevator or parking farther away from stores  Ask your healthcare provider about the best exercise plan for you  © Copyright [employer] 2018 Information is for End User's use only and may not be sold, redistributed or otherwise used for commercial purposes   All illustrations and images included in CareNotes® are the copyrighted property of [employer]  or [address] [place] [address] [employer]

## 2022-07-06 DIAGNOSIS — I48.91 NEW ONSET ATRIAL FIBRILLATION (HCC): ICD-10-CM

## 2022-07-06 DIAGNOSIS — E03.9 HYPOTHYROIDISM, UNSPECIFIED TYPE: ICD-10-CM

## 2022-07-06 PROBLEM — M25.561 ACUTE PAIN OF RIGHT KNEE: Status: RESOLVED | Noted: 2021-03-01 | Resolved: 2022-07-06

## 2022-07-06 PROBLEM — N20.0 RENAL CALCULI: Status: RESOLVED | Noted: 2021-10-07 | Resolved: 2022-07-06

## 2022-07-06 PROBLEM — R21 RASH: Status: RESOLVED | Noted: 2019-09-05 | Resolved: 2022-07-06

## 2022-07-06 RX ORDER — LEVOTHYROXINE SODIUM 0.1 MG/1
TABLET ORAL
Qty: 31 TABLET | Refills: 0 | Status: SHIPPED | OUTPATIENT
Start: 2022-07-06 | End: 2022-08-10

## 2022-07-06 RX ORDER — RIVAROXABAN 20 MG/1
TABLET, FILM COATED ORAL
Qty: 31 TABLET | Refills: 0 | Status: SHIPPED | OUTPATIENT
Start: 2022-07-06 | End: 2022-08-14

## 2022-07-06 NOTE — TELEPHONE ENCOUNTER
Requested medication(s) are due for refill today: Yes  Patient has already received a courtesy refill: no    Other reason request has been forwarded to provider:

## 2022-07-06 NOTE — ASSESSMENT & PLAN NOTE
Lab Results   Component Value Date    [id number] 1 710 06/10/2021     Continue current dose levothyroxine 100 mcg daily   Recheck TSH before next visit

## 2022-07-18 ENCOUNTER — HOSPITAL ENCOUNTER (OUTPATIENT)
Dept: RADIOLOGY | Age: 62
Discharge: HOME | End: 2022-07-18
Payer: MEDICARE

## 2022-07-18 VITALS — BODY MASS INDEX: 37.17 KG/M2 | WEIGHT: 202 LBS | HEIGHT: 62 IN

## 2022-07-18 DIAGNOSIS — Z12.31 ENCOUNTER FOR SCREENING MAMMOGRAM FOR MALIGNANT NEOPLASM OF BREAST: ICD-10-CM

## 2022-07-18 PROCEDURE — 77067 SCR MAMMO BI INCL CAD: CPT

## 2022-07-18 PROCEDURE — 77063 BREAST TOMOSYNTHESIS BI: CPT

## 2022-08-10 ENCOUNTER — APPOINTMENT (EMERGENCY)
Dept: CT IMAGING | Facility: HOSPITAL | Age: 62
End: 2022-08-10
Payer: MEDICARE

## 2022-08-10 ENCOUNTER — HOSPITAL ENCOUNTER (EMERGENCY)
Facility: HOSPITAL | Age: 62
Discharge: HOME | End: 2022-08-10
Attending: EMERGENCY MEDICINE
Payer: MEDICARE

## 2022-08-10 VITALS
DIASTOLIC BLOOD PRESSURE: 79 MMHG | SYSTOLIC BLOOD PRESSURE: 112 MMHG | RESPIRATION RATE: 20 BRPM | OXYGEN SATURATION: 98 % | HEART RATE: 66 BPM

## 2022-08-10 DIAGNOSIS — S09.90XA INJURY OF HEAD, INITIAL ENCOUNTER: Primary | ICD-10-CM

## 2022-08-10 DIAGNOSIS — I48.91 NEW ONSET ATRIAL FIBRILLATION (HCC): ICD-10-CM

## 2022-08-10 DIAGNOSIS — E03.9 HYPOTHYROIDISM, UNSPECIFIED TYPE: ICD-10-CM

## 2022-08-10 PROCEDURE — 99284 EMERGENCY DEPT VISIT MOD MDM: CPT

## 2022-08-10 PROCEDURE — 99282 EMERGENCY DEPT VISIT SF MDM: CPT | Performed by: EMERGENCY MEDICINE

## 2022-08-10 PROCEDURE — 70450 CT HEAD/BRAIN W/O DYE: CPT

## 2022-08-10 RX ORDER — LEVOTHYROXINE SODIUM 0.1 MG/1
TABLET ORAL
Qty: 30 TABLET | Refills: 0 | Status: SHIPPED | OUTPATIENT
Start: 2022-08-10 | End: 2022-09-06

## 2022-08-10 NOTE — DISCHARGE INSTRUCTIONS
Diagnosis; FRONTAL HEAD INJURY     - ACTIVITY AS TOLERATED     - PLEASE RETURN TO  THE ER FOR ANY  NEW/ WORSENING/CONCERNING SYMPTOMS TO YOU- WORSENING HEADACHES/ PERSISTENT VOMITING

## 2022-08-14 RX ORDER — RIVAROXABAN 20 MG/1
TABLET, FILM COATED ORAL
Qty: 30 TABLET | Refills: 0 | Status: SHIPPED | OUTPATIENT
Start: 2022-08-14 | End: 2022-09-06

## 2022-08-14 NOTE — ED PROVIDER NOTES
History  Chief Complaint   Patient presents with    Head Injury     Pt bumped head on van door, on blood thinners  No loc  64 yr female special needs from group home- no doac- fell forward and hit head on door  no other comps or injuries c/o frontal headache only - no fall to ground       History provided by:  Patient and caregiver  History limited by:  Psychiatric disorder   used: No        Prior to Admission Medications   Prescriptions Last Dose Informant Patient Reported? Taking?    Calcium Carbonate-Vitamin D3 (Calcium + Vitamin D3) 600-400 MG-UNIT TABS   No No   Sig: Take 1 tablet by mouth 2 (two) times a day   Ibuprofen 200 MG CAPS  Care Giver No No   Sig: Take 1-2 tablets if needed for pain or fever every 6-8 hours   Incontinence Supply Disposable (Wings Choice Plus Adult Briefs) MISC   No No   Sig: USE AS DIRECTED FOR INCONTINENCE   Omega-3 Fatty Acids (fish oil) 1,000 mg   No No   Sig: Take 1 capsule (1,000 mg total) by mouth daily   acetaminophen (TYLENOL) 650 mg CR tablet   No No   Sig: TAKE 1 TABLET BY MOUTH EVERY 8 HOURS AS NEEDED FOR MILD PAIN OR FEVER   benzonatate (TESSALON) 200 MG capsule  Care Giver No No   Sig: Take 1 capsule (200 mg total) by mouth 3 (three) times a day as needed for cough for up to 20 doses   carbamide peroxide (DEBROX) 6 5 % otic solution   No No   Sig: Administer 5 drops into both ears 2 (two) times a day for 4 days   cholecalciferol (VITAMIN D3) 400 units tablet   No No   Sig: Take 1 tablet (400 Units total) by mouth 3 (three) times a day   diltiazem (CARDIZEM CD) 120 mg 24 hr capsule   No No   Sig: Take 1 capsule (120 mg total) by mouth daily   docusate sodium (COLACE) 100 mg capsule  Care Giver No No   Sig: Take 1 capsule (100 mg total) by mouth 2 (two) times a day If needed constipation   fluticasone (FLONASE) 50 mcg/act nasal spray  Care Giver Yes No   Sig: into each nostril daily   Patient not taking: No sig reported   furosemide (LASIX) 20 mg tablet   No No   Sig: Take 1 tablet (20 mg total) by mouth daily   hydrocortisone-aloe 1 % cream  Care Giver No No   Sig: APPLY TO AFFECTED AREA TWICE A DAY AS NEEDED FOR ITCH AND RASH  loperamide (IMODIUM A-D) 2 mg capsule  Care Giver No No   Sig: Take 1 capsule (2 mg total) by mouth 4 (four) times a day as needed for diarrhea   neomycin-polymyxin b-bacitracin (NEOSPORIN) 3 5-400-5,000  Care Giver No No   Sig: APPLY TOPICALLY TO AFFECTED AREA FOUR TIMES A DAY AS NEEDED FOR WOUND CARE   pravastatin (PRAVACHOL) 40 mg tablet   No No   Sig: Take 1 tablet (40 mg total) by mouth daily      Facility-Administered Medications: None       Past Medical History:   Diagnosis Date    Anxiety disorder     Atrial fibrillation (HCC)        Past Surgical History:   Procedure Laterality Date    HYSTERECTOMY      age 25   [de-identified]      [place] CARDIOVERSION ELECTIVE ARRHYTHMIA EXTERNAL  8/23/2019            Family History   Problem Relation Age of Onset    Other Mother         neglect or abandonment     Other Father         neglect or abandonment     No Known Problems Sister     No Known Problems Maternal Grandmother     No Known Problems Maternal Grandfather     No Known Problems Paternal Grandmother     No Known Problems Paternal Grandfather      I have reviewed and agree with the history as documented  E-Cigarette/Vaping     E-Cigarette/Vaping Substances     Social History     Tobacco Use    Smoking status: Never Smoker    Smokeless tobacco: Never Used   Substance Use Topics    Alcohol use: Never    Drug use: Never       Review of Systems   Constitutional: Negative  HENT: Negative  Eyes: Negative  Respiratory: Negative  Cardiovascular: Negative  Gastrointestinal: Negative  Endocrine: Negative  Genitourinary: Negative  Musculoskeletal: Negative  Skin: Negative  Allergic/Immunologic: Negative  Neurological: Positive for tremors   Negative for dizziness, seizures, syncope, facial asymmetry, speech difficulty, weakness, light-headedness, numbness and headaches  Frontal head injury    Hematological: Negative  Psychiatric/Behavioral: Negative  Physical Exam  Physical Exam  Vitals and nursing note reviewed  Constitutional:       General: She is not in acute distress  Appearance: She is not ill-appearing, toxic-appearing or diaphoretic  Comments: avss-- pulse ox 99 % on ra- interpretation is normal- no intervention    HENT:      Head: Normocephalic and atraumatic  Right Ear: Tympanic membrane, ear canal and external ear normal  There is no impacted cerumen  Left Ear: Tympanic membrane, ear canal and external ear normal  There is no impacted cerumen  Nose: Nose normal       Mouth/Throat:      Mouth: Mucous membranes are moist    Eyes:      General: No scleral icterus  Right eye: No discharge  Left eye: No discharge  Extraocular Movements: Extraocular movements intact  Conjunctiva/sclera: Conjunctivae normal       Pupils: Pupils are equal, round, and reactive to light  Comments: Mm pink   Neck:      Vascular: No carotid bruit  Comments: No pmt c/t/l/s spine   Cardiovascular:      Rate and Rhythm: Normal rate and regular rhythm  Pulses: Normal pulses  Heart sounds: Normal heart sounds  No murmur heard  No friction rub  No gallop  Pulmonary:      Effort: Pulmonary effort is normal  No respiratory distress  Breath sounds: Normal breath sounds  No stridor  No wheezing, rhonchi or rales  Chest:      Chest wall: No tenderness  Abdominal:      General: Bowel sounds are normal  There is no distension  Palpations: Abdomen is soft  There is no mass  Tenderness: There is no abdominal tenderness  There is no right CVA tenderness, left CVA tenderness, guarding or rebound  Hernia: No hernia is present  Musculoskeletal:         General: No swelling, tenderness, deformity or signs of injury  Normal range of motion  Cervical back: Normal range of motion and neck supple  No rigidity or tenderness  Right lower leg: No edema  Left lower leg: No edema  Lymphadenopathy:      Cervical: No cervical adenopathy  Skin:     General: Skin is warm  Capillary Refill: Capillary refill takes less than 2 seconds  Coloration: Skin is not jaundiced or pale  Findings: No bruising, erythema, lesion or rash  Neurological:      General: No focal deficit present  Mental Status: She is alert  Mental status is at baseline  Cranial Nerves: No cranial nerve deficit  Sensory: No sensory deficit  Motor: No weakness  Comments: A  nd o x 1- baseline- non focal neuro exam- head tremors    Psychiatric:         Mood and Affect: Mood normal          Behavior: Behavior normal          Vital Signs  ED Triage Vitals [08/10/22 1058]   Temp Pulse Respirations Blood Pressure SpO2   -- 66 20 112/79 98 %      Temp src Heart Rate Source Patient Position - Orthostatic VS BP Location FiO2 (%)   -- Monitor Lying Right arm --      Pain Score       --           Vitals:    08/10/22 1058   BP: 112/79   Pulse: 66   Patient Position - Orthostatic VS: Lying         Visual Acuity  Visual Acuity    Flowsheet Row Most Recent Value   L Pupil Size (mm) 3   R Pupil Size (mm) 3          ED Medications  Medications - No data to display    Diagnostic Studies  Results Reviewed     None                 CT head without contrast   Final Result by Lew Thrasher MD (08/10 1148)      No acute intracranial abnormality  Complete high density opacification of right maxillary sinus suggesting either desiccated secretions or, less likely, fungal sinusitis                    Workstation performed: [id number]                    Procedures  Procedures         ED Course  ED Course as of 08/14/22 1621   Wed Aug 10, 2022   1105 [facility] md note- case d /w- dr Richi Redmond -- on call  for trauma- if no injury on head ct- they do not need to see pt in er                                              MDM    Disposition  Final diagnoses:   Injury of head, initial encounter     Time reflects when diagnosis was documented in both MDM as applicable and the Disposition within this note     Time User Action Codes Description Comment    8/10/2022 12:08 PM Ita Alicea [S09 90XA] Injury of head, initial encounter       ED Disposition     ED Disposition   Discharge    Condition   Stable    Date/Time   Wed Aug 10, 2022 12:08 PM    [address] discharge to home/self care                 Follow-up Information    None         Discharge Medication List as of 8/10/2022 12:09 PM      CONTINUE these medications which have NOT CHANGED    Details   acetaminophen (TYLENOL) 650 mg CR tablet TAKE 1 TABLET BY MOUTH EVERY 8 HOURS AS NEEDED FOR MILD PAIN OR FEVER, Normal      benzonatate (TESSALON) 200 MG capsule Take 1 capsule (200 mg total) by mouth 3 (three) times a day as needed for cough for up to 20 doses, Starting Tue 8/6/2019, Normal      Calcium Carbonate-Vitamin D3 (Calcium + Vitamin D3) 600-400 MG-UNIT TABS Take 1 tablet by mouth 2 (two) times a day, Starting Tue 7/5/2022, Normal      carbamide peroxide (DEBROX) 6 5 % otic solution Administer 5 drops into both ears 2 (two) times a day for 4 days, Starting Thu 6/16/2022, Until Mon 6/20/2022, Normal      cholecalciferol (VITAMIN D3) 400 units tablet Take 1 tablet (400 Units total) by mouth 3 (three) times a day, Starting Tue 7/5/2022, Normal      diltiazem (CARDIZEM CD) 120 mg 24 hr capsule Take 1 capsule (120 mg total) by mouth daily, Starting Tue 7/5/2022, Normal      docusate sodium (COLACE) 100 mg capsule Take 1 capsule (100 mg total) by mouth 2 (two) times a day If needed constipation, Starting Mon 6/24/2019, Normal      fluticasone (FLONASE) 50 mcg/act nasal spray into each nostril daily, Historical Med      furosemide (LASIX) 20 mg tablet Take 1 tablet (20 mg total) by mouth daily, Starting Tue 6/7/2022, Normal      hydrocortisone-aloe 1 % cream APPLY TO AFFECTED AREA TWICE A DAY AS NEEDED FOR ITCH AND RASH , Normal      Ibuprofen 200 MG CAPS Take 1-2 tablets if needed for pain or fever every 6-8 hours, Normal      Incontinence Supply Disposable (Wings Choice Plus Adult Briefs) MISC USE AS DIRECTED FOR INCONTINENCE, Print      loperamide (IMODIUM A-D) 2 mg capsule Take 1 capsule (2 mg total) by mouth 4 (four) times a day as needed for diarrhea, Starting Mon 6/24/2019, Normal      neomycin-polymyxin b-bacitracin (NEOSPORIN) 3 5-400-5,000 APPLY TOPICALLY TO AFFECTED AREA FOUR TIMES A DAY AS NEEDED FOR WOUND CARE, Normal      Omega-3 Fatty Acids (fish oil) 1,000 mg Take 1 capsule (1,000 mg total) by mouth daily, Starting Tue 7/5/2022, Normal      pravastatin (PRAVACHOL) 40 mg tablet Take 1 tablet (40 mg total) by mouth daily, Starting Tue 7/5/2022, Normal      levothyroxine 100 mcg tablet TAKE (1) TABLET BY MOUTH ONCE DAILY , Normal      Xarelto 20 MG tablet TAKE (1) TABLET BY MOUTH ONCE DAILY WITH BREAKFAST , Normal             No discharge procedures on file      PDMP Review     None          ED Provider  Electronically Signed by           Lashay Mcclelland MD  08/14/22 7070

## 2022-09-02 DIAGNOSIS — I48.91 NEW ONSET ATRIAL FIBRILLATION (HCC): ICD-10-CM

## 2022-09-02 DIAGNOSIS — E03.9 HYPOTHYROIDISM, UNSPECIFIED TYPE: ICD-10-CM

## 2022-09-06 RX ORDER — RIVAROXABAN 20 MG/1
TABLET, FILM COATED ORAL
Qty: 31 TABLET | Refills: 0 | Status: SHIPPED | OUTPATIENT
Start: 2022-09-06 | End: 2022-10-10

## 2022-09-06 RX ORDER — LEVOTHYROXINE SODIUM 0.1 MG/1
TABLET ORAL
Qty: 31 TABLET | Refills: 0 | Status: SHIPPED | OUTPATIENT
Start: 2022-09-06 | End: 2022-10-08

## 2022-09-22 ENCOUNTER — OFFICE VISIT (OUTPATIENT)
Dept: FAMILY MEDICINE CLINIC | Facility: CLINIC | Age: 62
End: 2022-09-22

## 2022-09-22 VITALS
DIASTOLIC BLOOD PRESSURE: 80 MMHG | RESPIRATION RATE: 16 BRPM | BODY MASS INDEX: 37.17 KG/M2 | OXYGEN SATURATION: 99 % | WEIGHT: 202 LBS | TEMPERATURE: 97.8 F | HEIGHT: 62 IN | HEART RATE: 120 BPM | SYSTOLIC BLOOD PRESSURE: 118 MMHG

## 2022-09-22 DIAGNOSIS — L30.4 INTERTRIGO: Primary | ICD-10-CM

## 2022-09-22 PROCEDURE — 99213 OFFICE O/P EST LOW 20 MIN: CPT | Performed by: FAMILY MEDICINE

## 2022-09-22 RX ORDER — CLOTRIMAZOLE AND BETAMETHASONE DIPROPIONATE 10; .64 MG/G; MG/G
CREAM TOPICAL 2 TIMES DAILY
Qty: 30 G | Refills: 0 | Status: SHIPPED | OUTPATIENT
Start: 2022-09-22

## 2022-09-22 RX ORDER — NYSTATIN 100000 [USP'U]/G
POWDER TOPICAL 2 TIMES DAILY
Qty: 15 G | Refills: 0 | Status: SHIPPED | OUTPATIENT
Start: 2022-09-22

## 2022-09-22 NOTE — PROGRESS NOTES
Name: Negin Starks      : 1960      MRN: [id number]  Encounter Provider: [address] [place] [address]  Encounter Date: 2022   Encounter department: [address] [place] [address]  Intertr[facility]  Assessment & Plan: Will start patient on nystatin powder and Lotrisone cream     Orders:  -     nystatin (MYCOSTATIN) powder; Apply topically 2 (two) times a day  -     clotrimazole-betamethasone (LOTRISONE) 1-0 05 % cream; Apply topically 2 (two) times a day Apply Twice a day 3 hours after nystatin powder  Treat for 5 days  Subjective      This is a very pleasant 71-year-old female presents to clinic for a same-day visit  Patient  Lives in a community living home and usually has helped when she showers, she was noted to have a rash underneath her pannus  Patient does not verbalize that this is bothersome however she did have increased activity of picking noted on the dorsal aspect of her left hand      Review of Systems   Unable to perform ROS: Patient nonverbal       Current Outpatient Medications on File Prior to Visit   Medication Sig    acetaminophen (TYLENOL) 650 mg CR tablet TAKE 1 TABLET BY MOUTH EVERY 8 HOURS AS NEEDED FOR MILD PAIN OR FEVER    benzonatate (TESSALON) 200 MG capsule Take 1 capsule (200 mg total) by mouth 3 (three) times a day as needed for cough for up to 20 doses    Calcium Carbonate-Vitamin D3 (Calcium + Vitamin D3) 600-400 MG-UNIT TABS Take 1 tablet by mouth 2 (two) times a day    carbamide peroxide (DEBROX) 6 5 % otic solution Administer 5 drops into both ears 2 (two) times a day for 4 days    cholecalciferol (VITAMIN D3) 400 units tablet Take 1 tablet (400 Units total) by mouth 3 (three) times a day    diltiazem (CARDIZEM CD) 120 mg 24 hr capsule Take 1 capsule (120 mg total) by mouth daily    docusate sodium (COLACE) 100 mg capsule Take 1 capsule (100 mg total) by mouth 2 (two) times a day If needed constipation    fluticasone (FLONASE) 50 mcg/act nasal spray into each nostril daily (Patient not taking: No sig reported)    furosemide (LASIX) 20 mg tablet Take 1 tablet (20 mg total) by mouth daily    hydrocortisone-aloe 1 % cream APPLY TO AFFECTED AREA TWICE A DAY AS NEEDED FOR ITCH AND RASH   Ibuprofen 200 MG CAPS Take 1-2 tablets if needed for pain or fever every 6-8 hours    Incontinence Supply Disposable (Wings Choice Plus Adult Briefs) MISC USE AS DIRECTED FOR INCONTINENCE    levothyroxine 100 mcg tablet TAKE (1) TABLET BY MOUTH ONCE DAILY   loperamide (IMODIUM A-D) 2 mg capsule Take 1 capsule (2 mg total) by mouth 4 (four) times a day as needed for diarrhea    neomycin-polymyxin b-bacitracin (NEOSPORIN) 3 5-400-5,000 APPLY TOPICALLY TO AFFECTED AREA FOUR TIMES A DAY AS NEEDED FOR WOUND CARE    Omega-3 Fatty Acids (fish oil) 1,000 mg Take 1 capsule (1,000 mg total) by mouth daily    pravastatin (PRAVACHOL) 40 mg tablet Take 1 tablet (40 mg total) by mouth daily    Xarelto 20 MG tablet TAKE (1) TABLET BY MOUTH ONCE DAILY WITH BREAKFAST  Objective     /80 (BP Location: Left arm, Patient Position: Sitting, Cuff Size: Large)   Pulse (!) 120   Temp 97 8 °F (36 6 °C) (Temporal)   Resp 16   Ht 5' 2" (1 575 m)   Wt 91 6 kg (202 lb)   SpO2 99%   Breastfeeding No   BMI 36 95 kg/m²     Physical Exam  Vitals reviewed  Constitutional:       General: She is not in acute distress  Appearance: She is well-developed  She is not diaphoretic  HENT:      Head: Normocephalic and atraumatic  Right Ear: External ear normal       Left Ear: External ear normal       Nose: Nose normal    Eyes:      General:         Right eye: No discharge  Left eye: No discharge  Conjunctiva/sclera: Conjunctivae normal       Pupils: Pupils are equal, round, and reactive to light  Neck:      Thyroid: No thyromegaly  Vascular: No JVD  Trachea: No tracheal deviation     Cardiovascular:      Rate and Rhythm: Normal rate and regular rhythm  Heart sounds: Normal heart sounds  No murmur heard  No friction rub  No gallop  Pulmonary:      Effort: Pulmonary effort is normal  No respiratory distress  Breath sounds: Normal breath sounds  No wheezing  Chest:      Chest wall: No tenderness  Abdominal:      General: Bowel sounds are normal  There is no distension  Palpations: Abdomen is soft  Tenderness: There is no abdominal tenderness  There is no rebound  Musculoskeletal:         General: No tenderness  Normal range of motion  Cervical back: Normal range of motion and neck supple  Skin:     General: Skin is warm and dry  Findings: Lesion present  No erythema or rash  Neurological:      Mental Status: She is alert and oriented to person, place, and time  Mental status is at baseline  Coordination: Coordination normal       Deep Tendon Reflexes: Reflexes are normal and symmetric  Psychiatric:         Behavior: Behavior normal          Thought Content:  Thought content normal        Lexie Falcon

## 2022-10-08 DIAGNOSIS — I48.91 NEW ONSET ATRIAL FIBRILLATION (HCC): ICD-10-CM

## 2022-10-08 DIAGNOSIS — E03.9 HYPOTHYROIDISM, UNSPECIFIED TYPE: ICD-10-CM

## 2022-10-08 RX ORDER — LEVOTHYROXINE SODIUM 0.1 MG/1
TABLET ORAL
Qty: 30 TABLET | Refills: 0 | Status: SHIPPED | OUTPATIENT
Start: 2022-10-08

## 2022-10-10 RX ORDER — RIVAROXABAN 20 MG/1
TABLET, FILM COATED ORAL
Qty: 30 TABLET | Refills: 0 | Status: SHIPPED | OUTPATIENT
Start: 2022-10-10

## 2022-10-26 ENCOUNTER — IMMUNIZATIONS (OUTPATIENT)
Dept: FAMILY MEDICINE CLINIC | Facility: CLINIC | Age: 62
End: 2022-10-26

## 2022-10-26 DIAGNOSIS — Z23 ENCOUNTER FOR IMMUNIZATION: Primary | ICD-10-CM

## 2022-10-26 PROCEDURE — G0008 ADMIN INFLUENZA VIRUS VAC: HCPCS | Performed by: NURSE PRACTITIONER

## 2022-10-26 PROCEDURE — 90682 RIV4 VACC RECOMBINANT DNA IM: CPT | Performed by: NURSE PRACTITIONER

## 2022-11-02 DIAGNOSIS — E03.9 HYPOTHYROIDISM, UNSPECIFIED TYPE: ICD-10-CM

## 2022-11-02 DIAGNOSIS — I48.91 NEW ONSET ATRIAL FIBRILLATION (HCC): ICD-10-CM

## 2022-11-02 RX ORDER — RIVAROXABAN 20 MG/1
TABLET, FILM COATED ORAL
Qty: 31 TABLET | Refills: 0 | Status: SHIPPED | OUTPATIENT
Start: 2022-11-02

## 2022-11-05 RX ORDER — LEVOTHYROXINE SODIUM 0.1 MG/1
TABLET ORAL
Qty: 31 TABLET | Refills: 0 | Status: SHIPPED | OUTPATIENT
Start: 2022-11-05

## 2022-11-17 ENCOUNTER — ANNUAL EXAM (OUTPATIENT)
Dept: OBGYN CLINIC | Facility: MEDICAL CENTER | Age: 62
End: 2022-11-17

## 2022-11-17 VITALS
SYSTOLIC BLOOD PRESSURE: 102 MMHG | HEIGHT: 61 IN | WEIGHT: 202 LBS | BODY MASS INDEX: 38.14 KG/M2 | DIASTOLIC BLOOD PRESSURE: 78 MMHG

## 2022-11-17 DIAGNOSIS — Z01.419 ENCOUNTER FOR ANNUAL ROUTINE GYNECOLOGICAL EXAMINATION: Primary | ICD-10-CM

## 2022-11-17 DIAGNOSIS — Z12.31 ENCOUNTER FOR SCREENING MAMMOGRAM FOR MALIGNANT NEOPLASM OF BREAST: ICD-10-CM

## 2022-11-17 NOTE — PROGRESS NOTES
Assessment/Plan:    57 yo G0 - annual exam      Problem List Items Addressed This Visit    None  Visit Diagnoses     Encounter for annual routine gynecological examination    -  Primary  Pap not indicated, s/p SELWYN/BSO  Internal exam deferred - pt unable to tolerate  Return w/ vaginal bleeding, pelvic pain  Encounter for screening mammogram for malignant neoplasm of breast      Recommend yearly mammo    Relevant Orders    Mammo screening bilateral w 3d & cad            Subjective:      Patient ID: Helen Alcantara is a 58 y o  female  This is a 58 y o  postmenopausal  who presents for annual exam      Concerns: none    Presents with caretaker from nursing home  Minimally verbal   Caretaker denies vaginal bleeding, discharge, or pelvic pain  Sexually active: not currently  STD testing: NA  Urinary concerns: occasional leakage  Bowel movements: no changes    Screening:  Last pap smear: no longer indicated, s/p SELWYN/BSO  Last mammogram: 22-normal  Colon Cancer screenin16-repeat in 10 years    Family history:  Breast cancer: none  Colon cancer: none  Ovarian cancer: none    There is no height or weight on file to calculate BMI  Smoking: non smoker        The following portions of the patient's history were reviewed and updated as appropriate: allergies, current medications, past family history, past medical history, past social history, past surgical history and problem list     Review of Systems   All other systems reviewed and are negative  Objective:      /78 (BP Location: Left arm, Patient Position: Sitting, Cuff Size: Large)   Ht 5' 0 5" (1 537 m)   Wt 91 6 kg (202 lb)   BMI 38 80 kg/m²          Physical Exam  Vitals reviewed  Pulmonary:      Effort: Pulmonary effort is normal    Chest:   Breasts:     Right: No mass, nipple discharge, skin change or tenderness  Left: No mass, nipple discharge, skin change or tenderness     Genitourinary:     Labia:         Right: No rash, tenderness, lesion or injury  Left: No rash, tenderness, lesion or injury  Comments: Internal exam deferred - pt unable to tolerate  Neurological:      Mental Status: She is alert and oriented to person, place, and time     Psychiatric:         Behavior: Behavior normal

## 2022-11-29 ENCOUNTER — TELEPHONE (OUTPATIENT)
Dept: CARDIOLOGY CLINIC | Facility: CLINIC | Age: 62
End: 2022-11-29

## 2022-11-29 NOTE — TELEPHONE ENCOUNTER
You did receive a [facility] message on this as well  Patient had mechanical fall last night  EMT's were called, no trauma, no bleeding, did not hit her head during fall, was not taken to the hospital   Gigi Hopkins was held until permission given from cardiology  I spoke with Yolanda Lynch, he stated we could fax letter allowing patient to be given her Xarelto  Letter faxed to [phone]

## 2022-12-01 DIAGNOSIS — E03.9 HYPOTHYROIDISM, UNSPECIFIED TYPE: ICD-10-CM

## 2022-12-01 DIAGNOSIS — I48.91 NEW ONSET ATRIAL FIBRILLATION (HCC): ICD-10-CM

## 2022-12-01 RX ORDER — LEVOTHYROXINE SODIUM 0.1 MG/1
TABLET ORAL
Qty: 31 TABLET | Refills: 0 | Status: SHIPPED | OUTPATIENT
Start: 2022-12-01

## 2022-12-01 RX ORDER — RIVAROXABAN 20 MG/1
TABLET, FILM COATED ORAL
Qty: 31 TABLET | Refills: 0 | Status: SHIPPED | OUTPATIENT
Start: 2022-12-01

## 2022-12-01 NOTE — TELEPHONE ENCOUNTER
Requested medication(s) are due for refill today: Yes  Patient has already received a courtesy refill: No  Other reason request has been forwarded to provider: Seabron Sicard

## 2022-12-06 ENCOUNTER — OFFICE VISIT (OUTPATIENT)
Dept: FAMILY MEDICINE CLINIC | Facility: CLINIC | Age: 62
End: 2022-12-06

## 2022-12-06 VITALS
TEMPERATURE: 96.8 F | HEART RATE: 85 BPM | HEIGHT: 61 IN | DIASTOLIC BLOOD PRESSURE: 74 MMHG | OXYGEN SATURATION: 98 % | RESPIRATION RATE: 18 BRPM | BODY MASS INDEX: 37.38 KG/M2 | SYSTOLIC BLOOD PRESSURE: 128 MMHG | WEIGHT: 198 LBS

## 2022-12-06 DIAGNOSIS — L30.4 INTERTRIGO: ICD-10-CM

## 2022-12-06 RX ORDER — CLOTRIMAZOLE AND BETAMETHASONE DIPROPIONATE 10; .64 MG/G; MG/G
CREAM TOPICAL 2 TIMES DAILY
Qty: 30 G | Refills: 0 | Status: SHIPPED | OUTPATIENT
Start: 2022-12-06

## 2022-12-06 RX ORDER — NYSTATIN 100000 [USP'U]/G
POWDER TOPICAL 2 TIMES DAILY
Qty: 60 G | Refills: 0 | Status: SHIPPED | OUTPATIENT
Start: 2022-12-06

## 2022-12-06 NOTE — PROGRESS NOTES
Name: Rory Wells      : 1960      MRN: [id number]  Encounter Provider: [address] [place] [address]  Encounter Date: 2022   Encounter department: [address] [place] [address]  Intertrigo  Assessment & Plan:  Intertrigo of abdominal fold  Recommend keeping abdominal area dry  Will treat with nystatin powder and Lotrimin cream    Orders:  -     nystatin (MYCOSTATIN) powder; Apply topically 2 (two) times a day  -     clotrimazole-betamethasone (LOTRISONE) 1-0 05 % cream; Apply topically 2 (two) times a day Apply Twice a day 3 hours after nystatin powder  Treat for 7 days  Subjective      59-year-old female resents today for same-day visit accompanied by her caregiver  According to caregiver patient has a rash underneath her abdominal wall skin fold for the past few days  Patient was previously seen for this and prescribed topical cream and nystatin powder  According to caregiver the powder that was prescribed was in a very small quantity  Review of Systems   Unable to perform ROS: Patient nonverbal   Gastrointestinal: Negative for nausea and vomiting  Skin: Negative for rash         Current Outpatient Medications on File Prior to Visit   Medication Sig   • acetaminophen (TYLENOL) 650 mg CR tablet TAKE 1 TABLET BY MOUTH EVERY 8 HOURS AS NEEDED FOR MILD PAIN OR FEVER   • benzonatate (TESSALON) 200 MG capsule Take 1 capsule (200 mg total) by mouth 3 (three) times a day as needed for cough for up to 20 doses   • Calcium Carbonate-Vitamin D3 (Calcium + Vitamin D3) 600-400 MG-UNIT TABS Take 1 tablet by mouth 2 (two) times a day   • carbamide peroxide (DEBROX) 6 5 % otic solution Administer 5 drops into both ears 2 (two) times a day for 4 days   • cholecalciferol (VITAMIN D3) 400 units tablet Take 1 tablet (400 Units total) by mouth 3 (three) times a day   • diltiazem (CARDIZEM CD) 120 mg 24 hr capsule Take 1 capsule (120 mg total) by mouth daily • docusate sodium (COLACE) 100 mg capsule Take 1 capsule (100 mg total) by mouth 2 (two) times a day If needed constipation   • fluticasone (FLONASE) 50 mcg/act nasal spray into each nostril daily (Patient not taking: Reported on 12/13/2021)   • furosemide (LASIX) 20 mg tablet Take 1 tablet (20 mg total) by mouth daily   • hydrocortisone-aloe 1 % cream APPLY TO AFFECTED AREA TWICE A DAY AS NEEDED FOR ITCH AND RASH  • Ibuprofen 200 MG CAPS Take 1-2 tablets if needed for pain or fever every 6-8 hours   • Incontinence Supply Disposable (Wings Choice Plus Adult Briefs) MISC USE AS DIRECTED FOR INCONTINENCE   • levothyroxine 100 mcg tablet TAKE (1) TABLET BY MOUTH ONCE DAILY  • loperamide (IMODIUM A-D) 2 mg capsule Take 1 capsule (2 mg total) by mouth 4 (four) times a day as needed for diarrhea   • neomycin-polymyxin b-bacitracin (NEOSPORIN) 3 5-400-5,000 APPLY TOPICALLY TO AFFECTED AREA FOUR TIMES A DAY AS NEEDED FOR WOUND CARE   • Omega-3 Fatty Acids (fish oil) 1,000 mg Take 1 capsule (1,000 mg total) by mouth daily   • pravastatin (PRAVACHOL) 40 mg tablet Take 1 tablet (40 mg total) by mouth daily   • Xarelto 20 MG tablet TAKE (1) TABLET BY MOUTH ONCE DAILY WITH BREAKFAST  • [DISCONTINUED] clotrimazole-betamethasone (LOTRISONE) 1-0 05 % cream Apply topically 2 (two) times a day Apply Twice a day 3 hours after nystatin powder  Treat for 5 days  (Patient not taking: Reported on 11/17/2022)   • [DISCONTINUED] nystatin (MYCOSTATIN) powder Apply topically 2 (two) times a day       Objective     /74 (BP Location: Right arm, Patient Position: Sitting, Cuff Size: Standard)   Pulse 85   Temp (!) 96 8 °F (36 °C) (Temporal)   Resp 18   Ht 5' 0 5" (1 537 m)   Wt 89 8 kg (198 lb)   SpO2 98%   BMI 38 03 kg/m²     Physical Exam  Constitutional:       General: She is not in acute distress  Appearance: Normal appearance  She is not ill-appearing, toxic-appearing or diaphoretic     Eyes:      Extraocular Movements: Extraocular movements intact  Cardiovascular:      Rate and Rhythm: Normal rate  Heart sounds: No murmur heard  No friction rub  No gallop  Pulmonary:      Effort: No respiratory distress  Breath sounds: Normal breath sounds  No wheezing  Abdominal:      Palpations: Abdomen is soft  Tenderness: There is no abdominal tenderness  Comments: Erythematous lesions on the knees abdominal fold   Musculoskeletal:         General: Normal range of motion  Skin:     Capillary Refill: Capillary refill takes less than 2 seconds  Findings: Erythema and rash present  Neurological:      Mental Status: She is alert  Motor: No weakness         [address]

## 2022-12-06 NOTE — ASSESSMENT & PLAN NOTE
Intertrigo of abdominal fold  Recommend keeping abdominal area dry  Will treat with nystatin powder and Lotrimin cream

## 2022-12-26 DIAGNOSIS — L30.4 INTERTRIGO: ICD-10-CM

## 2022-12-27 RX ORDER — NYSTATIN 100000 [USP'U]/G
POWDER TOPICAL
Qty: 60 G | Refills: 0 | Status: SHIPPED | OUTPATIENT
Start: 2022-12-27 | End: 2023-01-05 | Stop reason: SDUPTHER

## 2023-01-04 ENCOUNTER — OFFICE VISIT (OUTPATIENT)
Dept: CARDIOLOGY CLINIC | Facility: CLINIC | Age: 63
End: 2023-01-04

## 2023-01-04 VITALS
BODY MASS INDEX: 39.07 KG/M2 | HEIGHT: 60 IN | DIASTOLIC BLOOD PRESSURE: 70 MMHG | HEART RATE: 76 BPM | SYSTOLIC BLOOD PRESSURE: 106 MMHG | WEIGHT: 199 LBS

## 2023-01-04 DIAGNOSIS — E03.9 HYPOTHYROIDISM, UNSPECIFIED TYPE: ICD-10-CM

## 2023-01-04 DIAGNOSIS — I48.91 ATRIAL FIBRILLATION, UNSPECIFIED TYPE (HCC): Primary | ICD-10-CM

## 2023-01-04 DIAGNOSIS — I48.91 NEW ONSET ATRIAL FIBRILLATION (HCC): ICD-10-CM

## 2023-01-04 RX ORDER — RIVAROXABAN 20 MG/1
TABLET, FILM COATED ORAL
Qty: 28 TABLET | Refills: 0 | Status: SHIPPED | OUTPATIENT
Start: 2023-01-04

## 2023-01-04 NOTE — PROGRESS NOTES
HEART  [facility]    Outpatient f/u  Today's Date: 01/04/23        Patient name: Eric Almeida  YOB: 1960  Sex: female         Chief Complaint: f/u for afib    ASSESSMENT:  Problem List Items Addressed This Visit        Cardiovascular and Mediastinum    Atrial fibrillation [facility]) - Primary    Relevant Orders    POCT ECG         57 yo female  1) Afib, unclear duration or type, found incidentally at office visit in Aug 2019 and sent in for MARCIE -DCCV but found to be back in afib persistetnly on Zio patch , she is on xarelto and diltiazem now  [id number] female   She has developmental delay and lives in care facility, they check her BP/HR daily  2) Stress echo 2021, 3min on Marshal, Moderate MR/TR, normal EF,  Moderate LAE  Otherwise has d MR/TR normal EF on TTE  Moderate LAE    3) 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    4) Mild ankle edema, no pitting, on lasix daily now     5)CKD , last BMP Cr 0 9 was up to 1 4 before lasix  PLAN:  1  Cont xarelto and diltiazem, will not pursue rhythm control since asymptomatic  Went over things to look for if afib not well controlled w care giver  Check BP/HR once a week , gave parameter to call office  2  Cont lasix 20mg daily            Follow up in: 6 months    Orders Placed This Encounter   Procedures   • POCT ECG     There are no discontinued medications    57 yo female  1) Afib, unclear duration or type, found incidentally at office visit in Aug 2019 and sent in for MARCIE -DCCV but found to be back in afib persistetnly on Zio patch , she is on xarelto and diltiazem now  [id number] female   She has developmental delay and lives in care facility, they check her BP/HR daily    2) Stress echo 2021, 3min on Marshal, Moderate MR/TR, normal EF,  Moderate LAE  Otherwise has d MR/TR normal EF on TTE  Moderate LAE    3) 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    4) Mild ankle edema, no pitting, on lasix daily now     5)CKD , last BMP Cr 0 9 was up to 1 4 before lasix  Doing well, no cp/sob  She has edema that is slightly improved  Complete 12 point ROS reviewed and otherwise non pertinent or negative except as per HPI  Please see paper chart for outpatient clinic patients where the patient completed the 12 point ROS survey  Past Medical History:   Diagnosis Date   • Anxiety disorder    • Atrial fibrillation ([address] )        No Known Allergies  I reviewed the Home Medication list and Allergies in the chart  Scheduled Meds:  Current Outpatient Medications   Medication Sig Dispense Refill   • acetaminophen (TYLENOL) 650 mg CR tablet TAKE 1 TABLET BY MOUTH EVERY 8 HOURS AS NEEDED FOR MILD PAIN OR FEVER 30 tablet 0   • benzonatate (TESSALON) 200 MG capsule Take 1 capsule (200 mg total) by mouth 3 (three) times a day as needed for cough for up to 20 doses 20 capsule 0   • Calcium Carbonate-Vitamin D3 (Calcium + Vitamin D3) 600-400 MG-UNIT TABS Take 1 tablet by mouth 2 (two) times a day 180 tablet 3   • cholecalciferol (VITAMIN D3) 400 units tablet Take 1 tablet (400 Units total) by mouth 3 (three) times a day 360 tablet 3   • diltiazem (CARDIZEM CD) 120 mg 24 hr capsule Take 1 capsule (120 mg total) by mouth daily 90 capsule 3   • docusate sodium (COLACE) 100 mg capsule Take 1 capsule (100 mg total) by mouth 2 (two) times a day If needed constipation 30 capsule 0   • furosemide (LASIX) 20 mg tablet Take 1 tablet (20 mg total) by mouth daily 90 tablet 3   • hydrocortisone-aloe 1 % cream APPLY TO AFFECTED AREA TWICE A DAY AS NEEDED FOR ITCH AND RASH   28 g 2   • Ibuprofen 200 MG CAPS Take 1-2 tablets if needed for pain or fever every 6-8 hours 120 each 0   • Incontinence Supply Disposable (Wings Choice Plus Adult Briefs) MISC USE AS DIRECTED FOR INCONTINENCE 90 each 4   • levothyroxine 100 mcg tablet TAKE (1) TABLET BY MOUTH ONCE DAILY  31 tablet 0   • loperamide (IMODIUM A-D) 2 mg capsule Take 1 capsule (2 mg total) by mouth 4 (four) times a day as needed for diarrhea 30 capsule 0   • neomycin-polymyxin b-bacitracin (NEOSPORIN) 3 5-400-5,000 APPLY TOPICALLY TO AFFECTED AREA FOUR TIMES A DAY AS NEEDED FOR WOUND CARE 28 4 g 2   • nystatin (MYCOSTATIN) powder APPLY TOPICALLY TO AFFECTED AREA TWICE DAILY 60 g 0   • Omega-3 Fatty Acids (fish oil) 1,000 mg Take 1 capsule (1,000 mg total) by mouth daily 90 capsule 3   • pravastatin (PRAVACHOL) 40 mg tablet Take 1 tablet (40 mg total) by mouth daily 90 tablet 3   • Xarelto 20 MG tablet TAKE (1) TABLET BY MOUTH ONCE DAILY WITH BREAKFAST  31 tablet 0   • carbamide peroxide (DEBROX) 6 5 % otic solution Administer 5 drops into both ears 2 (two) times a day for 4 days 15 mL 0   • clotrimazole-betamethasone (LOTRISONE) 1-0 05 % cream Apply topically 2 (two) times a day Apply Twice a day 3 hours after nystatin powder  Treat for 7 days  30 g 0   • fluticasone (FLONASE) 50 mcg/act nasal spray into each nostril daily (Patient not taking: Reported on 12/13/2021)       No current facility-administered medications for this visit       PRN Meds:         Family History   Problem Relation Age of Onset   • Other Mother         neglect or abandonment    • Other Father         neglect or abandonment    • No Known Problems Sister    • No Known Problems Maternal Grandmother    • No Known Problems Maternal Grandfather    • No Known Problems Paternal Grandmother    • No Known Problems Paternal Grandfather        Social History     Socioeconomic History   • Marital status: Single     Spouse name: Not on file   • Number of children: Not on file   • Years of education: Not on file   • Highest education level: Not on file   Occupational History   • Not on file   Tobacco Use   • Smoking status: Never   • Smokeless tobacco: Never   Vaping Use   • Vaping Use: Never used Substance and Sexual Activity   • Alcohol use: Never   • Drug use: Never   • Sexual activity: Never     Birth control/protection: Female Sterilization   Other Topics Concern   • Not on file   Social History Narrative    Disabled    Social history reviewed unchanged      Social Determinants of Health     Financial Resource Strain: Low Risk    • Difficulty of Paying Living Expenses: Not hard at all   Food Insecurity: No Food Insecurity   • Worried About [address] in the Last Year: Never true   • Ran Out of Food in the Last Year: Never true   Transportation Needs: No Transportation Needs   • Lack of Transportation (Medical): No   • Lack of Transportation (Non-Medical): No   Physical Activity: Not on file   Stress: Not on file   Social Connections: Not on file   Intimate Partner Violence: Not on file   Housing Stability: Not on file         OBJECTIVE:    /70 (BP Location: Left arm, Patient Position: Sitting, Cuff Size: Adult)   Pulse 76   Ht 5' (1 524 m)   Wt 90 3 kg (199 lb)   BMI 38 86 kg/m²   Vitals:    01/04/23 1016   Weight: 90 3 kg (199 lb)       /70 (BP Location: Left arm, Patient Position: Sitting, Cuff Size: Adult)   Pulse 76   Ht 5' (1 524 m)   Wt 90 3 kg (199 lb)   BMI 38 86 kg/m²   Vitals:    01/04/23 1016   Weight: 90 3 kg (199 lb)     GEN: No acute distress, Alert and oriented, well appearing  HEENT:External ears normal, wearing a mask  EYES: Pupils equal, sclera anicteric, midline, normal conjuctiva  NECK: No JVD, supple, no obvious masses or thryomegaly or goiter  CARDIOVASCULAR: irreg irreg, No murmur, rub, gallops S1,S2  LUNGS: Clear To auscultation bilaterally, normal effort, no rales, rhonchi, crackles   ABDOMEN:  nondistended,  without obvious organomegaly or ascites  EXTREMITIES/VASCULAR: 1+ darrel edema  warm an well perfused  PSYCH: Normal Affect,  linear speech pattern without evidence of psychosis     NEURO: Grossly intact, moving all extremiteis equal, face symmetric, alert and responsive, no obvious focal defecits   GAIT:  Ambulates normally without difficulty  HEME: No bleeding, bruising, petechia, purpura   SKIN: No significant rashes on visibile skin, warm, no diaphoresis or pallor  Lab Results:       LABS:      Chemistry        Component Value Date/Time    K 4 0 2022 0951     2022 0951    CO2 34 (H) 2022 0951    BUN 16 2022 0951    CREATININE 0 93 2022 0951        Component Value Date/Time    CALCIUM 9 8 2022 0951    ALKPHOS 126 (H) 2021 0924    AST 15 2021 0924    ALT 19 2021 0924            No results found for: CHOL  Lab Results   Component Value Date    HDL 47 06/10/2021    HDL 47 2020    HDL 53 07/10/2019     Lab Results   Component Value Date    LDLCALC 64 06/10/2021    LDLCALC 69 2020    LDLCALC 56 07/10/2019     Lab Results   Component Value Date    TRIG 109 06/10/2021    TRIG 134 2020    TRIG 89 07/10/2019     No results found for: CHOLHDL    IMAGING: No results found  Cardiac testing:   Results for orders placed during the hospital encounter of 18   Echo complete with contrast if indicated    [address], [address]  [phone]    Transthoracic Echocardiogram  2D, M-mode, Doppler, and Color Doppler    Study date:  2018    Patient: Laura Kramer  MR number: [id number]  Account number: [de-identified]  : 1960  Age: 62 years  Gender: Female  Status: Outpatient  Location: [facility]  Height: 61 in  Weight: 194 lb  BP: 102/ 70 mmHg    Indications: Abnormal EKG      Diagnoses: R94 31 - Abnormal electrocardiogram [ECG] [EKG]    Sonographer:  Josue Mai RDCS  Primary Physician:  LALA Rosen  Referring Physician:  Pete Zuniga MD  Group:  [facility]  Interpreting Physician:  Pete Zuniga MD    SUMMARY    LEFT VENTRICLE:  Size was at the upper limits of normal   Systolic function was normal  Ejection fraction was estimated to be 55 %  There were no regional wall motion abnormalities  Features were consistent with a pseudonormal left ventricular filling pattern, with concomitant abnormal relaxation and increased filling pressure (grade 2 diastolic dysfunction)  LEFT ATRIUM:  The atrium was moderately dilated  MITRAL VALVE:  There was mild regurgitation  TRICUSPID VALVE:  There was mild regurgitation  HISTORY: PRIOR HISTORY: Hyperlipidemia, Dyspnea on exertion, Mental disability  PROCEDURE: The study was performed in the [address] [place] [address]  This was a routine study  The transthoracic approach was used  The study included complete 2D imaging, M-mode, complete spectral Doppler, and color Doppler  The  heart rate was 63 bpm, at the start of the study  Images were obtained from the parasternal, apical, subcostal, and suprasternal notch acoustic windows  Image quality was adequate  LEFT VENTRICLE: Size was at the upper limits of normal  Systolic function was normal  Ejection fraction was estimated to be 55 %  There were no regional wall motion abnormalities  Wall thickness was normal  No evidence of apical thrombus  DOPPLER: Features were consistent with a pseudonormal left ventricular filling pattern, with concomitant abnormal relaxation and increased filling pressure (grade 2 diastolic dysfunction)  RIGHT VENTRICLE: The size was normal  Systolic function was normal  Wall thickness was normal     LEFT ATRIUM: The atrium was moderately dilated  RIGHT ATRIUM: Size was normal     MITRAL VALVE: Valve structure was normal  There was normal leaflet separation  DOPPLER: The transmitral velocity was within the normal range  There was no evidence for stenosis  There was mild regurgitation  AORTIC VALVE: The valve was trileaflet  Leaflets exhibited normal thickness and normal cuspal separation   DOPPLER: Transaortic velocity was within the normal range  There was no evidence for stenosis  There was no significant  regurgitation  TRICUSPID VALVE: The valve structure was normal  There was normal leaflet separation  DOPPLER: The transtricuspid velocity was within the normal range  There was no evidence for stenosis  There was mild regurgitation  Estimated peak PA  pressure was 30 mmHg  PULMONIC VALVE: Not well visualized  DOPPLER: The transpulmonic velocity was within the normal range  There was no significant regurgitation  PERICARDIUM: There was no pericardial effusion  The pericardium was normal in appearance  AORTA: The root exhibited normal size  SYSTEMIC VEINS: IVC: The inferior vena cava was normal in size  SYSTEM MEASUREMENT TABLES    2D  %FS: 31 2 %  Ao Diam: 2 9 cm  EDV(Teich): 161 8 ml  EF(Teich): 58 3 %  ESV(Teich): 67 5 ml  IVSd: 1 cm  LA Area: 25 7 cm2  LA Diam: 5 3 cm  LVEDV MOD A4C: 62 6 ml  LVEF MOD A4C: 57 8 %  LVESV MOD A4C: 26 4 ml  LVIDd: 5 7 cm  LVIDs: 3 9 cm  LVLd A4C: 6 2 cm  LVLs A4C: 5 5 cm  LVPWd: 1 cm  RA Area: 12 4 cm2  RVIDd: 3 1 cm  SV MOD A4C: 36 2 ml  SV(Teich): 94 4 ml    CW  TR Vmax: 2 6 m/s  TR maxP 5 mmHg    MM  TAPSE: 2 3 cm    PW  E': 0 1 m/s  E/E': 11 8  MV A Adrian: 0 2 m/s  MV Dec [place]: 5 9 m/s2  MV DecT: 141 1 ms  MV E Adrian: 0 8 m/s  MV E/A Ratio: 3 8  MV PHT: 40 9 ms  MVA By PHT: 5 4 cm2    [facility] Commission Accredited Echocardiography Laboratory    Prepared and electronically signed by    Glenda Starr MD  Signed [id number] 08:58:33       Results for orders placed during the hospital encounter of 19   MARCIE    Narrative [facility]    [place], [address]  [phone]    Transesophageal Echocardiogram  2D, M-mode, Doppler, and Color Doppler    Study date:  23-Aug-2019    Patient: Pavan Mccloud  MR number: [id number]  Account number: [de-identified]  : 1960  Age: 62 years  Gender: Female  Status: Inpatient  Location: Cath lab  Height: 61 in  Weight: 196 7 lb  BP: 141/ 78 mmHg    Indications: Atrial fibrillation  Diagnoses: I48 0 - Atrial fibrillation    Sonographer:  Mirela Giron RDCS  Interpreting Physician:  Marianela Malone MD  Primary Physician:  Lindsay Shrestha PA-C  Referring Physician:  Zoie Gallego MD  Group:  [facility]  RN:  Gabriella Ross RN BSN    IMPRESSIONS:  No evidence of left atrial or left atrial appendage thrombus on transesophageal echocardiogram   Other echocardiographic findings as noted below  SUMMARY    LEFT VENTRICLE:  Normal left ventricular cavity size mild concentric left ventricular hypertrophy, normal left ventricular systolic function  Regional wall motion and ejection fraction could not be reliably assessed due to rapid ventricular rates  RIGHT VENTRICLE:  Normal right ventricular size and visually normal right ventricular systolic function  LEFT ATRIUM:  Moderate left atrial cavity enlargement  No spontaneous contrast, mass or thrombus noted in left atrial cavity  LEFT ATRIAL APPENDAGE:  Enlarged single lobe left atrial appendage with moderately reduced flow velocities  No masses, vegetations or thrombus identified in left atrial appendage cavity  ATRIAL SEPTUM:  Intact interatrial septum  No color flow Doppler evidence of interatrial shunts  RIGHT ATRIUM:  Mild right atrial cavity enlargement  MITRAL VALVE:  Mild mitral valve leaflet thickening, adequate leaflet mobility  Mild mitral valve regurgitation noted  AORTIC VALVE:  Tricuspid aortic valve with mild sclerosis  Adequate cuspal separation  No aortic valve stenosis or regurgitation noted  TRICUSPID VALVE:  Mild tricuspid valve leaflet thickening  Mild tricuspid valve regurgitation  PULMONIC VALVE:  Unremarkable pulmonic valve leaflet morphology  No pulmonic valve stenosis or regurgitation noted      AORTA:  Aortic root and proximal ascending aorta are normal in size on 2D imaging  There are mild atherosclerotic changes noted in visualized portions of ascending and descending aorta  No mobile plaques noted  PERICARDIUM:  No pericardial effusion  HISTORY: PRIOR HISTORY: Long QT interval, hypothyroidism, hyperlipidemia, atrial fibrillation, mental retardation, left atrial enlargement, obesity, anxiety  PROCEDURE: The procedure was performed in the catheterization laboratory  The risks and alternatives of the procedure were explained to the patient's next of kin and informed consent was obtained  The transesophageal approach was used  The  study included complete 2D imaging, M-mode, complete spectral Doppler, and color Doppler  The heart rate was 156 bpm, at the start of the study  An adult omniplane probe was inserted by the attending cardiologist  Intubated with ease  One  intubation attempt(s)  There was no blood detected on the probe  Image quality was adequate  There were no complications during the procedure  MEDICATIONS: Anesthesia administered by anesthesia team     LEFT VENTRICLE: Normal left ventricular cavity size mild concentric left ventricular hypertrophy, normal left ventricular systolic function  Regional wall motion and ejection fraction could not be reliably assessed due to rapid ventricular  rates  RIGHT VENTRICLE: Normal right ventricular size and visually normal right ventricular systolic function  LEFT ATRIUM: Moderate left atrial cavity enlargement  No spontaneous contrast, mass or thrombus noted in left atrial cavity  APPENDAGE: Enlarged single lobe left atrial appendage with moderately reduced flow velocities  No masses,  vegetations or thrombus identified in left atrial appendage cavity  ATRIAL SEPTUM: Intact interatrial septum  No color flow Doppler evidence of interatrial shunts  RIGHT ATRIUM: Mild right atrial cavity enlargement  MITRAL VALVE: Mild mitral valve leaflet thickening, adequate leaflet mobility  Mild mitral valve regurgitation noted  AORTIC VALVE: Tricuspid aortic valve with mild sclerosis  Adequate cuspal separation  No aortic valve stenosis or regurgitation noted  TRICUSPID VALVE: Mild tricuspid valve leaflet thickening  Mild tricuspid valve regurgitation  PULMONIC VALVE: Unremarkable pulmonic valve leaflet morphology  No pulmonic valve stenosis or regurgitation noted  PERICARDIUM: No pericardial effusion  AORTA: Aortic root and proximal ascending aorta are normal in size on 2D imaging  There are mild atherosclerotic changes noted in visualized portions of ascending and descending aorta  No mobile plaques noted  SYSTEM MEASUREMENT TABLES    CW  TR Vmax: 2 1 m/s  TR maxP 7 mmHg    [facility] Commission Accredited Echocardiography Laboratory    Prepared and electronically signed by    Valentino Fordyce, MD  Signed 23-Aug-2019 19:27:14       No results found for this or any previous visit  No results found for this or any previous visit          I reviewed and interpreted the following LABS/EKG/TELE/IMAGING and below is summary of my interpretation (if data available):      Current EKG and Rhythm Strip:afib rate controlled 76bpm    Reviewed echo images, moderate MR/TR and moderate LAE, normal EF

## 2023-01-05 ENCOUNTER — OFFICE VISIT (OUTPATIENT)
Dept: FAMILY MEDICINE CLINIC | Facility: CLINIC | Age: 63
End: 2023-01-05

## 2023-01-05 VITALS
SYSTOLIC BLOOD PRESSURE: 106 MMHG | HEART RATE: 88 BPM | TEMPERATURE: 96.5 F | BODY MASS INDEX: 39.46 KG/M2 | DIASTOLIC BLOOD PRESSURE: 76 MMHG | HEIGHT: 60 IN | WEIGHT: 201 LBS | OXYGEN SATURATION: 97 % | RESPIRATION RATE: 18 BRPM

## 2023-01-05 DIAGNOSIS — E03.9 HYPOTHYROIDISM, UNSPECIFIED TYPE: ICD-10-CM

## 2023-01-05 DIAGNOSIS — E66.01 CLASS 2 SEVERE OBESITY DUE TO EXCESS CALORIES WITH SERIOUS COMORBIDITY AND BODY MASS INDEX (BMI) OF 37.0 TO 37.9 IN ADULT (HCC): ICD-10-CM

## 2023-01-05 DIAGNOSIS — F39 MOOD DISORDER (HCC): ICD-10-CM

## 2023-01-05 DIAGNOSIS — E03.2 HYPOTHYROIDISM DUE TO MEDICATION: ICD-10-CM

## 2023-01-05 DIAGNOSIS — L30.4 INTERTRIGO: ICD-10-CM

## 2023-01-05 DIAGNOSIS — Z23 ENCOUNTER FOR IMMUNIZATION: Primary | ICD-10-CM

## 2023-01-05 RX ORDER — CLOTRIMAZOLE AND BETAMETHASONE DIPROPIONATE 10; .64 MG/G; MG/G
CREAM TOPICAL 2 TIMES DAILY PRN
Qty: 90 G | Refills: 2 | Status: SHIPPED | OUTPATIENT
Start: 2023-01-05

## 2023-01-05 RX ORDER — LEVOTHYROXINE SODIUM 0.1 MG/1
TABLET ORAL
Qty: 28 TABLET | Refills: 0 | Status: SHIPPED | OUTPATIENT
Start: 2023-01-05

## 2023-01-05 RX ORDER — NYSTATIN 100000 [USP'U]/G
POWDER TOPICAL 2 TIMES DAILY PRN
Qty: 120 G | Refills: 2 | Status: SHIPPED | OUTPATIENT
Start: 2023-01-05

## 2023-01-05 NOTE — ASSESSMENT & PLAN NOTE
Of abdominal fold and between buttocks   Improved since last visit, Nystatin powder and Lotrisone cream changed to PRN   Continue hygiene measures

## 2023-01-05 NOTE — PATIENT INSTRUCTIONS
Acute Rash   AMBULATORY CARE:   A rash  is irritated, red, or itchy skin or mucus membranes, such as the lining of your nose or throat  Acute means the rash starts suddenly, worsens quickly, and lasts a short time  Common causes include a disease or infection, a reaction to something you are allergic to, or certain medicines  Seek care immediately if:   You have sudden trouble breathing or chest pain  You are vomiting, have a headache or muscle aches, and your throat hurts  Call your doctor or dermatologist if:   You have a fever  You get open wounds from scratching your skin, or you have a wound that is red, swollen, or painful  Your rash lasts longer than 3 months  You have swelling or pain in your joints  You have questions or concerns about your condition or care  Common types of rashes:   Eczema  causes inflamed, itchy areas  Your skin may be dry, scaly, and thick  The outer layer may be damaged  Irritants, stress, or a family history of eczema make you more likely to get it  Contact dermatitis  causes a small, itchy growth that may be flat or raised  It appears after you touch something that damages your skin or causes an allergic reaction  Examples include chemicals, metals, dye, soaps or detergents, and latex  Atopic dermatitis  causes small, itchy, blister-like growths along skin lines and folds  The growths may ooze fluid and become scaly, crusted, or hard  You may have sore, dry skin or swollen eyes  This rash usually forms after you are around an allergen, are overheated, or wear rough clothing  Urticaria (hives)  appears suddenly as patches and raised areas of swollen skin or mucus membrane  The area may itch or burn  Common causes include allergens, latex, certain foods, a bee sting, smoke, or a blood transfusion  Pityriasis rosea  may appear before you get a disease caused by bacteria or a virus  The rash may look like a patch on your chest, back, or abdomen   The rash may spread to become small, red, cone-shaped bumps that usually grow in groups  Treatment  will depend on the condition causing your acute rash  You may need any of the following:  Medicines  may be used to decrease itching or inflammation, or prevent or treat a bacterial infection  Medicines may also help your immune system fight infection or stop it from attacking your skin  Ultraviolet phototherapy  means the rash is put under light  Light therapy helps treats atopic dermatitis or eczema that does not get better with steroids  It can help pityriasis rosea heal faster and decrease itching  Prevent a rash or care for your skin when you have a rash:  Dry skin can lead to more problems  Do not scratch your skin if it itches  You may cause a skin infection by scratching  The following may prevent dry skin, and help your skin look better:  Help soothe your rash  Apply thick cream lotions or petroleum jelly  Cool compresses may also soothe your skin  Apply a cool compress or a cool, wet towel, and then cover it with a dry towel  Use lukewarm water when you bathe  Hot water may damage your skin more  Pat your skin dry  Do not rub your skin with a towel  Use detergents, soaps, shampoos, and bubble baths  made for sensitive skin  Wear clothes made of cotton instead of nylon or wool  Cotton is softer, so it will not hurt your skin as much  Follow up with your healthcare providers as directed:  A dermatologist may help find the cause of your rash or help plan or change treatment  A dietitian may help with meal planning if you have a food allergy  Write down your questions so you remember to ask them during your visits  © Copyright [employer] 2022 Information is for End User's use only and may not be sold, redistributed or otherwise used for commercial purposes   All illustrations and images included in CareNotes® are the copyrighted property of A D [employer] M , Inc  or Aidan Danielle  The above information is an  only  It is not intended as medical advice for individual conditions or treatments  Talk to your doctor, nurse or pharmacist before following any medical regimen to see if it is safe and effective for you

## 2023-01-05 NOTE — PROGRESS NOTES
Name: Helen Alcantara      : 1960      MRN: [id number]  Encounter Provider: LALA Bain  Encounter Date: 2023   Encounter department: [address] [place] [address]     1  Encounter for immunization  -     Pneumococcal Conjugate Vaccine 20-valent (Pcv20)  -     Age 15 y+, BOOSTER (BIVALENT): [id number] [address] [place]itten vac bivalent roman-sucr    2  Intertrigo  Assessment & Plan:  Of abdominal fold and between buttocks   Improved since last visit, Nystatin powder and Lotrisone cream changed to PRN   Continue hygiene measures     Orders:  -     nystatin (MYCOSTATIN) powder; Apply topically 2 (two) times a day as needed (rash)  -     clotrimazole-betamethasone (LOTRISONE) 1-0 05 % cream; Apply topically 2 (two) times a day as needed (rash) Apply Twice a day 3 hours after nystatin powder  3  Mood disorder (HCC)    4  Class 2 severe obesity due to excess calories with serious comorbidity and body mass index (BMI) of 37 0 to 37 9 in adult [facility])  Assessment & Plan:  -Encouraged diet and lifestyle changes: decrease processed foods (cakes, cookies, chips, soda), decrease total carbohydrate intake, decrease fried/fatty foods, increase fruits and vegetables, increase lean proteins (chicken, turkey), increase healthy fats (avocado, fish, nuts), drink plenty of water (at least four 16 oz bottles per day)        5  Hypothyroidism due to medication  Assessment & Plan:  Lab Results   Component Value Date    [id number] 1 710 06/10/2021     Continue levothyroxine 100 mcg daily  Discussed with caretaker the importance of checking level and encouraged to complete outstanding labs            Subjective     Helen Alcantara is a 64 y o  female who  has a past medical history of Anxiety disorder and Atrial fibrillation ([address] )  who presented to the office today for follow up  Accompanied by aid  There are no concerns today  Review of Systems   Constitutional: Negative for chills and fever  HENT: Negative for ear pain and sore throat  Eyes: Negative for pain and visual disturbance  Respiratory: Negative for cough and shortness of breath  Cardiovascular: Negative for chest pain and palpitations  Gastrointestinal: Negative for abdominal pain and vomiting  Genitourinary: Negative for dysuria and hematuria  Musculoskeletal: Negative for arthralgias and back pain  Skin: Positive for rash (improved)  Negative for color change  Neurological: Negative for seizures and syncope  All other systems reviewed and are negative        Past Medical History:   Diagnosis Date   • Anxiety disorder    • Atrial fibrillation [facility])      Past Surgical History:   Procedure Laterality Date   • HYSTERECTOMY      age 25   • OOPHORECTOMY     • [place] CARDIOVERSION ELECTIVE ARRHYTHMIA EXTERNAL  8/23/2019          Family History   Problem Relation Age of Onset   • Other Mother         neglect or abandonment    • Other Father         neglect or abandonment    • No Known Problems Sister    • No Known Problems Maternal Grandmother    • No Known Problems Maternal Grandfather    • No Known Problems Paternal Grandmother    • No Known Problems Paternal Grandfather      Social History     Socioeconomic History   • Marital status: Single     Spouse name: None   • Number of children: None   • Years of education: None   • Highest education level: None   Occupational History   • None   Tobacco Use   • Smoking status: Never   • Smokeless tobacco: Never   Vaping Use   • Vaping Use: Never used   Substance and Sexual Activity   • Alcohol use: Never   • Drug use: Never   • Sexual activity: Never     Birth control/protection: Female Sterilization   Other Topics Concern   • None   Social History Narrative    Disabled    Social history reviewed unchanged      Social Determinants of Health     Financial Resource Strain: Low Risk    • Difficulty of Paying Living Expenses: Not hard at all   Food Insecurity: No Food Insecurity   • Worried About Running Out of Food in the Last Year: Never true   • Ran Out of Food in the Last Year: Never true   Transportation Needs: No Transportation Needs   • Lack of Transportation (Medical): No   • Lack of Transportation (Non-Medical): No   Physical Activity: Not on file   Stress: Not on file   Social Connections: Not on file   Intimate Partner Violence: Not on file   Housing Stability: Not on file     Current Outpatient Medications on File Prior to Visit   Medication Sig   • acetaminophen (TYLENOL) 650 mg CR tablet TAKE 1 TABLET BY MOUTH EVERY 8 HOURS AS NEEDED FOR MILD PAIN OR FEVER   • benzonatate (TESSALON) 200 MG capsule Take 1 capsule (200 mg total) by mouth 3 (three) times a day as needed for cough for up to 20 doses   • Calcium Carbonate-Vitamin D3 (Calcium + Vitamin D3) 600-400 MG-UNIT TABS Take 1 tablet by mouth 2 (two) times a day   • carbamide peroxide (DEBROX) 6 5 % otic solution Administer 5 drops into both ears 2 (two) times a day for 4 days   • cholecalciferol (VITAMIN D3) 400 units tablet Take 1 tablet (400 Units total) by mouth 3 (three) times a day   • diltiazem (CARDIZEM CD) 120 mg 24 hr capsule Take 1 capsule (120 mg total) by mouth daily   • docusate sodium (COLACE) 100 mg capsule Take 1 capsule (100 mg total) by mouth 2 (two) times a day If needed constipation   • fluticasone (FLONASE) 50 mcg/act nasal spray into each nostril daily (Patient not taking: Reported on 12/13/2021)   • furosemide (LASIX) 20 mg tablet Take 1 tablet (20 mg total) by mouth daily   • hydrocortisone-aloe 1 % cream APPLY TO AFFECTED AREA TWICE A DAY AS NEEDED FOR ITCH AND RASH  • Ibuprofen 200 MG CAPS Take 1-2 tablets if needed for pain or fever every 6-8 hours   • Incontinence Supply Disposable (Wings Choice Plus Adult Briefs) MISC USE AS DIRECTED FOR INCONTINENCE   • levothyroxine 100 mcg tablet TAKE (1) TABLET BY MOUTH ONCE DAILY     • loperamide (IMODIUM A-D) 2 mg capsule Take 1 capsule (2 mg total) by mouth 4 (four) times a day as needed for diarrhea   • neomycin-polymyxin b-bacitracin (NEOSPORIN) 3 5-400-5,000 APPLY TOPICALLY TO AFFECTED AREA FOUR TIMES A DAY AS NEEDED FOR WOUND CARE   • Omega-3 Fatty Acids (fish oil) 1,000 mg Take 1 capsule (1,000 mg total) by mouth daily   • pravastatin (PRAVACHOL) 40 mg tablet Take 1 tablet (40 mg total) by mouth daily   • Xarelto 20 MG tablet TAKE (1) TABLET BY MOUTH ONCE DAILY WITH BREAKFAST  • [DISCONTINUED] clotrimazole-betamethasone (LOTRISONE) 1-0 05 % cream Apply topically 2 (two) times a day Apply Twice a day 3 hours after nystatin powder  Treat for 7 days  • [DISCONTINUED] nystatin (MYCOSTATIN) powder APPLY TOPICALLY TO AFFECTED AREA TWICE DAILY     No Known Allergies  Immunization History   Administered Date(s) Administered   • COVID-19 MODERNA VACC 0 5 ML IM 02/05/2021, 03/08/2021, 12/23/2021   • COVID-19 Pfizer Vac BIVALENT Dud[place]-sucrose 12 Yr+ IM (BOOSTER ONLY) 01/05/2023   • INFLUENZA 10/31/2016, 10/13/2017, 10/25/2017, 10/10/2018   • Influenza, recombinant, quadrivalent,injectable, preservative free 11/15/2019, 10/14/2020, 10/06/2021, 10/26/2022   • Influenza, seasonal, injectable 11/14/2014, 10/25/2017   • Pneumococcal Conjugate Vaccine 20-valent (Pcv20), Polysace 01/05/2023   • Tdap 06/11/2013, 11/01/2017   • Tuberculin Skin Test-PPD Intradermal 06/11/2013, 06/12/2015, 06/26/2017, 06/24/2019, 06/21/2021       Objective     /76 (BP Location: Left arm, Patient Position: Sitting, Cuff Size: Adult)   Pulse 88   Temp (!) 96 5 °F (35 8 °C) (Temporal)   Resp 18   Ht 5' (1 524 m)   Wt 91 2 kg (201 lb)   SpO2 97%   BMI 39 26 kg/m²     Physical Exam  Vitals and nursing note reviewed  Constitutional:       General: She is not in acute distress  Appearance: She is well-developed  She is not diaphoretic  HENT:      Head: Normocephalic and atraumatic        Right Ear: External ear normal       Left Ear: External ear normal    Eyes:      Conjunctiva/sclera: Conjunctivae normal       Pupils: Pupils are equal, round, and reactive to light  Cardiovascular:      Rate and Rhythm: Normal rate  Rhythm irregular  Pulmonary:      Effort: Pulmonary effort is normal  No respiratory distress  Breath sounds: Normal breath sounds  No wheezing  Abdominal:      General: Bowel sounds are normal  There is no distension  Palpations: Abdomen is soft  Tenderness: There is no abdominal tenderness  Musculoskeletal:         General: No deformity  Normal range of motion  Cervical back: Normal range of motion and neck supple  Lymphadenopathy:      Cervical: No cervical adenopathy  Skin:     General: Skin is warm and dry  Capillary Refill: Capillary refill takes less than 2 seconds  Findings: Rash present  Comments: Mild erythema to abdominal fold and between buttocks, improved since last visit, no open areas    Neurological:      Mental Status: She is alert  Mental status is at baseline     Psychiatric:         Mood and Affect: Mood normal          Behavior: Behavior normal        LALA Casillas

## 2023-01-05 NOTE — ASSESSMENT & PLAN NOTE
Lab Results   Component Value Date    [id number] 1 710 06/10/2021     Continue levothyroxine 100 mcg daily  Discussed with caretaker the importance of checking level and encouraged to complete outstanding labs

## 2023-01-19 ENCOUNTER — OFFICE VISIT (OUTPATIENT)
Dept: URGENT CARE | Age: 63
End: 2023-01-19

## 2023-01-19 VITALS
HEART RATE: 86 BPM | TEMPERATURE: 97.4 F | OXYGEN SATURATION: 99 % | DIASTOLIC BLOOD PRESSURE: 80 MMHG | SYSTOLIC BLOOD PRESSURE: 124 MMHG | RESPIRATION RATE: 18 BRPM

## 2023-01-19 DIAGNOSIS — U07.1 COVID-19: Primary | ICD-10-CM

## 2023-01-19 DIAGNOSIS — J06.9 VIRAL UPPER RESPIRATORY ILLNESS: ICD-10-CM

## 2023-01-19 LAB
SARS-COV-2 AG UPPER RESP QL IA: POSITIVE
VALID CONTROL: ABNORMAL

## 2023-01-19 NOTE — PATIENT INSTRUCTIONS
Patient tested postive for COVID-19 in office today  Discussed current CDC guidelines  Patient was informed that they must remain at home on self isolation for 5 days since symptom onset and continue to wear a mask around others for an additional 5 days after that  Patient was informed that they must be fever free for at least 24 hours without medications prior to discontinuing isolation  Patient has been instructed to rest at home, drink plenty of fluids, take Tylenol or Motrin as needed, drink warm tea w/ lemon and honey, run a humidifier at home, and take her already prescribed multivitamin daily  If symptoms persist despite treatment, patient is to follow up with PCP for re-check  If symptoms worsen,or any new symptoms present including but not limited to, fever, chest pain, shortness of breath, patient is to be seen in the ER  Patient verbalizes understanding and agrees w/ treatment plan

## 2023-01-19 NOTE — PROGRESS NOTES
[facility]        NAME: Mandy Javed is a 58 y o  female  : 1960    MRN: [id number]  DATE: 2023  TIME: 5:37 PM    Assessment and Orders   COVID-19 [U07 1]  1  COVID-19        2  Viral upper respiratory illness  Poct Covid 19 Rapid Antigen Test            Plan and Discussion      Patient tested postive for COVID-19 in office today  Discussed current CDC guidelines  Patient was informed that they must remain at home on self isolation for 5 days since symptom onset and continue to wear a mask around others for an additional 5 days after that  Patient was informed that they must be fever free for at least 24 hours without medications prior to discontinuing isolation  Patient has been instructed to rest at home, drink plenty of fluids, take Tylenol or Motrin as needed, drink warm tea w/ lemon and honey, run a humidifier at home, and take her already prescribed multivitamin daily  If symptoms persist despite treatment, patient is to follow up with PCP for re-check  If symptoms worsen,or any new symptoms present including but not limited to, fever, chest pain, shortness of breath, patient is to be seen in the ER  Patient verbalizes understanding and agrees w/ treatment plan  Discussed possibility of Paxlovid HOWEVER it is contraindicated in this patient who is taking Xarelto  Chief Complaint     Chief Complaint   Patient presents with   • Cold Like Symptoms     Living in group home, roommate tested positive for Covid  Starting having chills and cough today         History of Present Illness       Roommate at group home tested positive for COVID 19 yesterday  Cough  This is a new problem  The current episode started today  Associated symptoms include chills, a fever, nasal congestion and sweats  Pertinent negatives include no sore throat or shortness of breath  Review of Systems   Review of Systems   Constitutional: Positive for chills and fever     HENT: Negative for sore throat  Respiratory: Positive for cough  Negative for shortness of breath  Current Medications       Current Outpatient Medications:   •  Calcium Carbonate-Vitamin D3 (Calcium + Vitamin D3) 600-400 MG-UNIT TABS, Take 1 tablet by mouth 2 (two) times a day, Disp: 180 tablet, Rfl: 3  •  cholecalciferol (VITAMIN D3) 400 units tablet, Take 1 tablet (400 Units total) by mouth 3 (three) times a day, Disp: 360 tablet, Rfl: 3  •  clotrimazole-betamethasone (LOTRISONE) 1-0 05 % cream, Apply topically 2 (two) times a day as needed (rash) Apply Twice a day 3 hours after nystatin powder , Disp: 90 g, Rfl: 2  •  diltiazem (CARDIZEM CD) 120 mg 24 hr capsule, Take 1 capsule (120 mg total) by mouth daily, Disp: 90 capsule, Rfl: 3  •  docusate sodium (COLACE) 100 mg capsule, Take 1 capsule (100 mg total) by mouth 2 (two) times a day If needed constipation, Disp: 30 capsule, Rfl: 0  •  furosemide (LASIX) 20 mg tablet, Take 1 tablet (20 mg total) by mouth daily, Disp: 90 tablet, Rfl: 3  •  hydrocortisone-aloe 1 % cream, APPLY TO AFFECTED AREA TWICE A DAY AS NEEDED FOR ITCH AND RASH , Disp: 28 g, Rfl: 2  •  Ibuprofen 200 MG CAPS, Take 1-2 tablets if needed for pain or fever every 6-8 hours, Disp: 120 each, Rfl: 0  •  Incontinence Supply Disposable (Wings Choice Plus Adult Briefs) MISC, USE AS DIRECTED FOR INCONTINENCE, Disp: 90 each, Rfl: 4  •  levothyroxine 100 mcg tablet, TAKE (1) TABLET BY MOUTH ONCE DAILY  , Disp: 28 tablet, Rfl: 0  •  loperamide (IMODIUM A-D) 2 mg capsule, Take 1 capsule (2 mg total) by mouth 4 (four) times a day as needed for diarrhea, Disp: 30 capsule, Rfl: 0  •  neomycin-polymyxin b-bacitracin (NEOSPORIN) 3 5-400-5,000, APPLY TOPICALLY TO AFFECTED AREA FOUR TIMES A DAY AS NEEDED FOR WOUND CARE, Disp: 28 4 g, Rfl: 2  •  nystatin (MYCOSTATIN) powder, Apply topically 2 (two) times a day as needed (rash), Disp: 120 g, Rfl: 2  •  Omega-3 Fatty Acids (fish oil) 1,000 mg, Take 1 capsule (1,000 mg total) by mouth daily, Disp: 90 capsule, Rfl: 3  •  pravastatin (PRAVACHOL) 40 mg tablet, Take 1 tablet (40 mg total) by mouth daily, Disp: 90 tablet, Rfl: 3  •  Xarelto 20 MG tablet, TAKE (1) TABLET BY MOUTH ONCE DAILY WITH BREAKFAST , Disp: 28 tablet, Rfl: 0  •  acetaminophen (TYLENOL) 650 mg CR tablet, TAKE 1 TABLET BY MOUTH EVERY 8 HOURS AS NEEDED FOR MILD PAIN OR FEVER (Patient not taking: Reported on 1/19/2023), Disp: 30 tablet, Rfl: 0  •  benzonatate (TESSALON) 200 MG capsule, Take 1 capsule (200 mg total) by mouth 3 (three) times a day as needed for cough for up to 20 doses (Patient not taking: Reported on 1/19/2023), Disp: 20 capsule, Rfl: 0  •  carbamide peroxide (DEBROX) 6 5 % otic solution, Administer 5 drops into both ears 2 (two) times a day for 4 days, Disp: 15 mL, Rfl: 0  •  fluticasone (FLONASE) 50 mcg/act nasal spray, into each nostril daily (Patient not taking: Reported on 12/13/2021), Disp: , Rfl:     Current Allergies     Allergies as of 01/19/2023   • (No Known Allergies)            The following portions of the patient's history were reviewed and updated as appropriate: allergies, current medications, past family history, past medical history, past social history, past surgical history and problem list      Past Medical History:   Diagnosis Date   • Anxiety disorder    • Atrial fibrillation ([address] )        Past Surgical History:   Procedure Laterality Date   • HYSTERECTOMY      age 25   • OOPHORECTOMY     • [place] CARDIOVERSION ELECTIVE ARRHYTHMIA EXTERNAL  8/23/2019            Family History   Problem Relation Age of Onset   • Other Mother         neglect or abandonment    • Other Father         neglect or abandonment    • No Known Problems Sister    • No Known Problems Maternal Grandmother    • No Known Problems Maternal Grandfather    • No Known Problems Paternal Grandmother    • No Known Problems Paternal Grandfather          Medications have been verified          Objective   /80   Pulse 86   Temp Triny Dawkins ) 97 4 °F (36 3 °C)   Resp 18   SpO2 99%   No LMP recorded  Patient has had a hysterectomy  Physical Exam     Physical Exam  Constitutional:       General: She is not in acute distress  HENT:      Nose: Congestion present  Cardiovascular:      Rate and Rhythm: Normal rate and regular rhythm  Pulmonary:      Effort: No respiratory distress  Breath sounds: No wheezing or rhonchi  Neurological:      General: No focal deficit present  Mental Status: She is alert and oriented to person, place, and time  Psychiatric:         Mood and Affect: Mood normal          Speech: Speech is delayed  Cognition and Memory: Cognition is impaired                 Alvaro Alford DO

## 2023-01-24 ENCOUNTER — OFFICE VISIT (OUTPATIENT)
Dept: FAMILY MEDICINE CLINIC | Facility: CLINIC | Age: 63
End: 2023-01-24

## 2023-01-24 VITALS
BODY MASS INDEX: 36.82 KG/M2 | HEIGHT: 61 IN | DIASTOLIC BLOOD PRESSURE: 80 MMHG | OXYGEN SATURATION: 98 % | RESPIRATION RATE: 16 BRPM | TEMPERATURE: 98.8 F | SYSTOLIC BLOOD PRESSURE: 126 MMHG | HEART RATE: 100 BPM | WEIGHT: 195 LBS

## 2023-01-24 DIAGNOSIS — U07.1 COVID: Primary | ICD-10-CM

## 2023-01-24 NOTE — PROGRESS NOTES
COVID-19 Outpatient Progress Note    Assessment/Plan:    Problem List Items Addressed This Visit    None  Visit Diagnoses     COVID    -  Primary         Disposition:     Patient has asymptomatic or mild COVID-19 infection  Based off CDC guidelines, they were recommended to isolate for 5 days  If they are asymptomatic or symptoms are improving with no fevers in the past 24 hours, isolation may be ended followed by 5 days of wearing a mask when around othes to minimize risk of infecting others  If still have a fever or other symptoms have not improved, continue to isolate until they improve  Regardless of when they end isolation, avoid being around people who are more likely to get very sick from COVID-19 until at least day 11  Patient has completed isolation x 5 days, based on guidelines and current sx she is able to return to her regular program/ activities tomorrow  I have spent 20 minutes directly with the patient  Greater than 50% of this time was spent in counseling/coordination of care regarding: prognosis, risks and benefits of treatment options, instructions for management, patient and family education, importance of treatment compliance and risk factor reductions  Encounter provider: LALA Nathan     Provider located at: [address] [address] [phone]     Recent Visits  No visits were found meeting these conditions  Showing recent visits within past 7 days and meeting all other requirements  Today's Visits  Date Type Provider Dept   01/24/23 Office Visit Cam Nathan 48 today's visits and meeting all other requirements  Future Appointments  No visits were found meeting these conditions  Showing future appointments within next 150 days and meeting all other requirements     Subjective:   Verneice Collet is a 58 y o  female who has been screened for COVID-19   Symptom change since last report: improving  Patient's symptoms include fatigue, nasal congestion, rhinorrhea and cough  Patient denies fever, chills, malaise, sore throat, anosmia, loss of taste, shortness of breath, chest tightness, abdominal pain, nausea, vomiting, diarrhea, myalgias and headaches  - Date of symptom onset: 1/19/2023  - Date of positive COVID-19 test: 1/19/2023  Type of test: Rapid antigen  COVID-19 vaccination status: Fully vaccinated with booster    Carole has been staying home and has isolated themselves in her home  She is taking care to not share personal items and is cleaning all surfaces that are touched often, like counters, tabletops, and doorknobs using household cleaning sprays or wipes  She is wearing a mask when she leaves her room  Presents with caregiver, kaycee on 1/19/23 w/ COVID by rapid test  Onset had cough, congestion, fatigue  Has improved every day  Caregiver has given her acetaminophen PRN and lemon honey tea with improvement in sx  Patient denies any current HA, body pain, shortness of breath, chest pain, dizziness  Caregiver denies any bowel/ bladder problems, changes to appetite, fever  Lab Results   Component Value Date    SARSCOV2 Not Detected 12/10/2020    SARSCOVAG Positive (A) 01/19/2023       Review of Systems   Constitutional: Positive for fatigue  Negative for chills and fever  HENT: Positive for congestion and rhinorrhea  Negative for sore throat  Respiratory: Positive for cough  Negative for chest tightness and shortness of breath  Gastrointestinal: Negative for abdominal pain, diarrhea, nausea and vomiting  Musculoskeletal: Negative for myalgias  Neurological: Negative for headaches       Current Outpatient Medications on File Prior to Visit   Medication Sig   • acetaminophen (TYLENOL) 650 mg CR tablet TAKE 1 TABLET BY MOUTH EVERY 8 HOURS AS NEEDED FOR MILD PAIN OR FEVER (Patient not taking: Reported on 1/19/2023)   • benzonatate (TESSALON) 200 MG capsule Take 1 capsule (200 mg total) by mouth 3 (three) times a day as needed for cough for up to 20 doses (Patient not taking: Reported on 1/19/2023)   • Calcium Carbonate-Vitamin D3 (Calcium + Vitamin D3) 600-400 MG-UNIT TABS Take 1 tablet by mouth 2 (two) times a day   • carbamide peroxide (DEBROX) 6 5 % otic solution Administer 5 drops into both ears 2 (two) times a day for 4 days   • cholecalciferol (VITAMIN D3) 400 units tablet Take 1 tablet (400 Units total) by mouth 3 (three) times a day   • clotrimazole-betamethasone (LOTRISONE) 1-0 05 % cream Apply topically 2 (two) times a day as needed (rash) Apply Twice a day 3 hours after nystatin powder  • diltiazem (CARDIZEM CD) 120 mg 24 hr capsule Take 1 capsule (120 mg total) by mouth daily   • docusate sodium (COLACE) 100 mg capsule Take 1 capsule (100 mg total) by mouth 2 (two) times a day If needed constipation   • fluticasone (FLONASE) 50 mcg/act nasal spray into each nostril daily (Patient not taking: Reported on 12/13/2021)   • furosemide (LASIX) 20 mg tablet Take 1 tablet (20 mg total) by mouth daily   • hydrocortisone-aloe 1 % cream APPLY TO AFFECTED AREA TWICE A DAY AS NEEDED FOR ITCH AND RASH  • Ibuprofen 200 MG CAPS Take 1-2 tablets if needed for pain or fever every 6-8 hours   • Incontinence Supply Disposable (Wings Choice Plus Adult Briefs) MISC USE AS DIRECTED FOR INCONTINENCE   • levothyroxine 100 mcg tablet TAKE (1) TABLET BY MOUTH ONCE DAILY     • loperamide (IMODIUM A-D) 2 mg capsule Take 1 capsule (2 mg total) by mouth 4 (four) times a day as needed for diarrhea   • neomycin-polymyxin b-bacitracin (NEOSPORIN) 3 5-400-5,000 APPLY TOPICALLY TO AFFECTED AREA FOUR TIMES A DAY AS NEEDED FOR WOUND CARE   • nystatin (MYCOSTATIN) powder Apply topically 2 (two) times a day as needed (rash)   • Omega-3 Fatty Acids (fish oil) 1,000 mg Take 1 capsule (1,000 mg total) by mouth daily   • pravastatin (PRAVACHOL) 40 mg tablet Take 1 tablet (40 mg total) by mouth daily   • Xarelto 20 MG tablet TAKE (1) TABLET BY MOUTH ONCE DAILY WITH BREAKFAST  Objective:    /80 (BP Location: Right arm, Patient Position: Sitting, Cuff Size: Large)   Pulse 100   Temp 98 8 °F (37 1 °C) (Temporal)   Resp 16   Ht 5' 1" (1 549 m)   Wt 88 5 kg (195 lb)   SpO2 98%   Breastfeeding No   BMI 36 84 kg/m²      Physical Exam  Vitals and nursing note reviewed  Constitutional:       General: She is not in acute distress  Appearance: She is well-developed  She is obese  HENT:      Head: Normocephalic and atraumatic  Right Ear: External ear normal  There is no impacted cerumen  Left Ear: External ear normal       Nose: Congestion and rhinorrhea present  Eyes:      Conjunctiva/sclera: Conjunctivae normal       Pupils: Pupils are equal, round, and reactive to light  Cardiovascular:      Rate and Rhythm: Normal rate and regular rhythm  Pulses: Normal pulses  Pulmonary:      Effort: Pulmonary effort is normal  No respiratory distress  Breath sounds: Normal breath sounds  No wheezing  Abdominal:      General: Bowel sounds are normal  There is no distension  Palpations: Abdomen is soft  Tenderness: There is no abdominal tenderness  Musculoskeletal:      Cervical back: Neck supple  Comments: Trace BLLE edema    Lymphadenopathy:      Cervical: No cervical adenopathy  Skin:     General: Skin is warm and dry  Capillary Refill: Capillary refill takes less than 2 seconds  Neurological:      Mental Status: She is alert  Mental status is at baseline     Psychiatric:         Behavior: Behavior normal        LALA Jones

## 2023-02-04 DIAGNOSIS — I48.91 NEW ONSET ATRIAL FIBRILLATION (HCC): ICD-10-CM

## 2023-02-04 DIAGNOSIS — E03.9 HYPOTHYROIDISM, UNSPECIFIED TYPE: ICD-10-CM

## 2023-02-04 RX ORDER — LEVOTHYROXINE SODIUM 0.1 MG/1
TABLET ORAL
Qty: 31 TABLET | Refills: 0 | Status: SHIPPED | OUTPATIENT
Start: 2023-02-04

## 2023-02-06 RX ORDER — RIVAROXABAN 20 MG/1
TABLET, FILM COATED ORAL
Qty: 30 TABLET | Refills: 3 | Status: SHIPPED | OUTPATIENT
Start: 2023-02-06

## 2023-02-20 ENCOUNTER — APPOINTMENT (OUTPATIENT)
Dept: LAB | Age: 63
End: 2023-02-20

## 2023-02-20 DIAGNOSIS — E55.9 VITAMIN D DEFICIENCY: ICD-10-CM

## 2023-02-20 DIAGNOSIS — E03.9 HYPOTHYROIDISM, UNSPECIFIED TYPE: ICD-10-CM

## 2023-02-20 DIAGNOSIS — E66.01 OBESITY, MORBID (HCC): ICD-10-CM

## 2023-02-20 DIAGNOSIS — E28.39 ESTROGEN DEFICIENCY: ICD-10-CM

## 2023-02-20 DIAGNOSIS — R60.0 LEG EDEMA: ICD-10-CM

## 2023-02-20 LAB
25(OH)D3 SERPL-MCNC: 31.5 NG/ML (ref 30–100)
ALBUMIN SERPL BCP-MCNC: 3.9 G/DL (ref 3.5–5)
ALP SERPL-CCNC: 109 U/L (ref 46–116)
ALT SERPL W P-5'-P-CCNC: 19 U/L (ref 12–78)
ANION GAP SERPL CALCULATED.3IONS-SCNC: 4 MMOL/L (ref 4–13)
AST SERPL W P-5'-P-CCNC: 17 U/L (ref 5–45)
BASOPHILS # BLD AUTO: 0.05 THOUSANDS/ÂΜL (ref 0–0.1)
BASOPHILS NFR BLD AUTO: 1 % (ref 0–1)
BILIRUB SERPL-MCNC: 1.68 MG/DL (ref 0.2–1)
BUN SERPL-MCNC: 19 MG/DL (ref 5–25)
CALCIUM SERPL-MCNC: 9.6 MG/DL (ref 8.3–10.1)
CHLORIDE SERPL-SCNC: 105 MMOL/L (ref 96–108)
CHOLEST SERPL-MCNC: 160 MG/DL
CO2 SERPL-SCNC: 29 MMOL/L (ref 21–32)
CREAT SERPL-MCNC: 0.92 MG/DL (ref 0.6–1.3)
EOSINOPHIL # BLD AUTO: 0.27 THOUSAND/ÂΜL (ref 0–0.61)
EOSINOPHIL NFR BLD AUTO: 4 % (ref 0–6)
ERYTHROCYTE [DISTWIDTH] IN BLOOD BY AUTOMATED COUNT: 13.2 % (ref 11.6–15.1)
GFR SERPL CREATININE-BSD FRML MDRD: 66 ML/MIN/1.73SQ M
GLUCOSE P FAST SERPL-MCNC: 87 MG/DL (ref 65–99)
HCT VFR BLD AUTO: 46.5 % (ref 34.8–46.1)
HDLC SERPL-MCNC: 53 MG/DL
HGB BLD-MCNC: 14.6 G/DL (ref 11.5–15.4)
IMM GRANULOCYTES # BLD AUTO: 0.01 THOUSAND/UL (ref 0–0.2)
IMM GRANULOCYTES NFR BLD AUTO: 0 % (ref 0–2)
LDLC SERPL CALC-MCNC: 84 MG/DL (ref 0–100)
LYMPHOCYTES # BLD AUTO: 1.47 THOUSANDS/ÂΜL (ref 0.6–4.47)
LYMPHOCYTES NFR BLD AUTO: 20 % (ref 14–44)
MCH RBC QN AUTO: 29.1 PG (ref 26.8–34.3)
MCHC RBC AUTO-ENTMCNC: 31.4 G/DL (ref 31.4–37.4)
MCV RBC AUTO: 93 FL (ref 82–98)
MONOCYTES # BLD AUTO: 0.47 THOUSAND/ÂΜL (ref 0.17–1.22)
MONOCYTES NFR BLD AUTO: 6 % (ref 4–12)
NEUTROPHILS # BLD AUTO: 5.24 THOUSANDS/ÂΜL (ref 1.85–7.62)
NEUTS SEG NFR BLD AUTO: 69 % (ref 43–75)
NONHDLC SERPL-MCNC: 107 MG/DL
NRBC BLD AUTO-RTO: 0 /100 WBCS
PLATELET # BLD AUTO: 174 THOUSANDS/UL (ref 149–390)
PMV BLD AUTO: 11.7 FL (ref 8.9–12.7)
POTASSIUM SERPL-SCNC: 3.9 MMOL/L (ref 3.5–5.3)
PROT SERPL-MCNC: 7.6 G/DL (ref 6.4–8.4)
RBC # BLD AUTO: 5.02 MILLION/UL (ref 3.81–5.12)
SODIUM SERPL-SCNC: 138 MMOL/L (ref 135–147)
TRIGL SERPL-MCNC: 113 MG/DL
TSH SERPL DL<=0.05 MIU/L-ACNC: 2.34 UIU/ML (ref 0.45–4.5)
WBC # BLD AUTO: 7.51 THOUSAND/UL (ref 4.31–10.16)

## 2023-02-22 LAB
EST. AVERAGE GLUCOSE BLD GHB EST-MCNC: 108 MG/DL
HBA1C MFR BLD: 5.4 %

## 2023-03-03 DIAGNOSIS — E80.6 HYPERBILIRUBINEMIA: Primary | ICD-10-CM

## 2023-03-30 ENCOUNTER — HOSPITAL ENCOUNTER (OUTPATIENT)
Dept: RADIOLOGY | Age: 63
Discharge: HOME | End: 2023-03-30

## 2023-03-30 DIAGNOSIS — E80.6 HYPERBILIRUBINEMIA: ICD-10-CM

## 2023-04-01 ENCOUNTER — APPOINTMENT (EMERGENCY)
Dept: RADIOLOGY | Facility: HOSPITAL | Age: 63
End: 2023-04-01

## 2023-04-01 ENCOUNTER — HOSPITAL ENCOUNTER (EMERGENCY)
Facility: HOSPITAL | Age: 63
Discharge: HOME | End: 2023-04-01
Attending: EMERGENCY MEDICINE

## 2023-04-01 VITALS
OXYGEN SATURATION: 98 % | RESPIRATION RATE: 16 BRPM | HEART RATE: 76 BPM | TEMPERATURE: 98.2 F | DIASTOLIC BLOOD PRESSURE: 70 MMHG | SYSTOLIC BLOOD PRESSURE: 154 MMHG

## 2023-04-01 DIAGNOSIS — R07.9 CHEST PAIN: Primary | ICD-10-CM

## 2023-04-01 LAB
ALBUMIN SERPL BCP-MCNC: 4.1 G/DL (ref 3.5–5)
ALP SERPL-CCNC: 116 U/L (ref 34–104)
ALT SERPL W P-5'-P-CCNC: 11 U/L (ref 7–52)
ANION GAP SERPL CALCULATED.3IONS-SCNC: 8 MMOL/L (ref 4–13)
AST SERPL W P-5'-P-CCNC: 14 U/L (ref 13–39)
ATRIAL RATE: 375 BPM
BASOPHILS # BLD AUTO: 0.05 THOUSANDS/ÂΜL (ref 0–0.1)
BASOPHILS NFR BLD AUTO: 1 % (ref 0–1)
BILIRUB SERPL-MCNC: 0.72 MG/DL (ref 0.2–1)
BUN SERPL-MCNC: 19 MG/DL (ref 5–25)
CALCIUM SERPL-MCNC: 9.5 MG/DL (ref 8.4–10.2)
CARDIAC TROPONIN I PNL SERPL HS: 11 NG/L (ref 8–18)
CHLORIDE SERPL-SCNC: 103 MMOL/L (ref 96–108)
CO2 SERPL-SCNC: 29 MMOL/L (ref 21–32)
CREAT SERPL-MCNC: 0.84 MG/DL (ref 0.6–1.3)
D DIMER PPP FEU-MCNC: <0.27 UG/ML FEU
EOSINOPHIL # BLD AUTO: 0.29 THOUSAND/ÂΜL (ref 0–0.61)
EOSINOPHIL NFR BLD AUTO: 4 % (ref 0–6)
ERYTHROCYTE [DISTWIDTH] IN BLOOD BY AUTOMATED COUNT: 13.2 % (ref 11.6–15.1)
GFR SERPL CREATININE-BSD FRML MDRD: 74 ML/MIN/1.73SQ M
GLUCOSE SERPL-MCNC: 87 MG/DL (ref 65–140)
HCT VFR BLD AUTO: 45 % (ref 34.8–46.1)
HGB BLD-MCNC: 14.4 G/DL (ref 11.5–15.4)
IMM GRANULOCYTES # BLD AUTO: 0.03 THOUSAND/UL (ref 0–0.2)
IMM GRANULOCYTES NFR BLD AUTO: 0 % (ref 0–2)
LYMPHOCYTES # BLD AUTO: 1.88 THOUSANDS/ÂΜL (ref 0.6–4.47)
LYMPHOCYTES NFR BLD AUTO: 25 % (ref 14–44)
MCH RBC QN AUTO: 29.1 PG (ref 26.8–34.3)
MCHC RBC AUTO-ENTMCNC: 32 G/DL (ref 31.4–37.4)
MCV RBC AUTO: 91 FL (ref 82–98)
MONOCYTES # BLD AUTO: 0.58 THOUSAND/ÂΜL (ref 0.17–1.22)
MONOCYTES NFR BLD AUTO: 8 % (ref 4–12)
NEUTROPHILS # BLD AUTO: 4.74 THOUSANDS/ÂΜL (ref 1.85–7.62)
NEUTS SEG NFR BLD AUTO: 62 % (ref 43–75)
NRBC BLD AUTO-RTO: 0 /100 WBCS
PLATELET # BLD AUTO: 149 THOUSANDS/UL (ref 149–390)
PMV BLD AUTO: 10.6 FL (ref 8.9–12.7)
POTASSIUM SERPL-SCNC: 3.7 MMOL/L (ref 3.5–5.3)
PROT SERPL-MCNC: 7.5 G/DL (ref 6.4–8.4)
QRS AXIS: 42 DEGREES
QRSD INTERVAL: 86 MS
QT INTERVAL: 394 MS
QTC INTERVAL: 437 MS
RBC # BLD AUTO: 4.95 MILLION/UL (ref 3.81–5.12)
SODIUM SERPL-SCNC: 140 MMOL/L (ref 135–147)
T WAVE AXIS: -14 DEGREES
VENTRICULAR RATE: 74 BPM
WBC # BLD AUTO: 7.57 THOUSAND/UL (ref 4.31–10.16)

## 2023-04-03 NOTE — ASSESSMENT & PLAN NOTE
CBC/ANC from 3/28/23 10.8 Result entered into [facility] and  faxed to patient's [facility]. [phone]      Will close encounter when confirmation of successful fax has been received   · Possibly related to psychiatric medications such as Seroquel and Lamictal  · Maintain on telemetry

## 2023-04-07 ENCOUNTER — APPOINTMENT (OUTPATIENT)
Dept: LAB | Age: 63
End: 2023-04-07

## 2023-04-07 DIAGNOSIS — O26.619 RECURRENT JAUNDICE OF PREGNANCY, ANTEPARTUM: ICD-10-CM

## 2023-04-07 DIAGNOSIS — R74.8 ACID PHOSPHATASE ELEVATED: ICD-10-CM

## 2023-04-07 DIAGNOSIS — R17 RECURRENT JAUNDICE OF PREGNANCY, ANTEPARTUM: ICD-10-CM

## 2023-04-07 LAB
ALBUMIN SERPL BCP-MCNC: 4 G/DL (ref 3.5–5)
ALP SERPL-CCNC: 122 U/L (ref 46–116)
ALT SERPL W P-5'-P-CCNC: 18 U/L (ref 12–78)
AST SERPL W P-5'-P-CCNC: 15 U/L (ref 5–45)
BILIRUB DIRECT SERPL-MCNC: 0.22 MG/DL (ref 0–0.2)
BILIRUB SERPL-MCNC: 0.97 MG/DL (ref 0.2–1)
PROT SERPL-MCNC: 7.7 G/DL (ref 6.4–8.4)

## 2023-05-02 ENCOUNTER — OFFICE VISIT (OUTPATIENT)
Dept: URGENT CARE | Age: 63
End: 2023-05-02

## 2023-05-02 VITALS
OXYGEN SATURATION: 98 % | HEART RATE: 64 BPM | SYSTOLIC BLOOD PRESSURE: 126 MMHG | RESPIRATION RATE: 20 BRPM | TEMPERATURE: 97.8 F | DIASTOLIC BLOOD PRESSURE: 82 MMHG

## 2023-05-02 DIAGNOSIS — R60.9 EDEMA, UNSPECIFIED TYPE: ICD-10-CM

## 2023-05-02 DIAGNOSIS — R39.9 UTI SYMPTOMS: ICD-10-CM

## 2023-05-02 DIAGNOSIS — R31.9 URINARY TRACT INFECTION WITH HEMATURIA, SITE UNSPECIFIED: Primary | ICD-10-CM

## 2023-05-02 DIAGNOSIS — N39.0 URINARY TRACT INFECTION WITH HEMATURIA, SITE UNSPECIFIED: Primary | ICD-10-CM

## 2023-05-02 LAB
SL AMB  POCT GLUCOSE, UA: NEGATIVE
SL AMB LEUKOCYTE ESTERASE,UA: ABNORMAL
SL AMB POCT BILIRUBIN,UA: NEGATIVE
SL AMB POCT BLOOD,UA: ABNORMAL
SL AMB POCT CLARITY,UA: CLEAR
SL AMB POCT COLOR,UA: ABNORMAL
SL AMB POCT KETONES,UA: NEGATIVE
SL AMB POCT NITRITE,UA: NEGATIVE
SL AMB POCT PH,UA: 6
SL AMB POCT SPECIFIC GRAVITY,UA: 1.02
SL AMB POCT URINE PROTEIN: NEGATIVE
SL AMB POCT UROBILINOGEN: 0.2

## 2023-05-02 RX ORDER — NITROFURANTOIN 25; 75 MG/1; MG/1
100 CAPSULE ORAL 2 TIMES DAILY
Qty: 10 CAPSULE | Refills: 0 | Status: SHIPPED | OUTPATIENT
Start: 2023-05-02 | End: 2023-05-07

## 2023-05-02 RX ORDER — FUROSEMIDE 20 MG/1
TABLET ORAL
Qty: 30 TABLET | Refills: 0 | Status: SHIPPED | OUTPATIENT
Start: 2023-05-02

## 2023-05-02 NOTE — PATIENT INSTRUCTIONS
Take Macrobid as prescribed  Drink plenty of water   Cranberry supplements  Follow up with OB/GYN  Follow up with PCP in 3-5 days  Proceed to ER if symptoms worsen  Eat yogurt with live and active cultures and/or take a probiotic at least 3 hours before or after antibiotic dose  Monitor stool for diarrhea and/or blood  If this occurs, contact primary care doctor ASAP

## 2023-05-02 NOTE — PROGRESS NOTES
[facility]        NAME: Charly Pitt is a 58 y o  female  : 1960    MRN: [id number]  DATE: May 2, 2023  TIME: 5:47 PM    Assessment and Plan   Urinary tract infection with hematuria, site unspecified [N39 0, R31 9]  1  Urinary tract infection with hematuria, site unspecified  nitrofurantoin (MACROBID) 100 mg capsule      2  UTI symptoms  POCT urine dip    Urine culture            Patient Instructions     Take Macrobid as prescribed  Drink plenty of water   Cranberry supplements  Follow up with OB/GYN  Follow up with PCP in 3-5 days  Proceed to ER if symptoms worsen  Eat yogurt with live and active cultures and/or take a probiotic at least 3 hours before or after antibiotic dose  Monitor stool for diarrhea and/or blood  If this occurs, contact primary care doctor ASAP  Chief Complaint     Chief Complaint   Patient presents with    Possible UTI     Patients caregiver states that she has been having abdominal pain and trouble starting a stream  She is retaining and then going large amounts at a time  She has also had some incontinence         History of Present Illness       Patient is a 59 yo female with no significant PMH presenting in the clinic today for UTI sx x 1 week  Patient presents with her caregiver  Admits dysuria, frequency, hesitancy  Denies hematuria, flank pain, abdominal pain, n/v/d  Denies the use of OTC treatment for symptom management  Patient's caregiver notes patient has a h/o kidney stones  Review of Systems   Review of Systems   Constitutional: Negative for chills and fever  Respiratory: Negative for shortness of breath  Cardiovascular: Negative for chest pain  Gastrointestinal: Negative for abdominal pain, diarrhea, nausea and vomiting  Genitourinary: Positive for dysuria and frequency  Negative for flank pain, hematuria and urgency           Current Medications       Current Outpatient Medications:     Calcium Carbonate-Vitamin D3 (Calcium + Vitamin D3) 600-400 MG-UNIT TABS, Take 1 tablet by mouth 2 (two) times a day, Disp: 180 tablet, Rfl: 3    cholecalciferol (VITAMIN D3) 400 units tablet, Take 1 tablet (400 Units total) by mouth 3 (three) times a day, Disp: 360 tablet, Rfl: 3    clotrimazole-betamethasone (LOTRISONE) 1-0 05 % cream, Apply topically 2 (two) times a day as needed (rash) Apply Twice a day 3 hours after nystatin powder , Disp: 90 g, Rfl: 2    diltiazem (CARDIZEM CD) 120 mg 24 hr capsule, Take 1 capsule (120 mg total) by mouth daily, Disp: 90 capsule, Rfl: 3    docusate sodium (COLACE) 100 mg capsule, Take 1 capsule (100 mg total) by mouth 2 (two) times a day If needed constipation, Disp: 30 capsule, Rfl: 0    furosemide (LASIX) 20 mg tablet, TAKE (1) TABLET BY MOUTH ONCE DAILY  , Disp: 30 tablet, Rfl: 0    hydrocortisone-aloe 1 % cream, APPLY TO AFFECTED AREA TWICE A DAY AS NEEDED FOR ITCH AND RASH , Disp: 28 g, Rfl: 2    Ibuprofen 200 MG CAPS, Take 1-2 tablets if needed for pain or fever every 6-8 hours, Disp: 120 each, Rfl: 0    Incontinence Supply Disposable ([employer] Plus Adult Briefs) MISC, USE AS DIRECTED FOR INCONTINENCE, Disp: 90 each, Rfl: 4    levothyroxine 100 mcg tablet, TAKE (1) TABLET BY MOUTH ONCE DAILY  , Disp: 30 tablet, Rfl: 2    loperamide (IMODIUM A-D) 2 mg capsule, Take 1 capsule (2 mg total) by mouth 4 (four) times a day as needed for diarrhea, Disp: 30 capsule, Rfl: 0    neomycin-polymyxin b-bacitracin (NEOSPORIN) 3 5-400-5,000, APPLY TOPICALLY TO AFFECTED AREA FOUR TIMES A DAY AS NEEDED FOR WOUND CARE, Disp: 28 4 g, Rfl: 2    nitrofurantoin (MACROBID) 100 mg capsule, Take 1 capsule (100 mg total) by mouth 2 (two) times a day for 5 days, Disp: 10 capsule, Rfl: 0    nystatin (MYCOSTATIN) powder, Apply topically 2 (two) times a day as needed (rash), Disp: 120 g, Rfl: 2    Omega-3 Fatty Acids (fish oil) 1,000 mg, Take 1 capsule (1,000 mg total) by mouth daily, Disp: 90 capsule, Rfl: 3    pravastatin (PRAVACHOL) 40 mg tablet, Take 1 tablet (40 mg total) by mouth daily, Disp: 90 tablet, Rfl: 3    Xarelto 20 MG tablet, TAKE (1) TABLET BY MOUTH ONCE DAILY WITH BREAKFAST , Disp: 30 tablet, Rfl: 3    acetaminophen (TYLENOL) 650 mg CR tablet, TAKE 1 TABLET BY MOUTH EVERY 8 HOURS AS NEEDED FOR MILD PAIN OR FEVER (Patient not taking: Reported on 1/19/2023), Disp: 30 tablet, Rfl: 0    benzonatate (TESSALON) 200 MG capsule, Take 1 capsule (200 mg total) by mouth 3 (three) times a day as needed for cough for up to 20 doses (Patient not taking: Reported on 1/19/2023), Disp: 20 capsule, Rfl: 0    carbamide peroxide (DEBROX) 6 5 % otic solution, Administer 5 drops into both ears 2 (two) times a day for 4 days, Disp: 15 mL, Rfl: 0    fluticasone (FLONASE) 50 mcg/act nasal spray, into each nostril daily (Patient not taking: Reported on 12/13/2021), Disp: , Rfl:     Current Allergies     Allergies as of 05/02/2023    (No Known Allergies)            The following portions of the patient's history were reviewed and updated as appropriate: allergies, current medications, past family history, past medical history, past social history, past surgical history and problem list      Past Medical History:   Diagnosis Date    Anxiety disorder     Atrial fibrillation ([address] )        Past Surgical History:   Procedure Laterality Date    HYSTERECTOMY      age 25   [de-identified]      DE CARDIOVERSION ELECTIVE ARRHYTHMIA EXTERNAL  8/23/2019            Family History   Problem Relation Age of Onset    Other Mother         neglect or abandonment     Other Father         neglect or abandonment     No Known Problems Sister     No Known Problems Maternal Grandmother     No Known Problems Maternal Grandfather     No Known Problems Paternal Grandmother     No Known Problems Paternal Grandfather          Medications have been verified          Objective   /82   Pulse 64   Temp 97 8 °F (36 6 °C)   Resp 20   SpO2 98% Physical Exam     Physical Exam  Vitals reviewed  Constitutional:       General: She is not in acute distress  Appearance: Normal appearance  She is normal weight  She is not ill-appearing  HENT:      Head: Normocephalic  Nose: Nose normal       Mouth/Throat:      Mouth: Mucous membranes are moist    Eyes:      Conjunctiva/sclera: Conjunctivae normal    Cardiovascular:      Rate and Rhythm: Normal rate and regular rhythm  Pulses: Normal pulses  Heart sounds: Normal heart sounds  No murmur heard  No friction rub  No gallop  Pulmonary:      Effort: Pulmonary effort is normal       Breath sounds: Normal breath sounds  No wheezing, rhonchi or rales  Abdominal:      General: Abdomen is flat  Bowel sounds are normal  There is no distension  Palpations: Abdomen is soft  Tenderness: There is no abdominal tenderness  There is no right CVA tenderness, left CVA tenderness or guarding  Skin:     General: Skin is warm  Capillary Refill: Capillary refill takes less than 2 seconds  Neurological:      Mental Status: She is alert     Psychiatric:         Mood and Affect: Mood normal          Behavior: Behavior normal

## 2023-05-03 LAB — BACTERIA UR CULT: NORMAL

## 2023-05-04 ENCOUNTER — TELEPHONE (OUTPATIENT)
Dept: OBGYN CLINIC | Facility: CLINIC | Age: 63
End: 2023-05-04

## 2023-05-04 DIAGNOSIS — N39.0 URINARY TRACT INFECTION WITHOUT HEMATURIA, SITE UNSPECIFIED: Primary | ICD-10-CM

## 2023-05-04 NOTE — TELEPHONE ENCOUNTER
Regarding: Urgent care follow-up question  Contact: [phone]  Just to clarify - you want her to be retested once the medication is completed? If so can I will need a script for her to be retested  If the symptoms are resolved do you still want the test completed again?

## 2023-05-21 ENCOUNTER — TELEPHONE (OUTPATIENT)
Dept: OTHER | Facility: OTHER | Age: 63
End: 2023-05-21

## 2023-05-21 ENCOUNTER — TELEPHONE (OUTPATIENT)
Dept: FAMILY MEDICINE CLINIC | Facility: CLINIC | Age: 63
End: 2023-05-21

## 2023-05-21 NOTE — TELEPHONE ENCOUNTER
[address] [place] [address], she works with [employer] and had reported that due to an accident Aidan Lafleur was not able to get her afternoon medications  She called to request written notification to [employer] so that patient can receive medications in the evening  She currently takes three medications includin  Calcium   2  Vitamin D   3  Pravastatin  Was able to confirm  Wrote a notification in communications for team to be reviewed in [place]

## 2023-05-21 NOTE — TELEPHONE ENCOUNTER
Augustina Jennings called stating that there was an accident with the Yanni Ojeda  They would like to speak with on call to see if it is ok if they give the patient her medication late  On call notified via TC

## 2023-05-21 NOTE — LETTER
May 21, 2023     [facility]    To whom this may concern,     This is a written notification to [facility] that Ms  [address]Sefer[address] Benoit is able to receive late administration of the following medications:       1  Calcium 600 mg   2  Vitamin D 400 mg   3   Pravastatin 40 mg             Sincerely,        Jessica Childers MD

## 2023-05-21 NOTE — ADDENDUM NOTE
Addended by: Colton Thomason on: 5/21/2023 06:36 PM     Modules accepted: Level of Service, SmartSet

## 2023-06-01 DIAGNOSIS — I48.91 NEW ONSET ATRIAL FIBRILLATION (HCC): ICD-10-CM

## 2023-06-01 DIAGNOSIS — H61.23 BILATERAL IMPACTED CERUMEN: ICD-10-CM

## 2023-06-01 DIAGNOSIS — E03.9 HYPOTHYROIDISM, UNSPECIFIED TYPE: ICD-10-CM

## 2023-06-01 DIAGNOSIS — R60.9 EDEMA, UNSPECIFIED TYPE: ICD-10-CM

## 2023-06-01 DIAGNOSIS — L30.4 INTERTRIGO: ICD-10-CM

## 2023-06-01 RX ORDER — LEVOTHYROXINE SODIUM 0.1 MG/1
TABLET ORAL
Qty: 31 TABLET | Refills: 0 | Status: SHIPPED | OUTPATIENT
Start: 2023-06-01

## 2023-06-02 RX ORDER — NYSTATIN 100000 [USP'U]/G
POWDER TOPICAL
Qty: 60 G | Refills: 0 | Status: SHIPPED | OUTPATIENT
Start: 2023-06-02

## 2023-06-02 RX ORDER — CARBAMIDE PEROXIDE 65 MG/ML
LIQUID TOPICAL
Qty: 15 ML | Refills: 0 | Status: SHIPPED | OUTPATIENT
Start: 2023-06-02 | End: 2023-06-08 | Stop reason: ALTCHOICE

## 2023-06-05 RX ORDER — RIVAROXABAN 20 MG/1
TABLET, FILM COATED ORAL
Qty: 31 TABLET | Refills: 0 | Status: SHIPPED | OUTPATIENT
Start: 2023-06-05

## 2023-06-05 RX ORDER — FUROSEMIDE 20 MG/1
TABLET ORAL
Qty: 31 TABLET | Refills: 0 | Status: SHIPPED | OUTPATIENT
Start: 2023-06-05

## 2023-06-08 ENCOUNTER — OFFICE VISIT (OUTPATIENT)
Dept: CARDIOLOGY CLINIC | Facility: CLINIC | Age: 63
End: 2023-06-08
Payer: MEDICARE

## 2023-06-08 VITALS
HEART RATE: 99 BPM | WEIGHT: 195.6 LBS | DIASTOLIC BLOOD PRESSURE: 78 MMHG | BODY MASS INDEX: 36.93 KG/M2 | HEIGHT: 61 IN | SYSTOLIC BLOOD PRESSURE: 104 MMHG

## 2023-06-08 DIAGNOSIS — I48.91 ATRIAL FIBRILLATION, UNSPECIFIED TYPE (HCC): Primary | ICD-10-CM

## 2023-06-08 PROCEDURE — 99214 OFFICE O/P EST MOD 30 MIN: CPT | Performed by: INTERNAL MEDICINE

## 2023-06-08 PROCEDURE — 93000 ELECTROCARDIOGRAM COMPLETE: CPT | Performed by: INTERNAL MEDICINE

## 2023-06-08 NOTE — PROGRESS NOTES
HEART  [address] [address] [place] [facility]    Outpatient f/u  Today's Date: 06/08/23        Patient name: Fred Esparza  YOB: 1960  Sex: female         Chief Complaint: f/u for afib    ASSESSMENT:  Problem List Items Addressed This Visit        Cardiovascular and Mediastinum    Atrial fibrillation [facility]) - Primary    Relevant Orders    POCT ECG         59 yo female  1) Afib, unclear duration or type, found incidentally at office visit in Aug 2019 and sent in for MARCIE -DCCV but found to be back in afib persistetnly on Zio patch , she is on xarelto and diltiazem now  [id number]=3 female   She has developmental delay and lives in care facility, they check her BP/HR daily  2) Stress echo 2021, 3min on Marshal, Moderate MR/TR, normal EF,  Moderate LAE  Otherwise has d MR/TR normal EF on TTE  Moderate LAE    3) 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    4) Mild ankle edema, no pitting, on lasix daily now     5)CKD , last BMP Cr 0 9 was up to 1 4 before lasix  6) Atypical CP ER visit April 1 2023- neg trop/ekg/cxr  None since  PLAN:  1  Cont xarelto and diltiazem, will not pursue rhythm control since asymptomatic  Went over things to look for if afib not well controlled w care giver  2  Cont lasix 20mg daily            Follow up in: 12 months    Orders Placed This Encounter   Procedures   • POCT ECG     Medications Discontinued During This Encounter   Medication Reason   • fluticasone (FLONASE) 50 mcg/act nasal spray Therapy completed   • GNP Earwax Removal Drops 6 5 % otic solution Therapy completed             59 yo female  1) Afib, unclear duration or type, found incidentally at office visit in Aug 2019 and sent in for MARCIE -DCCV but found to be back in afib persistetnly on Zio patch , she is on xarelto and diltiazem now  [id number]=5 female    She has developmental delay and lives in care facility, they check her BP/HR daily  2) Stress echo 2021, 3min on Marshal, Moderate MR/TR, normal EF,  Moderate LAE  Otherwise has d MR/TR normal EF on TTE  Moderate LAE    3) 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    4) Mild ankle edema, no pitting, on lasix daily now     5)CKD , last BMP Cr 0 9 was up to 1 4 before lasix  6) Atypical CP ER visit April 1 2023- neg trop/ekg/cxr  None since  Doing well , she did have 2 months ago ER visit for CP but resolved, neg workup  No complaints today    Complete 12 point ROS reviewed and otherwise non pertinent or negative except as per HPI  Please see paper chart for outpatient clinic patients where the patient completed the 12 point ROS survey  Past Medical History:   Diagnosis Date   • Anxiety disorder    • Atrial fibrillation ([address] )        No Known Allergies  I reviewed the Home Medication list and Allergies in the chart  Scheduled Meds:  Current Outpatient Medications   Medication Sig Dispense Refill   • Calcium Carbonate-Vitamin D3 (Calcium + Vitamin D3) 600-400 MG-UNIT TABS Take 1 tablet by mouth 2 (two) times a day 180 tablet 3   • cholecalciferol (VITAMIN D3) 400 units tablet Take 1 tablet (400 Units total) by mouth 3 (three) times a day 360 tablet 3   • clotrimazole-betamethasone (LOTRISONE) 1-0 05 % cream Apply topically 2 (two) times a day as needed (rash) Apply Twice a day 3 hours after nystatin powder  90 g 2   • diltiazem (CARDIZEM CD) 120 mg 24 hr capsule Take 1 capsule (120 mg total) by mouth daily 90 capsule 3   • docusate sodium (COLACE) 100 mg capsule Take 1 capsule (100 mg total) by mouth 2 (two) times a day If needed constipation 30 capsule 0   • furosemide (LASIX) 20 mg tablet TAKE (1) TABLET BY MOUTH ONCE DAILY  31 tablet 0   • hydrocortisone-aloe 1 % cream APPLY TO AFFECTED AREA TWICE A DAY AS NEEDED FOR ITCH AND RASH   28 g 2   • Ibuprofen 200 MG CAPS Take 1-2 tablets if needed for pain or fever every 6-8 hours 120 each 0   • Incontinence Supply Disposable (Wings Choice Plus Adult Briefs) MISC USE AS DIRECTED FOR INCONTINENCE 90 each 4   • levothyroxine 100 mcg tablet TAKE (1) TABLET BY MOUTH ONCE DAILY  31 tablet 0   • loperamide (IMODIUM A-D) 2 mg capsule Take 1 capsule (2 mg total) by mouth 4 (four) times a day as needed for diarrhea 30 capsule 0   • neomycin-polymyxin b-bacitracin (NEOSPORIN) 3 5-400-5,000 APPLY TOPICALLY TO AFFECTED AREA FOUR TIMES A DAY AS NEEDED FOR WOUND CARE 28 4 g 2   • nystatin (MYCOSTATIN) powder APPLY TOPICALLY TO AFFECTED AREA TWICE DAILY 60 g 0   • Omega-3 Fatty Acids (fish oil) 1,000 mg Take 1 capsule (1,000 mg total) by mouth daily 90 capsule 3   • pravastatin (PRAVACHOL) 40 mg tablet Take 1 tablet (40 mg total) by mouth daily 90 tablet 3   • Xarelto 20 MG tablet TAKE (1) TABLET BY MOUTH ONCE DAILY WITH BREAKFAST  31 tablet 0   • acetaminophen (TYLENOL) 650 mg CR tablet TAKE 1 TABLET BY MOUTH EVERY 8 HOURS AS NEEDED FOR MILD PAIN OR FEVER (Patient not taking: Reported on 1/19/2023) 30 tablet 0   • benzonatate (TESSALON) 200 MG capsule Take 1 capsule (200 mg total) by mouth 3 (three) times a day as needed for cough for up to 20 doses (Patient not taking: Reported on 1/19/2023) 20 capsule 0     No current facility-administered medications for this visit       PRN Meds:         Family History   Problem Relation Age of Onset   • Other Mother         neglect or abandonment    • Other Father         neglect or abandonment    • No Known Problems Sister    • No Known Problems Maternal Grandmother    • No Known Problems Maternal Grandfather    • No Known Problems Paternal Grandmother    • No Known Problems Paternal Grandfather        Social History     Socioeconomic History   • Marital status: Single     Spouse name: Not on file   • Number of children: Not on file   • Years of education: Not on file   • Highest education level: Not on file   Occupational History   • Not on file "  Tobacco Use   • Smoking status: Never   • Smokeless tobacco: Never   Vaping Use   • Vaping Use: Never used   Substance and Sexual Activity   • Alcohol use: Never   • Drug use: Never   • Sexual activity: Never     Birth control/protection: Female Sterilization   Other Topics Concern   • Not on file   Social History Narrative    Disabled    Social history reviewed unchanged      Social Determinants of Health     Financial Resource Strain: Low Risk  (7/5/2022)    Overall Financial Resource Strain (CARDIA)    • Difficulty of Paying Living Expenses: Not hard at all   Food Insecurity: No Food Insecurity (7/5/2022)    Hunger Vital Sign    • Worried About Running Out of Food in the Last Year: Never true    • Ran Out of Food in the Last Year: Never true   Transportation Needs: No Transportation Needs (7/5/2022)    PRAPARE - Transportation    • Lack of Transportation (Medical): No    • Lack of Transportation (Non-Medical): No   Physical Activity: Not on file   Stress: Not on file   Social Connections: Not on file   Intimate Partner Violence: Not on file   Housing Stability: Not on file         OBJECTIVE:    /78 (BP Location: Left arm, Patient Position: Sitting, Cuff Size: Large)   Pulse 99   Ht 5' 1\" (1 549 m)   Wt 88 7 kg (195 lb 9 6 oz)   BMI 36 96 kg/m²   Vitals:    06/08/23 1331   Weight: 88 7 kg (195 lb 9 6 oz)       /78 (BP Location: Left arm, Patient Position: Sitting, Cuff Size: Large)   Pulse 99   Ht 5' 1\" (1 549 m)   Wt 88 7 kg (195 lb 9 6 oz)   BMI 36 96 kg/m²   Vitals:    06/08/23 1331   Weight: 88 7 kg (195 lb 9 6 oz)     GEN: No acute distress, Alert and oriented, well appearing  HEENT:External ears normal, wearing a mask     EYES: Pupils equal, sclera anicteric, midline, normal conjuctiva  NECK: No JVD, supple, no obvious masses or thryomegaly or goiter  CARDIOVASCULAR: irreg irreg, No murmur, rub, gallops S1,S2  LUNGS: Clear To auscultation bilaterally, normal effort, no rales, rhonchi, " "crackles   ABDOMEN:  nondistended,  without obvious organomegaly or ascites  EXTREMITIES/VASCULAR: 1+ darrel edema  warm an well perfused  PSYCH: Normal Affect,  linear speech pattern without evidence of psychosis  NEURO: Grossly intact, moving all extremiteis equal, face symmetric, alert and responsive, no obvious focal defecits   GAIT:  Ambulates normally without difficulty  HEME: No bleeding, bruising, petechia, purpura   SKIN: No significant rashes on visibile skin, warm, no diaphoresis or pallor  Lab Results:       LABS:      Chemistry        Component Value Date/Time    BUN 19 2023 1505     2023 1505    CO2 29 2023 1505    CREATININE 0 84 2023 1505    K 3 7 2023 1505        Component Value Date/Time    ALKPHOS 122 (H) 2023 0805    ALT 18 2023 0805    AST 15 2023 0805    CALCIUM 9 5 2023 1505            No results found for: \"CHOL\"  Lab Results   Component Value Date    HDL 53 2023    HDL 47 06/10/2021    HDL 47 2020     Lab Results   Component Value Date    LDLCALC 84 2023    LDLCALC 64 06/10/2021    LDLCALC 69 2020     Lab Results   Component Value Date    TRIG 113 2023    TRIG 109 06/10/2021    TRIG 134 2020     No results found for: \"CHOLHDL\"    IMAGING: No results found  Cardiac testing:   Results for orders placed during the hospital encounter of 18   Echo complete with contrast if indicated    Narrative [facility]  [address] [facility], [address] [facility]  [phone]    Transthoracic Echocardiogram  2D, M-mode, Doppler, and Color Doppler    Study date:  2018    Patient: Marco A Sheridan  MR number: [id number]  Account number: [de-identified]  : 1960  Age: 62 years  Gender: Female  Status: Outpatient  Location: [facility]  Height: 61 in  Weight: 194 lb  BP: 102/ 70 mmHg    Indications: Abnormal EKG      Diagnoses: R94 31 - Abnormal " electrocardiogram [ECG] [EKG]    Sonographer:  Matt Cleary RDCS  Primary Physician:  LALA Covington  Referring Physician:  Alan Hernandez MD  Group:  [facility]  Interpreting Physician:  Alan Hernandez MD    SUMMARY    LEFT VENTRICLE:  Size was at the upper limits of normal   Systolic function was normal  Ejection fraction was estimated to be 55 %  There were no regional wall motion abnormalities  Features were consistent with a pseudonormal left ventricular filling pattern, with concomitant abnormal relaxation and increased filling pressure (grade 2 diastolic dysfunction)  LEFT ATRIUM:  The atrium was moderately dilated  MITRAL VALVE:  There was mild regurgitation  TRICUSPID VALVE:  There was mild regurgitation  HISTORY: PRIOR HISTORY: Hyperlipidemia, Dyspnea on exertion, Mental disability  PROCEDURE: The study was performed in the [address] [place] [address]  This was a routine study  The transthoracic approach was used  The study included complete 2D imaging, M-mode, complete spectral Doppler, and color Doppler  The  heart rate was 63 bpm, at the start of the study  Images were obtained from the parasternal, apical, subcostal, and suprasternal notch acoustic windows  Image quality was adequate  LEFT VENTRICLE: Size was at the upper limits of normal  Systolic function was normal  Ejection fraction was estimated to be 55 %  There were no regional wall motion abnormalities  Wall thickness was normal  No evidence of apical thrombus  DOPPLER: Features were consistent with a pseudonormal left ventricular filling pattern, with concomitant abnormal relaxation and increased filling pressure (grade 2 diastolic dysfunction)  RIGHT VENTRICLE: The size was normal  Systolic function was normal  Wall thickness was normal     LEFT ATRIUM: The atrium was moderately dilated      RIGHT ATRIUM: Size was normal     MITRAL VALVE: Valve structure was normal  There was normal leaflet separation  DOPPLER: The transmitral velocity was within the normal range  There was no evidence for stenosis  There was mild regurgitation  AORTIC VALVE: The valve was trileaflet  Leaflets exhibited normal thickness and normal cuspal separation  DOPPLER: Transaortic velocity was within the normal range  There was no evidence for stenosis  There was no significant  regurgitation  TRICUSPID VALVE: The valve structure was normal  There was normal leaflet separation  DOPPLER: The transtricuspid velocity was within the normal range  There was no evidence for stenosis  There was mild regurgitation  Estimated peak PA  pressure was 30 mmHg  PULMONIC VALVE: Not well visualized  DOPPLER: The transpulmonic velocity was within the normal range  There was no significant regurgitation  PERICARDIUM: There was no pericardial effusion  The pericardium was normal in appearance  AORTA: The root exhibited normal size  SYSTEMIC VEINS: IVC: The inferior vena cava was normal in size      SYSTEM MEASUREMENT TABLES    2D  %FS: 31 2 %  Ao Diam: 2 9 cm  EDV(Teich): 161 8 ml  EF(Teich): 58 3 %  ESV(Teich): 67 5 ml  IVSd: 1 cm  LA Area: 25 7 cm2  LA Diam: 5 3 cm  LVEDV MOD A4C: 62 6 ml  LVEF MOD A4C: 57 8 %  LVESV MOD A4C: 26 4 ml  LVIDd: 5 7 cm  LVIDs: 3 9 cm  LVLd A4C: 6 2 cm  LVLs A4C: 5 5 cm  LVPWd: 1 cm  RA Area: 12 4 cm2  RVIDd: 3 1 cm  SV MOD A4C: 36 2 ml  SV(Teich): 94 4 ml    CW  TR Vmax: 2 6 m/s  TR maxP 5 mmHg    MM  TAPSE: 2 3 cm    PW  E': 0 1 m/s  E/E': 11 8  MV A Adrian: 0 2 m/s  MV Dec Sutter: 5 9 m/s2  MV DecT: 141 1 ms  MV E Adrian: 0 8 m/s  MV E/A Ratio: 3 8  MV PHT: 40 9 ms  MVA By PHT: 5 4 cm2    [employer] Accredited Echocardiography Laboratory    Prepared and electronically signed by    Sachi Chambers MD  Signed [id number] 08:58:33       Results for orders placed during the hospital encounter of 19   MARCIE    Narrative [address] [place] [address] Jorge [place], [place] [address]  [phone]    Transesophageal Echocardiogram  2D, M-mode, Doppler, and Color Doppler    Study date:  23-Aug-2019    Patient: Bruce Dockery  MR number: [id number]  Account number: [de-identified]  : 1960  Age: 62 years  Gender: Female  Status: Inpatient  Location: Cath lab  Height: 61 in  Weight: 196 7 lb  BP: 141/ 78 mmHg    Indications: Atrial fibrillation  Diagnoses: I48 0 - Atrial fibrillation    Sonographer:  Ned Lobo RDCS  Interpreting Physician:  Clarissa Olivarez MD  Primary Physician:  Edie Felix PA-C  Referring Physician:  Familia Dukes MD  Group:  [facility]  RN:  Yovani Ahn RN BSN    IMPRESSIONS:  No evidence of left atrial or left atrial appendage thrombus on transesophageal echocardiogram   Other echocardiographic findings as noted below  SUMMARY    LEFT VENTRICLE:  Normal left ventricular cavity size mild concentric left ventricular hypertrophy, normal left ventricular systolic function  Regional wall motion and ejection fraction could not be reliably assessed due to rapid ventricular rates  RIGHT VENTRICLE:  Normal right ventricular size and visually normal right ventricular systolic function  LEFT ATRIUM:  Moderate left atrial cavity enlargement  No spontaneous contrast, mass or thrombus noted in left atrial cavity  LEFT ATRIAL APPENDAGE:  Enlarged single lobe left atrial appendage with moderately reduced flow velocities  No masses, vegetations or thrombus identified in left atrial appendage cavity  ATRIAL SEPTUM:  Intact interatrial septum  No color flow Doppler evidence of interatrial shunts  RIGHT ATRIUM:  Mild right atrial cavity enlargement  MITRAL VALVE:  Mild mitral valve leaflet thickening, adequate leaflet mobility  Mild mitral valve regurgitation noted  AORTIC VALVE:  Tricuspid aortic valve with mild sclerosis  Adequate cuspal separation   No aortic valve stenosis or regurgitation noted     TRICUSPID VALVE:  Mild tricuspid valve leaflet thickening  Mild tricuspid valve regurgitation  PULMONIC VALVE:  Unremarkable pulmonic valve leaflet morphology  No pulmonic valve stenosis or regurgitation noted  AORTA:  Aortic root and proximal ascending aorta are normal in size on 2D imaging  There are mild atherosclerotic changes noted in visualized portions of ascending and descending aorta  No mobile plaques noted  PERICARDIUM:  No pericardial effusion  HISTORY: PRIOR HISTORY: Long QT interval, hypothyroidism, hyperlipidemia, atrial fibrillation, mental retardation, left atrial enlargement, obesity, anxiety  PROCEDURE: The procedure was performed in the catheterization laboratory  The risks and alternatives of the procedure were explained to the patient's next of kin and informed consent was obtained  The transesophageal approach was used  The  study included complete 2D imaging, M-mode, complete spectral Doppler, and color Doppler  The heart rate was 156 bpm, at the start of the study  An adult omniplane probe was inserted by the attending cardiologist  Intubated with ease  One  intubation attempt(s)  There was no blood detected on the probe  Image quality was adequate  There were no complications during the procedure  MEDICATIONS: Anesthesia administered by anesthesia team     LEFT VENTRICLE: Normal left ventricular cavity size mild concentric left ventricular hypertrophy, normal left ventricular systolic function  Regional wall motion and ejection fraction could not be reliably assessed due to rapid ventricular  rates  RIGHT VENTRICLE: Normal right ventricular size and visually normal right ventricular systolic function  LEFT ATRIUM: Moderate left atrial cavity enlargement  No spontaneous contrast, mass or thrombus noted in left atrial cavity  APPENDAGE: Enlarged single lobe left atrial appendage with moderately reduced flow velocities   No masses,  vegetations or thrombus identified in left atrial appendage cavity  ATRIAL SEPTUM: Intact interatrial septum  No color flow Doppler evidence of interatrial shunts  RIGHT ATRIUM: Mild right atrial cavity enlargement  MITRAL VALVE: Mild mitral valve leaflet thickening, adequate leaflet mobility  Mild mitral valve regurgitation noted  AORTIC VALVE: Tricuspid aortic valve with mild sclerosis  Adequate cuspal separation  No aortic valve stenosis or regurgitation noted  TRICUSPID VALVE: Mild tricuspid valve leaflet thickening  Mild tricuspid valve regurgitation  PULMONIC VALVE: Unremarkable pulmonic valve leaflet morphology  No pulmonic valve stenosis or regurgitation noted  PERICARDIUM: No pericardial effusion  AORTA: Aortic root and proximal ascending aorta are normal in size on 2D imaging  There are mild atherosclerotic changes noted in visualized portions of ascending and descending aorta  No mobile plaques noted  SYSTEM MEASUREMENT TABLES    CW  TR Vmax: 2 1 m/s  TR maxP 7 mmHg    [employer] [facility] in the [place] Commission Accredited Echocardiography Laboratory    Prepared and electronically signed by    Daxa Gordon MD  Signed 23-Aug-2019 19:27:14       No results found for this or any previous visit  No results found for this or any previous visit          I reviewed and interpreted the following LABS/EKG/TELE/IMAGING and below is summary of my interpretation (if data available):      Current EKG and Rhythm Strip:afib rate controlled 76bpm    Reviewed echo images, moderate MR/TR and moderate LAE, normal EF

## 2023-06-21 ENCOUNTER — CLINICAL SUPPORT (OUTPATIENT)
Dept: FAMILY MEDICINE CLINIC | Facility: CLINIC | Age: 63
End: 2023-06-21

## 2023-06-21 DIAGNOSIS — Z23 ENCOUNTER FOR IMMUNIZATION: Primary | ICD-10-CM

## 2023-06-21 PROCEDURE — 86580 TB INTRADERMAL TEST: CPT

## 2023-06-23 ENCOUNTER — CLINICAL SUPPORT (OUTPATIENT)
Dept: FAMILY MEDICINE CLINIC | Facility: CLINIC | Age: 63
End: 2023-06-23

## 2023-06-23 DIAGNOSIS — Z11.1 ENCOUNTER FOR PPD SKIN TEST READING: Primary | ICD-10-CM

## 2023-06-23 LAB
INDURATION: 0 MM
TB SKIN TEST: NEGATIVE

## 2023-07-05 DIAGNOSIS — E55.9 VITAMIN D DEFICIENCY: ICD-10-CM

## 2023-07-05 DIAGNOSIS — E03.9 HYPOTHYROIDISM, UNSPECIFIED TYPE: ICD-10-CM

## 2023-07-05 DIAGNOSIS — E78.5 HYPERLIPIDEMIA, UNSPECIFIED HYPERLIPIDEMIA TYPE: ICD-10-CM

## 2023-07-05 DIAGNOSIS — I48.91 NEW ONSET ATRIAL FIBRILLATION (HCC): ICD-10-CM

## 2023-07-05 DIAGNOSIS — R60.9 EDEMA, UNSPECIFIED TYPE: ICD-10-CM

## 2023-07-05 RX ORDER — OMEGA-3S/DHA/EPA/FISH OIL/D3 300MG-1000
CAPSULE ORAL
Qty: 93 TABLET | Refills: 0 | Status: SHIPPED | OUTPATIENT
Start: 2023-07-05

## 2023-07-05 RX ORDER — CALCIUM CARBONATE/VITAMIN D3 600 MG-10
TABLET ORAL
Qty: 62 TABLET | Refills: 0 | Status: SHIPPED | OUTPATIENT
Start: 2023-07-05

## 2023-07-05 RX ORDER — RIVAROXABAN 20 MG/1
TABLET, FILM COATED ORAL
Qty: 31 TABLET | Refills: 0 | Status: SHIPPED | OUTPATIENT
Start: 2023-07-05

## 2023-07-05 RX ORDER — FUROSEMIDE 20 MG/1
TABLET ORAL
Qty: 31 TABLET | Refills: 0 | Status: SHIPPED | OUTPATIENT
Start: 2023-07-05

## 2023-07-05 RX ORDER — DILTIAZEM HYDROCHLORIDE 120 MG/1
CAPSULE, COATED, EXTENDED RELEASE ORAL
Qty: 31 CAPSULE | Refills: 0 | Status: SHIPPED | OUTPATIENT
Start: 2023-07-05

## 2023-07-05 RX ORDER — LEVOTHYROXINE SODIUM 0.1 MG/1
TABLET ORAL
Qty: 31 TABLET | Refills: 0 | Status: SHIPPED | OUTPATIENT
Start: 2023-07-05

## 2023-07-05 RX ORDER — PRAVASTATIN SODIUM 40 MG
TABLET ORAL
Qty: 31 TABLET | Refills: 0 | Status: SHIPPED | OUTPATIENT
Start: 2023-07-05

## 2023-07-06 ENCOUNTER — OFFICE VISIT (OUTPATIENT)
Dept: FAMILY MEDICINE CLINIC | Facility: CLINIC | Age: 63
End: 2023-07-06

## 2023-07-06 VITALS
HEIGHT: 61 IN | DIASTOLIC BLOOD PRESSURE: 80 MMHG | TEMPERATURE: 97.1 F | OXYGEN SATURATION: 96 % | BODY MASS INDEX: 36.82 KG/M2 | SYSTOLIC BLOOD PRESSURE: 124 MMHG | RESPIRATION RATE: 16 BRPM | HEART RATE: 97 BPM | WEIGHT: 195 LBS

## 2023-07-06 DIAGNOSIS — E78.5 HYPERLIPIDEMIA, UNSPECIFIED HYPERLIPIDEMIA TYPE: ICD-10-CM

## 2023-07-06 DIAGNOSIS — I48.20 CHRONIC ATRIAL FIBRILLATION (HCC): ICD-10-CM

## 2023-07-06 DIAGNOSIS — E66.01 CLASS 2 SEVERE OBESITY DUE TO EXCESS CALORIES WITH SERIOUS COMORBIDITY AND BODY MASS INDEX (BMI) OF 37.0 TO 37.9 IN ADULT (HCC): ICD-10-CM

## 2023-07-06 DIAGNOSIS — Z00.00 ENCOUNTER FOR ANNUAL WELLNESS VISIT (AWV) IN MEDICARE PATIENT: Primary | ICD-10-CM

## 2023-07-06 DIAGNOSIS — E03.2 HYPOTHYROIDISM DUE TO MEDICATION: ICD-10-CM

## 2023-07-06 DIAGNOSIS — F79 MENTAL DISABILITY: ICD-10-CM

## 2023-07-06 DIAGNOSIS — Z00.00 MEDICARE ANNUAL WELLNESS VISIT, SUBSEQUENT: ICD-10-CM

## 2023-07-06 DIAGNOSIS — D69.6 PLATELETS DECREASED (HCC): ICD-10-CM

## 2023-07-06 DIAGNOSIS — H61.23 BILATERAL IMPACTED CERUMEN: ICD-10-CM

## 2023-07-06 DIAGNOSIS — R32 URINARY INCONTINENCE, UNSPECIFIED TYPE: ICD-10-CM

## 2023-07-06 DIAGNOSIS — E55.9 VITAMIN D DEFICIENCY: ICD-10-CM

## 2023-07-06 DIAGNOSIS — G25.70 MEDICATION-INDUCED MOVEMENT DISORDER: ICD-10-CM

## 2023-07-06 PROBLEM — Z01.419 ENCOUNTER FOR GYNECOLOGICAL EXAMINATION (GENERAL) (ROUTINE) WITHOUT ABNORMAL FINDINGS: Status: RESOLVED | Noted: 2021-11-08 | Resolved: 2023-07-06

## 2023-07-06 PROCEDURE — G0439 PPPS, SUBSEQ VISIT: HCPCS | Performed by: NURSE PRACTITIONER

## 2023-07-06 PROCEDURE — 99214 OFFICE O/P EST MOD 30 MIN: CPT | Performed by: NURSE PRACTITIONER

## 2023-07-06 RX ORDER — DIAPER,BRIEF,ADULT, DISPOSABLE
EACH MISCELLANEOUS
Qty: 90 EACH | Refills: 4 | Status: SHIPPED | OUTPATIENT
Start: 2023-07-06 | End: 2023-07-07

## 2023-07-06 NOTE — ASSESSMENT & PLAN NOTE
Lab Results   Component Value Date    [id number] 2.340 02/20/2023     Continue levothyroxine 100 mcg daily

## 2023-07-06 NOTE — PATIENT INSTRUCTIONS
Medicare Preventive Visit Patient Instructions  Thank you for completing your Welcome to Medicare Visit or Medicare Annual Wellness Visit today. Your next wellness visit will be due in one year (7/6/2024). The screening/preventive services that you may require over the next 5-10 years are detailed below. Some tests may not apply to you based off risk factors and/or age. Screening tests ordered at today's visit but not completed yet may show as past due. Also, please note that scanned in results may not display below. Preventive Screenings:  Service Recommendations Previous Testing/Comments   Colorectal Cancer Screening  * Colonoscopy    * Fecal Occult Blood Test (FOBT)/Fecal Immunochemical Test (FIT)  * Fecal DNA/Cologuard Test  * Flexible Sigmoidoscopy Age: 43-73 years old   Colonoscopy: every 10 years (may be performed more frequently if at higher risk)  OR  FOBT/FIT: every 1 year  OR  Cologuard: every 3 years  OR  Sigmoidoscopy: every 5 years  Screening may be recommended earlier than age 39 if at higher risk for colorectal cancer. Also, an individualized decision between you and your healthcare provider will decide whether screening between the ages of 77-80 would be appropriate. Colonoscopy: 01/16/2016  FOBT/FIT: Not on file  Cologuard: Not on file  Sigmoidoscopy: Not on file    Screening Current     Breast Cancer Screening Age: 36 years old  Frequency: every 1-2 years  Not required if history of left and right mastectomy Mammogram: 07/18/2022    Screening Current   Cervical Cancer Screening Between the ages of 21-29, pap smear recommended once every 3 years. Between the ages of 32-69, can perform pap smear with HPV co-testing every 5 years.    Recommendations may differ for women with a history of total hysterectomy, cervical cancer, or abnormal pap smears in past. Pap Smear: 11/17/2022    Screening Current   Hepatitis C Screening Once for adults born between 1945 and 1965  More frequently in patients at high risk for Hepatitis C Hep C Antibody: 07/08/2019    Screening Current   Diabetes Screening 1-2 times per year if you're at risk for diabetes or have pre-diabetes Fasting glucose: 87 mg/dL (2/20/2023)  A1C: 5.4 % (2/20/2023)  Screening Current   Cholesterol Screening Once every 5 years if you don't have a lipid disorder. May order more often based on risk factors. Lipid panel: 02/20/2023    Screening Not Indicated  History Lipid Disorder     Other Preventive Screenings Covered by Medicare:  1. Abdominal Aortic Aneurysm (AAA) Screening: covered once if your at risk. You're considered to be at risk if you have a family history of AAA. 2. Lung Cancer Screening: covers low dose CT scan once per year if you meet all of the following conditions: (1) Age 48-67; (2) No signs or symptoms of lung cancer; (3) Current smoker or have quit smoking within the last 15 years; (4) You have a tobacco smoking history of at least 20 pack years (packs per day multiplied by number of years you smoked); (5) You get a written order from a healthcare provider. 3. Glaucoma Screening: covered annually if you're considered high risk: (1) You have diabetes OR (2) Family history of glaucoma OR (3)  aged 48 and older OR (3)  American aged 72 and older  3. Osteoporosis Screening: covered every 2 years if you meet one of the following conditions: (1) You're estrogen deficient and at risk for osteoporosis based off medical history and other findings; (2) Have a vertebral abnormality; (3) On glucocorticoid therapy for more than 3 months; (4) Have primary hyperparathyroidism; (5) On osteoporosis medications and need to assess response to drug therapy. · Last bone density test (DXA Scan): 07/25/2018. 5. HIV Screening: covered annually if you're between the age of 14-79. Also covered annually if you are younger than 13 and older than 72 with risk factors for HIV infection.  For pregnant patients, it is covered up to 3 times per pregnancy. Immunizations:  Immunization Recommendations   Influenza Vaccine Annual influenza vaccination during flu season is recommended for all persons aged >= 6 months who do not have contraindications   Pneumococcal Vaccine   * Pneumococcal conjugate vaccine = PCV13 (Prevnar 13), PCV15 (Vaxneuvance), PCV20 (Prevnar 20)  * Pneumococcal polysaccharide vaccine = PPSV23 (Pneumovax) Adults 20-63 years old: 1-3 doses may be recommended based on certain risk factors  Adults 72 years old: 1-2 doses may be recommended based off what pneumonia vaccine you previously received   Hepatitis B Vaccine 3 dose series if at intermediate or high risk (ex: diabetes, end stage renal disease, liver disease)   Tetanus (Td) Vaccine - COST NOT COVERED BY MEDICARE PART B Following completion of primary series, a booster dose should be given every 10 years to maintain immunity against tetanus. Td may also be given as tetanus wound prophylaxis. Tdap Vaccine - COST NOT COVERED BY MEDICARE PART B Recommended at least once for all adults. For pregnant patients, recommended with each pregnancy. Shingles Vaccine (Shingrix) - COST NOT COVERED BY MEDICARE PART B  2 shot series recommended in those aged 48 and above     Health Maintenance Due:      Topic Date Due   • Cervical Cancer Screening  Never done   • Breast Cancer Screening: Mammogram  07/18/2024   • Colorectal Cancer Screening  01/07/2026   • HIV Screening  Completed   • Hepatitis C Screening  Completed     Immunizations Due:      Topic Date Due   • Influenza Vaccine (1) 09/01/2023     Advance Directives   What are advance directives? Advance directives are legal documents that state your wishes and plans for medical care. These plans are made ahead of time in case you lose your ability to make decisions for yourself. Advance directives can apply to any medical decision, such as the treatments you want, and if you want to donate organs.    What are the types of advance directives? There are many types of advance directives, and each state has rules about how to use them. You may choose a combination of any of the following:  · Living will: This is a written record of the treatment you want. You can also choose which treatments you do not want, which to limit, and which to stop at a certain time. This includes surgery, medicine, IV fluid, and tube feedings. · Durable power of  for healthcare [facility]): This is a written record that states who you want to make healthcare choices for you when you are unable to make them for yourself. This person, called a proxy, is usually a family member or a friend. You may choose more than 1 proxy. · Do not resuscitate (DNR) order:  A DNR order is used in case your heart stops beating or you stop breathing. It is a request not to have certain forms of treatment, such as CPR. A DNR order may be included in other types of advance directives. · Medical directive: This covers the care that you want if you are in a coma, near death, or unable to make decisions for yourself. You can list the treatments you want for each condition. Treatment may include pain medicine, surgery, blood transfusions, dialysis, IV or tube feedings, and a ventilator (breathing machine). · Values history: This document has questions about your views, beliefs, and how you feel and think about life. This information can help others choose the care that you would choose. Why are advance directives important? An advance directive helps you control your care. Although spoken wishes may be used, it is better to have your wishes written down. Spoken wishes can be misunderstood, or not followed. Treatments may be given even if you do not want them. An advance directive may make it easier for your family to make difficult choices about your care. Urinary Incontinence   Urinary incontinence (UI)  is when you lose control of your bladder.  UI develops because your bladder cannot store or empty urine properly. The 3 most common types of UI are stress incontinence, urge incontinence, or both. Medicines:   · May be given to help strengthen your bladder control. Report any side effects of medication to your healthcare provider. Do pelvic muscle exercises often:  Your pelvic muscles help you stop urinating. Squeeze these muscles tight for 5 seconds, then relax for 5 seconds. Gradually work up to squeezing for 10 seconds. Do 3 sets of 15 repetitions a day, or as directed. This will help strengthen your pelvic muscles and improve bladder control. Train your bladder:  Go to the bathroom at set times, such as every 2 hours, even if you do not feel the urge to go. You can also try to hold your urine when you feel the urge to go. For example, hold your urine for 5 minutes when you feel the urge to go. As that becomes easier, hold your urine for 10 minutes. Self-care:   · Keep a UI record. Write down how often you leak urine and how much you leak. Make a note of what you were doing when you leaked urine. · Drink liquids as directed. You may need to limit the amount of liquid you drink to help control your urine leakage. Do not drink any liquid right before you go to bed. Limit or do not have drinks that contain caffeine or alcohol. · Prevent constipation. Eat a variety of high-fiber foods. Good examples are high-fiber cereals, beans, vegetables, and whole-grain breads. Walking is the best way to trigger your intestines to have a bowel movement. · Exercise regularly and maintain a healthy weight. Weight loss and exercise will decrease pressure on your bladder and help you control your leakage. · Use a catheter as directed  to help empty your bladder. A catheter is a tiny, plastic tube that is put into your bladder to drain your urine. · Go to behavior therapy as directed. Behavior therapy may be used to help you learn to control your urge to urinate.     Weight Management Why it is important to manage your weight:  Being overweight increases your risk of health conditions such as heart disease, high blood pressure, type 2 diabetes, and certain types of cancer. It can also increase your risk for osteoarthritis, sleep apnea, and other respiratory problems. Aim for a slow, steady weight loss. Even a small amount of weight loss can lower your risk of health problems. How to lose weight safely:  A safe and healthy way to lose weight is to eat fewer calories and get regular exercise. You can lose up about 1 pound a week by decreasing the number of calories you eat by 500 calories each day. Healthy meal plan for weight management:  A healthy meal plan includes a variety of foods, contains fewer calories, and helps you stay healthy. A healthy meal plan includes the following:  · Eat whole-grain foods more often. A healthy meal plan should contain fiber. Fiber is the part of grains, fruits, and vegetables that is not broken down by your body. Whole-grain foods are healthy and provide extra fiber in your diet. Some examples of whole-grain foods are whole-wheat breads and pastas, oatmeal, brown rice, and bulgur. · Eat a variety of vegetables every day. Include dark, leafy greens such as spinach, kale, yoandy greens, and mustard greens. Eat yellow and orange vegetables such as carrots, sweet potatoes, and winter squash. · Eat a variety of fruits every day. Choose fresh or canned fruit (canned in its own juice or light syrup) instead of juice. Fruit juice has very little or no fiber. · Eat low-fat dairy foods. Drink fat-free (skim) milk or 1% milk. Eat fat-free yogurt and low-fat cottage cheese. Try low-fat cheeses such as mozzarella and other reduced-fat cheeses. · Choose meat and other protein foods that are low in fat. Choose beans or other legumes such as split peas or lentils. Choose fish, skinless poultry (chicken or turkey), or lean cuts of red meat (beef or pork).  Before you cook meat or poultry, cut off any visible fat. · Use less fat and oil. Try baking foods instead of frying them. Add less fat, such as margarine, sour cream, regular salad dressing and mayonnaise to foods. Eat fewer high-fat foods. Some examples of high-fat foods include french fries, doughnuts, ice cream, and cakes. · Eat fewer sweets. Limit foods and drinks that are high in sugar. This includes candy, cookies, regular soda, and sweetened drinks. Exercise:  Exercise at least 30 minutes per day on most days of the week. Some examples of exercise include walking, biking, dancing, and swimming. You can also fit in more physical activity by taking the stairs instead of the elevator or parking farther away from stores. Ask your healthcare provider about the best exercise plan for you. © Copyright [employer] 2018 Information is for End User's use only and may not be sold, redistributed or otherwise used for commercial purposes.  All illustrations and images included in CareNotes® are the copyrighted property of [employer] or [address] [place] [address]

## 2023-07-06 NOTE — ASSESSMENT & PLAN NOTE
Lab Results   Component Value Date    WBC 7.57 04/01/2023    HGB 14.4 04/01/2023    HCT 45.0 04/01/2023    MCV 91 04/01/2023     04/01/2023     Stable, continue to monitor

## 2023-07-06 NOTE — PROGRESS NOTES
Assessment and Plan:     Problem List Items Addressed This Visit        Endocrine    Hypothyroidism     Lab Results   Component Value Date    [id number] 2.340 02/20/2023     Continue levothyroxine 100 mcg daily             Cardiovascular and Mediastinum    Atrial fibrillation ([address])     Follows with cardiology   Rate controlled   Continue with Xaralto and Cardizem             Nervous and Auditory    Medication-induced movement disorder     2/2 previous antipsychotic use   No longer using          Bilateral impacted cerumen     No impaction on exam today, she goes to ENT for cleanings every 6 months             Other    Hyperlipidemia     Continue pravastatin daily          Vitamin D deficiency     Continue calcium/ vitamin D supplement          Mental disability     Lives in a group home, doing well          Class 2 severe obesity due to excess calories with serious comorbidity and body mass index (BMI) of 37.0 to 37.9 in [facility])     -Encouraged diet and lifestyle changes: decrease processed foods (cakes, cookies, chips, soda), decrease total carbohydrate intake, decrease fried/fatty foods, increase fruits and vegetables, increase lean proteins (chicken, turkey), increase healthy fats (avocado, fish, nuts), drink plenty of water (at least four 16 oz bottles per day)           Platelets decreased ([address]) - Primary     Lab Results   Component Value Date    WBC 7.57 04/01/2023    HGB 14.4 04/01/2023    HCT 45.0 04/01/2023    MCV 91 04/01/2023     04/01/2023     Stable, continue to monitor         Other Visit Diagnoses     Urinary incontinence, unspecified type        Relevant Medications    Incontinence Supply Disposable (Wings Choice Plus Adult Briefs) MISC    Medicare annual wellness visit, subsequent            BMI Counseling: Body mass index is 36.84 kg/m².  The BMI is above normal. Nutrition recommendations include decreasing portion sizes, encouraging healthy choices of fruits and vegetables, decreasing fast food intake, consuming healthier snacks and limiting drinks that contain sugar. Rationale for BMI follow-up plan is due to patient being overweight or obese. Depression Screening and Follow-up Plan: Patient was screened for depression during today's encounter. They screened negative with a PHQ-2 score of 0. Preventive health issues were discussed with patient, and age appropriate screening tests were ordered as noted in patient's After Visit Summary. Personalized health advice and appropriate referrals for health education or preventive services given if needed, as noted in patient's After Visit Summary. History of Present Illness:     Patient presents for a Medicare Wellness Visit. She is accompanied by group home staff. There are currently no concerns or issues. Patient Care Team:  Juanito Ramos as PCP - General (Family Medicine)  MD Ho Coker PA-C Arlis Elam, CRNP     Review of Systems:     Review of Systems   Constitutional: Negative for chills and fever. HENT: Negative for ear pain and sore throat. Eyes: Negative for pain and visual disturbance. Respiratory: Negative for cough and shortness of breath. Cardiovascular: Negative for chest pain and palpitations. Gastrointestinal: Negative for abdominal pain and vomiting. Genitourinary: Negative for dysuria and hematuria. Musculoskeletal: Negative for arthralgias and back pain. Skin: Negative for color change and rash. Neurological: Negative for seizures and syncope. All other systems reviewed and are negative.        Problem List:     Patient Active Problem List   Diagnosis   • Enlarged LA (left atrium)   • H/O prolonged Q-T interval on ECG   • Hypothyroidism   • Hyperlipidemia   • Vitamin D deficiency   • Medication-induced movement disorder   • Mental disability   • Mood disorder (HCC)   • Class 2 severe obesity due to excess calories with serious comorbidity and body mass index (BMI) of 37.0 to 37.9 in adult [facility])   • Overflow incontinence of urine   • Severe anxiety   • Atrial fibrillation (HCC)   • Bilateral impacted cerumen   • Leg edema   • Head injury   • Intertrigo   • Platelets decreased (HCC)      Past Medical and Surgical History:     Past Medical History:   Diagnosis Date   • Anxiety disorder    • Atrial fibrillation [facility])      Past Surgical History:   Procedure Laterality Date   • HYSTERECTOMY      age 25   • OOPHORECTOMY     • [place] CARDIOVERSION ELECTIVE ARRHYTHMIA EXTERNAL  8/23/2019           Family History:     Family History   Problem Relation Age of Onset   • Other Mother         neglect or abandonment    • Other Father         neglect or abandonment    • No Known Problems Sister    • No Known Problems Maternal Grandmother    • No Known Problems Maternal Grandfather    • No Known Problems Paternal Grandmother    • No Known Problems Paternal Grandfather       Social History:     Social History     Socioeconomic History   • Marital status: Single     Spouse name: None   • Number of children: None   • Years of education: None   • Highest education level: None   Occupational History   • None   Tobacco Use   • Smoking status: Never   • Smokeless tobacco: Never   Vaping Use   • Vaping Use: Never used   Substance and Sexual Activity   • Alcohol use: Never   • Drug use: Never   • Sexual activity: Never     Birth control/protection: Female Sterilization   Other Topics Concern   • None   Social History Narrative    Disabled    Social history reviewed unchanged      Social Determinants of Health     Financial Resource Strain: Low Risk  (7/6/2023)    Overall Financial Resource Strain (CARDIA)    • Difficulty of Paying Living Expenses: Not hard at all   Food Insecurity: No Food Insecurity (7/6/2023)    Hunger Vital Sign    • Worried About Running Out of Food in the Last Year: Never true    • Ran Out of Food in the Last Year: Never true   Transportation Needs: No Transportation Needs (7/6/2023)    PRAPARE - Transportation    • Lack of Transportation (Medical): No    • Lack of Transportation (Non-Medical): No   Physical Activity: Not on file   Stress: Not on file   Social Connections: Not on file   Intimate Partner Violence: Not on file   Housing Stability: Not on file      Medications and Allergies:     Current Outpatient Medications   Medication Sig Dispense Refill   • Incontinence Supply Disposable (Wings Choice Plus Adult Briefs) MISC USE AS DIRECTED FOR INCONTINENCE 90 each 4   • acetaminophen (TYLENOL) 650 mg CR tablet TAKE 1 TABLET BY MOUTH EVERY 8 HOURS AS NEEDED FOR MILD PAIN OR FEVER (Patient not taking: Reported on 1/19/2023) 30 tablet 0   • Calcium + Vitamin D3 600-10 MG-MCG TABS TAKE 1 TABLET BY MOUTH TWICE DAILY. 62 tablet 0   • cholecalciferol (VITAMIN D3) 400 units tablet TAKE 1 TABLET BY MOUTH THREE TIMES DAILY. 93 tablet 0   • clotrimazole-betamethasone (LOTRISONE) 1-0.05 % cream Apply topically 2 (two) times a day as needed (rash) Apply Twice a day 3 hours after nystatin powder. 90 g 2   • diltiazem (CARDIZEM CD) 120 mg 24 hr capsule TAKE 1 CAPSULE BY MOUTH ONCE DAILY. 31 capsule 0   • docusate sodium (COLACE) 100 mg capsule Take 1 capsule (100 mg total) by mouth 2 (two) times a day If needed constipation 30 capsule 0   • furosemide (LASIX) 20 mg tablet TAKE (1) TABLET BY MOUTH ONCE DAILY. 31 tablet 0   • hydrocortisone-aloe 1 % cream APPLY TO AFFECTED AREA TWICE A DAY AS NEEDED FOR ITCH AND RASH. 28 g 2   • Ibuprofen 200 MG CAPS Take 1-2 tablets if needed for pain or fever every 6-8 hours 120 each 0   • levothyroxine 100 mcg tablet TAKE (1) TABLET BY MOUTH ONCE DAILY.  31 tablet 0   • loperamide (IMODIUM A-D) 2 mg capsule Take 1 capsule (2 mg total) by mouth 4 (four) times a day as needed for diarrhea 30 capsule 0   • neomycin-polymyxin b-bacitracin (NEOSPORIN) 3.5-400-5,000 APPLY TOPICALLY TO AFFECTED AREA FOUR TIMES A DAY AS NEEDED FOR WOUND CARE 28.4 g 2   • nystatin (MYCOSTATIN) powder APPLY TOPICALLY TO AFFECTED AREA TWICE DAILY 60 g 0   • Omega-3 Fatty Acids (fish oil) 1,000 mg Take 1 capsule (1,000 mg total) by mouth daily 90 capsule 3   • pravastatin (PRAVACHOL) 40 mg tablet TAKE (1) TABLET BY MOUTH ONCE DAILY. 31 tablet 0   • Xarelto 20 MG tablet TAKE (1) TABLET BY MOUTH ONCE DAILY WITH BREAKFAST. 31 tablet 0     No current facility-administered medications for this visit. No Known Allergies   Immunizations:     Immunization History   Administered Date(s) Administered   • COVID-19 MODERNA VACC 0.5 ML IM 02/05/2021, 03/08/2021, 12/23/2021   • COVID-19 Pfizer Vac BIVALENT Dudley-sucrose 12 Yr+ IM (BOOSTER ONLY) 01/05/2023, 01/05/2023   • INFLUENZA 10/31/2016, 10/13/2017, 10/25/2017, 10/10/2018, 10/26/2022   • Influenza, recombinant, quadrivalent,injectable, preservative free 11/15/2019, 10/14/2020, 10/06/2021, 10/26/2022   • Influenza, seasonal, injectable 11/14/2014, 10/25/2017   • Pneumococcal Conjugate Vaccine 20-valent (Pcv20), Polysace 01/05/2023, 01/05/2023   • Tdap 06/11/2013, 11/01/2017   • Tuberculin Skin Test-PPD Intradermal 06/11/2013, 06/12/2015, 06/26/2017, 06/24/2019, 06/21/2021, 06/21/2023      Health Maintenance:         Topic Date Due   • Cervical Cancer Screening  Never done   • Breast Cancer Screening: Mammogram  07/18/2024   • Colorectal Cancer Screening  01/07/2026   • HIV Screening  Completed   • Hepatitis C Screening  Completed         Topic Date Due   • Influenza Vaccine (1) 09/01/2023      Medicare Screening Tests and Risk Assessments:     Chaparrita Hatfield is here for her Subsequent Wellness visit. Last Medicare Wellness visit information reviewed, patient interviewed and updates made to the record today. Health Risk Assessment:   Patient rates overall health as very good. Patient feels that their physical health rating is same. Patient is satisfied with their life. Eyesight was rated as same. Hearing was rated as same.  Patient feels that their emotional and mental health rating is slightly better. Patients states they are never, rarely angry. Patient states they are never, rarely unusually tired/fatigued. Pain experienced in the last 7 days has been none. Patient states that she has experienced no weight loss or gain in last 6 months. Depression Screening:   PHQ-2 Score: 0      Fall Risk Screening: In the past year, patient has experienced: history of falling in past year    Number of falls: 1  Injured during fall?: No    Feels unsteady when standing or walking?: No    Worried about falling?: No      Urinary Incontinence Screening:   Patient has leaked urine accidently in the last six months. The urge to urinate will sometimes come on quickly    Home Safety:  Patient does not have trouble with stairs inside or outside of their home. Patient has working smoke alarms and has working carbon monoxide detector. Home safety hazards include: none. Nutrition:   Current diet is Regular. Medications:   Patient is not currently taking any over-the-counter supplements. Patient is not able to manage medications. Only take PRN's as needed    Activities of Daily Living (ADLs)/Instrumental Activities of Daily Living (IADLs):   Walk and transfer into and out of bed and chair?: Yes  Dress and groom yourself?: Yes    Bathe or shower yourself?: No    Feed yourself? Yes  Do your laundry/housekeeping?: No  Manage your money, pay your bills and track your expenses?: No  Make your own meals?: No    Do your own shopping?: No    ADL comments: She has assistance with most of the above    Durable Medical Equipment Suppliers  N/A    Previous Hospitalizations:   Any hospitalizations or ED visits within the last 12 months?: Yes    How many hospitalizations have you had in the last year?: 1-2    Advance Care Planning:   Living will: No    Durable POA for healthcare:  Yes    Advanced directive: No      Cognitive Screening:   Provider or family/friend/caregiver concerned regarding cognition?: No    PREVENTIVE SCREENINGS      Cardiovascular Screening:    General: Screening Not Indicated and History Lipid Disorder      Diabetes Screening:     General: Screening Current      Colorectal Cancer Screening:     General: Screening Current      Breast Cancer Screening:     General: Screening Current      Cervical Cancer Screening:    General: Screening Current      Osteoporosis Screening:      Due for: DXA Axial      Abdominal Aortic Aneurysm (AAA) Screening:        General: Screening Not Indicated      Lung Cancer Screening:     General: Screening Not Indicated      Hepatitis C Screening:    General: Screening Current    Screening, Brief Intervention, and Referral to Treatment (SBIRT)    Screening  Typical number of drinks in a day: 0  Typical number of drinks in a week: 0  Interpretation: Low risk drinking behavior. AUDIT-C Screenin) How often did you have a drink containing alcohol in the past year? never  2) How many drinks did you have on a typical day when you were drinking in the past year? 0  3) How often did you have 6 or more drinks on one occasion in the past year? never    AUDIT-C Score: 0  Interpretation: Score 0-2 (female): Negative screen for alcohol misuse    Single Item Drug Screening:  How often have you used an illegal drug (including marijuana) or a prescription medication for non-medical reasons in the past year? never    Single Item Drug Screen Score: 0  Interpretation: Negative screen for possible drug use disorder    No results found. Physical Exam:     /80 (BP Location: Left arm, Patient Position: Sitting, Cuff Size: Large)   Pulse 97   Temp (!) 97.1 °F (36.2 °C) (Temporal)   Resp 16   Ht 5' 1" (1.549 m)   Wt 88.5 kg (195 lb)   SpO2 96%   BMI 36.84 kg/m²     Physical Exam  Vitals and nursing note reviewed. Constitutional:       General: She is not in acute distress. Appearance: She is well-developed. She is obese.    HENT:      Head: Normocephalic and atraumatic. Right Ear: External ear normal. There is no impacted cerumen. Left Ear: External ear normal.   Eyes:      Conjunctiva/sclera: Conjunctivae normal.      Pupils: Pupils are equal, round, and reactive to light. Cardiovascular:      Rate and Rhythm: Normal rate and regular rhythm. Pulses: Normal pulses. Pulmonary:      Effort: Pulmonary effort is normal. No respiratory distress. Breath sounds: Normal breath sounds. No wheezing. Abdominal:      General: Bowel sounds are normal. There is no distension. Palpations: Abdomen is soft. Tenderness: There is no abdominal tenderness. Musculoskeletal:      Cervical back: Neck supple. Comments: Trace BLLE edema    Lymphadenopathy:      Cervical: No cervical adenopathy. Skin:     General: Skin is warm and dry. Capillary Refill: Capillary refill takes less than 2 seconds. Neurological:      Mental Status: She is alert. Mental status is at baseline. Motor: Tremor present.       Comments: +EPS   Psychiatric:         Behavior: Behavior normal.          Mick Kendrick

## 2023-07-07 DIAGNOSIS — R32 URINARY INCONTINENCE, UNSPECIFIED TYPE: ICD-10-CM

## 2023-07-07 DIAGNOSIS — R05.9 COUGH, UNSPECIFIED TYPE: Primary | ICD-10-CM

## 2023-07-07 RX ORDER — BENZONATATE 200 MG/1
200 CAPSULE ORAL 3 TIMES DAILY PRN
Qty: 20 CAPSULE | Refills: 0 | Status: SHIPPED | OUTPATIENT
Start: 2023-07-07

## 2023-07-07 RX ORDER — DIAPER,BRIEF,ADULT, DISPOSABLE
EACH MISCELLANEOUS
Qty: 90 EACH | Refills: 4 | Status: SHIPPED | OUTPATIENT
Start: 2023-07-07 | End: 2023-07-21 | Stop reason: SDUPTHER

## 2023-07-19 DIAGNOSIS — R32 URINARY INCONTINENCE, UNSPECIFIED TYPE: ICD-10-CM

## 2023-07-19 DIAGNOSIS — E78.5 HYPERLIPIDEMIA, UNSPECIFIED HYPERLIPIDEMIA TYPE: ICD-10-CM

## 2023-07-19 RX ORDER — CHLORAL HYDRATE 500 MG
1000 CAPSULE ORAL DAILY
Qty: 90 CAPSULE | Refills: 0 | Status: CANCELLED | OUTPATIENT
Start: 2023-07-19

## 2023-07-19 RX ORDER — DIAPER,BRIEF,ADULT, DISPOSABLE
EACH MISCELLANEOUS
Qty: 90 EACH | Refills: 0 | Status: CANCELLED | OUTPATIENT
Start: 2023-07-19

## 2023-07-21 DIAGNOSIS — E78.5 HYPERLIPIDEMIA, UNSPECIFIED HYPERLIPIDEMIA TYPE: ICD-10-CM

## 2023-07-21 DIAGNOSIS — R32 URINARY INCONTINENCE, UNSPECIFIED TYPE: ICD-10-CM

## 2023-07-21 RX ORDER — CHLORAL HYDRATE 500 MG
1000 CAPSULE ORAL DAILY
Qty: 90 CAPSULE | Refills: 3 | Status: SHIPPED | OUTPATIENT
Start: 2023-07-21

## 2023-07-21 RX ORDER — DIAPER,BRIEF,ADULT, DISPOSABLE
EACH MISCELLANEOUS
Qty: 90 EACH | Refills: 4 | Status: SHIPPED | OUTPATIENT
Start: 2023-07-21

## 2023-07-24 ENCOUNTER — HOSPITAL ENCOUNTER (OUTPATIENT)
Dept: MAMMOGRAPHY | Facility: CLINIC | Age: 63
Discharge: HOME | End: 2023-07-24
Payer: MEDICARE

## 2023-07-24 VITALS — BODY MASS INDEX: 36.82 KG/M2 | WEIGHT: 195 LBS | HEIGHT: 61 IN

## 2023-07-24 DIAGNOSIS — Z12.31 ENCOUNTER FOR SCREENING MAMMOGRAM FOR MALIGNANT NEOPLASM OF BREAST: ICD-10-CM

## 2023-07-24 PROCEDURE — 77063 BREAST TOMOSYNTHESIS BI: CPT

## 2023-07-24 PROCEDURE — 77067 SCR MAMMO BI INCL CAD: CPT

## 2023-08-03 DIAGNOSIS — E78.5 HYPERLIPIDEMIA, UNSPECIFIED HYPERLIPIDEMIA TYPE: ICD-10-CM

## 2023-08-03 DIAGNOSIS — I48.91 NEW ONSET ATRIAL FIBRILLATION (HCC): ICD-10-CM

## 2023-08-03 DIAGNOSIS — E03.9 HYPOTHYROIDISM, UNSPECIFIED TYPE: ICD-10-CM

## 2023-08-03 DIAGNOSIS — R60.9 EDEMA, UNSPECIFIED TYPE: ICD-10-CM

## 2023-08-03 DIAGNOSIS — E55.9 VITAMIN D DEFICIENCY: ICD-10-CM

## 2023-08-04 RX ORDER — DILTIAZEM HYDROCHLORIDE 120 MG/1
CAPSULE, COATED, EXTENDED RELEASE ORAL
Qty: 30 CAPSULE | Refills: 0 | Status: SHIPPED | OUTPATIENT
Start: 2023-08-04

## 2023-08-04 RX ORDER — RIVAROXABAN 20 MG/1
TABLET, FILM COATED ORAL
Qty: 30 TABLET | Refills: 0 | Status: SHIPPED | OUTPATIENT
Start: 2023-08-04

## 2023-08-04 RX ORDER — LEVOTHYROXINE SODIUM 0.1 MG/1
TABLET ORAL
Qty: 30 TABLET | Refills: 0 | Status: SHIPPED | OUTPATIENT
Start: 2023-08-04

## 2023-08-04 RX ORDER — CALCIUM CARBONATE/VITAMIN D3 600 MG-10
TABLET ORAL
Qty: 60 TABLET | Refills: 0 | Status: SHIPPED | OUTPATIENT
Start: 2023-08-04

## 2023-08-04 RX ORDER — PRAVASTATIN SODIUM 40 MG
TABLET ORAL
Qty: 30 TABLET | Refills: 0 | Status: SHIPPED | OUTPATIENT
Start: 2023-08-04

## 2023-08-04 RX ORDER — FUROSEMIDE 20 MG/1
TABLET ORAL
Qty: 30 TABLET | Refills: 0 | Status: SHIPPED | OUTPATIENT
Start: 2023-08-04

## 2023-08-04 RX ORDER — OMEGA-3S/DHA/EPA/FISH OIL/D3 300MG-1000
CAPSULE ORAL
Qty: 90 TABLET | Refills: 0 | Status: SHIPPED | OUTPATIENT
Start: 2023-08-04

## 2023-09-05 DIAGNOSIS — E03.9 HYPOTHYROIDISM, UNSPECIFIED TYPE: ICD-10-CM

## 2023-09-05 DIAGNOSIS — I48.91 NEW ONSET ATRIAL FIBRILLATION (HCC): ICD-10-CM

## 2023-09-05 DIAGNOSIS — E78.5 HYPERLIPIDEMIA, UNSPECIFIED HYPERLIPIDEMIA TYPE: ICD-10-CM

## 2023-09-05 DIAGNOSIS — R60.9 EDEMA, UNSPECIFIED TYPE: ICD-10-CM

## 2023-09-05 DIAGNOSIS — E55.9 VITAMIN D DEFICIENCY: ICD-10-CM

## 2023-09-06 RX ORDER — LEVOTHYROXINE SODIUM 0.1 MG/1
TABLET ORAL
Qty: 31 TABLET | Refills: 0 | Status: SHIPPED | OUTPATIENT
Start: 2023-09-06

## 2023-09-06 RX ORDER — DILTIAZEM HYDROCHLORIDE 120 MG/1
CAPSULE, COATED, EXTENDED RELEASE ORAL
Qty: 31 CAPSULE | Refills: 0 | Status: SHIPPED | OUTPATIENT
Start: 2023-09-06

## 2023-09-06 RX ORDER — OMEGA-3S/DHA/EPA/FISH OIL/D3 300MG-1000
CAPSULE ORAL
Qty: 93 TABLET | Refills: 0 | Status: SHIPPED | OUTPATIENT
Start: 2023-09-06

## 2023-09-06 RX ORDER — RIVAROXABAN 20 MG/1
TABLET, FILM COATED ORAL
Qty: 31 TABLET | Refills: 0 | Status: SHIPPED | OUTPATIENT
Start: 2023-09-06

## 2023-09-06 RX ORDER — FUROSEMIDE 20 MG/1
TABLET ORAL
Qty: 31 TABLET | Refills: 0 | Status: SHIPPED | OUTPATIENT
Start: 2023-09-06

## 2023-09-06 RX ORDER — PRAVASTATIN SODIUM 40 MG
TABLET ORAL
Qty: 31 TABLET | Refills: 0 | Status: SHIPPED | OUTPATIENT
Start: 2023-09-06

## 2023-09-06 RX ORDER — CALCIUM CARBONATE/VITAMIN D3 600 MG-10
TABLET ORAL
Qty: 62 TABLET | Refills: 0 | Status: SHIPPED | OUTPATIENT
Start: 2023-09-06

## 2023-09-20 ENCOUNTER — IMMUNIZATIONS (OUTPATIENT)
Dept: FAMILY MEDICINE CLINIC | Facility: CLINIC | Age: 63
End: 2023-09-20

## 2023-09-20 DIAGNOSIS — Z23 ENCOUNTER FOR IMMUNIZATION: Primary | ICD-10-CM

## 2023-09-20 PROCEDURE — 90686 IIV4 VACC NO PRSV 0.5 ML IM: CPT

## 2023-09-20 PROCEDURE — G0008 ADMIN INFLUENZA VIRUS VAC: HCPCS

## 2023-10-03 DIAGNOSIS — E55.9 VITAMIN D DEFICIENCY: ICD-10-CM

## 2023-10-03 DIAGNOSIS — E03.9 HYPOTHYROIDISM, UNSPECIFIED TYPE: ICD-10-CM

## 2023-10-03 DIAGNOSIS — I48.91 NEW ONSET ATRIAL FIBRILLATION (HCC): ICD-10-CM

## 2023-10-03 DIAGNOSIS — R60.9 EDEMA, UNSPECIFIED TYPE: ICD-10-CM

## 2023-10-03 DIAGNOSIS — E78.5 HYPERLIPIDEMIA, UNSPECIFIED HYPERLIPIDEMIA TYPE: ICD-10-CM

## 2023-10-03 RX ORDER — FUROSEMIDE 20 MG/1
TABLET ORAL
Qty: 30 TABLET | Refills: 5 | Status: SHIPPED | OUTPATIENT
Start: 2023-10-03

## 2023-10-03 RX ORDER — RIVAROXABAN 20 MG/1
TABLET, FILM COATED ORAL
Qty: 30 TABLET | Refills: 5 | Status: SHIPPED | OUTPATIENT
Start: 2023-10-03

## 2023-10-06 RX ORDER — DILTIAZEM HYDROCHLORIDE 120 MG/1
CAPSULE, COATED, EXTENDED RELEASE ORAL
Qty: 30 CAPSULE | Refills: 0 | Status: SHIPPED | OUTPATIENT
Start: 2023-10-06

## 2023-10-06 RX ORDER — PRAVASTATIN SODIUM 40 MG
TABLET ORAL
Qty: 30 TABLET | Refills: 0 | Status: SHIPPED | OUTPATIENT
Start: 2023-10-06

## 2023-10-06 RX ORDER — CALCIUM CARBONATE/VITAMIN D3 600 MG-10
TABLET ORAL
Qty: 60 TABLET | Refills: 0 | Status: SHIPPED | OUTPATIENT
Start: 2023-10-06

## 2023-10-06 RX ORDER — OMEGA-3S/DHA/EPA/FISH OIL/D3 300MG-1000
CAPSULE ORAL
Qty: 90 TABLET | Refills: 0 | Status: SHIPPED | OUTPATIENT
Start: 2023-10-06

## 2023-10-06 RX ORDER — LEVOTHYROXINE SODIUM 0.1 MG/1
TABLET ORAL
Qty: 30 TABLET | Refills: 0 | Status: SHIPPED | OUTPATIENT
Start: 2023-10-06

## 2023-10-10 DIAGNOSIS — Z71.89 COMPLEX CARE COORDINATION: Primary | ICD-10-CM

## 2023-10-12 ENCOUNTER — PATIENT OUTREACH (OUTPATIENT)
Dept: FAMILY MEDICINE CLINIC | Facility: CLINIC | Age: 63
End: 2023-10-12

## 2023-10-12 NOTE — PROGRESS NOTES
CCM.    Chart reviewed. Patient currently living in a group home, therefore ineligible for CCM program.  Closing case.

## 2023-11-01 DIAGNOSIS — I48.91 NEW ONSET ATRIAL FIBRILLATION (HCC): ICD-10-CM

## 2023-11-01 DIAGNOSIS — E03.9 HYPOTHYROIDISM, UNSPECIFIED TYPE: ICD-10-CM

## 2023-11-01 DIAGNOSIS — E78.5 HYPERLIPIDEMIA, UNSPECIFIED HYPERLIPIDEMIA TYPE: ICD-10-CM

## 2023-11-01 DIAGNOSIS — E55.9 VITAMIN D DEFICIENCY: ICD-10-CM

## 2023-11-01 RX ORDER — OMEGA-3S/DHA/EPA/FISH OIL/D3 300MG-1000
CAPSULE ORAL
Qty: 93 TABLET | Refills: 0 | Status: SHIPPED | OUTPATIENT
Start: 2023-11-01

## 2023-11-01 RX ORDER — PRAVASTATIN SODIUM 40 MG
TABLET ORAL
Qty: 31 TABLET | Refills: 0 | Status: SHIPPED | OUTPATIENT
Start: 2023-11-01

## 2023-11-01 RX ORDER — CALCIUM CARBONATE/VITAMIN D3 600 MG-10
TABLET ORAL
Qty: 62 TABLET | Refills: 0 | Status: SHIPPED | OUTPATIENT
Start: 2023-11-01

## 2023-11-01 RX ORDER — DILTIAZEM HYDROCHLORIDE 120 MG/1
CAPSULE, COATED, EXTENDED RELEASE ORAL
Qty: 31 CAPSULE | Refills: 0 | Status: SHIPPED | OUTPATIENT
Start: 2023-11-01

## 2023-11-01 RX ORDER — LEVOTHYROXINE SODIUM 0.1 MG/1
TABLET ORAL
Qty: 31 TABLET | Refills: 0 | Status: SHIPPED | OUTPATIENT
Start: 2023-11-01

## 2023-11-30 ENCOUNTER — ANNUAL EXAM (OUTPATIENT)
Dept: OBGYN CLINIC | Facility: MEDICAL CENTER | Age: 63
End: 2023-11-30
Payer: MEDICARE

## 2023-11-30 VITALS
WEIGHT: 194 LBS | HEIGHT: 61 IN | SYSTOLIC BLOOD PRESSURE: 120 MMHG | BODY MASS INDEX: 36.63 KG/M2 | DIASTOLIC BLOOD PRESSURE: 70 MMHG

## 2023-11-30 DIAGNOSIS — Z12.31 ENCOUNTER FOR SCREENING MAMMOGRAM FOR BREAST CANCER: ICD-10-CM

## 2023-11-30 DIAGNOSIS — B96.89 BV (BACTERIAL VAGINOSIS): ICD-10-CM

## 2023-11-30 DIAGNOSIS — Z01.419 ENCOUNTER FOR ANNUAL ROUTINE GYNECOLOGICAL EXAMINATION: Primary | ICD-10-CM

## 2023-11-30 DIAGNOSIS — N76.0 BV (BACTERIAL VAGINOSIS): ICD-10-CM

## 2023-11-30 DIAGNOSIS — N89.8 LEUKORRHEA: ICD-10-CM

## 2023-11-30 LAB
BV WHIFF TEST VAG QL: ABNORMAL
CLUE CELLS SPEC QL WET PREP: ABNORMAL
PH SMN: 5 [PH]
SL AMB POCT WET MOUNT: ABNORMAL
T VAGINALIS VAG QL WET PREP: ABNORMAL
YEAST VAG QL WET PREP: ABNORMAL

## 2023-11-30 PROCEDURE — 99396 PREV VISIT EST AGE 40-64: CPT | Performed by: CLINICAL NURSE SPECIALIST

## 2023-11-30 PROCEDURE — 87210 SMEAR WET MOUNT SALINE/INK: CPT | Performed by: CLINICAL NURSE SPECIALIST

## 2023-11-30 RX ORDER — METRONIDAZOLE 500 MG/1
500 TABLET ORAL 2 TIMES DAILY
Qty: 14 TABLET | Refills: 0 | Status: SHIPPED | OUTPATIENT
Start: 2023-11-30 | End: 2023-12-07

## 2023-11-30 NOTE — PROGRESS NOTES
weSubjective:        Burak Albrecht is a 61 y.o. female. Here for Gynecologic Exam (Hysterectomy /Mammo 7/24/23 bi-rads 1 /Dxa 7/25/18 osteopenia. Scheduled /Colonoscopy 1/7/16 recall 10 years )    Pt w/ Severe anxiety/Disabled and is minimally verbal, Lives in a group home and is attended by staff to visit. GYN HPI  Menstrual cycle:  hx of hysterectomy and oophorectomy   Vaginal c/o: staff reports she was c/o vaginal pain yesterday and today  Urinary c/o: denies  Breast complaints:denies  She does not do self breast Exams    Sexually active: No  Risk for STI's low  Contraception: n/a  She reports she feels safe at home. Hereditary Cancer Screening  Cancer-related family history is not on file. Substance Abuse Screening Completed. See hx and flowsheet. The following portions of the patient's history were reviewed and updated as appropriate: allergies, current medications, past family history, past medical history, past social history, past surgical history, and problem list.       Review of Systems   Constitutional:  Negative for appetite change, chills, fatigue, fever and unexpected weight change. HENT: Negative. Eyes: Negative. Respiratory:  Negative for chest tightness and shortness of breath. Cardiovascular:  Negative for chest pain and palpitations. Gastrointestinal:  Negative for abdominal pain, constipation and vomiting. Endocrine: Negative for cold intolerance and heat intolerance. Genitourinary:         As per HPI   Musculoskeletal:  Negative for back pain, joint swelling and neck pain. Skin:  Negative for color change and rash. Neurological:  Negative for dizziness, weakness and numbness. Hematological:  Does not bruise/bleed easily. Psychiatric/Behavioral: Negative.                Objective:  /70 (BP Location: Left arm, Patient Position: Sitting, Cuff Size: Large)   Ht 5' 1" (1.549 m)   Wt 88 kg (194 lb)   BMI 36.66 kg/m²        Physical Exam  Constitutional: General: She is not in acute distress. Appearance: Normal appearance. Genitourinary:      Vulva and rectum normal.      No lesions in the vagina. Genitourinary Comments: Limited pelvic exam due to pt discomfort/anxiety. Able to insert speculum, but unable to fully visualize vaginal cuff. Right Labia: No rash or lesions. Left Labia: No lesions or rash. Vaginal discharge present. No vaginal erythema, tenderness or bleeding. Right Adnexa: not tender and no mass present. Left Adnexa: not tender and no mass present. Cervix is absent. No urethral prolapse present. Pelvic exam was performed with patient in the lithotomy position. Breasts:     Breasts are symmetrical.      Right: No inverted nipple, mass, nipple discharge, skin change or tenderness. Left: No inverted nipple, mass, nipple discharge, skin change or tenderness. HENT:      Head: Normocephalic and atraumatic. Cardiovascular:      Rate and Rhythm: Normal rate. Heart sounds: No murmur heard. Pulmonary:      Effort: Pulmonary effort is normal.      Breath sounds: Normal breath sounds. Abdominal:      General: There is no distension. Palpations: Abdomen is soft. Tenderness: There is no abdominal tenderness. Musculoskeletal:         General: Normal range of motion. Cervical back: Normal range of motion. Lymphadenopathy:      Cervical: No cervical adenopathy. Neurological:      Mental Status: She is alert and oriented to person, place, and time. Skin:     General: Skin is warm and dry. Psychiatric:         Mood and Affect: Mood normal.         Behavior: Behavior normal.   Vitals reviewed. Assessment/Plan:           [address] [place] [address]  Annual GYN examination completed today.    Health maintenance reviewed/updated as appropriate    HEALTH MAINTENANCE SCREENINGS:    Last Papanicolaou test:  N/A- s/p Hysterectomy at age 25   Last mammogram: 2023  Last Colon Cancer Screenin2016 Not on file. Recall 10y    Cervical cancer screen :Previous pap smears and ASCCP screening guidelines have been reviewed. Pap not indicated today. Encouraged regular self breast exams  Risk prevention and anticipatory guidance provided including:  Age related Calcium and vitamin D intake  Dietary and lifestyle recommendations based on her age and weight. body mass index is 36.66 kg/m². .      Problem List Items Addressed This Visit    None  Visit Diagnoses       Encounter for annual routine gynecological examination    -  Primary    Encounter for screening mammogram for breast cancer        Relevant Orders    Mammo screening bilateral w 3d & cad    Leukorrhea        Relevant Orders    POCT wet mount (Completed)    BV (bacterial vaginosis)        exam/wet mount c/w BV-flagyl given    Relevant Medications    metroNIDAZOLE (FLAGYL) 500 mg tablet            Orders Placed This Encounter   Procedures    Mammo screening bilateral w 3d & cad    POCT wet mount

## 2023-12-06 DIAGNOSIS — E78.5 HYPERLIPIDEMIA, UNSPECIFIED HYPERLIPIDEMIA TYPE: ICD-10-CM

## 2023-12-06 DIAGNOSIS — E55.9 VITAMIN D DEFICIENCY: ICD-10-CM

## 2023-12-06 DIAGNOSIS — E03.9 HYPOTHYROIDISM, UNSPECIFIED TYPE: ICD-10-CM

## 2023-12-06 DIAGNOSIS — I48.91 NEW ONSET ATRIAL FIBRILLATION (HCC): ICD-10-CM

## 2023-12-06 RX ORDER — DILTIAZEM HYDROCHLORIDE 120 MG/1
CAPSULE, COATED, EXTENDED RELEASE ORAL
Qty: 31 CAPSULE | Refills: 0 | Status: SHIPPED | OUTPATIENT
Start: 2023-12-06

## 2023-12-06 RX ORDER — OMEGA-3S/DHA/EPA/FISH OIL/D3 300MG-1000
CAPSULE ORAL
Qty: 93 TABLET | Refills: 0 | Status: SHIPPED | OUTPATIENT
Start: 2023-12-06

## 2023-12-06 RX ORDER — LEVOTHYROXINE SODIUM 0.1 MG/1
TABLET ORAL
Qty: 31 TABLET | Refills: 0 | Status: SHIPPED | OUTPATIENT
Start: 2023-12-06

## 2023-12-06 RX ORDER — CALCIUM CARBONATE/VITAMIN D3 600 MG-10
TABLET ORAL
Qty: 62 TABLET | Refills: 0 | Status: SHIPPED | OUTPATIENT
Start: 2023-12-06

## 2023-12-06 RX ORDER — PRAVASTATIN SODIUM 40 MG
TABLET ORAL
Qty: 31 TABLET | Refills: 0 | Status: SHIPPED | OUTPATIENT
Start: 2023-12-06

## 2024-01-03 DIAGNOSIS — E55.9 VITAMIN D DEFICIENCY: ICD-10-CM

## 2024-01-03 DIAGNOSIS — I48.91 NEW ONSET ATRIAL FIBRILLATION (HCC): ICD-10-CM

## 2024-01-03 DIAGNOSIS — E03.9 HYPOTHYROIDISM, UNSPECIFIED TYPE: ICD-10-CM

## 2024-01-03 DIAGNOSIS — E78.5 HYPERLIPIDEMIA, UNSPECIFIED HYPERLIPIDEMIA TYPE: ICD-10-CM

## 2024-01-04 RX ORDER — OMEGA-3S/DHA/EPA/FISH OIL/D3 300MG-1000
CAPSULE ORAL
Qty: 87 TABLET | Refills: 0 | Status: SHIPPED | OUTPATIENT
Start: 2024-01-04

## 2024-01-04 RX ORDER — PRAVASTATIN SODIUM 40 MG
TABLET ORAL
Qty: 29 TABLET | Refills: 0 | Status: SHIPPED | OUTPATIENT
Start: 2024-01-04

## 2024-01-04 RX ORDER — LEVOTHYROXINE SODIUM 0.1 MG/1
TABLET ORAL
Qty: 29 TABLET | Refills: 0 | Status: SHIPPED | OUTPATIENT
Start: 2024-01-04

## 2024-01-04 RX ORDER — DILTIAZEM HYDROCHLORIDE 120 MG/1
CAPSULE, COATED, EXTENDED RELEASE ORAL
Qty: 29 CAPSULE | Refills: 0 | Status: SHIPPED | OUTPATIENT
Start: 2024-01-04

## 2024-01-17 DIAGNOSIS — E55.9 VITAMIN D DEFICIENCY: ICD-10-CM

## 2024-01-17 RX ORDER — CALCIUM CARBONATE/VITAMIN D3 600 MG-10
1 TABLET ORAL 2 TIMES DAILY
Qty: 180 TABLET | Refills: 0 | Status: SHIPPED | OUTPATIENT
Start: 2024-01-17

## 2024-02-02 DIAGNOSIS — E55.9 VITAMIN D DEFICIENCY: ICD-10-CM

## 2024-02-02 DIAGNOSIS — E03.9 HYPOTHYROIDISM, UNSPECIFIED TYPE: ICD-10-CM

## 2024-02-02 DIAGNOSIS — E78.5 HYPERLIPIDEMIA, UNSPECIFIED HYPERLIPIDEMIA TYPE: ICD-10-CM

## 2024-02-02 DIAGNOSIS — I48.91 NEW ONSET ATRIAL FIBRILLATION (HCC): ICD-10-CM

## 2024-02-05 RX ORDER — OMEGA-3S/DHA/EPA/FISH OIL/D3 300MG-1000
CAPSULE ORAL
Qty: 93 TABLET | Refills: 0 | Status: SHIPPED | OUTPATIENT
Start: 2024-02-05

## 2024-02-05 RX ORDER — PRAVASTATIN SODIUM 40 MG
TABLET ORAL
Qty: 31 TABLET | Refills: 0 | Status: SHIPPED | OUTPATIENT
Start: 2024-02-05

## 2024-02-05 RX ORDER — LEVOTHYROXINE SODIUM 0.1 MG/1
TABLET ORAL
Qty: 31 TABLET | Refills: 0 | Status: SHIPPED | OUTPATIENT
Start: 2024-02-05

## 2024-02-05 RX ORDER — DILTIAZEM HYDROCHLORIDE 120 MG/1
CAPSULE, COATED, EXTENDED RELEASE ORAL
Qty: 31 CAPSULE | Refills: 0 | Status: SHIPPED | OUTPATIENT
Start: 2024-02-05

## 2024-02-15 DIAGNOSIS — R32 URINARY INCONTINENCE, UNSPECIFIED TYPE: ICD-10-CM

## 2024-02-15 RX ORDER — DIAPER,BRIEF,ADULT, DISPOSABLE
EACH MISCELLANEOUS
Qty: 90 EACH | Refills: 0 | Status: SHIPPED | OUTPATIENT
Start: 2024-02-15

## 2024-02-21 PROBLEM — H61.23 BILATERAL IMPACTED CERUMEN: Status: RESOLVED | Noted: 2020-06-26 | Resolved: 2024-02-21

## 2024-03-04 DIAGNOSIS — E78.5 HYPERLIPIDEMIA, UNSPECIFIED HYPERLIPIDEMIA TYPE: ICD-10-CM

## 2024-03-04 DIAGNOSIS — E03.9 HYPOTHYROIDISM, UNSPECIFIED TYPE: ICD-10-CM

## 2024-03-04 DIAGNOSIS — I48.91 NEW ONSET ATRIAL FIBRILLATION (HCC): ICD-10-CM

## 2024-03-04 DIAGNOSIS — E55.9 VITAMIN D DEFICIENCY: ICD-10-CM

## 2024-03-04 RX ORDER — LEVOTHYROXINE SODIUM 0.1 MG/1
TABLET ORAL
Qty: 31 TABLET | Refills: 0 | Status: SHIPPED | OUTPATIENT
Start: 2024-03-04

## 2024-03-04 RX ORDER — DILTIAZEM HYDROCHLORIDE 120 MG/1
CAPSULE, COATED, EXTENDED RELEASE ORAL
Qty: 31 CAPSULE | Refills: 0 | Status: SHIPPED | OUTPATIENT
Start: 2024-03-04

## 2024-03-04 RX ORDER — OMEGA-3S/DHA/EPA/FISH OIL/D3 300MG-1000
CAPSULE ORAL
Qty: 93 TABLET | Refills: 0 | Status: SHIPPED | OUTPATIENT
Start: 2024-03-04

## 2024-03-04 RX ORDER — PRAVASTATIN SODIUM 40 MG
TABLET ORAL
Qty: 31 TABLET | Refills: 0 | Status: SHIPPED | OUTPATIENT
Start: 2024-03-04

## 2024-04-01 DIAGNOSIS — E55.9 VITAMIN D DEFICIENCY: ICD-10-CM

## 2024-04-01 DIAGNOSIS — E03.9 HYPOTHYROIDISM, UNSPECIFIED TYPE: ICD-10-CM

## 2024-04-01 DIAGNOSIS — I48.91 NEW ONSET ATRIAL FIBRILLATION (HCC): ICD-10-CM

## 2024-04-01 DIAGNOSIS — E78.5 HYPERLIPIDEMIA, UNSPECIFIED HYPERLIPIDEMIA TYPE: ICD-10-CM

## 2024-04-01 DIAGNOSIS — R60.9 EDEMA, UNSPECIFIED TYPE: ICD-10-CM

## 2024-04-01 RX ORDER — OMEGA-3S/DHA/EPA/FISH OIL/D3 300MG-1000
CAPSULE ORAL
Qty: 93 TABLET | Refills: 0 | Status: SHIPPED | OUTPATIENT
Start: 2024-04-01

## 2024-04-01 RX ORDER — CALCIUM CARBONATE/VITAMIN D3 600 MG-10
1 TABLET ORAL 2 TIMES DAILY
Qty: 62 TABLET | Refills: 0 | Status: SHIPPED | OUTPATIENT
Start: 2024-04-01

## 2024-04-01 RX ORDER — DILTIAZEM HYDROCHLORIDE 120 MG/1
CAPSULE, COATED, EXTENDED RELEASE ORAL
Qty: 31 CAPSULE | Refills: 0 | Status: SHIPPED | OUTPATIENT
Start: 2024-04-01

## 2024-04-01 RX ORDER — LEVOTHYROXINE SODIUM 0.1 MG/1
TABLET ORAL
Qty: 31 TABLET | Refills: 0 | Status: SHIPPED | OUTPATIENT
Start: 2024-04-01

## 2024-04-01 RX ORDER — PRAVASTATIN SODIUM 40 MG
TABLET ORAL
Qty: 31 TABLET | Refills: 0 | Status: SHIPPED | OUTPATIENT
Start: 2024-04-01

## 2024-04-02 RX ORDER — RIVAROXABAN 20 MG/1
TABLET, FILM COATED ORAL
Qty: 31 TABLET | Refills: 0 | Status: SHIPPED | OUTPATIENT
Start: 2024-04-02

## 2024-04-02 RX ORDER — FUROSEMIDE 20 MG/1
TABLET ORAL
Qty: 31 TABLET | Refills: 0 | Status: SHIPPED | OUTPATIENT
Start: 2024-04-02

## 2024-05-02 DIAGNOSIS — E78.5 HYPERLIPIDEMIA, UNSPECIFIED HYPERLIPIDEMIA TYPE: ICD-10-CM

## 2024-05-02 DIAGNOSIS — E55.9 VITAMIN D DEFICIENCY: ICD-10-CM

## 2024-05-02 DIAGNOSIS — I48.91 NEW ONSET ATRIAL FIBRILLATION (HCC): ICD-10-CM

## 2024-05-02 DIAGNOSIS — R60.9 EDEMA, UNSPECIFIED TYPE: ICD-10-CM

## 2024-05-02 DIAGNOSIS — E03.9 HYPOTHYROIDISM, UNSPECIFIED TYPE: ICD-10-CM

## 2024-05-02 RX ORDER — LEVOTHYROXINE SODIUM 0.1 MG/1
TABLET ORAL
Qty: 30 TABLET | Refills: 0 | Status: SHIPPED | OUTPATIENT
Start: 2024-05-02

## 2024-05-02 RX ORDER — DILTIAZEM HYDROCHLORIDE 120 MG/1
CAPSULE, COATED, EXTENDED RELEASE ORAL
Qty: 30 CAPSULE | Refills: 0 | Status: SHIPPED | OUTPATIENT
Start: 2024-05-02

## 2024-05-02 RX ORDER — PRAVASTATIN SODIUM 40 MG
TABLET ORAL
Qty: 30 TABLET | Refills: 0 | Status: SHIPPED | OUTPATIENT
Start: 2024-05-02

## 2024-05-02 RX ORDER — OMEGA-3S/DHA/EPA/FISH OIL/D3 300MG-1000
CAPSULE ORAL
Qty: 90 TABLET | Refills: 0 | Status: SHIPPED | OUTPATIENT
Start: 2024-05-02

## 2024-05-02 RX ORDER — CALCIUM CARBONATE/VITAMIN D3 600 MG-10
1 TABLET ORAL 2 TIMES DAILY
Qty: 60 TABLET | Refills: 0 | Status: SHIPPED | OUTPATIENT
Start: 2024-05-02

## 2024-05-03 RX ORDER — FUROSEMIDE 20 MG/1
TABLET ORAL
Qty: 30 TABLET | Refills: 0 | Status: SHIPPED | OUTPATIENT
Start: 2024-05-03

## 2024-05-03 RX ORDER — RIVAROXABAN 20 MG/1
TABLET, FILM COATED ORAL
Qty: 30 TABLET | Refills: 0 | Status: SHIPPED | OUTPATIENT
Start: 2024-05-03

## 2024-05-03 NOTE — TELEPHONE ENCOUNTER
Patient needs updated blood work. Please place orders. A 30D courtesy refill was provided. Patient have an appointment 06.14.2024

## 2024-05-31 ENCOUNTER — TELEPHONE (OUTPATIENT)
Dept: CARDIOLOGY CLINIC | Facility: CLINIC | Age: 64
End: 2024-05-31

## 2024-05-31 NOTE — TELEPHONE ENCOUNTER
Spoke with Maria Guadalupe at [facility].  Instructed to hold Lasix X3 days and Diltiazem X1 day, continued to monitor and should report to ED if SBP drops below 100mmHg.  She is also trying to hydrate patient more.     Will fax instructions/orders at [phone]

## 2024-05-31 NOTE — TELEPHONE ENCOUNTER
Maria Guadalupe from [facility] called stating the protocol they have for this pt states they need to contact Dr. Lewis' office if systolic is lower than 100. Since the AM, pt c/o a headache and feeling lethargic so they decided to take her BP. Her BP and HR log is below. Her headache went away after taking ibuprofen and her BP seemed to improve with water intake.    1st Attempt: BP - 94/62 HR - 63  2nd Attempt: BP - 97/63  3rd Attempt after giving fluids: BP - 119/49 HR- 62    Please advise if any changes are needed. Thanks!

## 2024-06-04 DIAGNOSIS — I48.91 NEW ONSET ATRIAL FIBRILLATION (HCC): ICD-10-CM

## 2024-06-04 DIAGNOSIS — E03.9 HYPOTHYROIDISM, UNSPECIFIED TYPE: ICD-10-CM

## 2024-06-04 DIAGNOSIS — R60.9 EDEMA, UNSPECIFIED TYPE: ICD-10-CM

## 2024-06-04 DIAGNOSIS — E78.5 HYPERLIPIDEMIA, UNSPECIFIED HYPERLIPIDEMIA TYPE: ICD-10-CM

## 2024-06-04 DIAGNOSIS — E55.9 VITAMIN D DEFICIENCY: ICD-10-CM

## 2024-06-04 RX ORDER — CALCIUM CARBONATE/VITAMIN D3 600 MG-10
1 TABLET ORAL 2 TIMES DAILY
Qty: 62 TABLET | Refills: 4 | Status: SHIPPED | OUTPATIENT
Start: 2024-06-04

## 2024-06-04 RX ORDER — OMEGA-3S/DHA/EPA/FISH OIL/D3 300MG-1000
CAPSULE ORAL
Qty: 93 TABLET | Refills: 4 | Status: SHIPPED | OUTPATIENT
Start: 2024-06-04

## 2024-06-04 RX ORDER — LEVOTHYROXINE SODIUM 0.1 MG/1
TABLET ORAL
Qty: 31 TABLET | Refills: 4 | Status: SHIPPED | OUTPATIENT
Start: 2024-06-04

## 2024-06-04 RX ORDER — DILTIAZEM HYDROCHLORIDE 120 MG/1
CAPSULE, COATED, EXTENDED RELEASE ORAL
Qty: 31 CAPSULE | Refills: 4 | Status: SHIPPED | OUTPATIENT
Start: 2024-06-04

## 2024-06-04 RX ORDER — PRAVASTATIN SODIUM 40 MG
TABLET ORAL
Qty: 31 TABLET | Refills: 4 | Status: SHIPPED | OUTPATIENT
Start: 2024-06-04

## 2024-06-05 RX ORDER — RIVAROXABAN 20 MG/1
TABLET, FILM COATED ORAL
Qty: 31 TABLET | Refills: 4 | Status: SHIPPED | OUTPATIENT
Start: 2024-06-05

## 2024-06-05 RX ORDER — FUROSEMIDE 20 MG/1
TABLET ORAL
Qty: 31 TABLET | Refills: 4 | Status: SHIPPED | OUTPATIENT
Start: 2024-06-05

## 2024-06-14 ENCOUNTER — OFFICE VISIT (OUTPATIENT)
Dept: CARDIOLOGY CLINIC | Facility: CLINIC | Age: 64
End: 2024-06-14
Payer: MEDICARE

## 2024-06-14 ENCOUNTER — TELEPHONE (OUTPATIENT)
Dept: CARDIOLOGY CLINIC | Facility: CLINIC | Age: 64
End: 2024-06-14

## 2024-06-14 VITALS
BODY MASS INDEX: 35.27 KG/M2 | SYSTOLIC BLOOD PRESSURE: 108 MMHG | HEART RATE: 65 BPM | HEIGHT: 61 IN | WEIGHT: 186.8 LBS | DIASTOLIC BLOOD PRESSURE: 70 MMHG

## 2024-06-14 DIAGNOSIS — E66.01 CLASS 2 SEVERE OBESITY DUE TO EXCESS CALORIES WITH SERIOUS COMORBIDITY AND BODY MASS INDEX (BMI) OF 37.0 TO 37.9 IN ADULT (HCC): ICD-10-CM

## 2024-06-14 DIAGNOSIS — I48.91 ATRIAL FIBRILLATION, UNSPECIFIED TYPE (HCC): Primary | ICD-10-CM

## 2024-06-14 PROCEDURE — 99214 OFFICE O/P EST MOD 30 MIN: CPT | Performed by: INTERNAL MEDICINE

## 2024-06-14 PROCEDURE — 93000 ELECTROCARDIOGRAM COMPLETE: CPT | Performed by: INTERNAL MEDICINE

## 2024-06-14 RX ORDER — FLUTICASONE PROPIONATE 50 MCG
SPRAY, SUSPENSION (ML) NASAL
COMMUNITY
Start: 2024-06-12

## 2024-06-14 NOTE — TELEPHONE ENCOUNTER
----- Message from Jerel Lewis MD sent at 6/14/2024  2:25 PM EDT -----  I forgot to tell group jason person to get routine labs, non fasting. I ordered. thanks

## 2024-06-14 NOTE — PROGRESS NOTES
HEART AND VASCULAR  CARDIAC ELECTROPHYSIOLOGY   HEART RHYTHM CENTER  [facility]    Outpatient f/u  Today's Date: 06/14/24        Patient name: Carole Burrows  YOB: 1960  Sex: female         Chief Complaint: f/u for afib    ASSESSMENT:  Problem List Items Addressed This Visit          Cardiovascular and Mediastinum    Atrial fibrillation (HCC) - Primary    Relevant Orders    POCT ECG         64 yo female  1) Afib, unclear duration or type, found incidentally at office visit in Aug 2019 and sent in for MARCIE -DCCV but found to be back in afib persistetnly on Zio patch , she is on xarelto and diltiazem now. [id number] female . She has developmental delay and lives in care facility, they check her BP/HR daily.  2) Stress echo 2021, 3min on Marshal, Moderate MR/TR, normal EF,  Moderate LAE  Otherwise has d MR/TR normal EF on TTE. 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  6) Atypical CP ER visit April 1 2023- neg trop/ekg/cxr. None since.   7) Recent episode of low BP, wasn't feeling well. WE carol lasix for 2 days and improved     PLAN:  1. Cont xarelto and diltiazem 120mg daily, will not pursue rhythm control since asymptomatic.   Went over things to look for if afib not well controlled w care giver.    2. Cont lasix 20mg daily    3. Check BMP and CBC        Follow up in: 12 months    Orders Placed This Encounter   Procedures    POCT ECG     Medications Discontinued During This Encounter   Medication Reason    clotrimazole-betamethasone (LOTRISONE) 1-0.05 % cream Therapy completed         .............................................................................................        64 yo female  1) Afib, unclear duration or type, found incidentally at office visit in Aug 2019 and sent in for MARCIE -DCCV but found to be back in afib persistetnly on Zio patch , she is on xarelto  and diltiazem now. [id number] female . She has developmental delay and lives in care facility, they check her BP/HR daily.  2) Stress echo 2021, 3min on Marshal, Moderate MR/TR, normal EF,  Moderate LAE  Otherwise has d MR/TR normal EF on TTE. 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  6) Atypical CP ER visit April 1 2023- neg trop/ekg/cxr. None since.   7) Recent episode of low BP, wasn't feeling well. WE carol lasix for 2 days and improved    Past Medical History:   Diagnosis Date    Anxiety disorder     Atrial fibrillation (HCC)        No Known Allergies  I reviewed the Home Medication list and Allergies in the chart.   Scheduled Meds:  Current Outpatient Medications   Medication Sig Dispense Refill    acetaminophen (TYLENOL) 650 mg CR tablet TAKE 1 TABLET BY MOUTH EVERY 8 HOURS AS NEEDED FOR MILD PAIN OR FEVER 30 tablet 0    benzonatate (TESSALON) 200 MG capsule Take 1 capsule (200 mg total) by mouth 3 (three) times a day as needed for cough 20 capsule 0    Calcium + Vitamin D3 600-10 MG-MCG TABS TAKE 1 TABLET BY MOUTH TWICE DAILY. 62 tablet 4    cholecalciferol (VITAMIN D3) 400 units tablet TAKE 1 TABLET BY MOUTH THREE TIMES DAILY. 93 tablet 4    diltiazem (CARDIZEM CD) 120 mg 24 hr capsule TAKE 1 CAPSULE BY MOUTH ONCE DAILY. 31 capsule 4    docusate sodium (COLACE) 100 mg capsule Take 1 capsule (100 mg total) by mouth 2 (two) times a day If needed constipation 30 capsule 0    fluticasone (FLONASE) 50 mcg/act nasal spray       furosemide (LASIX) 20 mg tablet TAKE (1) TABLET BY MOUTH ONCE DAILY. 31 tablet 4    hydrocortisone-aloe 1 % cream APPLY TO AFFECTED AREA TWICE A DAY AS NEEDED FOR ITCH AND RASH. 28 g 2    Ibuprofen 200 MG CAPS Take 1-2 tablets if needed for pain or fever every 6-8 hours 120 each 0    Incontinence Supply Disposable ([employer] Choice Plus Adult Briefs) MISC USE AS DIRECTED FOR  INCONTINENCE 90 each 0    levothyroxine 100 mcg tablet TAKE (1) TABLET BY MOUTH ONCE DAILY. 31 tablet 4    loperamide (IMODIUM A-D) 2 mg capsule Take 1 capsule (2 mg total) by mouth 4 (four) times a day as needed for diarrhea 30 capsule 0    neomycin-polymyxin b-bacitracin (NEOSPORIN) 3.5-400-5,000 APPLY TOPICALLY TO AFFECTED AREA FOUR TIMES A DAY AS NEEDED FOR WOUND CARE 28.4 g 2    nystatin (MYCOSTATIN) powder APPLY TOPICALLY TO AFFECTED AREA TWICE DAILY 60 g 0    Omega-3 Fatty Acids (fish oil) 1,000 mg Take 1 capsule (1,000 mg total) by mouth daily 90 capsule 3    pravastatin (PRAVACHOL) 40 mg tablet TAKE (1) TABLET BY MOUTH ONCE DAILY. 31 tablet 4    Xarelto 20 MG tablet TAKE (1) TABLET BY MOUTH ONCE DAILY WITH BREAKFAST. 31 tablet 4     No current facility-administered medications for this visit.     PRN Meds:.        Family History   Problem Relation Age of Onset    Other Mother         neglect or abandonment     Other Father         neglect or abandonment     No Known Problems Sister     No Known Problems Maternal Grandmother     No Known Problems Maternal Grandfather     No Known Problems Paternal Grandmother     No Known Problems Paternal Grandfather        Social History     Socioeconomic History    Marital status: Single     Spouse name: Not on file    Number of children: Not on file    Years of education: Not on file    Highest education level: Not on file   Occupational History    Not on file   Tobacco Use    Smoking status: Never    Smokeless tobacco: Never   Vaping Use    Vaping status: Never Used   Substance and Sexual Activity    Alcohol use: Never    Drug use: Never    Sexual activity: Never     Birth control/protection: Female Sterilization   Other Topics Concern    Not on file   Social History Narrative    Disabled    Social history reviewed unchanged      Social Determinants of Health     Financial Resource Strain: Low Risk  (7/6/2023)    Overall Financial Resource Strain (CARDIA)     Difficulty  "of Paying Living Expenses: Not hard at all   Food Insecurity: No Food Insecurity (7/6/2023)    Hunger Vital Sign     Worried About Running Out of Food in the Last Year: Never true     Ran Out of Food in the Last Year: Never true   Transportation Needs: No Transportation Needs (7/6/2023)    PRAPARE - Transportation     Lack of Transportation (Medical): No     Lack of Transportation (Non-Medical): No   Physical Activity: Not on file   Stress: Not on file   Social Connections: Not on file   Intimate Partner Violence: Not on file   Housing Stability: Not on file         OBJECTIVE:    /70 (BP Location: Left arm, Patient Position: Sitting, Cuff Size: Large)   Pulse 65   Ht 5' 1\" (1.549 m)   Wt 84.7 kg (186 lb 12.8 oz)   BMI 35.30 kg/m²   Vitals:    06/14/24 1359   Weight: 84.7 kg (186 lb 12.8 oz)       /70 (BP Location: Left arm, Patient Position: Sitting, Cuff Size: Large)   Pulse 65   Ht 5' 1\" (1.549 m)   Wt 84.7 kg (186 lb 12.8 oz)   BMI 35.30 kg/m²   Vitals:    06/14/24 1359   Weight: 84.7 kg (186 lb 12.8 oz)     GEN: No acute distress, Alert and oriented, well appearing  HEENT:External ears normal, wearing a mask.   EYES: Pupils equal, sclera anicteric, midline, normal conjuctiva  NECK: No JVD, supple, no obvious masses or thryomegaly or goiter  CARDIOVASCULAR: irreg irreg, 3/6 Holosystolic murmur, rub, gallops S1,S2  LUNGS: Clear To auscultation bilaterally, normal effort, no rales, rhonchi, crackles   ABDOMEN:  nondistended,  without obvious organomegaly or ascites  EXTREMITIES/VASCULAR: 1+ darrel edema. warm an well perfused.  PSYCH: Normal Affect,  linear speech pattern without evidence of psychosis.   NEURO: Grossly intact, moving all extremiteis equal, face symmetric, alert and responsive, no obvious focal defecits   GAIT:  Ambulates normally without difficulty  HEME: No bleeding, bruising, petechia, purpura   SKIN: No significant rashes on visibile skin, warm, no diaphoresis or pallor. " "        Lab Results:       LABS:      Chemistry        Component Value Date/Time    K 3.7 2023 1505    K 4.2 10/16/2021 1255     2023 1505     10/16/2021 1255    CO2 29 2023 1505    CO2 30 10/16/2021 1255    BUN 19 2023 1505    BUN 20 10/16/2021 1255    CREATININE 0.84 2023 1505    CREATININE 0.82 10/16/2021 1255        Component Value Date/Time    CALCIUM 9.5 2023 1505    CALCIUM 9.2 10/16/2021 1255    ALKPHOS 122 (H) 2023 0805    ALKPHOS 120 10/16/2021 1255    AST 15 2023 0805    AST 13 10/16/2021 1255    ALT 18 2023 0805    ALT 20 10/16/2021 1255            No results found for: \"CHOL\"  Lab Results   Component Value Date    HDL 53 2023    HDL 47 06/10/2021    HDL 47 2020     Lab Results   Component Value Date    LDLCALC 84 2023    LDLCALC 64 06/10/2021    LDLCALC 69 2020     Lab Results   Component Value Date    TRIG 113 2023    TRIG 109 06/10/2021    TRIG 134 2020     No results found for: \"CHOLHDL\"    IMAGING: No results found.     Cardiac testing:   Results for orders placed during the hospital encounter of 18   Echo complete [place] [address]  [phone]    Transthoracic Echocardiogram  2D, M-mode, Doppler, and Color Doppler    Study date:  2018    Patient: ELADIO CORTES  MR number: [id number]  Account number: [id number]  : 1960  Age: 57 years  Gender: Female  Status: Outpatient  Location: [facility] [place]  Height: 61 in  Weight: 194 lb  BP: 102/ 70 mmHg    Indications: Abnormal EKG.    Diagnoses: R94.31 - Abnormal electrocardiogram [ECG] [EKG]    Sonographer:  Andrea Garcia RDCS  Primary Physician:  LALA Adan  Referring Physician:  Frantz Garcia MD  Group:  [facility]  Interpreting Physician:  Frantz Garcia, " MD    SUMMARY    LEFT VENTRICLE:  Size was at the upper limits of normal.  Systolic function was normal. Ejection fraction was estimated to be 55 %.  There were no regional wall motion abnormalities.  Features were consistent with a pseudonormal left ventricular filling pattern, with concomitant abnormal relaxation and increased filling pressure (grade 2 diastolic dysfunction).    LEFT ATRIUM:  The atrium was moderately dilated.    MITRAL VALVE:  There was mild regurgitation.    TRICUSPID VALVE:  There was mild regurgitation.    HISTORY: PRIOR HISTORY: Hyperlipidemia, Dyspnea on exertion, Mental disability.    PROCEDURE: The study was performed in the [facility] [place]. This was a routine study. The transthoracic approach was used. The study included complete 2D imaging, M-mode, complete spectral Doppler, and color Doppler. The  heart rate was 63 bpm, at the start of the study. Images were obtained from the parasternal, apical, subcostal, and suprasternal notch acoustic windows. Image quality was adequate.    LEFT VENTRICLE: Size was at the upper limits of normal. Systolic function was normal. Ejection fraction was estimated to be 55 %. There were no regional wall motion abnormalities. Wall thickness was normal. No evidence of apical thrombus.  DOPPLER: Features were consistent with a pseudonormal left ventricular filling pattern, with concomitant abnormal relaxation and increased filling pressure (grade 2 diastolic dysfunction).    RIGHT VENTRICLE: The size was normal. Systolic function was normal. Wall thickness was normal.    LEFT ATRIUM: The atrium was moderately dilated.    RIGHT ATRIUM: Size was normal.    MITRAL VALVE: Valve structure was normal. There was normal leaflet separation. DOPPLER: The transmitral velocity was within the normal range. There was no evidence for stenosis. There was mild regurgitation.    AORTIC VALVE: The valve was trileaflet. Leaflets exhibited normal thickness and  normal cuspal separation. DOPPLER: Transaortic velocity was within the normal range. There was no evidence for stenosis. There was no significant  regurgitation.    TRICUSPID VALVE: The valve structure was normal. There was normal leaflet separation. DOPPLER: The transtricuspid velocity was within the normal range. There was no evidence for stenosis. There was mild regurgitation. Estimated peak PA  pressure was 30 mmHg.    PULMONIC VALVE: Not well visualized. DOPPLER: The transpulmonic velocity was within the normal range. There was no significant regurgitation.    PERICARDIUM: There was no pericardial effusion. The pericardium was normal in appearance.    AORTA: The root exhibited normal size.    SYSTEMIC VEINS: IVC: The inferior vena cava was normal in size.    SYSTEM MEASUREMENT TABLES    2D  %FS: 31.2 %  Ao Diam: 2.9 cm  EDV(Teich): 161.8 ml  EF(Teich): 58.3 %  ESV(Teich): 67.5 ml  IVSd: 1 cm  LA Area: 25.7 cm2  LA Diam: 5.3 cm  LVEDV MOD A4C: 62.6 ml  LVEF MOD A4C: 57.8 %  LVESV MOD A4C: 26.4 ml  LVIDd: 5.7 cm  LVIDs: 3.9 cm  LVLd A4C: 6.2 cm  LVLs A4C: 5.5 cm  LVPWd: 1 cm  RA Area: 12.4 cm2  RVIDd: 3.1 cm  SV MOD A4C: 36.2 ml  SV(Teich): 94.4 ml    CW  TR Vmax: 2.6 m/s  TR maxP.5 mmHg    MM  TAPSE: 2.3 cm    PW  E': 0.1 m/s  E/E': 11.8  MV A [place]: 0.2 m/s  MV Dec [place]: 5.9 m/s2  MV DecT: 141.1 ms  MV E [place]: 0.8 m/s  MV E/A Ratio: 3.8  MV PHT: 40.9 ms  MVA By PHT: 5.4 cm2    [employer] Accredited Echocardiography Laboratory    Prepared and electronically signed by    Frantz Garcia MD  Signed 2018 08:58:33       Results for orders placed during the hospital encounter of 19   [facility]    [address] [place] [address] [phone]    Transesophageal Echocardiogram  2D, M-mode, Doppler, and Color Doppler    Study date:  23-Aug-2019    Patient: ELADIO CORTES  MR number: [id number]  Account number: [id number]  :  1960  Age: 58 years  Gender: Female  Status: Inpatient  Location: Cath lab  Height: 61 in  Weight: 196.7 lb  BP: 141/ 78 mmHg    Indications: Atrial fibrillation.    Diagnoses: I48.0 - Atrial fibrillation    Sonographer:  Nathaniel Cruz RDCS  Interpreting Physician:  Brody Bethea MD  Primary Physician:  Ho Huynh PA-C  Referring Physician:  Frantz Garcia MD  Group:  [facility]  RN:  Giselle Gonzales RN BSN    IMPRESSIONS:  No evidence of left atrial or left atrial appendage thrombus on transesophageal echocardiogram.  Other echocardiographic findings as noted below.    SUMMARY    LEFT VENTRICLE:  Normal left ventricular cavity size mild concentric left ventricular hypertrophy, normal left ventricular systolic function. Regional wall motion and ejection fraction could not be reliably assessed due to rapid ventricular rates.    RIGHT VENTRICLE:  Normal right ventricular size and visually normal right ventricular systolic function.    LEFT ATRIUM:  Moderate left atrial cavity enlargement. No spontaneous contrast, mass or thrombus noted in left atrial cavity.    LEFT ATRIAL APPENDAGE:  Enlarged single lobe left atrial appendage with moderately reduced flow velocities. No masses, vegetations or thrombus identified in left atrial appendage cavity.    ATRIAL SEPTUM:  Intact interatrial septum. No color flow Doppler evidence of interatrial shunts.    RIGHT ATRIUM:  Mild right atrial cavity enlargement.    MITRAL VALVE:  Mild mitral valve leaflet thickening, adequate leaflet mobility. Mild mitral valve regurgitation noted.    AORTIC VALVE:  Tricuspid aortic valve with mild sclerosis. Adequate cuspal separation. No aortic valve stenosis or regurgitation noted.    TRICUSPID VALVE:  Mild tricuspid valve leaflet thickening. Mild tricuspid valve regurgitation.    PULMONIC VALVE:  Unremarkable pulmonic valve leaflet morphology. No pulmonic valve stenosis or regurgitation  noted.    AORTA:  Aortic root and proximal ascending aorta are normal in size on 2D imaging. There are mild atherosclerotic changes noted in visualized portions of ascending and descending aorta. No mobile plaques noted.    PERICARDIUM:  No pericardial effusion.    HISTORY: PRIOR HISTORY: Long QT interval, hypothyroidism, hyperlipidemia, atrial fibrillation, mental retardation, left atrial enlargement, obesity, anxiety.    PROCEDURE: The procedure was performed in the catheterization laboratory. The risks and alternatives of the procedure were explained to the patient's next of kin and informed consent was obtained. The transesophageal approach was used. The  study included complete 2D imaging, M-mode, complete spectral Doppler, and color Doppler. The heart rate was 156 bpm, at the start of the study. An adult omniplane probe was inserted by the attending cardiologist. Intubated with ease. One  intubation attempt(s). There was no blood detected on the probe. Image quality was adequate. There were no complications during the procedure. MEDICATIONS: Anesthesia administered by anesthesia team.    LEFT VENTRICLE: Normal left ventricular cavity size mild concentric left ventricular hypertrophy, normal left ventricular systolic function. Regional wall motion and ejection fraction could not be reliably assessed due to rapid ventricular  rates.    RIGHT VENTRICLE: Normal right ventricular size and visually normal right ventricular systolic function.    LEFT ATRIUM: Moderate left atrial cavity enlargement. No spontaneous contrast, mass or thrombus noted in left atrial cavity. APPENDAGE: Enlarged single lobe left atrial appendage with moderately reduced flow velocities. No masses,  vegetations or thrombus identified in left atrial appendage cavity.    ATRIAL SEPTUM: Intact interatrial septum. No color flow Doppler evidence of interatrial shunts.    RIGHT ATRIUM: Mild right atrial cavity enlargement.    MITRAL VALVE: Mild  mitral valve leaflet thickening, adequate leaflet mobility. Mild mitral valve regurgitation noted.    AORTIC VALVE: Tricuspid aortic valve with mild sclerosis. Adequate cuspal separation. No aortic valve stenosis or regurgitation noted.    TRICUSPID VALVE: Mild tricuspid valve leaflet thickening. Mild tricuspid valve regurgitation.    PULMONIC VALVE: Unremarkable pulmonic valve leaflet morphology. No pulmonic valve stenosis or regurgitation noted.    PERICARDIUM: No pericardial effusion.    AORTA: Aortic root and proximal ascending aorta are normal in size on 2D imaging. There are mild atherosclerotic changes noted in visualized portions of ascending and descending aorta. No mobile plaques noted.    SYSTEM MEASUREMENT TABLES    CW  TR Vmax: 2.1 m/s  TR maxP.7 mmHg    [facility]    Prepared and electronically signed by    Brody Bethea MD  Signed 23-Aug-2019 19:27:14       No results found for this or any previous visit.  No results found for this or any previous visit.        I reviewed and interpreted the following LABS/EKG/TELE/IMAGING and below is summary of my interpretation (if data available):      Current EKG and Rhythm Strip:afib rate controlled 65bpm    Reviewed echo images, moderate MR/TR and moderate LAE, normal EF

## 2024-06-14 NOTE — TELEPHONE ENCOUNTER
Spoke to caregiver, Maria Guadalupe and notified her of Sr. Lewis message regarding labs. She understood and asked the orders be faxed to her at [phone].    Lab orders faxed to Attn: Maria Guadalupe @ [phone].

## 2024-06-28 ENCOUNTER — RA CDI HCC (OUTPATIENT)
Dept: OTHER | Facility: HOSPITAL | Age: 64
End: 2024-06-28

## 2024-07-08 ENCOUNTER — OFFICE VISIT (OUTPATIENT)
Dept: FAMILY MEDICINE CLINIC | Facility: CLINIC | Age: 64
End: 2024-07-08

## 2024-07-08 VITALS
BODY MASS INDEX: 35.3 KG/M2 | SYSTOLIC BLOOD PRESSURE: 112 MMHG | HEART RATE: 68 BPM | OXYGEN SATURATION: 96 % | RESPIRATION RATE: 16 BRPM | DIASTOLIC BLOOD PRESSURE: 66 MMHG | WEIGHT: 187 LBS | TEMPERATURE: 96.4 F | HEIGHT: 61 IN

## 2024-07-08 DIAGNOSIS — Z00.00 MEDICARE ANNUAL WELLNESS VISIT, SUBSEQUENT: ICD-10-CM

## 2024-07-08 DIAGNOSIS — E03.9 HYPOTHYROIDISM, UNSPECIFIED TYPE: ICD-10-CM

## 2024-07-08 DIAGNOSIS — I48.20 CHRONIC ATRIAL FIBRILLATION (HCC): ICD-10-CM

## 2024-07-08 DIAGNOSIS — E55.9 VITAMIN D DEFICIENCY: Primary | ICD-10-CM

## 2024-07-08 DIAGNOSIS — F79 MENTAL DISABILITY: ICD-10-CM

## 2024-07-08 DIAGNOSIS — F39 MOOD DISORDER (HCC): ICD-10-CM

## 2024-07-08 DIAGNOSIS — E78.5 HYPERLIPIDEMIA, UNSPECIFIED HYPERLIPIDEMIA TYPE: ICD-10-CM

## 2024-07-08 DIAGNOSIS — G25.70 MEDICATION-INDUCED MOVEMENT DISORDER: ICD-10-CM

## 2024-07-08 DIAGNOSIS — D69.6 PLATELETS DECREASED (HCC): ICD-10-CM

## 2024-07-08 DIAGNOSIS — E66.01 CLASS 2 SEVERE OBESITY DUE TO EXCESS CALORIES WITH SERIOUS COMORBIDITY AND BODY MASS INDEX (BMI) OF 37.0 TO 37.9 IN ADULT (HCC): ICD-10-CM

## 2024-07-08 DIAGNOSIS — R60.0 LEG EDEMA: ICD-10-CM

## 2024-07-08 DIAGNOSIS — E03.2 HYPOTHYROIDISM DUE TO MEDICATION: ICD-10-CM

## 2024-07-08 PROCEDURE — G0439 PPPS, SUBSEQ VISIT: HCPCS | Performed by: NURSE PRACTITIONER

## 2024-07-08 PROCEDURE — 99214 OFFICE O/P EST MOD 30 MIN: CPT | Performed by: NURSE PRACTITIONER

## 2024-07-08 NOTE — ASSESSMENT & PLAN NOTE
Lab Results   Component Value Date    [id number] 2.340 02/20/2023     Continue levothyroxine 100 mcg daily

## 2024-07-08 NOTE — PATIENT INSTRUCTIONS
Medicare Preventive Visit Patient Instructions  Thank you for completing your Welcome to Medicare Visit or Medicare Annual Wellness Visit today. Your next wellness visit will be due in one year (7/9/2025).  The screening/preventive services that you may require over the next 5-10 years are detailed below. Some tests may not apply to you based off risk factors and/or age. Screening tests ordered at today's visit but not completed yet may show as past due. Also, please note that scanned in results may not display below.  Preventive Screenings:  Service Recommendations Previous Testing/Comments   Colorectal Cancer Screening  * Colonoscopy    * Fecal Occult Blood Test (FOBT)/Fecal Immunochemical Test (FIT)  * Fecal DNA/Cologuard Test  * Flexible Sigmoidoscopy Age: 45-75 years old   Colonoscopy: every 10 years (may be performed more frequently if at higher risk)  OR  FOBT/FIT: every 1 year  OR  Cologuard: every 3 years  OR  Sigmoidoscopy: every 5 years  Screening may be recommended earlier than age 45 if at higher risk for colorectal cancer. Also, an individualized decision between you and your healthcare provider will decide whether screening between the ages of 76-85 would be appropriate. Colonoscopy: 01/07/2016  FOBT/FIT: Not on file  Cologuard: Not on file  Sigmoidoscopy: Not on file    Screening Current     Breast Cancer Screening Age: 40+ years old  Frequency: every 1-2 years  Not required if history of left and right mastectomy Mammogram: 07/24/2023    Screening Current   Cervical Cancer Screening Between the ages of 21-29, pap smear recommended once every 3 years.   Between the ages of 30-65, can perform pap smear with HPV co-testing every 5 years.   Recommendations may differ for women with a history of total hysterectomy, cervical cancer, or abnormal pap smears in past. Pap Smear: 11/17/2022    Screening Current   Hepatitis C Screening Once for adults born between 1945 and 1965  More frequently in patients at  high risk for Hepatitis C Hep C Antibody: 07/08/2019    Screening Current   Diabetes Screening 1-2 times per year if you're at risk for diabetes or have pre-diabetes Fasting glucose: 87 mg/dL (2/20/2023)  A1C: 5.4 % (2/20/2023)      Cholesterol Screening Once every 5 years if you don't have a lipid disorder. May order more often based on risk factors. Lipid panel: 02/20/2023    Screening Not Indicated  History Lipid Disorder     Other Preventive Screenings Covered by Medicare:  Abdominal Aortic Aneurysm (AAA) Screening: covered once if your at risk. You're considered to be at risk if you have a family history of AAA.  Lung Cancer Screening: covers low dose CT scan once per year if you meet all of the following conditions: (1) Age 55-77; (2) No signs or symptoms of lung cancer; (3) Current smoker or have quit smoking within the last 15 years; (4) You have a tobacco smoking history of at least 20 pack years (packs per day multiplied by number of years you smoked); (5) You get a written order from a healthcare provider.  Glaucoma Screening: covered annually if you're considered high risk: (1) You have diabetes OR (2) Family history of glaucoma OR (3)  aged 50 and older OR (4)  American aged 65 and older  Osteoporosis Screening: covered every 2 years if you meet one of the following conditions: (1) You're estrogen deficient and at risk for osteoporosis based off medical history and other findings; (2) Have a vertebral abnormality; (3) On glucocorticoid therapy for more than 3 months; (4) Have primary hyperparathyroidism; (5) On osteoporosis medications and need to assess response to drug therapy.   Last bone density test (DXA Scan): 07/25/2018.  HIV Screening: covered annually if you're between the age of 15-65. Also covered annually if you are younger than 15 and older than 65 with risk factors for HIV infection. For pregnant patients, it is covered up to 3 times per  pregnancy.    Immunizations:  Immunization Recommendations   Influenza Vaccine Annual influenza vaccination during flu season is recommended for all persons aged >= 6 months who do not have contraindications   Pneumococcal Vaccine   * Pneumococcal conjugate vaccine = PCV13 (Prevnar 13), PCV15 (Vaxneuvance), PCV20 (Prevnar 20)  * Pneumococcal polysaccharide vaccine = PPSV23 (Pneumovax) Adults 19-65 yo with certain risk factors or if 65+ yo  If never received any pneumonia vaccine: recommend Prevnar 20 (PCV20)  Give PCV20 if previously received 1 dose of PCV13 or PPSV23   Hepatitis B Vaccine 3 dose series if at intermediate or high risk (ex: diabetes, end stage renal disease, liver disease)   Respiratory syncytial virus (RSV) Vaccine - COVERED BY MEDICARE PART D  * RSVPreF3 (Arexvy) CDC recommends that adults 60 years of age and older may receive a single dose of RSV vaccine using shared clinical decision-making (SCDM)   Tetanus (Td) Vaccine - COST NOT COVERED BY MEDICARE PART B Following completion of primary series, a booster dose should be given every 10 years to maintain immunity against tetanus. Td may also be given as tetanus wound prophylaxis.   Tdap Vaccine - COST NOT COVERED BY MEDICARE PART B Recommended at least once for all adults. For pregnant patients, recommended with each pregnancy.   Shingles Vaccine (Shingrix) - COST NOT COVERED BY MEDICARE PART B  2 shot series recommended in those 19 years and older who have or will have weakened immune systems or those 50 years and older     Health Maintenance Due:      Topic Date Due   • Breast Cancer Screening: Mammogram  07/24/2025   • Colorectal Cancer Screening  01/07/2026   • HIV Screening  Completed   • Hepatitis C Screening  Completed     Immunizations Due:      Topic Date Due   • COVID-19 Vaccine (6 - 2023-24 season) 09/01/2023   • Influenza Vaccine (1) 09/01/2024     Advance Directives   What are advance directives?  Advance directives are legal  documents that state your wishes and plans for medical care. These plans are made ahead of time in case you lose your ability to make decisions for yourself. Advance directives can apply to any medical decision, such as the treatments you want, and if you want to donate organs.   What are the types of advance directives?  There are many types of advance directives, and each state has rules about how to use them. You may choose a combination of any of the following:  Living will:  This is a written record of the treatment you want. You can also choose which treatments you do not want, which to limit, and which to stop at a certain time. This includes surgery, medicine, IV fluid, and tube feedings.   Durable power of  for healthcare (DPAHC):  This is a written record that states who you want to make healthcare choices for you when you are unable to make them for yourself. This person, called a proxy, is usually a family member or a friend. You may choose more than 1 proxy.  Do not resuscitate (DNR) order:  A DNR order is used in case your heart stops beating or you stop breathing. It is a request not to have certain forms of treatment, such as CPR. A DNR order may be included in other types of advance directives.  Medical directive:  This covers the care that you want if you are in a coma, near death, or unable to make decisions for yourself. You can list the treatments you want for each condition. Treatment may include pain medicine, surgery, blood transfusions, dialysis, IV or tube feedings, and a ventilator (breathing machine).  Values history:  This document has questions about your views, beliefs, and how you feel and think about life. This information can help others choose the care that you would choose.  Why are advance directives important?  An advance directive helps you control your care. Although spoken wishes may be used, it is better to have your wishes written down. Spoken wishes can be  misunderstood, or not followed. Treatments may be given even if you do not want them. An advance directive may make it easier for your family to make difficult choices about your care.   Urinary Incontinence   Urinary incontinence (UI)  is when you lose control of your bladder. UI develops because your bladder cannot store or empty urine properly. The 3 most common types of UI are stress incontinence, urge incontinence, or both.  Medicines:   May be given to help strengthen your bladder control. Report any side effects of medication to your healthcare provider.  Do pelvic muscle exercises often:  Your pelvic muscles help you stop urinating. Squeeze these muscles tight for 5 seconds, then relax for 5 seconds. Gradually work up to squeezing for 10 seconds. Do 3 sets of 15 repetitions a day, or as directed. This will help strengthen your pelvic muscles and improve bladder control.  Train your bladder:  Go to the bathroom at set times, such as every 2 hours, even if you do not feel the urge to go. You can also try to hold your urine when you feel the urge to go. For example, hold your urine for 5 minutes when you feel the urge to go. As that becomes easier, hold your urine for 10 minutes.   Self-care:   Keep a UI record.  Write down how often you leak urine and how much you leak. Make a note of what you were doing when you leaked urine.  Drink liquids as directed. You may need to limit the amount of liquid you drink to help control your urine leakage. Do not drink any liquid right before you go to bed. Limit or do not have drinks that contain caffeine or alcohol.   Prevent constipation.  Eat a variety of high-fiber foods. Good examples are high-fiber cereals, beans, vegetables, and whole-grain breads. Walking is the best way to trigger your intestines to have a bowel movement.  Exercise regularly and maintain a healthy weight.  Weight loss and exercise will decrease pressure on your bladder and help you control your  leakage.   Use a catheter as directed  to help empty your bladder. A catheter is a tiny, plastic tube that is put into your bladder to drain your urine.   Go to behavior therapy as directed.  Behavior therapy may be used to help you learn to control your urge to urinate.    Weight Management   Why it is important to manage your weight:  Being overweight increases your risk of health conditions such as heart disease, high blood pressure, type 2 diabetes, and certain types of cancer. It can also increase your risk for osteoarthritis, sleep apnea, and other respiratory problems. Aim for a slow, steady weight loss. Even a small amount of weight loss can lower your risk of health problems.  How to lose weight safely:  A safe and healthy way to lose weight is to eat fewer calories and get regular exercise. You can lose up about 1 pound a week by decreasing the number of calories you eat by 500 calories each day.   Healthy meal plan for weight management:  A healthy meal plan includes a variety of foods, contains fewer calories, and helps you stay healthy. A healthy meal plan includes the following:  Eat whole-grain foods more often.  A healthy meal plan should contain fiber. Fiber is the part of grains, fruits, and vegetables that is not broken down by your body. Whole-grain foods are healthy and provide extra fiber in your diet. Some examples of whole-grain foods are whole-wheat breads and pastas, oatmeal, brown rice, and bulgur.  Eat a variety of vegetables every day.  Include dark, leafy greens such as spinach, kale, yoandy greens, and mustard greens. Eat yellow and orange vegetables such as carrots, sweet potatoes, and winter squash.   Eat a variety of fruits every day.  Choose fresh or canned fruit (canned in its own juice or light syrup) instead of juice. Fruit juice has very little or no fiber.  Eat low-fat dairy foods.  Drink fat-free (skim) milk or 1% milk. Eat fat-free yogurt and low-fat cottage cheese. Try  low-fat cheeses such as mozzarella and other reduced-fat cheeses.  Choose meat and other protein foods that are low in fat.  Choose beans or other legumes such as split peas or lentils. Choose fish, skinless poultry (chicken or turkey), or lean cuts of red meat (beef or pork). Before you cook meat or poultry, cut off any visible fat.   Use less fat and oil.  Try baking foods instead of frying them. Add less fat, such as margarine, sour cream, regular salad dressing and mayonnaise to foods. Eat fewer high-fat foods. Some examples of high-fat foods include french fries, doughnuts, ice cream, and cakes.  Eat fewer sweets.  Limit foods and drinks that are high in sugar. This includes candy, cookies, regular soda, and sweetened drinks.  Exercise:  Exercise at least 30 minutes per day on most days of the week. Some examples of exercise include walking, biking, dancing, and swimming. You can also fit in more physical activity by taking the stairs instead of the elevator or parking farther away from stores. Ask your healthcare provider about the best exercise plan for you.      © Copyright [employer] 2018 Information is for End User's use only and may not be sold, redistributed or otherwise used for commercial purposes. All illustrations and images included in CareNotes® are the copyrighted property of [employer] or [employer]

## 2024-07-08 NOTE — PROGRESS NOTES
Ambulatory Visit  Name: Carole Burrows      : 1960      MRN: [id number]  Encounter Provider: LALA Lloyd  Encounter Date: 2024   Encounter department: [facility]        Assessment   Carole was seen today for medicare wellness visit.    Diagnoses and all orders for this visit:    Vitamin D deficiency  -     Vitamin D 25 hydroxy; Future    Hyperlipidemia, unspecified hyperlipidemia type  -     Lipid panel; Future    Hypothyroidism, unspecified type  -     TSH, 3rd generation with Free T4 reflex; Future    Hypothyroidism due to medication  -     TSH, 3rd generation with Free T4 reflex; Future    Chronic atrial fibrillation (HCC)  -     CBC and differential; Future  -     Comprehensive metabolic panel; Future    Class 2 severe obesity due to excess calories with serious comorbidity and body mass index (BMI) of 37.0 to 37.9 in adult (HCC)  -     CBC and differential; Future  -     Comprehensive metabolic panel; Future  -     Lipid panel; Future  -     TSH, 3rd generation with Free T4 reflex; Future  -     Vitamin D 25 hydroxy; Future    Mood disorder (HCC)    Platelets decreased (HCC)    Medication-induced movement disorder    Mental disability    Leg edema    Medicare annual wellness visit, subsequent     Atrial fibrillation (HCC)  Follows with cardiology   Rate controlled   Continue with Xaralto and Cardizem     Hypothyroidism  Lab Results   Component Value Date    [id number] 2.340 2023     Continue levothyroxine 100 mcg daily     Medication-induced movement disorder  2/2 previous antipsychotic use   No longer using     Platelets decreased (HCC)  Lab Results   Component Value Date    WBC 7.57 2023    HGB 14.4 2023    HCT 45.0 2023    MCV 91 2023     2023     Stable, continue to monitor     Mental disability  Lives in a group home, doing well     Leg edema  +1 BLLE, non pitting   Recommend CHILO stockings, elevation at  rest   Limited dietary sodium     Hyperlipidemia  Lab Results   Component Value Date    CHOLESTEROL 160 02/20/2023    CHOLESTEROL 133 06/10/2021    CHOLESTEROL 143 08/04/2020     Lab Results   Component Value Date    HDL 53 02/20/2023    HDL 47 06/10/2021    HDL 47 08/04/2020     Lab Results   Component Value Date    TRIG 113 02/20/2023    TRIG 109 06/10/2021    TRIG 134 08/04/2020     Lab Results   Component Value Date    NONHDLC 107 02/20/2023    NONHDLC 86 06/10/2021    NONHDLC 96 08/04/2020     Lab Results   Component Value Date    LDLCALC 84 02/20/2023       Continue pravastatin daily     Class 2 severe obesity due to excess calories with serious comorbidity and body mass index (BMI) of 37.0 to 37.9 in adult (HCC)  Wt Readings from Last 3 Encounters:   07/08/24 84.8 kg (187 lb)   06/14/24 84.7 kg (186 lb 12.8 oz)   11/30/23 88 kg (194 lb)     -Encouraged diet and lifestyle changes: decrease processed foods (cakes, cookies, chips, soda), decrease total carbohydrate intake, decrease fried/fatty foods, increase fruits and vegetables, increase lean proteins (chicken, turkey), increase healthy fats (avocado, fish, nuts), drink plenty of water (at least four 16 oz bottles per day)         BMI Counseling: Body mass index is 35.33 kg/m². The BMI is above normal. Nutrition recommendations include decreasing portion sizes, decreasing fast food intake and consuming healthier snacks. Exercise recommendations include exercising 3-5 times per week. Rationale for BMI follow-up plan is due to patient being overweight or obese.     Depression Screening and Follow-up Plan: Patient was screened for depression during today's encounter. They screened negative with a PHQ-2 score of 0.      Preventive health issues were discussed with patient, and age appropriate screening tests were ordered as noted in patient's After Visit Summary. Personalized health advice and appropriate referrals for health education or preventive services  given if needed, as noted in patient's After Visit Summary.    History of Present Illness     Carole Burrows is a 63 y.o. female who  has a past medical history of Anxiety disorder and Atrial fibrillation (HCC) who presented to the office today for follow up and Medicare Visit. Accompanied by aid. The staff member reports that patient had several low BP's last week with SBP less than 100. Cardiology directed them to hold Lasix for a few days. She has since restarted medication and staff members have been pushing fluids as they notice she has not been drinking much water. Note that BP has improved. BP is WNL in office today.     The following portions of the patient's history were reviewed and updated as appropriate: allergies, current medications, past family history, past medical history, past social history, past surgical history and problem list.         Patient Care Team:  LALA Lloyd as PCP - General (Family Medicine)  MD Ho Shore, LALA Cui    Review of Systems   Constitutional:  Negative for activity change, appetite change, chills, fatigue, fever and unexpected weight change.   HENT:  Negative for hearing loss, nosebleeds, sinus pain, sneezing, sore throat and trouble swallowing.    Eyes:  Negative for photophobia and visual disturbance.   Respiratory:  Negative for cough, chest tightness, shortness of breath and wheezing.    Cardiovascular:  Negative for chest pain, palpitations and leg swelling.   Gastrointestinal:  Negative for abdominal pain, constipation, nausea and vomiting.   Genitourinary:  Negative for decreased urine volume, difficulty urinating, dysuria, flank pain, genital sores, hematuria and urgency.   Musculoskeletal:  Negative for back pain and gait problem.   Skin:  Negative for pallor, rash and wound.   Neurological:  Negative for dizziness, seizures, syncope, weakness, numbness and headaches.   Hematological:  Negative for adenopathy. Does  not bruise/bleed easily.   Psychiatric/Behavioral:  Negative for confusion, hallucinations, self-injury, sleep disturbance and suicidal ideas. The patient is not nervous/anxious.      Medical History Reviewed by provider this encounter:       Annual Wellness Visit Questionnaire   Carole is here for her Subsequent Wellness visit.     Health Risk Assessment:   Patient rates overall health as very good. Patient feels that their physical health rating is same. Patient is satisfied with their life. Eyesight was rated as same. Hearing was rated as same. Patient feels that their emotional and mental health rating is slightly better. Patients states they are never, rarely angry. Patient states they are sometimes unusually tired/fatigued. Pain experienced in the last 7 days has been none. Patient states that she has experienced no weight loss or gain in last 6 months.     Depression Screening:   PHQ-2 Score: 0      Fall Risk Screening:   In the past year, patient has experienced: no history of falling in past year      Urinary Incontinence Screening:   Patient has leaked urine accidently in the last six months.     Home Safety:  Patient does not have trouble with stairs inside or outside of their home. Patient has working smoke alarms and has working carbon monoxide detector. Home safety hazards include: none.     Nutrition:   Current diet is Regular.     Medications:   Patient is not currently taking any over-the-counter supplements. Patient is not able to manage medications. Medications are managed for her    Activities of Daily Living (ADLs)/Instrumental Activities of Daily Living (IADLs):   Walk and transfer into and out of bed and chair?: Yes  Dress and groom yourself?: Yes    Bathe or shower yourself?: No    Feed yourself? Yes  Do your laundry/housekeeping?: No  Manage your money, pay your bills and track your expenses?: No  Make your own meals?: No    Do your own shopping?: No    ADL comments: Carole is assisted with  all ADLs    Previous Hospitalizations:   Any hospitalizations or ED visits within the last 12 months?: No      Hospitalization Comments: No hospitalizations    Advance Care Planning:   Living will: No    Durable POA for healthcare: Yes    Advanced directive: No    ACP document given: Yes      PREVENTIVE SCREENINGS      Cardiovascular Screening:    General: Screening Not Indicated and History Lipid Disorder      Diabetes Screening:     General: Risks and Benefits Discussed      Colorectal Cancer Screening:     General: Screening Current      Breast Cancer Screening:     General: Screening Current      Cervical Cancer Screening:    General: Screening Current      Osteoporosis Screening:    General: Risks and Benefits Discussed    Due for: DXA Axial      Abdominal Aortic Aneurysm (AAA) Screening:        General: Screening Not Indicated      Lung Cancer Screening:     General: Screening Not Indicated      Hepatitis C Screening:    General: Screening Current    Screening, Brief Intervention, and Referral to Treatment (SBIRT)    Screening  Typical number of drinks in a day: 0  Typical number of drinks in a week: 0  Interpretation: Low risk drinking behavior.    AUDIT-C Screenin) How often did you have a drink containing alcohol in the past year? never  2) How many drinks did you have on a typical day when you were drinking in the past year? 0  3) How often did you have 6 or more drinks on one occasion in the past year? never    AUDIT-C Score: 0  Interpretation: Score 0-2 (female): Negative screen for alcohol misuse    Single Item Drug Screening:  How often have you used an illegal drug (including marijuana) or a prescription medication for non-medical reasons in the past year? never    Single Item Drug Screen Score: 0  Interpretation: Negative screen for possible drug use disorder    Social Determinants of Health     Financial Resource Strain: Low Risk  (2024)    Overall Financial Resource Strain ([facility])      "Difficulty of Paying Living Expenses: Not hard at all   Food Insecurity: No Food Insecurity (7/7/2024)    Hunger Vital Sign     Worried About Running Out of Food in the Last Year: Never true     Ran Out of Food in the Last Year: Never true   Transportation Needs: No Transportation Needs (7/7/2024)    PRAPARE - Transportation     Lack of Transportation (Medical): No     Lack of Transportation (Non-Medical): No   Housing Stability: Low Risk  (7/7/2024)    Housing Stability Vital Sign     Unable to Pay for Housing in the Last Year: No     Number of Times Moved in the Last Year: 0     Homeless in the Last Year: No   Utilities: Not At Risk (7/7/2024)    [facility] Utilities     Threatened with loss of utilities: No     No results found.    Objective     /66 (BP Location: Left arm, Patient Position: Sitting, Cuff Size: Large)   Pulse 68   Temp (!) 96.4 °F (35.8 °C) (Temporal)   Resp 16   Ht 5' 1\" (1.549 m)   Wt 84.8 kg (187 lb)   SpO2 96%   BMI 35.33 kg/m²     Physical Exam  Vitals and nursing note reviewed.   Constitutional:       General: She is not in acute distress.     Appearance: Normal appearance. She is not diaphoretic.   HENT:      Head: Normocephalic.      Right Ear: Tympanic membrane and external ear normal.      Left Ear: Tympanic membrane and external ear normal.      Nose: Nose normal.      Mouth/Throat:      Mouth: Mucous membranes are moist.   Eyes:      General:         Right eye: No discharge.         Left eye: No discharge.      Extraocular Movements: Extraocular movements intact.      Conjunctiva/sclera: Conjunctivae normal.      Pupils: Pupils are equal, round, and reactive to light.   Cardiovascular:      Rate and Rhythm: Normal rate. Rhythm irregular.   Pulmonary:      Effort: Pulmonary effort is normal. No respiratory distress.      Breath sounds: Normal breath sounds.   Abdominal:      General: Bowel sounds are normal. There is no distension.      Palpations: Abdomen is soft. "   Musculoskeletal:         General: Normal range of motion.      Cervical back: Normal range of motion and neck supple. No rigidity.      Right lower leg: Edema present.      Left lower leg: Edema present.   Lymphadenopathy:      Cervical: No cervical adenopathy.   Skin:     General: Skin is warm and dry.      Capillary Refill: Capillary refill takes less than 2 seconds.      Findings: No rash.   Neurological:      Mental Status: She is alert. Mental status is at baseline.      Motor: Tremor present.   Psychiatric:         Mood and Affect: Mood normal.       Administrative Statements

## 2024-07-08 NOTE — ASSESSMENT & PLAN NOTE
Wt Readings from Last 3 Encounters:   07/08/24 84.8 kg (187 lb)   06/14/24 84.7 kg (186 lb 12.8 oz)   11/30/23 88 kg (194 lb)     -Encouraged diet and lifestyle changes: decrease processed foods (cakes, cookies, chips, soda), decrease total carbohydrate intake, decrease fried/fatty foods, increase fruits and vegetables, increase lean proteins (chicken, turkey), increase healthy fats (avocado, fish, nuts), drink plenty of water (at least four 16 oz bottles per day)

## 2024-07-08 NOTE — ASSESSMENT & PLAN NOTE
Lab Results   Component Value Date    CHOLESTEROL 160 02/20/2023    CHOLESTEROL 133 06/10/2021    CHOLESTEROL 143 08/04/2020     Lab Results   Component Value Date    HDL 53 02/20/2023    HDL 47 06/10/2021    HDL 47 08/04/2020     Lab Results   Component Value Date    TRIG 113 02/20/2023    TRIG 109 06/10/2021    TRIG 134 08/04/2020     Lab Results   Component Value Date    NONHDLC 107 02/20/2023    NONHDLC 86 06/10/2021    NONHDLC 96 08/04/2020     Lab Results   Component Value Date    LDLCALC 84 02/20/2023       Continue pravastatin daily

## 2024-07-22 DIAGNOSIS — R32 URINARY INCONTINENCE, UNSPECIFIED TYPE: ICD-10-CM

## 2024-07-22 DIAGNOSIS — E78.5 HYPERLIPIDEMIA, UNSPECIFIED HYPERLIPIDEMIA TYPE: ICD-10-CM

## 2024-07-23 RX ORDER — DIAPER,BRIEF,ADULT, DISPOSABLE
EACH MISCELLANEOUS
Qty: 90 EACH | Refills: 0 | Status: SHIPPED | OUTPATIENT
Start: 2024-07-23

## 2024-07-23 RX ORDER — CHLORAL HYDRATE 500 MG
1000 CAPSULE ORAL DAILY
Qty: 90 CAPSULE | Refills: 0 | Status: SHIPPED | OUTPATIENT
Start: 2024-07-23

## 2024-07-24 ENCOUNTER — HOSPITAL ENCOUNTER (OUTPATIENT)
Dept: RADIOLOGY | Facility: IMAGING CENTER | Age: 64
Discharge: HOME | End: 2024-07-24
Payer: MEDICARE

## 2024-07-24 VITALS — BODY MASS INDEX: 35.3 KG/M2 | HEIGHT: 61 IN | WEIGHT: 187 LBS

## 2024-07-24 DIAGNOSIS — Z12.31 ENCOUNTER FOR SCREENING MAMMOGRAM FOR BREAST CANCER: ICD-10-CM

## 2024-07-24 PROCEDURE — 77067 SCR MAMMO BI INCL CAD: CPT

## 2024-07-24 PROCEDURE — 77063 BREAST TOMOSYNTHESIS BI: CPT

## 2024-08-24 ENCOUNTER — APPOINTMENT (OUTPATIENT)
Dept: LAB | Age: 64
End: 2024-08-24
Payer: MEDICARE

## 2024-08-24 DIAGNOSIS — E78.5 HYPERLIPIDEMIA, UNSPECIFIED HYPERLIPIDEMIA TYPE: ICD-10-CM

## 2024-08-24 DIAGNOSIS — E55.9 VITAMIN D DEFICIENCY: ICD-10-CM

## 2024-08-24 DIAGNOSIS — E66.01 CLASS 2 SEVERE OBESITY DUE TO EXCESS CALORIES WITH SERIOUS COMORBIDITY AND BODY MASS INDEX (BMI) OF 37.0 TO 37.9 IN ADULT (HCC): ICD-10-CM

## 2024-08-24 DIAGNOSIS — I48.91 ATRIAL FIBRILLATION, UNSPECIFIED TYPE (HCC): ICD-10-CM

## 2024-08-24 DIAGNOSIS — E03.9 HYPOTHYROIDISM, UNSPECIFIED TYPE: ICD-10-CM

## 2024-08-24 DIAGNOSIS — E03.2 HYPOTHYROIDISM DUE TO MEDICATION: ICD-10-CM

## 2024-08-24 DIAGNOSIS — I48.20 CHRONIC ATRIAL FIBRILLATION (HCC): ICD-10-CM

## 2024-08-24 LAB
25(OH)D3 SERPL-MCNC: 46.1 NG/ML (ref 30–100)
ALBUMIN SERPL BCG-MCNC: 4.4 G/DL (ref 3.5–5)
ALP SERPL-CCNC: 110 U/L (ref 34–104)
ALT SERPL W P-5'-P-CCNC: 9 U/L (ref 7–52)
ANION GAP SERPL CALCULATED.3IONS-SCNC: 12 MMOL/L (ref 4–13)
AST SERPL W P-5'-P-CCNC: 14 U/L (ref 13–39)
BASOPHILS # BLD AUTO: 0.04 THOUSANDS/ÂΜL (ref 0–0.1)
BASOPHILS NFR BLD AUTO: 1 % (ref 0–1)
BILIRUB SERPL-MCNC: 1.65 MG/DL (ref 0.2–1)
BUN SERPL-MCNC: 17 MG/DL (ref 5–25)
CALCIUM SERPL-MCNC: 9.8 MG/DL (ref 8.4–10.2)
CHLORIDE SERPL-SCNC: 101 MMOL/L (ref 96–108)
CHOLEST SERPL-MCNC: 146 MG/DL
CO2 SERPL-SCNC: 29 MMOL/L (ref 21–32)
CREAT SERPL-MCNC: 0.96 MG/DL (ref 0.6–1.3)
EOSINOPHIL # BLD AUTO: 0.12 THOUSAND/ÂΜL (ref 0–0.61)
EOSINOPHIL NFR BLD AUTO: 2 % (ref 0–6)
ERYTHROCYTE [DISTWIDTH] IN BLOOD BY AUTOMATED COUNT: 12.8 % (ref 11.6–15.1)
GFR SERPL CREATININE-BSD FRML MDRD: 63 ML/MIN/1.73SQ M
GLUCOSE P FAST SERPL-MCNC: 90 MG/DL (ref 65–99)
HCT VFR BLD AUTO: 46.3 % (ref 34.8–46.1)
HDLC SERPL-MCNC: 50 MG/DL
HGB BLD-MCNC: 15 G/DL (ref 11.5–15.4)
IMM GRANULOCYTES # BLD AUTO: 0.01 THOUSAND/UL (ref 0–0.2)
IMM GRANULOCYTES NFR BLD AUTO: 0 % (ref 0–2)
LDLC SERPL CALC-MCNC: 74 MG/DL (ref 0–100)
LYMPHOCYTES # BLD AUTO: 1.24 THOUSANDS/ÂΜL (ref 0.6–4.47)
LYMPHOCYTES NFR BLD AUTO: 19 % (ref 14–44)
MCH RBC QN AUTO: 29.4 PG (ref 26.8–34.3)
MCHC RBC AUTO-ENTMCNC: 32.4 G/DL (ref 31.4–37.4)
MCV RBC AUTO: 91 FL (ref 82–98)
MONOCYTES # BLD AUTO: 0.31 THOUSAND/ÂΜL (ref 0.17–1.22)
MONOCYTES NFR BLD AUTO: 5 % (ref 4–12)
NEUTROPHILS # BLD AUTO: 4.71 THOUSANDS/ÂΜL (ref 1.85–7.62)
NEUTS SEG NFR BLD AUTO: 73 % (ref 43–75)
NONHDLC SERPL-MCNC: 96 MG/DL
NRBC BLD AUTO-RTO: 0 /100 WBCS
PLATELET # BLD AUTO: 156 THOUSANDS/UL (ref 149–390)
PMV BLD AUTO: 11.5 FL (ref 8.9–12.7)
POTASSIUM SERPL-SCNC: 3.8 MMOL/L (ref 3.5–5.3)
PROT SERPL-MCNC: 7.4 G/DL (ref 6.4–8.4)
RBC # BLD AUTO: 5.11 MILLION/UL (ref 3.81–5.12)
SODIUM SERPL-SCNC: 142 MMOL/L (ref 135–147)
TRIGL SERPL-MCNC: 110 MG/DL
TSH SERPL DL<=0.05 MIU/L-ACNC: 1.33 UIU/ML (ref 0.45–4.5)
WBC # BLD AUTO: 6.43 THOUSAND/UL (ref 4.31–10.16)

## 2024-08-24 PROCEDURE — 85025 COMPLETE CBC W/AUTO DIFF WBC: CPT

## 2024-08-24 PROCEDURE — 80053 COMPREHEN METABOLIC PANEL: CPT

## 2024-08-24 PROCEDURE — 36415 COLL VENOUS BLD VENIPUNCTURE: CPT

## 2024-08-24 PROCEDURE — 80061 LIPID PANEL: CPT

## 2024-08-24 PROCEDURE — 82306 VITAMIN D 25 HYDROXY: CPT

## 2024-08-24 PROCEDURE — 84443 ASSAY THYROID STIM HORMONE: CPT

## 2024-09-13 DIAGNOSIS — E80.6 HYPERBILIRUBINEMIA: Primary | ICD-10-CM

## 2024-10-15 ENCOUNTER — CLINICAL SUPPORT (OUTPATIENT)
Dept: FAMILY MEDICINE CLINIC | Facility: CLINIC | Age: 64
End: 2024-10-15

## 2024-10-15 DIAGNOSIS — Z23 ENCOUNTER FOR IMMUNIZATION: Primary | ICD-10-CM

## 2024-10-15 PROCEDURE — G0008 ADMIN INFLUENZA VIRUS VAC: HCPCS

## 2024-10-15 PROCEDURE — 90673 RIV3 VACCINE NO PRESERV IM: CPT

## 2024-11-05 DIAGNOSIS — I48.91 NEW ONSET ATRIAL FIBRILLATION (HCC): ICD-10-CM

## 2024-11-05 DIAGNOSIS — E78.5 HYPERLIPIDEMIA, UNSPECIFIED HYPERLIPIDEMIA TYPE: ICD-10-CM

## 2024-11-05 DIAGNOSIS — R60.9 EDEMA, UNSPECIFIED TYPE: ICD-10-CM

## 2024-11-05 DIAGNOSIS — E03.9 HYPOTHYROIDISM, UNSPECIFIED TYPE: ICD-10-CM

## 2024-11-05 DIAGNOSIS — E55.9 VITAMIN D DEFICIENCY: ICD-10-CM

## 2024-11-05 RX ORDER — RIVAROXABAN 20 MG/1
TABLET, FILM COATED ORAL
Qty: 90 TABLET | Refills: 1 | Status: SHIPPED | OUTPATIENT
Start: 2024-11-05

## 2024-11-05 RX ORDER — FUROSEMIDE 20 MG/1
TABLET ORAL
Qty: 90 TABLET | Refills: 1 | Status: SHIPPED | OUTPATIENT
Start: 2024-11-05

## 2024-11-06 RX ORDER — LEVOTHYROXINE SODIUM 100 UG/1
TABLET ORAL
Qty: 31 TABLET | Refills: 4 | Status: SHIPPED | OUTPATIENT
Start: 2024-11-06

## 2024-11-06 RX ORDER — DILTIAZEM HYDROCHLORIDE 120 MG/1
CAPSULE, COATED, EXTENDED RELEASE ORAL
Qty: 31 CAPSULE | Refills: 4 | Status: SHIPPED | OUTPATIENT
Start: 2024-11-06

## 2024-11-06 RX ORDER — CALCIUM CARBONATE/VITAMIN D3 600 MG-10
1 TABLET ORAL 2 TIMES DAILY
Qty: 62 TABLET | Refills: 4 | Status: SHIPPED | OUTPATIENT
Start: 2024-11-06

## 2024-11-06 RX ORDER — PRAVASTATIN SODIUM 40 MG
TABLET ORAL
Qty: 31 TABLET | Refills: 4 | Status: SHIPPED | OUTPATIENT
Start: 2024-11-06

## 2024-11-06 RX ORDER — OMEGA-3S/DHA/EPA/FISH OIL/D3 300MG-1000
CAPSULE ORAL
Qty: 93 TABLET | Refills: 4 | Status: SHIPPED | OUTPATIENT
Start: 2024-11-06

## 2024-12-05 ENCOUNTER — ANNUAL EXAM (OUTPATIENT)
Dept: OBGYN CLINIC | Facility: MEDICAL CENTER | Age: 64
End: 2024-12-05
Payer: MEDICARE

## 2024-12-05 VITALS
WEIGHT: 187 LBS | BODY MASS INDEX: 35.3 KG/M2 | SYSTOLIC BLOOD PRESSURE: 132 MMHG | HEIGHT: 61 IN | DIASTOLIC BLOOD PRESSURE: 82 MMHG

## 2024-12-05 DIAGNOSIS — Z12.31 ENCOUNTER FOR SCREENING MAMMOGRAM FOR MALIGNANT NEOPLASM OF BREAST: ICD-10-CM

## 2024-12-05 DIAGNOSIS — Z01.419 ENCOUNTER FOR GYNECOLOGICAL EXAMINATION (GENERAL) (ROUTINE) WITHOUT ABNORMAL FINDINGS: Primary | ICD-10-CM

## 2024-12-05 PROCEDURE — G0101 CA SCREEN;PELVIC/BREAST EXAM: HCPCS | Performed by: CLINICAL NURSE SPECIALIST

## 2024-12-05 NOTE — PROGRESS NOTES
Name: Carole Burrows      : 1960      MRN: [id number]  Encounter Provider: LALA Escobar  Encounter Date: 2024   Encounter department: [facility] [place]    :  Assessment & Plan  Encounter for gynecological examination (general) (routine) without abnormal findings  Annual GYN examination completed today.   Pap not indicated due to hysterectomy. Attempted bimanual exam but didn't tolerate it well.   Health maintenance reviewed/updated as appropriate  Risk prevention and anticipatory guidance provided to pt and caregiver.       Encounter for screening mammogram for malignant neoplasm of breast    Orders:    Mammo screening bilateral w 3d and cad; Future                      Subjective:      History of Present Illness     Carole Burrows is a 64 y.o. female. Here for Gynecologic Exam (Hysterectomy /Mammo 24 bi-rads 2/Colonoscopy 16)    Carole is here with one of her caregivers from group home. Pt disabled and has severe anxiety-limited verbalization, but she is able to answer some simple questions.History obtained from pt and confirmed with caregiver  Pt is postmenopausal.- prior hx of hysterectomy.  She denies any C/O abnormal vaginal discharge or irritation. She is incontinent at times and wears adult briefs.  Denies breast changes    Sexually active: no  She reports she feels safe at home.     Dietary calcium/vit D  intake: adequate- supplementation    HEALTH MAINTENANCE SCREENINGS:     Previous pap smears and ASCCP screening guidelines have been reviewed.   Last Pap: N/A- s/p hysterectomy  Last mammogram:  2024 BR-2  Last Colon Cancer Screening: colonoscopy: 2016 Cologuard:Not on file. Recall 10y  Last Dexa Scan: 2018- ordered last yr, but not yet completed      Substance Abuse Screening Completed. See hx and flowsheet.    Review of Systems   Constitutional:  Negative for appetite change, chills, fatigue, fever and unexpected weight change.  "  HENT: Negative.     Eyes: Negative.    Respiratory:  Negative for chest tightness and shortness of breath.    Cardiovascular:  Negative for chest pain and palpitations.   Gastrointestinal:  Negative for abdominal pain, constipation and vomiting.   Endocrine: Negative for cold intolerance and heat intolerance.   Genitourinary:         As per HPI   Musculoskeletal:  Negative for back pain, joint swelling and neck pain.   Skin:  Negative for color change and rash.   Neurological:  Negative for dizziness, weakness and numbness.   Hematological:  Does not bruise/bleed easily.   Psychiatric/Behavioral: Negative.         The following portions of the patient's history were reviewed and updated as appropriate: allergies, current medications, past family history, past medical history, past social history, past surgical history, and problem list.         Objective   /82 (BP Location: Left arm, Patient Position: Sitting, Cuff Size: Standard)   Ht 5' 1\" (1.549 m)   Wt 84.8 kg (187 lb)   BMI 35.33 kg/m²     Physical Exam  Constitutional:       General: She is not in acute distress.     Appearance: Normal appearance.   Genitourinary:      Vulva and rectum normal.      Genitourinary Comments: Cervix/Uterus surgically absent  Attempted pelvic exam but pt not tolerating well.       Right Labia: No rash or lesions.     Left Labia: No lesions, skin changes or rash.     No vaginal discharge.      Cervix is absent.      Uterus is absent.      No urethral prolapse present.      Pelvic exam was performed with patient in the lithotomy position.   Breasts:     Breasts are symmetrical.      Right: No inverted nipple, mass, nipple discharge, skin change or tenderness.      Left: No inverted nipple, mass, nipple discharge, skin change or tenderness.   HENT:      Head: Normocephalic and atraumatic.   Cardiovascular:      Rate and Rhythm: Normal rate.      Heart sounds: No murmur heard.  Pulmonary:      Effort: Pulmonary effort is " normal.      Breath sounds: Normal breath sounds.   Abdominal:      General: There is no distension.      Palpations: Abdomen is soft.      Tenderness: There is no abdominal tenderness.   Musculoskeletal:         General: Normal range of motion.      Cervical back: Normal range of motion.   Lymphadenopathy:      Cervical: No cervical adenopathy.   Neurological:      Mental Status: She is alert and oriented to person, place, and time.   Skin:     General: Skin is warm and dry.   Psychiatric:         Attention and Perception: She is attentive.         Mood and Affect: Mood is not anxious.         Cognition and Memory: Cognition is impaired.   Vitals reviewed.

## 2025-01-10 ENCOUNTER — OFFICE VISIT (OUTPATIENT)
Dept: URGENT CARE | Age: 65
End: 2025-01-10
Payer: MEDICARE

## 2025-01-10 VITALS
DIASTOLIC BLOOD PRESSURE: 70 MMHG | SYSTOLIC BLOOD PRESSURE: 112 MMHG | HEART RATE: 91 BPM | RESPIRATION RATE: 18 BRPM | OXYGEN SATURATION: 95 % | TEMPERATURE: 97.5 F

## 2025-01-10 DIAGNOSIS — R39.9 UTI SYMPTOMS: Primary | ICD-10-CM

## 2025-01-10 LAB
SL AMB  POCT GLUCOSE, UA: ABNORMAL
SL AMB LEUKOCYTE ESTERASE,UA: ABNORMAL
SL AMB POCT BILIRUBIN,UA: ABNORMAL
SL AMB POCT BLOOD,UA: ABNORMAL
SL AMB POCT CLARITY,UA: ABNORMAL
SL AMB POCT COLOR,UA: YELLOW
SL AMB POCT KETONES,UA: ABNORMAL
SL AMB POCT NITRITE,UA: ABNORMAL
SL AMB POCT PH,UA: 7.5
SL AMB POCT SPECIFIC GRAVITY,UA: 1.01
SL AMB POCT URINE PROTEIN: ABNORMAL
SL AMB POCT UROBILINOGEN: ABNORMAL

## 2025-01-10 PROCEDURE — 87077 CULTURE AEROBIC IDENTIFY: CPT

## 2025-01-10 PROCEDURE — 81002 URINALYSIS NONAUTO W/O SCOPE: CPT

## 2025-01-10 PROCEDURE — 99213 OFFICE O/P EST LOW 20 MIN: CPT

## 2025-01-10 PROCEDURE — 87086 URINE CULTURE/COLONY COUNT: CPT

## 2025-01-10 NOTE — PROGRESS NOTES
Assessment/Plan  Urine dip grossly normal in office, urine culture results pending.   Continue to monitor symptoms, follow up with PCP if no relief within one week.     UTI symptoms [R39.9]  1. UTI symptoms  POCT urine dip    Urine culture        Patient Education     Urinary Incontinence, Adult ED   General Information   You came to the Emergency Department (ED) for urinary incontinence. This is the medical name for when you lose control of your bladder or leak urine. You may leak urine when you cough, sneeze, or laugh. You may have a very strong urge to go to the bathroom and not be able to make it in time. In some cases, you may not be able to empty your bladder all the way because of a large prostate. With help, many people can reduce the amount of leaking they have.  What care is needed at home?   Call your regular doctor to let them know you were in the ED. Make a follow-up appointment if you were told to.  Talk with your regular doctor if any of the medicines you take could be causing your problems.  Wear a pad to soak up any urine and keep it from irritating your skin.  Cut down on any foods or drinks that make your symptoms worse. Some people find that alcohol, caffeine, or spicy or acidic foods irritate the bladder.  If you are overweight, try to lose weight to help decrease the pressure on your bladder. Talk to your doctor or nurse about healthy ways to lose weight.  If you have diabetes, try to keep your blood sugar well controlled. If you take diabetes medicines, be sure to take them as you were told to.  If you take medicines that make you urinate more, talk with your doctor or nurse about when to take those medicines so you can be near a bathroom when you need to urinate.  Ask your regular doctor about bladder training and exercises to strengthen your pelvic floor muscles. These things can help reduce urine leak.  When do I need to get emergency help?   Return to the ED if:   You start to have very  bad pain in your back, shoulder, or belly.  When do I need to call the doctor?   You have a fever of 100.4°F (38°C) or higher or chills.  Your urine is cloudy, smells bad, or is bloody.  You have new or worsening symptoms.  Last Reviewed Date   2021-06-02  Consumer Information Use and Disclaimer   This generalized information is a limited summary of diagnosis, treatment, and/or medication information. It is not meant to be comprehensive and should be used as a tool to help the user understand and/or assess potential diagnostic and treatment options. It does NOT include all information about conditions, treatments, medications, side effects, or risks that may apply to a specific patient. It is not intended to be medical advice or a substitute for the medical advice, diagnosis, or treatment of a health care provider based on the health care provider's examination and assessment of a patient’s specific and unique circumstances. Patients must speak with a health care provider for complete information about their health, medical questions, and treatment options, including any risks or benefits regarding use of medications. This information does not endorse any treatments or medications as safe, effective, or approved for treating a specific patient. UpTo[employer], Inc[employer] and its affiliates disclaim any warranty or liability relating to this information or the use thereof. The use of this information is governed by the Terms of Use, available at https://www.[employer].com/en/know/clinical-effectiveness-terms   Copyright   Copyright © 2024 UpToDate, Inc. and its affiliates and/or licensors. All rights reserved.            Subjective:     Patient ID: Carole Burrows is a 64 y.o. female.      Reason For Visit / Chief Complaint  Chief Complaint   Patient presents with    Possible UTI     Incontinent at her day program and unsure if it was anxiety or the start of a UTI.          Patient is a 64 year old female with PMH significant  for atrial fibrillation, hypothyroidism, nephrolithiasis, who is a group home resident. She is accompanied by her caregiver today, who reports two episodes of urinary incontinence today. Patient is on low dose daily Lasix for edema, but caregiver reports that she is usually continent, and only has urinary incontinence when anxious or if she has a UTI. Caregiver denies signs of pain, bowel disturbance, fever or other behavioral abnormality.             Past Medical History:   Diagnosis Date    Anxiety disorder     Atrial fibrillation (HCC)     Hyperlipidemia 06/27/2012    Hypothyroidism 07/14/2015    Kidney stone     Mental disability 06/22/2016       Past Surgical History:   Procedure Laterality Date    HYSTERECTOMY      age 22    OOPHORECTOMY      AL CARDIOVERSION ELECTIVE ARRHYTHMIA EXTERNAL  8/23/2019            Family History   Problem Relation Age of Onset    Other Mother         neglect or abandonment     Other Father         neglect or abandonment     No Known Problems Sister     No Known Problems Maternal Grandmother     No Known Problems Maternal Grandfather     No Known Problems Paternal Grandmother     No Known Problems Paternal Grandfather        Review of Systems   Constitutional:  Negative for fatigue and fever.   HENT:  Negative for congestion, ear discharge, ear pain, postnasal drip, rhinorrhea, sinus pressure, sinus pain, sneezing and sore throat.    Eyes: Negative.  Negative for pain, discharge, redness and itching.   Respiratory: Negative.  Negative for apnea, cough, choking, chest tightness, shortness of breath, wheezing and stridor.    Cardiovascular: Negative.  Negative for chest pain and palpitations.   Gastrointestinal: Negative.  Negative for diarrhea, nausea and vomiting.   Endocrine: Negative.  Negative for polydipsia, polyphagia and polyuria.   Genitourinary: Negative.  Negative for decreased urine volume, difficulty urinating, dyspareunia, dysuria, enuresis, flank pain, frequency,  genital sores, hematuria, menstrual problem, pelvic pain, urgency, vaginal bleeding, vaginal discharge and vaginal pain.   Musculoskeletal: Negative.  Negative for arthralgias, back pain, myalgias, neck pain and neck stiffness.   Skin: Negative.  Negative for color change and rash.   Allergic/Immunologic: Negative.  Negative for environmental allergies.   Neurological: Negative.  Negative for dizziness, facial asymmetry, light-headedness, numbness and headaches.   Hematological: Negative.  Negative for adenopathy.   Psychiatric/Behavioral: Negative.         Objective:    /70   Pulse 91   Temp 97.5 °F (36.4 °C)   Resp 18   SpO2 95%     Physical Exam  Vitals and nursing note reviewed.   Constitutional:       General: She is not in acute distress.     Appearance: Normal appearance. She is not ill-appearing, toxic-appearing or diaphoretic.      Interventions: She is not intubated.  HENT:      Head: Normocephalic and atraumatic.      Right Ear: External ear normal.      Left Ear: External ear normal.      Nose: Nose normal. No congestion or rhinorrhea.      Mouth/Throat:      Mouth: Mucous membranes are moist.   Eyes:      Extraocular Movements: Extraocular movements intact.      Conjunctiva/sclera: Conjunctivae normal.      Pupils: Pupils are equal, round, and reactive to light.   Cardiovascular:      Rate and Rhythm: Normal rate and regular rhythm.      Pulses: Normal pulses.      Heart sounds: Normal heart sounds, S1 normal and S2 normal. Heart sounds not distant. No murmur heard.     No friction rub. No gallop.   Pulmonary:      Effort: Pulmonary effort is normal. No tachypnea, bradypnea, accessory muscle usage, prolonged expiration, respiratory distress or retractions. She is not intubated.      Breath sounds: Normal breath sounds. No stridor, decreased air movement or transmitted upper airway sounds. No decreased breath sounds, wheezing, rhonchi or rales.   Chest:      Chest wall: No tenderness.    Abdominal:      General: Abdomen is flat. Bowel sounds are normal.      Palpations: Abdomen is soft.      Tenderness: There is no abdominal tenderness. There is no right CVA tenderness, left CVA tenderness, guarding or rebound.   Musculoskeletal:         General: Normal range of motion.      Cervical back: Normal range of motion and neck supple. No tenderness.   Skin:     General: Skin is warm and dry.      Capillary Refill: Capillary refill takes less than 2 seconds.   Neurological:      General: No focal deficit present.      Mental Status: She is alert and oriented to person, place, and time.      Cranial Nerves: No cranial nerve deficit.   Psychiatric:         Mood and Affect: Mood normal.         Behavior: Behavior normal.

## 2025-01-10 NOTE — PATIENT INSTRUCTIONS
Urine dip grossly normal in office, urine culture results pending.   Continue to monitor symptoms, follow up with PCP if no relief within one week.

## 2025-01-12 ENCOUNTER — RESULTS FOLLOW-UP (OUTPATIENT)
Dept: URGENT CARE | Age: 65
End: 2025-01-12

## 2025-01-12 DIAGNOSIS — N39.0 URINARY TRACT INFECTION WITH HEMATURIA, SITE UNSPECIFIED: Primary | ICD-10-CM

## 2025-01-12 DIAGNOSIS — R31.9 URINARY TRACT INFECTION WITH HEMATURIA, SITE UNSPECIFIED: Primary | ICD-10-CM

## 2025-01-12 LAB
BACTERIA UR CULT: ABNORMAL
BACTERIA UR CULT: ABNORMAL

## 2025-01-12 RX ORDER — AMOXICILLIN 500 MG/1
500 CAPSULE ORAL EVERY 8 HOURS SCHEDULED
Qty: 21 CAPSULE | Refills: 0 | Status: SHIPPED | OUTPATIENT
Start: 2025-01-13 | End: 2025-01-20

## 2025-01-12 NOTE — RESULT ENCOUNTER NOTE
Urine Culture   50,000-59,000 cfu/ml Alpha Hemolytic Streptococcus NOT Enterococcus Abnormal   10,000-19,000 cfu/ml  Mixed Contaminants X3    Pt was not prescribed an antibiotic at time of OV.

## 2025-01-21 NOTE — TELEPHONE ENCOUNTER
Requested medication(s) are due for refill today: Yes  Patient has already received a courtesy refill: No  Other reason request has been forwarded to provider: OB Provider reported that the vagina was inspected and no foreign body was found/Laps, needles and instrument count was correct

## 2025-03-17 NOTE — ED PROVIDER NOTES
"History  Chief Complaint   Patient presents with   • Medical Problem     Per EMS, patient at group home was expressing chest pain, but then denied it  Per group home protocol, if any resident expresses concern for chest pain they must be taken to hospital  On ems arrival, patient denied chest pain  On arrival to the ED patient denies chest pain or any other pain  States \"I feel good\"     57 y/o F w hx of a fib on xeralto, developmental delay presents for eval of possible CP  Pt apparently may have complained about CP early in the morning  However, when asked now pt denies any CP  Staff notes bedside is unsure whether patient complained about chest pain or not  Patient is extremely well-appearing on my exam   No diaphoresis  No nausea vomiting  Denies any complaints  Will obtain EKG, labs, reassess  Medical Problem  Associated symptoms: no chest pain, no congestion, no cough, no ear pain, no fatigue, no fever, no headaches, no loss of consciousness, no myalgias, no rash, no rhinorrhea, no shortness of breath and no sore throat        Prior to Admission Medications   Prescriptions Last Dose Informant Patient Reported? Taking? Calcium Carbonate-Vitamin D3 (Calcium + Vitamin D3) 600-400 MG-UNIT TABS   No No   Sig: Take 1 tablet by mouth 2 (two) times a day   Ibuprofen 200 MG CAPS   No No   Sig: Take 1-2 tablets if needed for pain or fever every 6-8 hours   Incontinence Supply Disposable ([employer] Plus Adult Briefs) MISC   No No   Sig: USE AS DIRECTED FOR INCONTINENCE   Omega-3 Fatty Acids (fish oil) 1,000 mg   No No   Sig: Take 1 capsule (1,000 mg total) by mouth daily   Xarelto 20 MG tablet   No No   Sig: TAKE (1) TABLET BY MOUTH ONCE DAILY WITH BREAKFAST     acetaminophen (TYLENOL) 650 mg CR tablet   No No   Sig: TAKE 1 TABLET BY MOUTH EVERY 8 HOURS AS NEEDED FOR MILD PAIN OR FEVER   Patient not taking: Reported on 1/19/2023   benzonatate (TESSALON) 200 MG capsule   No No   Sig: Take 1 capsule (200 mg " total) by mouth 3 (three) times a day as needed for cough for up to 20 doses   Patient not taking: Reported on 1/19/2023   carbamide peroxide (DEBROX) 6 5 % otic solution   No No   Sig: Administer 5 drops into both ears 2 (two) times a day for 4 days   cholecalciferol (VITAMIN D3) 400 units tablet   No No   Sig: Take 1 tablet (400 Units total) by mouth 3 (three) times a day   clotrimazole-betamethasone (LOTRISONE) 1-0 05 % cream   No No   Sig: Apply topically 2 (two) times a day as needed (rash) Apply Twice a day 3 hours after nystatin powder  diltiazem (CARDIZEM CD) 120 mg 24 hr capsule   No No   Sig: Take 1 capsule (120 mg total) by mouth daily   docusate sodium (COLACE) 100 mg capsule   No No   Sig: Take 1 capsule (100 mg total) by mouth 2 (two) times a day If needed constipation   fluticasone (FLONASE) 50 mcg/act nasal spray   Yes No   Sig: into each nostril daily   Patient not taking: Reported on 12/13/2021   furosemide (LASIX) 20 mg tablet   No No   Sig: Take 1 tablet (20 mg total) by mouth daily   hydrocortisone-aloe 1 % cream   No No   Sig: APPLY TO AFFECTED AREA TWICE A DAY AS NEEDED FOR ITCH AND RASH    levothyroxine 100 mcg tablet   No No   Sig: TAKE (1) TABLET BY MOUTH ONCE DAILY     loperamide (IMODIUM A-D) 2 mg capsule   No No   Sig: Take 1 capsule (2 mg total) by mouth 4 (four) times a day as needed for diarrhea   neomycin-polymyxin b-bacitracin (NEOSPORIN) 3 5-400-5,000   No No   Sig: APPLY TOPICALLY TO AFFECTED AREA FOUR TIMES A DAY AS NEEDED FOR WOUND CARE   nystatin (MYCOSTATIN) powder   No No   Sig: Apply topically 2 (two) times a day as needed (rash)   pravastatin (PRAVACHOL) 40 mg tablet   No No   Sig: Take 1 tablet (40 mg total) by mouth daily      Facility-Administered Medications: None       Past Medical History:   Diagnosis Date   • Anxiety disorder    • Atrial fibrillation [facility])        Past Surgical History:   Procedure Laterality Date   • HYSTERECTOMY      age 25   • OOPHORECTOMY     • NE CARDIOVERSION ELECTIVE ARRHYTHMIA EXTERNAL  8/23/2019            Family History   Problem Relation Age of Onset   • Other Mother         neglect or abandonment    • Other Father         neglect or abandonment    • No Known Problems Sister    • No Known Problems Maternal Grandmother    • No Known Problems Maternal Grandfather    • No Known Problems Paternal Grandmother    • No Known Problems Paternal Grandfather      I have reviewed and agree with the history as documented  E-Cigarette/Vaping   • E-Cigarette Use Never User      E-Cigarette/Vaping Substances     Social History     Tobacco Use   • Smoking status: Never   • Smokeless tobacco: Never   Vaping Use   • Vaping Use: Never used   Substance Use Topics   • Alcohol use: Never   • Drug use: Never       Review of Systems   Unable to perform ROS: Other   Constitutional: Negative for activity change, appetite change, chills, diaphoresis, fatigue and fever  HENT: Negative for congestion, drooling, ear discharge, ear pain, hearing loss, mouth sores, nosebleeds, rhinorrhea and sore throat  Respiratory: Negative for apnea, cough and shortness of breath  Cardiovascular: Negative for chest pain  Endocrine: Negative for cold intolerance, heat intolerance, polydipsia and polyphagia  Musculoskeletal: Negative for arthralgias, joint swelling and myalgias  Skin: Negative for color change, pallor and rash  Allergic/Immunologic: Negative for environmental allergies and food allergies  Neurological: Negative for dizziness, loss of consciousness, facial asymmetry and headaches  Hematological: Negative for adenopathy  Physical Exam  Physical Exam  Vitals and nursing note reviewed  Constitutional:       General: She is not in acute distress  Appearance: She is well-developed  HENT:      Head: Normocephalic and atraumatic     Eyes:      Conjunctiva/sclera: Conjunctivae normal    Cardiovascular:      Rate and Rhythm: Normal rate and regular rhythm  Heart sounds: No murmur heard  Pulmonary:      Effort: Pulmonary effort is normal  No respiratory distress  Breath sounds: Normal breath sounds  Abdominal:      Palpations: Abdomen is soft  Tenderness: There is no abdominal tenderness  Musculoskeletal:         General: No swelling  Cervical back: Neck supple  Skin:     General: Skin is warm and dry  Capillary Refill: Capillary refill takes less than 2 seconds  Neurological:      Mental Status: She is alert  Psychiatric:         Mood and Affect: Mood normal          Vital Signs  ED Triage Vitals   Temperature Pulse Respirations Blood Pressure SpO2   04/01/23 1520 04/01/23 1431 04/01/23 1431 04/01/23 1431 04/01/23 1431   98 2 °F (36 8 °C) 76 16 154/70 98 %      Temp Source Heart Rate Source Patient Position - Orthostatic VS BP Location FiO2 (%)   04/01/23 1520 04/01/23 1431 04/01/23 1431 04/01/23 1431 --   Oral Monitor Sitting Right arm       Pain Score       --                  Vitals:    04/01/23 1431   BP: 154/70   Pulse: 76   Patient Position - Orthostatic VS: Sitting         Visual Acuity      ED Medications  Medications - No data to display    Diagnostic Studies  Results Reviewed     Procedure Component Value Units Date/Time    D-dimer, quantitative [[id number]]  (Normal) Collected: 04/01/23 1505    Lab Status: Final result Specimen: Blood from Arm, Right Updated: 04/01/23 1623     D-Dimer, Quant <0 27 ug/ml FEU     Narrative: In the evaluation for possible pulmonary embolism, in the appropriate (Well's Score of 4 or less) patient, the age adjusted d-dimer cutoff for this patient can be calculated as:    Age x 0 01 (in ug/mL) for Age-adjusted D-dimer exclusion threshold for a patient over 50 years      High Sensitivity Troponin I Random [[id number]]  (Normal) Collected: 04/01/23 1505    Lab Status: Final result Specimen: Blood from Arm, Right Updated: 04/01/23 1550     HS TnI random 11 ng/L     Comprehensive metabolic panel [[id number]]  (Abnormal) Collected: 04/01/23 1505    Lab Status: Final result Specimen: Blood from Arm, Right Updated: 04/01/23 1540     Sodium 140 mmol/L      Potassium 3 7 mmol/L      Chloride 103 mmol/L      CO2 29 mmol/L      ANION GAP 8 mmol/L      BUN 19 mg/dL      Creatinine 0 84 mg/dL      Glucose 87 mg/dL      Calcium 9 5 mg/dL      AST 14 U/L      ALT 11 U/L      Alkaline Phosphatase 116 U/L      Total Protein 7 5 g/dL      Albumin 4 1 g/dL      Total Bilirubin 0 72 mg/dL      eGFR 74 ml/min/1 73sq m     Narrative:      [place] guidelines for Chronic Kidney Disease (CKD):   •  Stage 1 with normal or high GFR (GFR > 90 mL/min/1 73 square meters)  •  Stage 2 Mild CKD (GFR = 60-89 mL/min/1 73 square meters)  •  Stage 3A Moderate CKD (GFR = 45-59 mL/min/1 73 square meters)  •  Stage 3B Moderate CKD (GFR = 30-44 mL/min/1 73 square meters)  •  Stage 4 Severe CKD (GFR = 15-29 mL/min/1 73 square meters)  •  Stage 5 End Stage CKD (GFR <15 mL/min/1 73 square meters)  Note: GFR calculation is accurate only with a steady state creatinine    CBC and differential [[id number]] Collected: 04/01/23 1505    Lab Status: Final result Specimen: Blood from Arm, Right Updated: 04/01/23 1516     WBC 7 57 Thousand/uL      RBC 4 95 Million/uL      Hemoglobin 14 4 g/dL      Hematocrit 45 0 %      MCV 91 fL      MCH 29 1 pg      MCHC 32 0 g/dL      RDW 13 2 %      MPV 10 6 fL      Platelets 017 Thousands/uL      nRBC 0 /100 WBCs      Neutrophils Relative 62 %      Immat GRANS % 0 %      Lymphocytes Relative 25 %      Monocytes Relative 8 %      Eosinophils Relative 4 %      Basophils Relative 1 %      Neutrophils Absolute 4 74 Thousands/µL      Immature Grans Absolute 0 03 Thousand/uL      Lymphocytes Absolute 1 88 Thousands/µL      Monocytes Absolute 0 58 Thousand/µL      Eosinophils Absolute 0 29 Thousand/µL      Basophils Absolute 0 05 Thousands/µL                  XR chest 1 view portable (Results Pending)              Procedures  ECG 12 Lead Documentation Only    Date/Time: 4/1/2023 6:19 PM  Performed by: Dominique Car DO  Authorized by: Dominique Car DO     Patient location:  ED  Rate:     ECG rate assessment: normal    Rhythm:     Rhythm: sinus rhythm and atrial fibrillation    Ectopy:     Ectopy: none    QRS:     QRS axis:  Normal    QRS intervals:  Normal  Conduction:     Conduction: normal    ST segments:     ST segments:  Normal  T waves:     T waves: normal               ED Course          EKG shows A-fib without signs of ischemia  No ST elevations or depressions  No T wave inversions  Troponin negative  Chest x-ray unremarkable  Will discharge with outpatient cardiology follow-up  SBIRT 22yo+    Flowsheet Row Most Recent Value   SBIRT (25 yo +)    In order to provide better care to our patients, we are screening all of our patients for alcohol and drug use  Would it be okay to ask you these screening questions? No Filed at: 04/01/2023 1436                    Medical Decision Making  EKG shows A-fib without signs of ischemia  No ST elevations or depressions  No T wave inversions  Troponin negative  Chest x-ray unremarkable  Will discharge with outpatient cardiology follow-up  Amount and/or Complexity of Data Reviewed  External Data Reviewed: labs and radiology  Labs: ordered  Radiology: ordered  Disposition  Final diagnoses:   Chest pain     Time reflects when diagnosis was documented in both MDM as applicable and the Disposition within this note     Time User Action Codes Description Comment    4/1/2023  5:39 PM Jenny Myrick Add [R07 9] Chest pain       ED Disposition     ED Disposition   Discharge    Condition   Stable    Date/Time   Sat Apr 1, 2023 [address] discharge to home/self care                 Follow-up Information     Follow up With Specialties Details Why Contact Info Additional [facility] [address] [facility]   Critical access hospital0 [address]  [id number] [address] Cardiology [address] [facility], [address] [place] [address]          Patient's Medications   Discharge Prescriptions    No medications on file       No discharge procedures on file      PDMP Review     None          ED Provider  Electronically Signed by           Vincent Pratt DO  04/01/23 9912 15

## 2025-03-21 ENCOUNTER — HOSPITAL ENCOUNTER (EMERGENCY)
Facility: HOSPITAL | Age: 65
Discharge: HOME | End: 2025-03-22
Attending: EMERGENCY MEDICINE
Payer: MEDICARE

## 2025-03-21 ENCOUNTER — APPOINTMENT (EMERGENCY)
Dept: RADIOLOGY | Facility: HOSPITAL | Age: 65
End: 2025-03-21
Payer: MEDICARE

## 2025-03-21 VITALS
HEART RATE: 68 BPM | OXYGEN SATURATION: 95 % | RESPIRATION RATE: 18 BRPM | DIASTOLIC BLOOD PRESSURE: 63 MMHG | TEMPERATURE: 98.4 F | SYSTOLIC BLOOD PRESSURE: 139 MMHG
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
## 2025-03-21 PROCEDURE — 71046 X-RAY EXAM CHEST 2 VIEWS: CPT

## 2025-03-21 PROCEDURE — 73030 X-RAY EXAM OF SHOULDER: CPT

## 2025-03-21 PROCEDURE — 93005 ELECTROCARDIOGRAM TRACING: CPT

## 2025-03-21 PROCEDURE — 99284 EMERGENCY DEPT VISIT MOD MDM: CPT

## 2025-03-22 LAB
ATRIAL RATE: 77 BPM
QRS AXIS: 11 DEGREES
QRSD INTERVAL: 86 MS
QT INTERVAL: 430 MS
QTC INTERVAL: 477 MS
T WAVE AXIS: 0 DEGREES
VENTRICULAR RATE: 74 BPM

## 2025-03-22 PROCEDURE — 93010 ELECTROCARDIOGRAM REPORT: CPT | Performed by: INTERNAL MEDICINE

## 2025-03-22 PROCEDURE — 99285 EMERGENCY DEPT VISIT HI MDM: CPT | Performed by: EMERGENCY MEDICINE

## 2025-03-22 NOTE — ED NOTES
Pt walking in hallway with steady gait. Denies pain or dizziness.      Yumiko Sosa, JUAN JOSÉ  03/22/25 0012

## 2025-03-22 NOTE — ED ATTENDING ATTESTATION
3/21/2025  I, Alfred Palm MD, saw and evaluated the patient. I have discussed the patient with the resident/non-physician practitioner and agree with the resident's/non-physician practitioner's findings, Plan of Care, and MDM as documented in the resident's/non-physician practitioner's note, except where noted. All available labs and Radiology studies were reviewed.  I was present for key portions of any procedure(s) performed by the resident/non-physician practitioner and I was immediately available to provide assistance.       At this point I agree with the current assessment done in the Emergency Department.  I have conducted an independent evaluation of this patient a history and physical is as follows: Patient is a 64 year old female who fell in the shower tonight at the group home and struck her left chest and left shoulder. Patient is on eliquis for a. Fib. No head trauma. No pain. Was last seen at [facility] in [place] on 1/10/25 for UTI sx. [facility] website checked on this patient and no Rx found. Last Tdap was in 2017 as per Epic. NCAT. No scleral icterus. PERRL. Moist mucous membranes. Neck nontender and supple. Lungs clear. Heart irregularly irregular without murmur. Nontender chest wall. Abdomen soft and nontender. Good bowel sounds. (+) LE edema. Nontender extremities including left shoulder. No gross focal deficits. DDx including but not limited to: Doubt intracranial injury, concussion, cervical injury; intrathoracic injury; doubt intraabdominal injury; extremity injury--fracture, dislocation, strain, sprain, contusion.  Will check x-rays.     ED Course         Critical Care Time  Procedures

## 2025-03-24 ENCOUNTER — OFFICE VISIT (OUTPATIENT)
Dept: FAMILY MEDICINE CLINIC | Facility: CLINIC | Age: 65
End: 2025-03-24

## 2025-03-24 ENCOUNTER — TELEPHONE (OUTPATIENT)
Dept: FAMILY MEDICINE CLINIC | Facility: CLINIC | Age: 65
End: 2025-03-24

## 2025-03-24 VITALS
HEIGHT: 61 IN | TEMPERATURE: 98.1 F | RESPIRATION RATE: 18 BRPM | DIASTOLIC BLOOD PRESSURE: 74 MMHG | OXYGEN SATURATION: 99 % | BODY MASS INDEX: 35.16 KG/M2 | HEART RATE: 71 BPM | SYSTOLIC BLOOD PRESSURE: 118 MMHG | WEIGHT: 186.2 LBS

## 2025-03-24 DIAGNOSIS — E03.2 HYPOTHYROIDISM DUE TO MEDICATION: ICD-10-CM

## 2025-03-24 DIAGNOSIS — R32 URINARY INCONTINENCE, UNSPECIFIED TYPE: Primary | ICD-10-CM

## 2025-03-24 DIAGNOSIS — E66.812 CLASS 2 SEVERE OBESITY DUE TO EXCESS CALORIES WITH SERIOUS COMORBIDITY AND BODY MASS INDEX (BMI) OF 37.0 TO 37.9 IN ADULT (HCC): ICD-10-CM

## 2025-03-24 DIAGNOSIS — E78.5 HYPERLIPIDEMIA, UNSPECIFIED HYPERLIPIDEMIA TYPE: ICD-10-CM

## 2025-03-24 DIAGNOSIS — E66.01 CLASS 2 SEVERE OBESITY DUE TO EXCESS CALORIES WITH SERIOUS COMORBIDITY AND BODY MASS INDEX (BMI) OF 37.0 TO 37.9 IN ADULT (HCC): ICD-10-CM

## 2025-03-24 LAB
SL AMB  POCT GLUCOSE, UA: NEGATIVE
SL AMB LEUKOCYTE ESTERASE,UA: POSITIVE
SL AMB POCT BILIRUBIN,UA: NEGATIVE
SL AMB POCT BLOOD,UA: NEGATIVE
SL AMB POCT CLARITY,UA: ABNORMAL
SL AMB POCT COLOR,UA: YELLOW
SL AMB POCT KETONES,UA: NEGATIVE
SL AMB POCT NITRITE,UA: NEGATIVE
SL AMB POCT PH,UA: 7.5
SL AMB POCT SPECIFIC GRAVITY,UA: 1.01
SL AMB POCT URINE PROTEIN: NEGATIVE
SL AMB POCT UROBILINOGEN: 0.2

## 2025-03-24 PROCEDURE — 81003 URINALYSIS AUTO W/O SCOPE: CPT | Performed by: NURSE PRACTITIONER

## 2025-03-24 PROCEDURE — 99214 OFFICE O/P EST MOD 30 MIN: CPT | Performed by: NURSE PRACTITIONER

## 2025-03-24 PROCEDURE — G2211 COMPLEX E/M VISIT ADD ON: HCPCS | Performed by: NURSE PRACTITIONER

## 2025-03-24 PROCEDURE — 87086 URINE CULTURE/COLONY COUNT: CPT | Performed by: NURSE PRACTITIONER

## 2025-03-24 PROCEDURE — 87077 CULTURE AEROBIC IDENTIFY: CPT | Performed by: NURSE PRACTITIONER

## 2025-03-24 NOTE — TELEPHONE ENCOUNTER
Emmanuel for patient to call back,  she scheduled herself through [employer] to wrong office, her provider is [facility] on [address] [facility] [phone]

## 2025-03-24 NOTE — ASSESSMENT & PLAN NOTE
Wt Readings from Last 6 Encounters:   03/24/25 84.5 kg (186 lb 3.2 oz)   12/05/24 84.8 kg (187 lb)   07/24/24 84.8 kg (187 lb)   07/08/24 84.8 kg (187 lb)   06/14/24 84.7 kg (186 lb 12.8 oz)   11/30/23 88 kg (194 lb)       -Encouraged diet and lifestyle changes: decrease processed foods (cakes, cookies, chips, soda), decrease total carbohydrate intake, decrease fried/fatty foods, increase fruits and vegetables, increase lean proteins (chicken, turkey), increase healthy fats (avocado, fish, nuts), drink plenty of water (at least four 16 oz bottles per day)

## 2025-03-25 NOTE — ASSESSMENT & PLAN NOTE
Lab Results   Component Value Date    [id number] 1.332 08/24/2024     Continue levothyroxine 100 mcg daily

## 2025-03-25 NOTE — ASSESSMENT & PLAN NOTE
Lab Results   Component Value Date    CHOLESTEROL 146 08/24/2024    CHOLESTEROL 160 02/20/2023    CHOLESTEROL 133 06/10/2021     Lab Results   Component Value Date    HDL 50 08/24/2024    HDL 53 02/20/2023    HDL 47 06/10/2021     Lab Results   Component Value Date    TRIG 110 08/24/2024    TRIG 113 02/20/2023    TRIG 109 06/10/2021     Lab Results   Component Value Date    NONHDLC 96 08/24/2024    NONHDLC 107 02/20/2023    NONHDLC 86 06/10/2021     Lab Results   Component Value Date    LDLCALC 74 08/24/2024       Continue pravastatin daily

## 2025-03-25 NOTE — PROGRESS NOTES
Name: Carole Burrows      : 1960      MRN: [id number]  Encounter Provider: LALA Lloyd  Encounter Date: 3/24/2025   Encounter department: [facility]  :  Assessment & Plan  Urinary incontinence, unspecified type  Urine dip unable to be analyzed in office due to insufficient specimen - will send to lab for culture and treat as indicated     Orders:    Urine culture    POCT urine dip auto non-scope    Class 2 severe obesity due to excess calories with serious comorbidity and body mass index (BMI) of 37.0 to 37.9 in adult (HCC)      Wt Readings from Last 6 Encounters:   25 84.5 kg (186 lb 3.2 oz)   24 84.8 kg (187 lb)   24 84.8 kg (187 lb)   24 84.8 kg (187 lb)   24 84.7 kg (186 lb 12.8 oz)   23 88 kg (194 lb)       -Encouraged diet and lifestyle changes: decrease processed foods (cakes, cookies, chips, soda), decrease total carbohydrate intake, decrease fried/fatty foods, increase fruits and vegetables, increase lean proteins (chicken, turkey), increase healthy fats (avocado, fish, nuts), drink plenty of water (at least four 16 oz bottles per day)         Hypothyroidism due to medication  Lab Results   Component Value Date    [id number] 1.332 2024     Continue levothyroxine 100 mcg daily          Hyperlipidemia, unspecified hyperlipidemia type  Lab Results   Component Value Date    CHOLESTEROL 146 2024    CHOLESTEROL 160 2023    CHOLESTEROL 133 06/10/2021     Lab Results   Component Value Date    HDL 50 2024    HDL 53 2023    HDL 47 06/10/2021     Lab Results   Component Value Date    TRIG 110 2024    TRIG 113 2023    TRIG 109 06/10/2021     Lab Results   Component Value Date    NONHDLC 96 2024    NONHDLC 107 2023    NONHDLC 86 06/10/2021     Lab Results   Component Value Date    LDLCALC 74 2024       Continue pravastatin daily              BMI Counseling: Body mass  index is 35.18 kg/m². The BMI is above normal. Nutrition recommendations include decreasing fast food intake, consuming healthier snacks and limiting drinks that contain sugar. Rationale for BMI follow-up plan is due to patient being overweight or obese.       History of Present Illness   Carole Burrows is a 64 y.o. female who  has a past medical history of Anxiety disorder and Atrial fibrillation (HCC) who presented to the office today for follow up. Accompanied by aid. The staff member reports that patient was seen in ED on 3/21 due to fall from standing position. Patient reports that she was reaching for a towel when she got out of the shower and slipped. No acute findings on imaging. Staff is concerned re: patient increased urinary incontinence. Would like her checked for UTI.     The following portions of the patient's history were reviewed and updated as appropriate: allergies, current medications, past family history, past medical history, past social history, past surgical history and problem list.        Review of Systems   Constitutional:  Negative for activity change, appetite change, chills, fatigue, fever and unexpected weight change.   HENT:  Negative for hearing loss, nosebleeds, sinus pain, sneezing, sore throat and trouble swallowing.    Eyes:  Negative for photophobia and visual disturbance.   Respiratory:  Negative for cough, chest tightness, shortness of breath and wheezing.    Cardiovascular:  Negative for chest pain, palpitations and leg swelling.   Gastrointestinal:  Negative for abdominal pain, constipation, nausea and vomiting.   Genitourinary:  Negative for decreased urine volume, difficulty urinating, dysuria, flank pain, genital sores, hematuria and urgency.   Musculoskeletal:  Negative for back pain and gait problem.   Skin:  Negative for pallor, rash and wound.   Neurological:  Negative for dizziness, seizures, syncope, weakness, numbness and headaches.   Hematological:  Negative for  "adenopathy. Does not bruise/bleed easily.   Psychiatric/Behavioral:  Negative for confusion, hallucinations, self-injury, sleep disturbance and suicidal ideas. The patient is not nervous/anxious.        Objective   /74 (BP Location: Right arm, Patient Position: Sitting, Cuff Size: Standard)   Pulse 71   Temp 98.1 °F (36.7 °C) (Temporal)   Resp 18   Ht 5' 1\" (1.549 m)   Wt 84.5 kg (186 lb 3.2 oz)   SpO2 99%   BMI 35.18 kg/m²      Physical Exam  Vitals and nursing note reviewed.   Constitutional:       General: She is not in acute distress.     Appearance: Normal appearance. She is not diaphoretic.   HENT:      Head: Normocephalic.      Right Ear: Tympanic membrane and external ear normal.      Left Ear: Tympanic membrane and external ear normal.      Nose: Nose normal.      Mouth/Throat:      Mouth: Mucous membranes are moist.   Eyes:      General:         Right eye: No discharge.         Left eye: No discharge.      Conjunctiva/sclera: Conjunctivae normal.   Cardiovascular:      Rate and Rhythm: Normal rate. Rhythm irregular.   Pulmonary:      Effort: Pulmonary effort is normal. No respiratory distress.      Breath sounds: Normal breath sounds.   Musculoskeletal:         General: Normal range of motion.      Cervical back: Normal range of motion and neck supple. No rigidity.      Right lower leg: Edema present.      Left lower leg: Edema present.   Lymphadenopathy:      Cervical: No cervical adenopathy.   Skin:     General: Skin is warm and dry.      Capillary Refill: Capillary refill takes less than 2 seconds.      Findings: No rash.   Neurological:      Mental Status: She is alert. Mental status is at baseline.      Motor: Tremor present.   Psychiatric:         Mood and Affect: Mood normal.         "

## 2025-03-27 LAB — BACTERIA UR CULT: ABNORMAL

## 2025-04-01 ENCOUNTER — RESULTS FOLLOW-UP (OUTPATIENT)
Dept: FAMILY MEDICINE CLINIC | Facility: CLINIC | Age: 65
End: 2025-04-01

## 2025-04-01 DIAGNOSIS — E03.9 HYPOTHYROIDISM, UNSPECIFIED TYPE: ICD-10-CM

## 2025-04-01 DIAGNOSIS — I48.91 NEW ONSET ATRIAL FIBRILLATION (HCC): ICD-10-CM

## 2025-04-01 DIAGNOSIS — E55.9 VITAMIN D DEFICIENCY: ICD-10-CM

## 2025-04-01 DIAGNOSIS — R39.9 UTI SYMPTOMS: Primary | ICD-10-CM

## 2025-04-01 DIAGNOSIS — E78.5 HYPERLIPIDEMIA, UNSPECIFIED HYPERLIPIDEMIA TYPE: ICD-10-CM

## 2025-04-01 RX ORDER — LEVOTHYROXINE SODIUM 100 UG/1
100 TABLET ORAL DAILY
Qty: 31 TABLET | Refills: 4 | OUTPATIENT
Start: 2025-04-01

## 2025-04-01 RX ORDER — CALCIUM CARBONATE/VITAMIN D3 600 MG-10
1 TABLET ORAL 2 TIMES DAILY
Qty: 62 TABLET | Refills: 4 | OUTPATIENT
Start: 2025-04-01

## 2025-04-01 RX ORDER — PRAVASTATIN SODIUM 40 MG
40 TABLET ORAL DAILY
Qty: 31 TABLET | Refills: 4 | OUTPATIENT
Start: 2025-04-01

## 2025-04-01 RX ORDER — AMOXICILLIN 500 MG/1
500 CAPSULE ORAL EVERY 12 HOURS SCHEDULED
Qty: 14 CAPSULE | Refills: 0 | Status: SHIPPED | OUTPATIENT
Start: 2025-04-01 | End: 2025-04-08

## 2025-04-01 RX ORDER — DILTIAZEM HYDROCHLORIDE 120 MG/1
120 CAPSULE, COATED, EXTENDED RELEASE ORAL DAILY
Qty: 31 CAPSULE | Refills: 4 | OUTPATIENT
Start: 2025-04-01

## 2025-04-01 RX ORDER — OMEGA-3S/DHA/EPA/FISH OIL/D3 300MG-1000
400 CAPSULE ORAL 3 TIMES DAILY
Qty: 93 TABLET | Refills: 4 | OUTPATIENT
Start: 2025-04-01

## 2025-04-09 DIAGNOSIS — E78.5 HYPERLIPIDEMIA, UNSPECIFIED HYPERLIPIDEMIA TYPE: ICD-10-CM

## 2025-04-09 DIAGNOSIS — I48.91 NEW ONSET ATRIAL FIBRILLATION (HCC): ICD-10-CM

## 2025-04-09 DIAGNOSIS — E03.9 HYPOTHYROIDISM, UNSPECIFIED TYPE: ICD-10-CM

## 2025-04-09 DIAGNOSIS — E55.9 VITAMIN D DEFICIENCY: ICD-10-CM

## 2025-04-09 RX ORDER — PRAVASTATIN SODIUM 40 MG
40 TABLET ORAL DAILY
Qty: 31 TABLET | Refills: 4 | Status: SHIPPED | OUTPATIENT
Start: 2025-04-09

## 2025-04-09 RX ORDER — DILTIAZEM HYDROCHLORIDE 120 MG/1
120 CAPSULE, COATED, EXTENDED RELEASE ORAL DAILY
Qty: 31 CAPSULE | Refills: 4 | Status: SHIPPED | OUTPATIENT
Start: 2025-04-09

## 2025-04-09 RX ORDER — CALCIUM CARBONATE/VITAMIN D3 600 MG-10
1 TABLET ORAL 2 TIMES DAILY
Qty: 62 TABLET | Refills: 4 | Status: SHIPPED | OUTPATIENT
Start: 2025-04-09

## 2025-04-09 RX ORDER — LEVOTHYROXINE SODIUM 100 UG/1
100 TABLET ORAL DAILY
Qty: 31 TABLET | Refills: 4 | Status: SHIPPED | OUTPATIENT
Start: 2025-04-09

## 2025-04-09 RX ORDER — OMEGA-3S/DHA/EPA/FISH OIL/D3 300MG-1000
400 CAPSULE ORAL DAILY
Qty: 93 TABLET | Refills: 4 | Status: SHIPPED | OUTPATIENT
Start: 2025-04-09

## 2025-04-16 NOTE — ED PROVIDER NOTES
Time reflects when diagnosis was documented in both MDM as applicable and the Disposition within this note       Time User Action Codes Description Comment    3/21/2025 11:35 PM Travis Huitron Add [W19.XXXA] Fall, initial encounter     3/22/2025 12:04 AM Travis Huitron Add [W19.XXXA] Fall from standing, initial encounter     3/22/2025 12:04 AM Travis Huitron Restorationist Modify [W19.XXXA] Fall, initial encounter     3/22/2025 12:04 AM Travis Huitron Restorationist Modify [W19.XXXA] Fall from standing, initial encounter     3/22/2025 12:04 AM Travis Huitron Remove [W19.XXXA] Fall, initial encounter           ED Disposition       ED Disposition   Discharge    Condition   Stable    Date/Time   Fri Mar 21, 2025 11:35 PM    Comment   Carole N Loch discharge to home/self care.                   Assessment & Plan       Medical Decision Making  Patient presents with:  Fall: Pt arrives from [facility] via EMS. Pt had a witnessed fall while in the shower tonight. Fall witnessed by staff, -LOC, -HS. +xarelto. GCS 14 at baseline, per staff pt appears at baseline. Pt c/o L upper chest/shoulder pain, pt reports hitting area on wall.  DDx includes closed head injury, CVA, concussion, syncope versus near syncope, mechanical fall, fracture/dislocation left upper extremity/ribs. Doubt infectious process at this time  Workup per ED course: Presentation was consistent with mechanical fall without signs of closed head injury.  Patient able to ambulate independently with nonfocal exam.  ECG with A-fib at baseline.  Chest/shoulder imaging without obvious fracture or focal consolidation.  Plan to discharge home with caretaker.  Discharged with PCP follow-up   Dispo: Workup and return precautions reviewed. Patient expresses understanding, is comfortable with discharge, aggress to follow-up as advised and return to ED if symptoms recur.       Amount and/or Complexity of Data Reviewed  Radiology: ordered and independent  interpretation performed.        ED Course as of 04/16/25 1611   Fri Mar 21, 2025   1015 Witnessed fall in shower.  No head strike/LOC per caretaker witnessed fall. h/o Afib on Eliquis.  Patient notes slipping while showering attempted to brace herself with left arm falling onto left shoulder/chest now with tenderness left chest wall and soreness of shoulder.  Denies numbness/tingling/weakness of extremities or pain with inspiration. Denies headache, nausea/vomiting, visual disturbance, abdominal discomfort or other complaints at this time    Plan EKG, Xray chest / left shoulder. Defer head CT at this time given no head trauma and non-focal exam.    2359 Resting comfortably without acute distress  Exam remains nonfocal  Plan bedside ambulation and discharge with caretaker   Sat Mar 22, 2025   0035 Ambulated with RN independently.  Findings reviewed with caretaker, subsequently discharged to home facility with caretaker       Medications - No data to display    ED Risk Strat Scores                    No data recorded                            History of Present Illness       Chief Complaint   Patient presents with    Fall     Pt arrives from alf via EMS. Pt had a witnessed fall while in the shower tonight. Fall witnessed by staff, -LOC, -HS. +xarelto. GCS 14 at baseline, per staff pt appears at baseline. Pt c/o L upper chest/shoulder pain, pt reports hitting area on wall.       Past Medical History:   Diagnosis Date    Anxiety disorder     Atrial fibrillation (HCC)     Hyperlipidemia 06/27/2012    Hypothyroidism 07/14/2015    Kidney stone     Mental disability 06/22/2016      Past Surgical History:   Procedure Laterality Date    HYSTERECTOMY      age 22    OOPHORECTOMY      [place] CARDIOVERSION ELECTIVE ARRHYTHMIA EXTERNAL  8/23/2019           Family History   Problem Relation Age of Onset    Other Mother         neglect or abandonment     Other Father         neglect or abandonment     No Known Problems Sister      No Known Problems Maternal Grandmother     No Known Problems Maternal Grandfather     No Known Problems Paternal Grandmother     No Known Problems Paternal Grandfather       Social History     Tobacco Use    Smoking status: Never    Smokeless tobacco: Never   Vaping Use    Vaping status: Never Used   Substance Use Topics    Alcohol use: Never    Drug use: Never      E-Cigarette/Vaping    E-Cigarette Use Never User       E-Cigarette/Vaping Substances    Nicotine No     THC No     CBD No     Flavoring No     Other No     Unknown No       I have reviewed and agree with the history as documented.     Carole Burrows is a 64 y.o. female p/w Witnessed fall in shower.  No head strike/LOC per caretaker witnessed fall. h/o Afib on Eliquis.  Patient notes slipping while showering attempted to brace herself with left arm falling onto left shoulder/chest now with tenderness left chest wall and soreness of shoulder.  Denies numbness/tingling/weakness of extremities or pain with inspiration. Denies headache, nausea/vomiting, visual disturbance, abdominal discomfort or other complaints at this time     All other systems reviewed and are negative      Fall      Review of Systems        Objective       ED Triage Vitals   Temperature Pulse Blood Pressure Respirations SpO2 Patient Position - Orthostatic VS   03/21/25 2141 03/21/25 2139 03/21/25 2139 03/21/25 2139 03/21/25 2139 --   98.4 °F (36.9 °C) 68 139/63 18 95 %       Temp Source Heart Rate Source BP Location FiO2 (%) Pain Score    03/21/25 2141 03/21/25 2139 03/21/25 2139 -- --    Oral Monitor Left arm        Vitals      Date and Time Temp Pulse SpO2 Resp BP Pain Score FACES Pain Rating User   03/21/25 2141 98.4 °F (36.9 °C) -- -- -- -- -- -- KB   03/21/25 2139 -- 68 95 % 18 139/63 -- -- KB            Physical Exam    General appearance: resting comfortably, no acute distress   HENT: Normocephalic, atraumatic, hearing grossly intact, and mucous membranes moist   Eyes:  Conjunctiva normal, PERRL, EOM intact   Lungs/Chest: Respirations even and unlabored, breath sounds normal; minimal TTP left chest wall  Cardiovascular: Normal rate,   Abdomen: soft, non-distended, non-tender  Musculoskeletal: extremities normal, atraumatic, no cyanosis or edema; Left posterior shoulder mild tenderness . No instability or ROM restriction  Skin: warm and dry, well perfused distally  Neurologic: Awake and alert, no apparent focal deficit  Psych: Mood consistent with affect     Results Reviewed       None            XR chest 2 views   ED Interpretation by Travis Huitron MD (03/21 2335)   No acute cardiopulmonary process      Final Interpretation by Etienne Beach MD (03/22 0873)      No focal consolidation, pleural effusion, or pneumothorax.         Resident: Corbin Mccartney I, the attending radiologist, have reviewed the images and agree with the final report above.      Workstation performed: [id number]         XR shoulder 2+ views LEFT   ED Interpretation by Travis Huitron MD (03/21 2335)   No acute fracture or dislocation. Chronic clavicle fracture with bony healing.       Final Interpretation by Etienne Beach MD (03/22 0841)      No acute osseous abnormality.      Degenerative changes as described.         Computerized Assisted Algorithm (CAA) may have been used to analyze all applicable images.         Resident: Corbin Mccartney I, the attending radiologist, have reviewed the images and agree with the final report above.      Workstation performed: [id number]             ECG 12 Lead Documentation Only    Date/Time: 3/21/2025 11:00 PM    Performed by: Travis Huitron MD  Authorized by: Travis Huitron MD    Indications / Diagnosis:  Fall  ECG reviewed by me, the ED Provider: yes    Patient location:  ED  Previous ECG:     Previous ECG:  Compared to current    Similarity:  No change  Interpretation:     Interpretation: abnormal    Rate:     ECG  rate:  74    ECG rate assessment: normal    Rhythm:     Rhythm: atrial fibrillation    Ectopy:     Ectopy: none    QRS:     QRS axis:  Normal    QRS intervals:  Normal  ST segments:     ST segments:  Normal  T waves:     T waves: non-specific        ED Medication and Procedure Management   Prior to Admission Medications   Prescriptions Last Dose Informant Patient Reported? Taking?   Ibuprofen 200 MG CAPS  Care Giver No No   Sig: Take 1-2 tablets if needed for pain or fever every 6-8 hours   Incontinence Supply Disposable (Wings Choice Plus Adult Briefs) MISC   No No   Sig: USE AS DIRECTED FOR INCONTINENCE   Patient not taking: Reported on 12/5/2024   Omega-3 Fatty Acids (fish oil) 1,000 mg   No No   Sig: Take 1 capsule (1,000 mg total) by mouth daily   acetaminophen (TYLENOL) 650 mg CR tablet  Care Giver No No   Sig: TAKE 1 TABLET BY MOUTH EVERY 8 HOURS AS NEEDED FOR MILD PAIN OR FEVER   benzonatate (TESSALON) 200 MG capsule  Care Giver No No   Sig: Take 1 capsule (200 mg total) by mouth 3 (three) times a day as needed for cough   docusate sodium (COLACE) 100 mg capsule  Care Giver No No   Sig: Take 1 capsule (100 mg total) by mouth 2 (two) times a day If needed constipation   fluticasone (FLONASE) 50 mcg/act nasal spray   Yes No   furosemide (LASIX) 20 mg tablet   No No   Sig: TAKE (1) TABLET BY MOUTH ONCE DAILY.   hydrocortisone-aloe 1 % cream  Care Giver No No   Sig: APPLY TO AFFECTED AREA TWICE A DAY AS NEEDED FOR ITCH AND RASH.   loperamide (IMODIUM A-D) 2 mg capsule  Care Giver No No   Sig: Take 1 capsule (2 mg total) by mouth 4 (four) times a day as needed for diarrhea   neomycin-polymyxin b-bacitracin (NEOSPORIN) 3.5-400-5,000  Care Giver No No   Sig: APPLY TOPICALLY TO AFFECTED AREA FOUR TIMES A DAY AS NEEDED FOR WOUND CARE   nystatin (MYCOSTATIN) powder  Care Giver No No   Sig: APPLY TOPICALLY TO AFFECTED AREA TWICE DAILY   rivaroxaban (Xarelto) 20 mg tablet   No No   Sig: TAKE (1) TABLET BY MOUTH ONCE  DAILY WITH BREAKFAST.      Facility-Administered Medications: None     Discharge Medication List as of 3/22/2025 12:04 AM        CONTINUE these medications which have NOT CHANGED    Details   acetaminophen (TYLENOL) 650 mg CR tablet TAKE 1 TABLET BY MOUTH EVERY 8 HOURS AS NEEDED FOR MILD PAIN OR FEVER, Normal      benzonatate (TESSALON) 200 MG capsule Take 1 capsule (200 mg total) by mouth 3 (three) times a day as needed for cough, Starting Fri 7/7/2023, Normal      docusate sodium (COLACE) 100 mg capsule Take 1 capsule (100 mg total) by mouth 2 (two) times a day If needed constipation, Starting Mon 6/24/2019, Normal      fluticasone (FLONASE) 50 mcg/act nasal spray Historical Med      furosemide (LASIX) 20 mg tablet TAKE (1) TABLET BY MOUTH ONCE DAILY., Normal      hydrocortisone-aloe 1 % cream APPLY TO AFFECTED AREA TWICE A DAY AS NEEDED FOR ITCH AND RASH., Normal      Ibuprofen 200 MG CAPS Take 1-2 tablets if needed for pain or fever every 6-8 hours, Normal      Incontinence Supply Disposable (Wings Choice Plus Adult Briefs) MISC USE AS DIRECTED FOR INCONTINENCE, Normal      loperamide (IMODIUM A-D) 2 mg capsule Take 1 capsule (2 mg total) by mouth 4 (four) times a day as needed for diarrhea, Starting Mon 6/24/2019, Normal      neomycin-polymyxin b-bacitracin (NEOSPORIN) 3.5-400-5,000 APPLY TOPICALLY TO AFFECTED AREA FOUR TIMES A DAY AS NEEDED FOR WOUND CARE, Normal      nystatin (MYCOSTATIN) powder APPLY TOPICALLY TO AFFECTED AREA TWICE DAILY, Normal      Omega-3 Fatty Acids (fish oil) 1,000 mg Take 1 capsule (1,000 mg total) by mouth daily, Starting Tue 7/23/2024, Normal      rivaroxaban (Xarelto) 20 mg tablet TAKE (1) TABLET BY MOUTH ONCE DAILY WITH BREAKFAST., Normal      Calcium + Vitamin D3 600-10 MG-MCG TABS TAKE 1 TABLET BY MOUTH TWICE DAILY., Starting Wed 11/6/2024, Normal      cholecalciferol (VITAMIN D3) 400 units tablet TAKE 1 TABLET BY MOUTH THREE TIMES DAILY., Normal      diltiazem (CARDIZEM CD) 120  mg 24 hr capsule TAKE 1 CAPSULE BY MOUTH ONCE DAILY., Normal      levothyroxine 100 mcg tablet TAKE (1) TABLET BY MOUTH ONCE DAILY., Normal      pravastatin (PRAVACHOL) 40 mg tablet TAKE (1) TABLET BY MOUTH ONCE DAILY., Normal           No discharge procedures on file.  ED SEPSIS DOCUMENTATION   Time reflects when diagnosis was documented in both MDM as applicable and the Disposition within this note       Time User Action Codes Description Comment    3/21/2025 11:35 PM Travis Huitron Add [W19.XXXA] Fall, initial encounter     3/22/2025 12:04 AM Travis Huitron Add [W19.XXXA] Fall from standing, initial encounter     3/22/2025 12:04 AM Travis Huitron Modify [W19.XXXA] Fall, initial encounter     3/22/2025 12:04 AM Travis Huitron Modify [W19.XXXA] Fall from standing, initial encounter     3/22/2025 12:04 AM Travis Huitron Remove [W19.XXXA] Fall, initial encounter                  Travis Huitron MD  04/16/25 1636

## 2025-04-22 ENCOUNTER — TELEPHONE (OUTPATIENT)
Dept: FAMILY MEDICINE CLINIC | Facility: CLINIC | Age: 65
End: 2025-04-22

## 2025-04-22 NOTE — TELEPHONE ENCOUNTER
Hi, my name is Mayelin. I'm calling from the [facility] located in [place], [place]. I am calling on behalf of a mutual patient, Carole Burrows. I'm calling in regards to a prescription that was sent over by [facility] on April 9th for her Vitamin D 3400 unit tablets. It looks like the [facility] wrote for a quantity of 93 but sent the directions as take one tablet daily. The patient however has previously been taking 3 tablets daily. I did confirm with the home that the three tablets daily is correct. So I was just hoping that maybe somebody would be able to send us a new prescription for the three a day dosing is indeed correct and the patient and the home is correct, if you could send over a new prescription to us. Carole is going to be out of medication at the end of this month. If you have any questions, you can contact us at the pharmacy which is [phone] and if you press 0, you can speak to us. Thank you. Jeremy.

## 2025-05-01 DIAGNOSIS — E55.9 VITAMIN D DEFICIENCY: ICD-10-CM

## 2025-05-01 RX ORDER — OMEGA-3S/DHA/EPA/FISH OIL/D3 300MG-1000
400 CAPSULE ORAL 3 TIMES DAILY
Qty: 93 TABLET | Refills: 11 | Status: SHIPPED | OUTPATIENT
Start: 2025-05-01

## 2025-05-02 DIAGNOSIS — I48.91 NEW ONSET ATRIAL FIBRILLATION (HCC): ICD-10-CM

## 2025-05-02 DIAGNOSIS — R60.9 EDEMA, UNSPECIFIED TYPE: ICD-10-CM

## 2025-05-02 RX ORDER — FUROSEMIDE 20 MG/1
20 TABLET ORAL DAILY
Qty: 30 TABLET | Refills: 4 | Status: SHIPPED | OUTPATIENT
Start: 2025-05-02

## 2025-05-02 RX ORDER — RIVAROXABAN 20 MG/1
TABLET, FILM COATED ORAL
Qty: 31 TABLET | Refills: 4 | Status: SHIPPED | OUTPATIENT
Start: 2025-05-02

## 2025-05-15 NOTE — ASSESSMENT & PLAN NOTE
· Possibly related to psychiatric medications such as Seroquel and Lamictal  · Maintain on telemetry details…

## 2025-05-22 DIAGNOSIS — J30.9 ALLERGIC RHINITIS, UNSPECIFIED SEASONALITY, UNSPECIFIED TRIGGER: Primary | ICD-10-CM

## 2025-05-27 RX ORDER — FLUTICASONE PROPIONATE 50 MCG
2 SPRAY, SUSPENSION (ML) NASAL DAILY
Qty: 16 G | Refills: 2 | Status: SHIPPED | OUTPATIENT
Start: 2025-05-27

## 2025-05-28 ENCOUNTER — OFFICE VISIT (OUTPATIENT)
Dept: FAMILY MEDICINE CLINIC | Facility: CLINIC | Age: 65
End: 2025-05-28

## 2025-05-28 VITALS
OXYGEN SATURATION: 98 % | WEIGHT: 204 LBS | BODY MASS INDEX: 38.51 KG/M2 | DIASTOLIC BLOOD PRESSURE: 70 MMHG | TEMPERATURE: 98.2 F | SYSTOLIC BLOOD PRESSURE: 118 MMHG | RESPIRATION RATE: 18 BRPM | HEART RATE: 78 BPM | HEIGHT: 61 IN

## 2025-05-28 DIAGNOSIS — Z86.19: Primary | ICD-10-CM

## 2025-05-28 PROBLEM — Z87.898 H/O PROLONGED Q-T INTERVAL ON ECG: Status: RESOLVED | Noted: 2018-02-13 | Resolved: 2025-05-28

## 2025-05-28 PROCEDURE — 99213 OFFICE O/P EST LOW 20 MIN: CPT

## 2025-05-28 PROCEDURE — G2211 COMPLEX E/M VISIT ADD ON: HCPCS

## 2025-05-28 NOTE — PROGRESS NOTES
"Name: Carole Burrows      : 1960      MRN: [id number]  Encounter Provider: LALA Briggs  Encounter Date: 2025   Encounter department: [facility]  :  Assessment & Plan  History of erysipelas  Patient was evaluated at [facility] on 25 and diagnosed with erysipelas of the bilateral lower extremities. She was given a course of Keflex, which she completed. The rash has reportedly significantly improved. On assessment, there are some small red round pinpoint macular rash over the bilateral ankles. Area is limited to only a few lesions and continues to improve.   No indication for extending course of antibiotics.   Advised to watch and wait as the rash should slowly improve over time.   Return precautions given.               History of Present Illness         Carole Burrows is a 64 year old female with a history of Enlarged LA (left atrium), Hypothyroidism, Hyperlipidemia, Vitamin D deficiency, Medication-induced movement disorder, Mental disability, Mood disorder ([facility]), Class 2 severe obesity due to excess calories with serious comorbidity and body mass index (BMI) of 37.0 to 37.9 in adult ([facility]), Severe anxiety, Atrial fibrillation ([facility]), Leg edema.    She presents to the office today with her caregiver (who provides history) for follow up of a recent rash. Caregiver reports prior treatment and rash has significantly improved, but not entirely resolved. She is wondering if she needs more treatment.             Review of Systems   Constitutional:  Negative for chills and fever.   Respiratory:  Negative for cough and shortness of breath.    Cardiovascular:  Negative for chest pain and palpitations.   Musculoskeletal:  Negative for myalgias.   Skin:  Positive for rash. Negative for wound.       Objective   /70 (BP Location: Right arm, Patient Position: Sitting, Cuff Size: Large)   Pulse 78   Temp 98.2 °F (36.8 °C) (Temporal)   Resp 18   Ht 5' 1\" (1.549 " m)   Wt 92.5 kg (204 lb)   SpO2 98%   BMI 38.55 kg/m²      Physical Exam  Vitals and nursing note reviewed.   Constitutional:       General: She is not in acute distress.     Appearance: She is well-developed.   HENT:      Head: Normocephalic and atraumatic.     Eyes:      Conjunctiva/sclera: Conjunctivae normal.       Cardiovascular:      Rate and Rhythm: Normal rate and regular rhythm.      Heart sounds: No murmur heard.  Pulmonary:      Effort: Pulmonary effort is normal. No respiratory distress.      Breath sounds: Normal breath sounds.   Abdominal:      Palpations: Abdomen is soft.      Tenderness: There is no abdominal tenderness.     Musculoskeletal:         General: No swelling.      Cervical back: Neck supple.     Skin:     General: Skin is warm and dry.      Capillary Refill: Capillary refill takes less than 2 seconds.      Findings: Rash present.      Comments: small red round pinpoint macular rash over the bilateral ankles     Neurological:      Mental Status: She is alert.     Psychiatric:         Mood and Affect: Mood normal.

## 2025-05-28 NOTE — LETTER
May 28, 2025     Patient: Carole Burrows  YOB: 1960  Date of Visit: 5/28/2025      To Whom it May Concern:    Carole Burrows is under my professional care. Carole was seen in my office on 5/28/2025.   Below are my recommendations based on the visit.     Regarding the rash on her legs - she received appropriate treatment of erysipelas with keflex and rash is much improved. The remaining small red dot rash is likely due to this previous skin infection. This will likely improve with time. There is no indication for repeat antibiotics at this time. Any signs that this infection is worsening such as a return of the large, red, raised rash especially with systemic symptoms such as fever, body aches, chills, malaise, sore throat, nausea, vomiting, diarrhea, Carole would need reevaluation.     Regarding the occasional urinary incontinence. This is common in older adults, especially with cognitive deficits. There is no sign of urinary tract infection at this time. If she were to develop malodorous urine, urgency, frequency, burning with urination, or any changes in cognition or behavior such as lethargy, agitation, etc. She should return for reevaluation. At this time, I recommend she use incontinence pads previously prescribed especially for situational in which a bathroom cannot be reached in a reasonable amount of time for Carole.    Regarding routine health screenings such as a DEXA scan, Carole has an upcoming appointment on 7/09/25 at 09:40 am for her annual Medicare Wellness Visit. Routine health maintenance recommendations and labs can be addressed during this visit.     I have made no changes to her current medication list.     If you have any questions or concerns, please don't hesitate to call.         Sincerely,          LALA Briggs

## 2025-06-18 ENCOUNTER — OFFICE VISIT (OUTPATIENT)
Dept: CARDIOLOGY CLINIC | Facility: CLINIC | Age: 65
End: 2025-06-18
Payer: MEDICARE

## 2025-06-18 VITALS
BODY MASS INDEX: 37.91 KG/M2 | HEART RATE: 93 BPM | SYSTOLIC BLOOD PRESSURE: 106 MMHG | DIASTOLIC BLOOD PRESSURE: 68 MMHG | WEIGHT: 200.8 LBS | HEIGHT: 61 IN

## 2025-06-18 DIAGNOSIS — I48.91 ATRIAL FIBRILLATION, UNSPECIFIED TYPE (HCC): Primary | ICD-10-CM

## 2025-06-18 LAB
QRS AXIS: -9 DEGREES
QRSD INTERVAL: 88 MS
QT INTERVAL: 376 MS
QTC INTERVAL: 468 MS
T WAVE AXIS: 35 DEGREES
VENTRICULAR RATE: 93 BPM

## 2025-06-18 PROCEDURE — 99214 OFFICE O/P EST MOD 30 MIN: CPT | Performed by: INTERNAL MEDICINE

## 2025-06-18 PROCEDURE — 93000 ELECTROCARDIOGRAM COMPLETE: CPT | Performed by: INTERNAL MEDICINE

## 2025-06-18 RX ORDER — CLOTRIMAZOLE AND BETAMETHASONE DIPROPIONATE 10; .64 MG/G; MG/G
15 CREAM TOPICAL AS NEEDED
COMMUNITY

## 2025-06-18 NOTE — PROGRESS NOTES
HEART AND VASCULAR  CARDIAC ELECTROPHYSIOLOGY   HEART RHYTHM CENTER  [facility]    Outpatient f/u  Today's Date: 06/18/25        Patient name: Carole Burrows  YOB: 1960  Sex: female         Chief Complaint: f/u for afib    ASSESSMENT:  Problem List Items Addressed This Visit          Cardiovascular and Mediastinum    Atrial fibrillation (HCC) - Primary    Relevant Orders    POCT ECG    Basic metabolic panel    CBC and differential         65 yo female  1) Afib, unclear duration or type, found incidentally at office visit in Aug 2019 and sent in for MARCIE -DCCV but found to be back in afib persistetnly on Zio patch , she is on xarelto and diltiazem now. [id number] female . She has developmental delay and lives in care facility, they check her BP/HR daily.  2) Stress echo 2021, 3min on Marshal, Moderate MR/TR, normal EF,  Moderate LAE  Otherwise has d MR/TR normal EF on TTE. Moderate LAE  34)h/o edema,  on lasix daily now , ER visit for Erysipelas march  4)CKD , last BMP Cr 0.9 was up to 1.4 before lasix.  5) Atypical CP ER visit April 1 2023- neg trop/ekg/cxr. None since.   6) Recent episode of low BP, wasn't feeling well. WE carol lasix for 2 days and improved     PLAN:  1. Cont xarelto and diltiazem 120mg daily, will not pursue rhythm control since asymptomatic.   Went over things to look for if afib not well controlled w care giver.    2. Cont lasix 20mg daily    3. Check BMP and CBC        Follow up in: 12 months    Orders Placed This Encounter   Procedures    Basic metabolic panel    CBC and differential    POCT ECG     There are no discontinued medications.        .............................................................................................      65 yo female  1) Afib, unclear duration or type, found incidentally at office visit in Aug 2019 and sent in for MARCIE -DCCV but found to be back in afib persistetnly on Zio patch , she is on xarelto and diltiazem now.  [id number] female . She has developmental delay and lives in care facility, they check her BP/HR daily.  2) Stress echo 2021, 3min on Marshal, Moderate MR/TR, normal EF,  Moderate LAE  Otherwise has d MR/TR normal EF on TTE. Moderate LAE  34)h/o edema,  on lasix daily now , ER visit for Erysipelas march  4)CKD , last BMP Cr 0.9 was up to 1.4 before lasix.  5) Atypical CP ER visit April 1 2023- neg trop/ekg/cxr. None since.   6) Recent episode of low BP, wasn't feeling well. WE carol lasix for 2 days and improved    Past Medical History:   Diagnosis Date    Anxiety disorder     Atrial fibrillation (HCC)     H/O prolonged Q-T interval on ECG 02/13/2018    Hyperlipidemia 06/27/2012    Hypothyroidism 07/14/2015    Kidney stone     Mental disability 06/22/2016       No Known Allergies  I reviewed the Home Medication list and Allergies in the chart.   Scheduled Meds:  Current Outpatient Medications   Medication Sig Dispense Refill    acetaminophen (TYLENOL) 650 mg CR tablet TAKE 1 TABLET BY MOUTH EVERY 8 HOURS AS NEEDED FOR MILD PAIN OR FEVER 30 tablet 0    benzonatate (TESSALON) 200 MG capsule Take 1 capsule (200 mg total) by mouth 3 (three) times a day as needed for cough 20 capsule 0    Calcium + Vitamin D3 600-10 MG-MCG TABS TAKE 1 TABLET BY MOUTH TWICE DAILY. 62 tablet 4    cholecalciferol (VITAMIN D3) 400 units tablet Take 1 tablet (400 Units total) by mouth 3 (three) times a day 93 tablet 11    clotrimazole-betamethasone (LOTRISONE) 1-0.05 % cream Apply 15 g topically as needed      diltiazem (CARDIZEM CD) 120 mg 24 hr capsule TAKE 1 CAPSULE BY MOUTH ONCE DAILY. 31 capsule 4    docusate sodium (COLACE) 100 mg capsule Take 1 capsule (100 mg total) by mouth 2 (two) times a day If needed constipation 30 capsule 0    fluticasone (FLONASE) 50 mcg/act nasal spray 2 sprays into each nostril in the morning. 16 g 2    furosemide (LASIX) 20 mg tablet TAKE (1) TABLET BY MOUTH ONCE DAILY. 30 tablet 4    hydrocortisone-aloe 1 %  cream APPLY TO AFFECTED AREA TWICE A DAY AS NEEDED FOR ITCH AND RASH. 28 g 2    Ibuprofen 200 MG CAPS Take 1-2 tablets if needed for pain or fever every 6-8 hours 120 each 0    levothyroxine 100 mcg tablet TAKE (1) TABLET BY MOUTH ONCE DAILY. 31 tablet 4    neomycin-polymyxin b-bacitracin (NEOSPORIN) 3.5-400-5,000 APPLY TOPICALLY TO AFFECTED AREA FOUR TIMES A DAY AS NEEDED FOR WOUND CARE 28.4 g 2    nystatin (MYCOSTATIN) powder APPLY TOPICALLY TO AFFECTED AREA TWICE DAILY 60 g 0    Omega-3 Fatty Acids (fish oil) 1,000 mg Take 1 capsule (1,000 mg total) by mouth daily 90 capsule 0    pravastatin (PRAVACHOL) 40 mg tablet TAKE (1) TABLET BY MOUTH ONCE DAILY. 31 tablet 4    Xarelto 20 MG tablet TAKE (1) TABLET BY MOUTH ONCE DAILY WITH BREAKFAST. 31 tablet 4    Incontinence Supply Disposable (Wings Choice Plus Adult Briefs) MISC USE AS DIRECTED FOR INCONTINENCE (Patient not taking: Reported on 12/5/2024) 90 each 0    loperamide (IMODIUM A-D) 2 mg capsule Take 1 capsule (2 mg total) by mouth 4 (four) times a day as needed for diarrhea (Patient not taking: Reported on 6/18/2025) 30 capsule 0     No current facility-administered medications for this visit.     PRN Meds:.        Family History   Problem Relation Name Age of Onset    Other Mother          neglect or abandonment     Other Father          neglect or abandonment     No Known Problems Sister      No Known Problems Maternal Grandmother      No Known Problems Maternal Grandfather      No Known Problems Paternal Grandmother      No Known Problems Paternal Grandfather         Social History     Socioeconomic History    Marital status: Single     Spouse name: Not on file    Number of children: Not on file    Years of education: Not on file    Highest education level: Not on file   Occupational History    Not on file   Tobacco Use    Smoking status: Never     Passive exposure: Never    Smokeless tobacco: Never   Vaping Use    Vaping status: Never Used   Substance and  "Sexual Activity    Alcohol use: Never    Drug use: Never    Sexual activity: Never     Birth control/protection: Female Sterilization   Other Topics Concern    Not on file   Social History Narrative    Disabled    Social history reviewed unchanged      Social Drivers of Health     Financial Resource Strain: Low Risk  (7/7/2024)    Overall Financial Resource Strain (CARDIA)     Difficulty of Paying Living Expenses: Not hard at all   Food Insecurity: No Food Insecurity (7/7/2024)    Nursing - Inadequate Food Risk Classification     Worried About Running Out of Food in the Last Year: Never true     Ran Out of Food in the Last Year: Never true     Ran Out of Food in the Last Year: Not on file   Transportation Needs: No Transportation Needs (7/7/2024)    PRAPARE - Transportation     Lack of Transportation (Medical): No     Lack of Transportation (Non-Medical): No   Physical Activity: Not on file   Stress: Not on file   Social Connections: Not on file   Intimate Partner Violence: Not on file   Housing Stability: Low Risk  (7/7/2024)    Housing Stability Vital Sign     Unable to Pay for Housing in the Last Year: No     Number of Times Moved in the Last Year: 0     Homeless in the Last Year: No         OBJECTIVE:    /68 (BP Location: Right arm, Patient Position: Sitting, Cuff Size: Standard)   Pulse 93   Ht 5' 1\" (1.549 m)   Wt 91.1 kg (200 lb 12.8 oz)   BMI 37.94 kg/m²   Vitals:    06/18/25 1534   Weight: 91.1 kg (200 lb 12.8 oz)       /68 (BP Location: Right arm, Patient Position: Sitting, Cuff Size: Standard)   Pulse 93   Ht 5' 1\" (1.549 m)   Wt 91.1 kg (200 lb 12.8 oz)   BMI 37.94 kg/m²   Vitals:    06/18/25 1534   Weight: 91.1 kg (200 lb 12.8 oz)     GEN: No acute distress, Alert and oriented, well appearing  HEENT:External ears normal, wearing a mask.   EYES: Pupils equal, sclera anicteric, midline, normal conjuctiva  NECK: No JVD, supple, no obvious masses or thryomegaly or goiter  CARDIOVASCULAR: " "irreg irreg, 3/6 Holosystolic murmur, rub, gallops S1,S2  LUNGS: Clear To auscultation bilaterally, normal effort, no rales, rhonchi, crackles   ABDOMEN:  nondistended,  without obvious organomegaly or ascites  EXTREMITIES/VASCULAR: 1+ darrel edema. warm an well perfused.  PSYCH: Normal Affect,  linear speech pattern without evidence of psychosis.   NEURO: Grossly intact, moving all extremiteis equal, face symmetric, alert and responsive, no obvious focal defecits   GAIT:  Ambulates normally without difficulty  HEME: No bleeding, bruising, petechia, purpura   SKIN: No significant rashes on visibile skin, warm, no diaphoresis or pallor.         Lab Results:       LABS:      Chemistry        Component Value Date/Time    K 3.8 08/24/2024 0848    K 4.2 10/16/2021 1255     08/24/2024 0848     10/16/2021 1255    CO2 29 08/24/2024 0848    CO2 30 10/16/2021 1255    BUN 17 08/24/2024 0848    BUN 20 10/16/2021 1255    CREATININE 0.96 08/24/2024 0848    CREATININE 0.82 10/16/2021 1255        Component Value Date/Time    CALCIUM 9.8 08/24/2024 0848    CALCIUM 9.2 10/16/2021 1255    ALKPHOS 110 (H) 08/24/2024 0848    ALKPHOS 120 10/16/2021 1255    AST 14 08/24/2024 0848    AST 13 10/16/2021 1255    ALT 9 08/24/2024 0848    ALT 20 10/16/2021 1255            No results found for: \"CHOL\"  Lab Results   Component Value Date    HDL 50 08/24/2024    HDL 53 02/20/2023    HDL 47 06/10/2021     Lab Results   Component Value Date    LDLCALC 74 08/24/2024    LDLCALC 84 02/20/2023    LDLCALC 64 06/10/2021     Lab Results   Component Value Date    TRIG 110 08/24/2024    TRIG 113 02/20/2023    TRIG 109 06/10/2021     No results found for: \"CHOLHDL\"    IMAGING: No results found.     Cardiac testing:   Results for orders placed during the hospital encounter of 02/13/18   Echo complete with contrast if indicated    Narrative [facility]  [address] [place] [address].  [place] [address]  [phone]    Transthoracic " Echocardiogram  2D, M-mode, Doppler, and Color Doppler    Study date:  2018    Patient: ELADIO CORTES  MR number: [id number]  Account number: [id number]  : 1960  Age: 57 years  Gender: Female  Status: Outpatient  Location: [address] [place] [address]  Height: 61 in  Weight: 194 lb  BP: 102/ 70 mmHg    Indications: Abnormal EKG.    Diagnoses: R94.31 - Abnormal electrocardiogram [ECG] [EKG]    Sonographer:  Andrea Garcia RDCS  Primary Physician:  LALA Adan  Referring Physician:  Frantz Garcia MD  Group:  [facility]  Interpreting Physician:  Frantz Gracia MD    SUMMARY    LEFT VENTRICLE:  Size was at the upper limits of normal.  Systolic function was normal. Ejection fraction was estimated to be 55 %.  There were no regional wall motion abnormalities.  Features were consistent with a pseudonormal left ventricular filling pattern, with concomitant abnormal relaxation and increased filling pressure (grade 2 diastolic dysfunction).    LEFT ATRIUM:  The atrium was moderately dilated.    MITRAL VALVE:  There was mild regurgitation.    TRICUSPID VALVE:  There was mild regurgitation.    HISTORY: PRIOR HISTORY: Hyperlipidemia, Dyspnea on exertion, Mental disability.    PROCEDURE: The study was performed in the [address] [place] [address]. This was a routine study. The transthoracic approach was used. The study included complete 2D imaging, M-mode, complete spectral Doppler, and color Doppler. The  heart rate was 63 bpm, at the start of the study. Images were obtained from the parasternal, apical, subcostal, and suprasternal notch acoustic windows. Image quality was adequate.    LEFT VENTRICLE: Size was at the upper limits of normal. Systolic function was normal. Ejection fraction was estimated to be 55 %. There were no regional wall motion abnormalities. Wall thickness was normal. No evidence of apical thrombus.  DOPPLER: Features were consistent with a  pseudonormal left ventricular filling pattern, with concomitant abnormal relaxation and increased filling pressure (grade 2 diastolic dysfunction).    RIGHT VENTRICLE: The size was normal. Systolic function was normal. Wall thickness was normal.    LEFT ATRIUM: The atrium was moderately dilated.    RIGHT ATRIUM: Size was normal.    MITRAL VALVE: Valve structure was normal. There was normal leaflet separation. DOPPLER: The transmitral velocity was within the normal range. There was no evidence for stenosis. There was mild regurgitation.    AORTIC VALVE: The valve was trileaflet. Leaflets exhibited normal thickness and normal cuspal separation. DOPPLER: Transaortic velocity was within the normal range. There was no evidence for stenosis. There was no significant  regurgitation.    TRICUSPID VALVE: The valve structure was normal. There was normal leaflet separation. DOPPLER: The transtricuspid velocity was within the normal range. There was no evidence for stenosis. There was mild regurgitation. Estimated peak PA  pressure was 30 mmHg.    PULMONIC VALVE: Not well visualized. DOPPLER: The transpulmonic velocity was within the normal range. There was no significant regurgitation.    PERICARDIUM: There was no pericardial effusion. The pericardium was normal in appearance.    AORTA: The root exhibited normal size.    SYSTEMIC VEINS: IVC: The inferior vena cava was normal in size.    SYSTEM MEASUREMENT TABLES    2D  %FS: 31.2 %  Ao Diam: 2.9 cm  EDV(Teich): 161.8 ml  EF(Teich): 58.3 %  ESV(Teich): 67.5 ml  IVSd: 1 cm  LA Area: 25.7 cm2  LA Diam: 5.3 cm  LVEDV MOD A4C: 62.6 ml  LVEF MOD A4C: 57.8 %  LVESV MOD A4C: 26.4 ml  LVIDd: 5.7 cm  LVIDs: 3.9 cm  LVLd A4C: 6.2 cm  LVLs A4C: 5.5 cm  LVPWd: 1 cm  RA Area: 12.4 cm2  RVIDd: 3.1 cm  SV MOD A4C: 36.2 ml  SV(Teich): 94.4 ml    CW  TR Vmax: 2.6 m/s  TR maxP.5 mmHg    MM  TAPSE: 2.3 cm    PW  E': 0.1 m/s  E/E': 11.8  MV A Adrian: 0.2 m/s  MV Dec Aguada: 5.9 m/s2  MV DecT:  141.1 ms  MV E Adrian: 0.8 m/s  MV E/A Ratio: 3.8  MV PHT: 40.9 ms  MVA By PHT: 5.4 cm2    [facility] Accredited Echocardiography Laboratory    Prepared and electronically signed by    Frantz Garcia MD  Signed 2018 08:58:33       Results for orders placed during the hospital encounter of 19   MARCIE    Narrative [address] [id number] [phone]    Transesophageal Echocardiogram  2D, M-mode, Doppler, and Color Doppler    Study date:  23-Aug-2019    Patient: ELADIO CORTES  MR number: [id number]  Account number: [id number]  : 1960  Age: 58 years  Gender: Female  Status: Inpatient  Location: Cath lab  Height: 61 in  Weight: 196.7 lb  BP: 141/ 78 mmHg    Indications: Atrial fibrillation.    Diagnoses: I48.0 - Atrial fibrillation    Sonographer:  Nathaniel Cruz RDCS  Interpreting Physician:  Brody Bethea MD  Primary Physician:  Ho Huynh PA-C  Referring Physician:  Frantz Garcia MD  Group:  [facility]  RN:  Giselle Gonzales RN BSN    IMPRESSIONS:  No evidence of left atrial or left atrial appendage thrombus on transesophageal echocardiogram.  Other echocardiographic findings as noted below.    SUMMARY    LEFT VENTRICLE:  Normal left ventricular cavity size mild concentric left ventricular hypertrophy, normal left ventricular systolic function. Regional wall motion and ejection fraction could not be reliably assessed due to rapid ventricular rates.    RIGHT VENTRICLE:  Normal right ventricular size and visually normal right ventricular systolic function.    LEFT ATRIUM:  Moderate left atrial cavity enlargement. No spontaneous contrast, mass or thrombus noted in left atrial cavity.    LEFT ATRIAL APPENDAGE:  Enlarged single lobe left atrial appendage with moderately reduced flow velocities. No masses, vegetations or thrombus identified in left atrial appendage cavity.    ATRIAL SEPTUM:  Intact  interatrial septum. No color flow Doppler evidence of interatrial shunts.    RIGHT ATRIUM:  Mild right atrial cavity enlargement.    MITRAL VALVE:  Mild mitral valve leaflet thickening, adequate leaflet mobility. Mild mitral valve regurgitation noted.    AORTIC VALVE:  Tricuspid aortic valve with mild sclerosis. Adequate cuspal separation. No aortic valve stenosis or regurgitation noted.    TRICUSPID VALVE:  Mild tricuspid valve leaflet thickening. Mild tricuspid valve regurgitation.    PULMONIC VALVE:  Unremarkable pulmonic valve leaflet morphology. No pulmonic valve stenosis or regurgitation noted.    AORTA:  Aortic root and proximal ascending aorta are normal in size on 2D imaging. There are mild atherosclerotic changes noted in visualized portions of ascending and descending aorta. No mobile plaques noted.    PERICARDIUM:  No pericardial effusion.    HISTORY: PRIOR HISTORY: Long QT interval, hypothyroidism, hyperlipidemia, atrial fibrillation, mental retardation, left atrial enlargement, obesity, anxiety.    PROCEDURE: The procedure was performed in the catheterization laboratory. The risks and alternatives of the procedure were explained to the patient's next of kin and informed consent was obtained. The transesophageal approach was used. The  study included complete 2D imaging, M-mode, complete spectral Doppler, and color Doppler. The heart rate was 156 bpm, at the start of the study. An adult omniplane probe was inserted by the attending cardiologist. Intubated with ease. One  intubation attempt(s). There was no blood detected on the probe. Image quality was adequate. There were no complications during the procedure. MEDICATIONS: Anesthesia administered by anesthesia team.    LEFT VENTRICLE: Normal left ventricular cavity size mild concentric left ventricular hypertrophy, normal left ventricular systolic function. Regional wall motion and ejection fraction could not be reliably assessed due to rapid  ventricular  rates.    RIGHT VENTRICLE: Normal right ventricular size and visually normal right ventricular systolic function.    LEFT ATRIUM: Moderate left atrial cavity enlargement. No spontaneous contrast, mass or thrombus noted in left atrial cavity. APPENDAGE: Enlarged single lobe left atrial appendage with moderately reduced flow velocities. No masses,  vegetations or thrombus identified in left atrial appendage cavity.    ATRIAL SEPTUM: Intact interatrial septum. No color flow Doppler evidence of interatrial shunts.    RIGHT ATRIUM: Mild right atrial cavity enlargement.    MITRAL VALVE: Mild mitral valve leaflet thickening, adequate leaflet mobility. Mild mitral valve regurgitation noted.    AORTIC VALVE: Tricuspid aortic valve with mild sclerosis. Adequate cuspal separation. No aortic valve stenosis or regurgitation noted.    TRICUSPID VALVE: Mild tricuspid valve leaflet thickening. Mild tricuspid valve regurgitation.    PULMONIC VALVE: Unremarkable pulmonic valve leaflet morphology. No pulmonic valve stenosis or regurgitation noted.    PERICARDIUM: No pericardial effusion.    AORTA: Aortic root and proximal ascending aorta are normal in size on 2D imaging. There are mild atherosclerotic changes noted in visualized portions of ascending and descending aorta. No mobile plaques noted.    SYSTEM MEASUREMENT TABLES    CW  TR Vmax: 2.1 m/s  TR maxP.7 mmHg    [employer] Accredited Echocardiography Laboratory    Prepared and electronically signed by    Brody Bethea MD  Signed 23-Aug-2019 19:27:14       No results found for this or any previous visit.  No results found for this or any previous visit.        I reviewed and interpreted the following LABS/EKG/TELE/IMAGING and below is summary of my interpretation (if data available):      Current EKG and Rhythm Strip:afib rate controlled 65bpm    Reviewed echo images, moderate MR/TR and moderate LAE, normal EF

## 2025-07-10 ENCOUNTER — OFFICE VISIT (OUTPATIENT)
Dept: FAMILY MEDICINE CLINIC | Facility: CLINIC | Age: 65
End: 2025-07-10

## 2025-07-10 VITALS
HEIGHT: 61 IN | SYSTOLIC BLOOD PRESSURE: 114 MMHG | BODY MASS INDEX: 38.33 KG/M2 | RESPIRATION RATE: 16 BRPM | OXYGEN SATURATION: 98 % | HEART RATE: 85 BPM | TEMPERATURE: 97.6 F | DIASTOLIC BLOOD PRESSURE: 86 MMHG | WEIGHT: 203 LBS

## 2025-07-10 DIAGNOSIS — E66.812 CLASS 2 SEVERE OBESITY DUE TO EXCESS CALORIES WITH SERIOUS COMORBIDITY AND BODY MASS INDEX (BMI) OF 37.0 TO 37.9 IN ADULT (HCC): ICD-10-CM

## 2025-07-10 DIAGNOSIS — I48.20 CHRONIC ATRIAL FIBRILLATION (HCC): ICD-10-CM

## 2025-07-10 DIAGNOSIS — E03.2 HYPOTHYROIDISM DUE TO MEDICATION: ICD-10-CM

## 2025-07-10 DIAGNOSIS — Z00.00 MEDICARE ANNUAL WELLNESS VISIT, SUBSEQUENT: ICD-10-CM

## 2025-07-10 DIAGNOSIS — E55.9 VITAMIN D DEFICIENCY: ICD-10-CM

## 2025-07-10 DIAGNOSIS — D69.6 PLATELETS DECREASED (HCC): ICD-10-CM

## 2025-07-10 DIAGNOSIS — Z13.820 SCREENING FOR OSTEOPOROSIS: ICD-10-CM

## 2025-07-10 DIAGNOSIS — Z23 NEED FOR COVID-19 VACCINE: ICD-10-CM

## 2025-07-10 DIAGNOSIS — Z23 ENCOUNTER FOR IMMUNIZATION: Primary | ICD-10-CM

## 2025-07-10 DIAGNOSIS — E66.01 CLASS 2 SEVERE OBESITY DUE TO EXCESS CALORIES WITH SERIOUS COMORBIDITY AND BODY MASS INDEX (BMI) OF 37.0 TO 37.9 IN ADULT (HCC): ICD-10-CM

## 2025-07-10 DIAGNOSIS — G25.70 MEDICATION-INDUCED MOVEMENT DISORDER: ICD-10-CM

## 2025-07-10 DIAGNOSIS — F79 MENTAL DISABILITY: ICD-10-CM

## 2025-07-10 DIAGNOSIS — Z91.81 HISTORY OF FALLING: ICD-10-CM

## 2025-07-10 DIAGNOSIS — E78.5 HYPERLIPIDEMIA, UNSPECIFIED HYPERLIPIDEMIA TYPE: ICD-10-CM

## 2025-07-10 DIAGNOSIS — N39.490 OVERFLOW INCONTINENCE OF URINE: ICD-10-CM

## 2025-07-10 PROBLEM — S09.90XA HEAD INJURY: Status: RESOLVED | Noted: 2022-06-16 | Resolved: 2025-07-10

## 2025-07-10 PROCEDURE — 90750 HZV VACC RECOMBINANT IM: CPT

## 2025-07-10 PROCEDURE — 90471 IMMUNIZATION ADMIN: CPT

## 2025-07-10 PROCEDURE — 99214 OFFICE O/P EST MOD 30 MIN: CPT | Performed by: NURSE PRACTITIONER

## 2025-07-10 PROCEDURE — G0439 PPPS, SUBSEQ VISIT: HCPCS | Performed by: NURSE PRACTITIONER

## 2025-07-10 PROCEDURE — G2211 COMPLEX E/M VISIT ADD ON: HCPCS | Performed by: NURSE PRACTITIONER

## 2025-07-10 NOTE — PROGRESS NOTES
Name: Carole Burrows      : 1960      MRN: [id number]  Encounter Provider: LALA Lloyd  Encounter Date: 7/10/2025   Encounter department: [facility]  :  Assessment & Plan  Encounter for immunization    Orders:    Zoster Vaccine Recombinant IM    Overflow incontinence of urine  Has improved since last visit, patient has been more communicative with day staff        Vitamin D deficiency    Orders:    Vitamin D 25 hydroxy; Future    Class 2 severe obesity due to excess calories with serious comorbidity and body mass index (BMI) of 37.0 to 37.9 in adult (HCC)      Wt Readings from Last 6 Encounters:   07/10/25 92.1 kg (203 lb)   25 91.1 kg (200 lb 12.8 oz)   25 92.5 kg (204 lb)   25 84.5 kg (186 lb 3.2 oz)   24 84.8 kg (187 lb)   24 84.8 kg (187 lb)     Wt Readings from Last 3 Encounters:   07/10/25 92.1 kg (203 lb)   25 91.1 kg (200 lb 12.8 oz)   25 92.5 kg (204 lb)     -Encouraged diet and lifestyle changes: decrease processed foods (cakes, cookies, chips, soda), decrease total carbohydrate intake, decrease fried/fatty foods, increase fruits and vegetables, increase lean proteins (chicken, turkey), increase healthy fats (avocado, fish, nuts), drink plenty of water (at least four 16 oz bottles per day)    Orders:    CBC and differential; Future    Comprehensive metabolic panel; Future    Hemoglobin A1C; Future    Lipid panel; Future    TSH, 3rd generation with Free T4 reflex; Future    Vitamin D 25 hydroxy; Future    DXA bone density spine hip and pelvis; Future     Hyperlipidemia, unspecified hyperlipidemia type  Lab Results   Component Value Date    CHOLESTEROL 146 2024    CHOLESTEROL 160 2023    CHOLESTEROL 133 06/10/2021     Lab Results   Component Value Date    HDL 50 2024    HDL 53 2023    HDL 47 06/10/2021     Lab Results   Component Value Date    TRIG 110 2024    TRIG 113 2023     TRIG 109 06/10/2021     Lab Results   Component Value Date    NONHDLC 96 08/24/2024    NONHDLC 107 02/20/2023    NONHDLC 86 06/10/2021     Lab Results   Component Value Date    LDLCALC 74 08/24/2024       Continue pravastatin daily       Orders:    Comprehensive metabolic panel; Future    Lipid panel; Future     Mental disability  Lives in a group home, doing well         Platelets decreased (HCC)  Lab Results   Component Value Date    WBC 6.43 08/24/2024    HGB 15.0 08/24/2024    HCT 46.3 (H) 08/24/2024    MCV 91 08/24/2024     08/24/2024     Stable, continue to monitor         Hypothyroidism due to medication  Lab Results   Component Value Date    [id number] 1.332 08/24/2024     Continue levothyroxine 100 mcg daily       Orders:    TSH, 3rd generation with Free T4 reflex; Future     Screening for osteoporosis    Orders:    DXA bone density spine hip and pelvis; Future    Chronic atrial fibrillation (HCC)  Follows with cardiology   Rate controlled   Continue with Xaralto and Cardizem          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 They screened negative with a PHQ-2 score of 0.        Preventive health issues were discussed with patient, and age appropriate screening tests were ordered as noted in patient's After Visit Summary. Personalized health advice and appropriate referrals for health education or preventive services given if needed, as noted in patient's After Visit Summary.    History of Present Illness         Carole Markos is a 64 year old female with a history of Enlarged LA (left atrium), Hypothyroidism, Hyperlipidemia, Vitamin D  deficiency, Medication-induced movement disorder, Mental disability, Mood disorder (HCC), Class 2 severe obesity due to excess calories with serious comorbidity and body mass index (BMI) of 37.0 to 37.9 in adult (HCC), Severe anxiety, Atrial fibrillation (HCC), Leg edema.    She presents to the office today with her caregiver (who provides history) for follow up and AWV. Caregiver reports that patient has been eating increased chocolate and sweets. Also reports that there has been staff changes at the group home and that patient has been scratching herself 2/2 increased anxiety. Her psychiatrist is aware.     The following portions of the patient's history were reviewed and updated as appropriate: allergies, current medications, past family history, past medical history, past social history, past surgical history and problem list.               Patient Care Team:  LALA Lloyd as PCP - General (Family Medicine)  MD Ho Shore, LALA Cui    Review of Systems   Constitutional:  Negative for chills and fever.   HENT:  Negative for ear pain and sore throat.    Eyes:  Negative for pain and visual disturbance.   Respiratory:  Negative for cough and shortness of breath.    Cardiovascular:  Negative for chest pain and palpitations.   Gastrointestinal:  Negative for abdominal pain and vomiting.   Genitourinary:  Negative for dysuria and hematuria.   Musculoskeletal:  Negative for arthralgias and back pain.   Skin:  Negative for color change and rash.   Neurological:  Negative for seizures and syncope.   Psychiatric/Behavioral:  Positive for dysphoric mood and self-injury. The patient is nervous/anxious.    All other systems reviewed and are negative.    Medical History Reviewed by provider this encounter:       Annual Wellness Visit Questionnaire   Carole is here for her Subsequent Wellness visit.     Health Risk Assessment:   Patient rates overall health as good. Patient feels  that their physical health rating is same. Patient is satisfied with their life. Eyesight was rated as same. Hearing was rated as same. Patient feels that their emotional and mental health rating is same. Patients states they are never, rarely angry. Patient states they are never, rarely unusually tired/fatigued. Pain experienced in the last 7 days has been none. Patient states that she has experienced no weight loss or gain in last 6 months.     Depression Screening:   PHQ-2 Score: 0      Fall Risk Screening:   In the past year, patient has experienced: history of falling in past year    Number of falls: 1  Injured during fall?: Yes    Feels unsteady when standing or walking?: No    Worried about falling?: No      Urinary Incontinence Screening:   Patient has leaked urine accidently in the last six months.     Home Safety:  Patient does not have trouble with stairs inside or outside of their home. Patient has working smoke alarms and has working carbon monoxide detector. Home safety hazards include: none.     Nutrition:   Current diet is Regular.     Medications:   Patient is currently taking over-the-counter supplements. OTC medications include: see medication list. Patient is not able to manage medications.     Activities of Daily Living (ADLs)/Instrumental Activities of Daily Living (IADLs):   Walk and transfer into and out of bed and chair?: Yes  Dress and groom yourself?: Yes    Bathe or shower yourself?: Yes    Feed yourself? Yes  Do your laundry/housekeeping?: Yes  Manage your money, pay your bills and track your expenses?: No  Make your own meals?: No    Do your own shopping?: No    Previous Hospitalizations:   Any hospitalizations or ED visits within the last 12 months?: No      Advance Care Planning:   Living will: No    Durable POA for healthcare: No    Advanced directive: No      Preventive Screenings      Cardiovascular Screening:    General: Screening Not Indicated and History Lipid Disorder       Diabetes Screening:     General: Screening Current      Colorectal Cancer Screening:     General: Screening Current      Breast Cancer Screening:     General: Screening Current      Cervical Cancer Screening:    General: Screening Current      Osteoporosis Screening:    General: Risks and Benefits Discussed    Due for: Bone Density Ultrasound      Abdominal Aortic Aneurysm (AAA) Screening:        General: Screening Not Indicated      Lung Cancer Screening:     General: Screening Not Indicated      Hepatitis C Screening:    General: Screening Current    Immunizations:    - Risks/benefits immunizations discussed      Screening, Brief Intervention, and Referral to Treatment (SBIRT)     Screening  Typical number of drinks in a day: 0  Typical number of drinks in a week: 0  Interpretation: Low risk drinking behavior.    Single Item Drug Screening:  How often have you used an illegal drug (including marijuana) or a prescription medication for non-medical reasons in the past year? never    Single Item Drug Screen Score: 0  Interpretation: Negative screen for possible drug use disorder    Social Drivers of Health     Financial Resource Strain: Low Risk  (7/10/2025)    Overall Financial Resource Strain (CARDIA)     Difficulty of Paying Living Expenses: Not hard at all   Food Insecurity: No Food Insecurity (7/10/2025)    Nursing - Inadequate Food Risk Classification     Worried About Running Out of Food in the Last Year: Never true     Ran Out of Food in the Last Year: Never true   Transportation Needs: No Transportation Needs (7/10/2025)    PRAPARE - Transportation     Lack of Transportation (Medical): No     Lack of Transportation (Non-Medical): No   Housing Stability: Low Risk  (7/10/2025)    Housing Stability Vital Sign     Unable to Pay for Housing in the Last Year: No     Number of Times Moved in the Last Year: 1     Homeless in the Last Year: No   Utilities: Not At Risk (7/10/2025)    [facility] Utilities     Threatened with  "loss of utilities: No     No results found.    Objective   /86 (BP Location: Right arm, Patient Position: Sitting, Cuff Size: Standard)   Pulse 85   Temp 97.6 °F (36.4 °C) (Temporal)   Resp 16   Ht 5' 1\" (1.549 m)   Wt 92.1 kg (203 lb)   SpO2 98%   BMI 38.36 kg/m²     Physical Exam  Vitals and nursing note reviewed.   Constitutional:       General: She is not in acute distress.     Appearance: She is well-developed.   HENT:      Head: Normocephalic and atraumatic.      Right Ear: External ear normal.      Left Ear: External ear normal.      Mouth/Throat:      Mouth: Mucous membranes are moist.     Eyes:      Conjunctiva/sclera: Conjunctivae normal.      Comments: Glasses       Cardiovascular:      Rate and Rhythm: Normal rate and regular rhythm.   Pulmonary:      Effort: Pulmonary effort is normal. No respiratory distress.      Breath sounds: Normal breath sounds.     Musculoskeletal:         General: No swelling.      Cervical back: Neck supple.     Skin:     General: Skin is warm and dry.      Capillary Refill: Capillary refill takes less than 2 seconds.      Findings: Lesion present.      Comments: Several scabbed lesions to the right forearm due to patient scratching/ pinching herself      Neurological:      Mental Status: She is alert. Mental status is at baseline.     Psychiatric:         Mood and Affect: Mood normal.         Immunization History   Administered Date(s) Administered    COVID-19 MODERNA VACC 0.5 ML IM 02/05/2021, 03/08/2021, 12/23/2021    COVID-19 Pfizer Vac BIVALENT Dud[place]-sucrose 12 Yr+ IM 01/05/2023, 01/05/2023    INFLUENZA 10/31/2016, 10/13/2017, 10/25/2017, 10/10/2018, 10/26/2022    Influenza Recombinant Preservative Free Im 10/15/2024    Influenza, injectable, quadrivalent, preservative free 0.5 mL 09/20/2023    Influenza, recombinant, quadrivalent,injectable, preservative free 11/15/2019, 10/14/2020, 10/06/2021, 10/26/2022    Influenza, seasonal, injectable 11/14/2014, " 10/25/2017    Pneumococcal Conjugate Vaccine 20-valent (Pcv20), Polysace 01/05/2023, 01/05/2023    Tdap 06/11/2013, 11/01/2017    Tuberculin Skin Test-PPD Intradermal 06/11/2013, 06/12/2015, 06/26/2017, 06/24/2019, 06/21/2021, 06/21/2023    Zoster Vaccine Recombinant 07/10/2025

## 2025-07-10 NOTE — ASSESSMENT & PLAN NOTE
Lab Results   Component Value Date    CHOLESTEROL 146 08/24/2024    CHOLESTEROL 160 02/20/2023    CHOLESTEROL 133 06/10/2021     Lab Results   Component Value Date    HDL 50 08/24/2024    HDL 53 02/20/2023    HDL 47 06/10/2021     Lab Results   Component Value Date    TRIG 110 08/24/2024    TRIG 113 02/20/2023    TRIG 109 06/10/2021     Lab Results   Component Value Date    NONHDLC 96 08/24/2024    NONHDLC 107 02/20/2023    NONHDLC 86 06/10/2021     Lab Results   Component Value Date    LDLCALC 74 08/24/2024       Continue pravastatin daily       Orders:    Comprehensive metabolic panel; Future    Lipid panel; Future

## 2025-07-10 NOTE — ASSESSMENT & PLAN NOTE
Lab Results   Component Value Date    [id number] 1.332 08/24/2024     Continue levothyroxine 100 mcg daily       Orders:    TSH, 3rd generation with Free T4 reflex; Future

## 2025-07-10 NOTE — ASSESSMENT & PLAN NOTE
Lab Results   Component Value Date    WBC 6.43 08/24/2024    HGB 15.0 08/24/2024    HCT 46.3 (H) 08/24/2024    MCV 91 08/24/2024     08/24/2024     Stable, continue to monitor

## 2025-07-10 NOTE — ASSESSMENT & PLAN NOTE
Wt Readings from Last 6 Encounters:   07/10/25 92.1 kg (203 lb)   06/18/25 91.1 kg (200 lb 12.8 oz)   05/28/25 92.5 kg (204 lb)   03/24/25 84.5 kg (186 lb 3.2 oz)   12/05/24 84.8 kg (187 lb)   07/24/24 84.8 kg (187 lb)     Wt Readings from Last 3 Encounters:   07/10/25 92.1 kg (203 lb)   06/18/25 91.1 kg (200 lb 12.8 oz)   05/28/25 92.5 kg (204 lb)     -Encouraged diet and lifestyle changes: decrease processed foods (cakes, cookies, chips, soda), decrease total carbohydrate intake, decrease fried/fatty foods, increase fruits and vegetables, increase lean proteins (chicken, turkey), increase healthy fats (avocado, fish, nuts), drink plenty of water (at least four 16 oz bottles per day)    Orders:    CBC and differential; Future    Comprehensive metabolic panel; Future    Hemoglobin A1C; Future    Lipid panel; Future    TSH, 3rd generation with Free T4 reflex; Future    Vitamin D 25 hydroxy; Future    DXA bone density spine hip and pelvis; Future

## 2025-07-10 NOTE — ASSESSMENT & PLAN NOTE
Fall in bathroom  in March due to loss of balance, no additional episodes exam today revealed no balance deficit, or unsteadiness

## 2025-07-12 DIAGNOSIS — Z78.9 LIVES IN GROUP HOME: ICD-10-CM

## 2025-07-12 DIAGNOSIS — R05.9 COUGH, UNSPECIFIED TYPE: ICD-10-CM

## 2025-07-12 DIAGNOSIS — F71 MODERATE INTELLECTUAL DISABILITIES: ICD-10-CM

## 2025-07-12 DIAGNOSIS — L30.4 INTERTRIGO: Primary | ICD-10-CM

## 2025-07-15 ENCOUNTER — PATIENT MESSAGE (OUTPATIENT)
Dept: FAMILY MEDICINE CLINIC | Facility: CLINIC | Age: 65
End: 2025-07-15

## 2025-07-15 RX ORDER — SENNOSIDES 8.6 MG
CAPSULE ORAL
Qty: 60 TABLET | Refills: 0 | Status: SHIPPED | OUTPATIENT
Start: 2025-07-15

## 2025-07-15 RX ORDER — DOCUSATE SODIUM 100 MG/1
CAPSULE, LIQUID FILLED ORAL
Qty: 60 CAPSULE | Refills: 0 | Status: SHIPPED | OUTPATIENT
Start: 2025-07-15

## 2025-07-15 RX ORDER — BISMUTH SUBSALICYLATE 525 MG/15ML
SUSPENSION ORAL
Qty: 60 TABLET | Refills: 0 | Status: SHIPPED | OUTPATIENT
Start: 2025-07-15

## 2025-07-15 RX ORDER — IBUPROFEN 200 MG
CAPSULE ORAL
Qty: 28.3 G | Refills: 0 | Status: SHIPPED | OUTPATIENT
Start: 2025-07-15

## 2025-07-15 RX ORDER — IBUPROFEN 200 MG
TABLET ORAL
Qty: 120 TABLET | Refills: 0 | Status: SHIPPED | OUTPATIENT
Start: 2025-07-15

## 2025-07-15 RX ORDER — BENZONATATE 200 MG/1
CAPSULE ORAL
Qty: 20 CAPSULE | Refills: 0 | Status: SHIPPED | OUTPATIENT
Start: 2025-07-15

## 2025-07-15 RX ORDER — CLOTRIMAZOLE AND BETAMETHASONE DIPROPIONATE 10; .64 MG/G; MG/G
CREAM TOPICAL
Qty: 15 G | Refills: 0 | Status: SHIPPED | OUTPATIENT
Start: 2025-07-15

## 2025-07-15 RX ORDER — HYDROCORTISONE 1 G/100G
CREAM TOPICAL
Refills: 0 | OUTPATIENT
Start: 2025-07-15

## 2025-07-29 ENCOUNTER — TELEPHONE (OUTPATIENT)
Dept: FAMILY MEDICINE CLINIC | Facility: CLINIC | Age: 65
End: 2025-07-29

## 2025-07-29 ENCOUNTER — HOSPITAL ENCOUNTER (OUTPATIENT)
Dept: RADIOLOGY | Facility: IMAGING CENTER | Age: 65
Discharge: HOME | End: 2025-07-29
Payer: MEDICARE

## 2025-07-29 VITALS — BODY MASS INDEX: 38.33 KG/M2 | HEIGHT: 61 IN | WEIGHT: 203 LBS

## 2025-07-29 DIAGNOSIS — Z12.31 ENCOUNTER FOR SCREENING MAMMOGRAM FOR MALIGNANT NEOPLASM OF BREAST: ICD-10-CM

## 2025-07-29 DIAGNOSIS — E78.5 HYPERLIPIDEMIA, UNSPECIFIED HYPERLIPIDEMIA TYPE: Primary | ICD-10-CM

## 2025-07-29 PROCEDURE — 77067 SCR MAMMO BI INCL CAD: CPT

## 2025-07-29 PROCEDURE — 77063 BREAST TOMOSYNTHESIS BI: CPT

## 2025-07-29 RX ORDER — OMEGA-3-ACID ETHYL ESTERS 1 G/1
1 CAPSULE, LIQUID FILLED ORAL DAILY
Qty: 90 CAPSULE | Refills: 3 | Status: SHIPPED | OUTPATIENT
Start: 2025-07-29